# Patient Record
Sex: FEMALE | Race: WHITE | NOT HISPANIC OR LATINO | Employment: OTHER | URBAN - METROPOLITAN AREA
[De-identification: names, ages, dates, MRNs, and addresses within clinical notes are randomized per-mention and may not be internally consistent; named-entity substitution may affect disease eponyms.]

---

## 2017-01-10 ENCOUNTER — OFFICE VISIT (OUTPATIENT)
Dept: INTERNAL MEDICINE | Facility: CLINIC | Age: 82
End: 2017-01-10

## 2017-01-10 ENCOUNTER — HOSPITAL ENCOUNTER (OUTPATIENT)
Dept: CARDIOLOGY | Facility: HOSPITAL | Age: 82
Setting detail: RECURRING SERIES
Discharge: HOME | End: 2017-01-10

## 2017-01-10 VITALS
TEMPERATURE: 97.9 F | WEIGHT: 142 LBS | HEART RATE: 76 BPM | BODY MASS INDEX: 25.97 KG/M2 | OXYGEN SATURATION: 98 % | SYSTOLIC BLOOD PRESSURE: 118 MMHG | DIASTOLIC BLOOD PRESSURE: 78 MMHG

## 2017-01-10 DIAGNOSIS — R82.90 ABNORMAL FINDING IN URINE: ICD-10-CM

## 2017-01-10 DIAGNOSIS — R10.84 GENERALIZED ABDOMINAL PAIN: ICD-10-CM

## 2017-01-10 DIAGNOSIS — R19.7 DIARRHEA, UNSPECIFIED TYPE: Primary | ICD-10-CM

## 2017-01-10 LAB
ALBUMIN SERPL-MCNC: 3.68 G/DL (ref 3.4–4.6)
ALBUMIN/GLOB SERPL: 1.1 G/DL
ALP SERPL-CCNC: 75 U/L (ref 46–116)
ALT SERPL W P-5'-P-CCNC: 21 U/L (ref 14–59)
ANION GAP SERPL CALCULATED.3IONS-SCNC: 9 MMOL/L
AST SERPL-CCNC: 22 U/L (ref 7–37)
BACTERIA UR QL AUTO: ABNORMAL /HPF
BASOPHILS # BLD AUTO: 0.01 10*3/MM3 (ref 0–0.2)
BASOPHILS NFR BLD AUTO: 0.1 % (ref 0–2)
BILIRUB SERPL-MCNC: 0.4 MG/DL (ref 0.2–1)
BILIRUB UR QL STRIP: NEGATIVE
BUN BLD-MCNC: 29 MG/DL (ref 6–22)
BUN/CREAT SERPL: 24.6 (ref 7–25)
CALCIUM SPEC-SCNC: 9.1 MG/DL (ref 8.6–10.5)
CHLORIDE SERPL-SCNC: 99 MMOL/L (ref 95–107)
CLARITY UR: ABNORMAL
CO2 SERPL-SCNC: 29 MMOL/L (ref 23–32)
COLOR UR: YELLOW
CREAT BLD-MCNC: 1.18 MG/DL (ref 0.55–1.02)
DEPRECATED RDW RBC AUTO: 55.7 FL (ref 37–54)
EOSINOPHIL # BLD AUTO: 0.07 10*3/MM3 (ref 0–0.7)
EOSINOPHIL NFR BLD AUTO: 0.8 % (ref 0–5)
ERYTHROCYTE [DISTWIDTH] IN BLOOD BY AUTOMATED COUNT: 16.8 % (ref 11.5–15)
GFR SERPL CREATININE-BSD FRML MDRD: 43 ML/MIN/1.73
GLOBULIN UR ELPH-MCNC: 3.2 GM/DL
GLUCOSE BLD-MCNC: 97 MG/DL (ref 70–100)
GLUCOSE UR STRIP-MCNC: NEGATIVE MG/DL
HCT VFR BLD AUTO: 38.7 % (ref 34.1–44.9)
HGB BLD-MCNC: 11.9 G/DL (ref 11.2–15.7)
HGB UR QL STRIP.AUTO: ABNORMAL
HYALINE CASTS UR QL AUTO: ABNORMAL /LPF
KETONES UR QL STRIP: NEGATIVE
LEUKOCYTE ESTERASE UR QL STRIP.AUTO: ABNORMAL
LYMPHOCYTES # BLD AUTO: 0.92 10*3/MM3 (ref 0.8–7)
LYMPHOCYTES NFR BLD AUTO: 11.1 % (ref 10–60)
MCH RBC QN AUTO: 28.5 PG (ref 26–34)
MCHC RBC AUTO-ENTMCNC: 30.7 G/DL (ref 31–37)
MCV RBC AUTO: 92.6 FL (ref 80–100)
MONOCYTES # BLD AUTO: 0.87 10*3/MM3 (ref 0–1)
MONOCYTES NFR BLD AUTO: 10.5 % (ref 0–13)
NEUTROPHILS # BLD AUTO: 6.41 10*3/MM3 (ref 1–11)
NEUTROPHILS NFR BLD AUTO: 77.5 % (ref 30–85)
NITRITE UR QL STRIP: NEGATIVE
PH UR STRIP.AUTO: <=5 [PH] (ref 5–8)
PLATELET # BLD AUTO: 192 10*3/MM3 (ref 150–450)
PMV BLD AUTO: 11.2 FL (ref 6–12)
POTASSIUM BLD-SCNC: 4.5 MMOL/L (ref 3.3–5.3)
PROT SERPL-MCNC: 6.9 G/DL (ref 6.3–8.4)
PROT UR QL STRIP: NEGATIVE
RBC # BLD AUTO: 4.18 10*6/MM3 (ref 3.93–5.22)
RBC # UR: ABNORMAL /HPF
REF LAB TEST METHOD: ABNORMAL
SODIUM BLD-SCNC: 137 MMOL/L (ref 136–145)
SP GR UR STRIP: 1.01 (ref 1–1.03)
SQUAMOUS #/AREA URNS HPF: ABNORMAL /HPF
UROBILINOGEN UR QL STRIP: ABNORMAL
WBC NRBC COR # BLD: 8.28 10*3/MM3 (ref 5–10)
WBC UR QL AUTO: ABNORMAL /HPF

## 2017-01-10 PROCEDURE — 36416 COLLJ CAPILLARY BLOOD SPEC: CPT | Performed by: INTERNAL MEDICINE

## 2017-01-10 PROCEDURE — 81001 URINALYSIS AUTO W/SCOPE: CPT | Performed by: NURSE PRACTITIONER

## 2017-01-10 PROCEDURE — 80053 COMPREHEN METABOLIC PANEL: CPT | Performed by: NURSE PRACTITIONER

## 2017-01-10 PROCEDURE — 85610 PROTHROMBIN TIME: CPT | Performed by: INTERNAL MEDICINE

## 2017-01-10 PROCEDURE — 85025 COMPLETE CBC W/AUTO DIFF WBC: CPT | Performed by: NURSE PRACTITIONER

## 2017-01-10 PROCEDURE — 99213 OFFICE O/P EST LOW 20 MIN: CPT | Performed by: NURSE PRACTITIONER

## 2017-01-10 RX ORDER — DICYCLOMINE HYDROCHLORIDE 10 MG/1
10 CAPSULE ORAL EVERY 6 HOURS PRN
Qty: 30 CAPSULE | Refills: 0 | Status: SHIPPED | OUTPATIENT
Start: 2017-01-10 | End: 2017-02-07

## 2017-01-10 NOTE — PROGRESS NOTES
Subjective   Yudith Robertson is a 89 y.o. female.     HPI Comments: She had similar symptoms around cherry for 3 days and it resolved itself. She started having diarrhea on Sunday, January 8, 2017. She stopped taking miralax on Sunday when diarrhea started.     Diarrhea    The problem occurs more than 10 times per day. The problem has been gradually worsening. The patient states that diarrhea awakens her from sleep. Associated symptoms include abdominal pain, bloating and increased flatus. Pertinent negatives include no chills, coughing, fever or vomiting. Associated symptoms comments: reflux. Risk factors include travel to endemic area, suspect food intake, recent hospitalization, recent antibiotic use and ill contacts. Her past medical history is significant for irritable bowel syndrome.        The following portions of the patient's history were reviewed and updated as appropriate: allergies, current medications, past social history, past surgical history and problem list.    Review of Systems   Constitutional: Positive for appetite change. Negative for activity change, chills, fatigue and fever.   Respiratory: Positive for shortness of breath (chronic at baseline ). Negative for cough and wheezing.    Cardiovascular: Negative for chest pain, palpitations and leg swelling.   Gastrointestinal: Positive for abdominal pain, bloating, diarrhea, flatus and nausea. Negative for blood in stool and vomiting.   Genitourinary: Positive for dysuria (intermittent, chronic).       Objective   Physical Exam   Constitutional: She is oriented to person, place, and time. She appears well-developed and well-nourished.   HENT:   Head: Normocephalic.   Nose: Nose normal.   Cardiovascular: Regular rhythm and normal heart sounds.  Exam reveals no S3 and no S4.    No murmur heard.  Pulmonary/Chest: Effort normal and breath sounds normal. She has no decreased breath sounds. She has no wheezes. She has no rhonchi. She has no rales.    Abdominal: Normal appearance and bowel sounds are normal. There is no hepatosplenomegaly. There is generalized tenderness. There is no CVA tenderness.   Musculoskeletal: She exhibits no edema.   Neurological: She is alert and oriented to person, place, and time. Gait normal.   Skin: Skin is warm and dry.   Psychiatric: She has a normal mood and affect.       Assessment/Plan   Yudith was seen today for diarrhea.    Diagnoses and all orders for this visit:    Diarrhea, unspecified type  Comments:  may take immodium prn   Orders:  -     Clostridium Difficile Toxin, PCR; Future  -     Stool Culture; Future  -     Fecal Leukocytes; Future  -     Ova & Parasite Examination; Future  -     Clostridium Difficile Toxin, PCR  -     Stool Culture  -     Fecal Leukocytes  -     Ova & Parasite Examination    Generalized abdominal pain  Comments:  intermittent cramping   Orders:  -     CBC Auto Differential; Future  -     Comprehensive Metabolic Panel; Future  -     Urinalysis With / Microscopic If Indicated; Future  -     dicyclomine (BENTYL) 10 MG capsule; Take 1 capsule by mouth Every 6 (Six) Hours As Needed (cramping).  -     CT Abdomen Pelvis With & Without Contrast; Future  -     CBC Auto Differential  -     Comprehensive Metabolic Panel  -     Urinalysis With / Microscopic If Indicated  -     Urinalysis, Microscopic Only; Future  -     Urinalysis, Microscopic Only    Abnormal finding in urine  -     Urine Culture; Future  -     Urine Culture    I encouraged patient to keep hydrated. She may need to see GI. Will check labs , stool and obtain imaging (r/o diverticulitis). If symptoms worsen go to ER.

## 2017-01-10 NOTE — MR AVS SNAPSHOT
Yudith Robertson   1/10/2017 9:15 AM   Office Visit    Dept Phone:  [phone]   Encounter #:  [id number]    Provider:  STEFANO Gibbs   Department:  [facility] INTERNAL MEDICINE                Your Full Care Plan              Today's Medication Changes          These changes are accurate as of: 1/10/17 10:16 AM.  If you have any questions, ask your nurse or doctor.               New Medication(s)Ordered:     dicyclomine 10 MG capsule   Commonly known as:  BENTYL   Take 1 capsule by mouth Every 6 (Six) Hours As Needed (cramping).            Where to Get Your Medications      These medications were sent to [facility] [address] [place] [address] - [address] [place] [address] - [phone]  - [phone] [place] [address]     Phone:  [phone]     dicyclomine 10 MG capsule                  Your Updated Medication List          This list is accurate as of: 1/10/17 10:16 AM.  Always use your most recent med list.                acetaminophen 500 MG tablet   Commonly known as:  TYLENOL       aspirin 81 MG EC tablet       carvedilol 12.5 MG tablet   Commonly known as:  COREG   Take 1 tablet by mouth 2 (two) times a day with meals.       clotrimazole-betamethasone 1-0.05 % cream   Commonly known as:  LOTRISONE       dicyclomine 10 MG capsule   Commonly known as:  BENTYL   Take 1 capsule by mouth Every 6 (Six) Hours As Needed (cramping).       fexofenadine 180 MG tablet   Commonly known as:  ALLEGRA   Take 1 tablet by mouth Daily.       furosemide 40 MG tablet   Commonly known as:  LASIX   1 Daily       isosorbide mononitrate 60 MG 24 hr tablet   Commonly known as:  IMDUR   Take 1 tablet by mouth  twice a day       lansoprazole 15 MG capsule   Commonly known as:  PREVACID   Take 1 capsule by mouth Daily.       LORazepam 0.5 MG tablet   Commonly known as:  ATIVAN   Take 1 tablet by mouth Every 8 (Eight) Hours As Needed for anxiety.       NIFEdipine XL 30  MG 24 hr tablet   Commonly known as:  PROCARDIA XL       polyethylene glycol powder   Commonly known as:  MIRALAX       potassium chloride 20 MEQ CR tablet   Commonly known as:  K-DUR,KLOR-CON   Take 1 tablet by mouth Daily.       simvastatin 40 MG tablet   Commonly known as:  ZOCOR   Take 1 tablet by mouth  daily       valsartan 320 MG tablet   Commonly known as:  DIOVAN       warfarin 3 MG tablet   Commonly known as:  COUMADIN   Take 1 tablet by mouth  daily or as directed               You Were Diagnosed With        Codes Comments    Diarrhea, unspecified type    -  Primary ICD-10-CM: R19.7  ICD-9-CM: 787.91 may take immodium prn     Generalized abdominal pain     ICD-10-CM: R10.84  ICD-9-CM: 789.07 intermittent cramping       Instructions     None    Patient Instructions History      Upcoming Appointments     Visit Type Date Time Department    ACUTE           1/10/2017  9:15 AM [facility]    FOLLOW UP 2/7/2017 10:00 AM [facility]    FOLLOW UP 3/13/2017 11:00 AM [facility]     Our records indicate that you have an active [employer] account.    You can view your After Visit Summary by going to [employer] and logging in with your [employer] username and password.  If you don't have a [employer] username and password but a parent or guardian has access to your record, the parent or guardian should login with their own [employer] username and password and access your record to view the After Visit Summary.    If you have questions, you can email [email or web address] or call [phone] to talk to our [employer] staff.  Remember, [employer] is NOT to be used for urgent needs.  For medical emergencies, dial 911.               Other Info from Your Visit           Your Appointments     Feb 07, 2017 10:00 AM EST   Follow Up with Felicitas Brantley MD   [facility] [place] [facility] (--)    [address]  [facility] [address]    [phone]           Arrive 15 minutes prior to appointment.            Mar 13, 2017 11:00 AM EDT   Follow Up with Deon Garzon MD   [facility] INTERNAL MEDICINE (--)    [address] [place] [id number] [phone]           Arrive 15 minutes prior to appointment.              Allergies     Morphine        Reason for Visit     Diarrhea           Vital Signs     Blood Pressure Pulse Temperature Weight Oxygen Saturation Body Mass Index    118/78 (BP Location: Left arm, Patient Position: Sitting, Cuff Size: Adult) 76 97.9 °F (36.6 °C) 142 lb (64.4 kg) 98% 25.97 kg/m2    Smoking Status                   Former Smoker           Problems and Diagnoses Noted     Diarrhea    -  Primary    Generalized abdominal pain

## 2017-01-11 ENCOUNTER — TELEPHONE (OUTPATIENT)
Dept: INTERNAL MEDICINE | Facility: CLINIC | Age: 82
End: 2017-01-11

## 2017-01-11 LAB — C DIFF TOX GENS STL QL NAA+PROBE: NEGATIVE

## 2017-01-11 NOTE — TELEPHONE ENCOUNTER
I called patient to follow up from office visit yesterday. She reports the stool frequency has diminished and is more formed (5 since yesterday) . No vomiting, fever, chills or abdominal pain. She is tolerated little food and drinking gatorade. She is scheduled for CT scan 1/17/16, but may not need to have performed if she continues to get better which we discussed. I informed her of lab results and will provide further update once specimen results are available. She is having indigestion and asked if ok to take some mylanta, which I said was ok.

## 2017-01-12 ENCOUNTER — TELEPHONE (OUTPATIENT)
Dept: INTERNAL MEDICINE | Facility: CLINIC | Age: 82
End: 2017-01-12

## 2017-01-12 DIAGNOSIS — N39.0 ACUTE UTI: Primary | ICD-10-CM

## 2017-01-12 LAB
Lab: NORMAL
O+P SPEC MICRO: NORMAL
OVA + PARASITE RESULT 1: NORMAL
WBC STL QL MICRO: NORMAL

## 2017-01-12 RX ORDER — CIPROFLOXACIN 250 MG/1
250 TABLET, FILM COATED ORAL 2 TIMES DAILY
Qty: 10 TABLET | Refills: 0 | Status: SHIPPED | OUTPATIENT
Start: 2017-01-12 | End: 2017-01-17

## 2017-01-12 NOTE — TELEPHONE ENCOUNTER
Pt informed.  Wanted to make sure we can call her with rest of results before the 17th as that is when she is scheduled for CT and only wants to get that done if she absolutely must.  Thanks

## 2017-01-12 NOTE — TELEPHONE ENCOUNTER
----- Message from Maglaie Moon MA sent at 1/12/2017  2:34 PM EST -----  pls call pt with results to UA/C&S as soon as able   Pt also asks if she should have the other test you spoke to her about.    Pt#[phone]    ##pt was very vague

## 2017-01-13 ENCOUNTER — TELEPHONE (OUTPATIENT)
Dept: INTERNAL MEDICINE | Facility: CLINIC | Age: 82
End: 2017-01-13

## 2017-01-13 LAB
BACTERIA UR CULT: ABNORMAL
Lab: ABNORMAL
Lab: ABNORMAL
SUSCEPTIBILITY TESTING: ABNORMAL

## 2017-01-13 NOTE — TELEPHONE ENCOUNTER
----- Message from Jacki Ross sent at 1/13/2017 12:01 PM EST -----  Contact: pt  Pt calling, she would like to know if she needs to come back in a follow up after she finishes her antibiotic.     #[phone]    Kati

## 2017-01-13 NOTE — TELEPHONE ENCOUNTER
Pt informed.  She is canceling CT scan as  is having cataract surgery and her symptoms have improved.

## 2017-01-14 LAB
CAMPYLOBACTER STL CULT: NORMAL
E COLI SXT STL QL IA: NEGATIVE
Lab: NORMAL
Lab: NORMAL
SALM + SHIG STL CULT: NORMAL

## 2017-01-17 ENCOUNTER — APPOINTMENT (OUTPATIENT)
Dept: CT IMAGING | Facility: HOSPITAL | Age: 82
End: 2017-01-17

## 2017-01-17 RX ORDER — CARVEDILOL 12.5 MG/1
TABLET ORAL
Qty: 180 TABLET | Refills: 1 | Status: SHIPPED | OUTPATIENT
Start: 2017-01-17 | End: 2017-03-31 | Stop reason: SDUPTHER

## 2017-01-17 RX ORDER — VALSARTAN 320 MG/1
320 TABLET ORAL DAILY
Qty: 90 TABLET | Refills: 1 | Status: SHIPPED | OUTPATIENT
Start: 2017-01-17 | End: 2017-06-23 | Stop reason: SDUPTHER

## 2017-01-24 ENCOUNTER — HOSPITAL ENCOUNTER (OUTPATIENT)
Dept: CARDIOLOGY | Facility: HOSPITAL | Age: 82
Setting detail: RECURRING SERIES
Discharge: HOME | End: 2017-01-24

## 2017-01-24 PROCEDURE — 36416 COLLJ CAPILLARY BLOOD SPEC: CPT | Performed by: INTERNAL MEDICINE

## 2017-01-24 PROCEDURE — 85610 PROTHROMBIN TIME: CPT | Performed by: INTERNAL MEDICINE

## 2017-02-07 ENCOUNTER — OFFICE VISIT (OUTPATIENT)
Dept: CARDIOLOGY | Facility: CLINIC | Age: 82
End: 2017-02-07

## 2017-02-07 ENCOUNTER — HOSPITAL ENCOUNTER (OUTPATIENT)
Dept: CARDIOLOGY | Facility: HOSPITAL | Age: 82
Setting detail: RECURRING SERIES
Discharge: HOME | End: 2017-02-07

## 2017-02-07 VITALS
HEART RATE: 72 BPM | DIASTOLIC BLOOD PRESSURE: 66 MMHG | BODY MASS INDEX: 28.66 KG/M2 | HEIGHT: 60 IN | WEIGHT: 146 LBS | SYSTOLIC BLOOD PRESSURE: 130 MMHG | RESPIRATION RATE: 16 BRPM

## 2017-02-07 DIAGNOSIS — R92.1 BREAST CALCIFICATIONS: ICD-10-CM

## 2017-02-07 DIAGNOSIS — S09.90XA CLOSED HEAD INJURY, INITIAL ENCOUNTER: ICD-10-CM

## 2017-02-07 DIAGNOSIS — I48.20 CHRONIC ATRIAL FIBRILLATION (HCC): Primary | ICD-10-CM

## 2017-02-07 PROCEDURE — 93000 ELECTROCARDIOGRAM COMPLETE: CPT | Performed by: INTERNAL MEDICINE

## 2017-02-07 PROCEDURE — 36416 COLLJ CAPILLARY BLOOD SPEC: CPT

## 2017-02-07 PROCEDURE — 99213 OFFICE O/P EST LOW 20 MIN: CPT | Performed by: INTERNAL MEDICINE

## 2017-02-07 PROCEDURE — 85610 PROTHROMBIN TIME: CPT

## 2017-02-07 NOTE — PROGRESS NOTES
PATIENTINFORMATION    Date of Office Visit: 2017  Encounter Provider: Felicitas Brantley MD  Place of Service: [facility] CARDIOLOGY  Patient Name: Yudith Robertson  : 1927    Subjective:     Encounter Date:2017      Patient ID: Yudith Robertson is a 89 y.o. female.      History of Present Illness    This is an 88-year-old woman who is well known to me. She had a heart catheterization in , which showed nonobstructive disease with 30%-40% stenoses in various vessels and an ejection fraction of 50%. She came to [facility] in 2015 with chest pain. She had a PET stress test 2/15 in our office, which was normal and showed normal left ventricular function. Echocardiogram was in 2014 and showed normal left ventricular systolic function with an ejection fraction of 54%. There was abnormal diastolic filling pressures. There was mild to moderate mitral regurgitation, moderate to severe tricuspid regurgitation, and right ventricular systolic pressure of 52 mmHg. She has persistent atrial fibrillation.       She came back to the hospital on 2015. Her blood pressure, which is generally well controlled, was elevated that day. She took three nitroglycerins at home, but it still remained about 200/100 mmHg and she came to the emergency room. There, she had some nitro paste and her symptoms resolved. She ruled out for a myocardial infarction and discharged home on the same home medications.       She was admitted to [facility] on 2015, with acute diastolic congestive heart failure. She diuresed with intravenous Lasix and then with oral Lasix. She complained of chest pain, which sounded like an esophageal stricture. Dr. Morales saw her and recommended an outpatient EGD with Dr. Abrams. She did have this and had an esophageal stricture dilated.      I saw her in 2016. At that time she was complaining of shortness of breath with paroxysmal nocturnal dyspnea and  orthopnea. Because of this, she had a repeat echocardiogram performed on August 3, 2016:  · Left ventricular function is normal. Calculated EF = 63.6%. Estimated EF was in agreement with the calculated EF. Estimated EF = 64%. Normal left ventricular cavity size noted. Left ventricular wall thickness is consistent with mild concentric hypertrophy. Left ventricular diastolic function was unable to be assessed. Elevated left atrial pressure.  · The following segments are hypokinetic: basal inferior and mid inferior. All other segments are normal.  · Left atrial volume is severely increased.  · Right atrial cavity size is severely dilated  · There is severe thickening of the aortic valve. Mild aortic valve regurgitation is present.  · Severe mitral annular calcification is present. Mild mitral valve regurgitation is present.  · The tricuspid valve is grossly normal. Moderate tricuspid valve stenosis is present. Mild tricuspid valve regurgitation is present. Estimated right ventricular systolic pressure from tricuspid regurgitation is moderately elevated (45-55 mmHg). The calculated RV systolic pressures 51 mmHg.     She comes in today with really no new complaints.  She continues to have some palpitations which she notices mostly at night and sometimes during the day.  She is short of breath with exertion such as walking area and she does sleep on 2 pillows but does not have any paroxysmal nocturnal dyspnea or lower extremity edema.  She does have some lightheadedness mostly with position change.  She has a sensation she might fall backwards sometimes.  She is fatigued and does not have her usual energy levels.    Review of Systems   Constitution: Negative for fever, malaise/fatigue, weight gain and weight loss.   HENT: Positive for hearing loss. Negative for ear pain, nosebleeds and sore throat.    Eyes: Positive for vision loss in left eye and vision loss in right eye. Negative for double vision and pain.  "  Cardiovascular:        See history of present illness.   Respiratory: Negative for cough, shortness of breath, sleep disturbances due to breathing, snoring and wheezing.    Endocrine: Positive for cold intolerance and heat intolerance. Negative for polyuria.   Skin: Negative for itching, poor wound healing and rash.   Musculoskeletal: Positive for joint pain and myalgias. Negative for joint swelling.   Gastrointestinal: Negative for abdominal pain, diarrhea, hematochezia, nausea and vomiting.   Genitourinary: Negative for hematuria and hesitancy.   Neurological: Negative for numbness, paresthesias and seizures.   Psychiatric/Behavioral: Negative for depression. The patient is nervous/anxious.            ECG 12 Lead  Date/Time: 2/7/2017 11:05 AM  Performed by: LEE ANN REARDON  Authorized by: LEE ANN REARDON   Comparison: compared with previous ECG from 11/8/2016  Similar to previous ECG  Rhythm: atrial fibrillation  BPM: 72  Conduction: non-specific intraventricular conduction delay  Clinical impression: abnormal ECG               Objective:       Visit Vitals   • /66 (BP Location: Left arm, Patient Position: Sitting, Cuff Size: Adult)   • Pulse 72   • Resp 16   • Ht 60\" (152.4 cm)   • Wt 146 lb (66.2 kg)   • BMI 28.51 kg/m2    Body mass index is 28.51 kg/(m^2).     Physical Exam   Constitutional: She appears well-developed.   HENT:   Head: Normocephalic and atraumatic.   Eyes: Conjunctivae and lids are normal. Pupils are equal, round, and reactive to light. Lids are everted and swept, no foreign bodies found.   Neck: Normal range of motion. No JVD present. Carotid bruit is not present. No tracheal deviation present. No thyroid mass present.   Cardiovascular: Normal rate and normal heart sounds.  An irregularly irregular rhythm present.   Pulses:       Dorsalis pedis pulses are 2+ on the right side, and 2+ on the left side.   Pulmonary/Chest: Effort normal and breath sounds normal.   Abdominal: Normal " appearance and bowel sounds are normal.   Musculoskeletal: Normal range of motion.   Neurological: She is alert. She has normal strength.   Skin: Skin is warm, dry and intact.   Psychiatric: She has a normal mood and affect. Her behavior is normal.   Vitals reviewed.          Assessment/Plan:       1. Dyspnea.  This has improved. She seems euvolemic. I am not going to adjust her medications today.  2. Chest pain. She has had years of chest pain. As mentioned, she has had a heart catheterization in the past, which showed nonobstructive disease. Her most recent stress was in 02/2015 and was normal.  She has problems with esophageal strictures and follows with Dr. Abrams.  3. Hypertension.  Her blood pressure looks good today.   4. Atrial fibrillation. She is rate controlled and on Coumadin.  Atrial Fibrillation and Atrial Flutter  Assessment  • The patient has permanent atrial fibrillation  • The patient's CHADS2-VASc score is 7  • A [id number]-BSIb score of 2 or more is considered a high risk for a thromboembolic event  • Warfarin prescribed    Plan  • Continue in atrial fibrillation with rate control  • Continue warfarin for antithrombotic therapy, bleeding issues discussed  • Continue beta blocker for rhythm control    5. Diastolic heart failure. Controlled on current dose of diuretic.   6. Valvular heart disease with moderate tricuspid regurgitation and mild mitral regurgitation.   7. Moderate pulmonary hypertension.   8. History of carotid stenosis, 30%-40% bilateral disease.   9. History of gastroesophageal reflux disease and esophageal stenosis.   10. Hyperlipidemia.   11. History of small bowel obstruction.   12. History of left bundle branch block.   13. History of transient ischemic attack.       I will see her back in 3 months.     Orders Placed This Encounter   Procedures   • ECG 12 Lead     This order was created via procedure documentation      Yudith Robertson Medication Instructions OLEGARIO:    Printed  on:02/07/17 1109   Medication Information                      acetaminophen (TYLENOL) 500 MG tablet  Take 500 mg by mouth every 6 (six) hours as needed for mild pain (1-3).             aspirin 81 MG EC tablet  Take 81 mg by mouth daily.             carvedilol (COREG) 12.5 MG tablet  Take 1 tablet by mouth two  times daily with meals             fexofenadine (ALLEGRA) 180 MG tablet  Take 1 tablet by mouth Daily.             furosemide (LASIX) 40 MG tablet  1 Daily             isosorbide mononitrate (IMDUR) 60 MG 24 hr tablet  Take 1 tablet by mouth  twice a day             lansoprazole (PREVACID) 15 MG capsule  Take 1 capsule by mouth Daily.             LORazepam (ATIVAN) 0.5 MG tablet  Take 1 tablet by mouth Every 8 (Eight) Hours As Needed for anxiety.             NIFEdipine XL (PROCARDIA XL) 30 MG 24 hr tablet  Take 30 mg by mouth daily.             polyethylene glycol (MIRALAX) powder  Take 17 g by mouth daily.             potassium chloride (K-DUR,KLOR-CON) 20 MEQ CR tablet  Take 1 tablet by mouth Daily.             simvastatin (ZOCOR) 40 MG tablet  Take 1 tablet by mouth  daily             valsartan (DIOVAN) 320 MG tablet  Take 1 tablet by mouth Daily.             warfarin (COUMADIN) 3 MG tablet  Take 1 tablet by mouth  daily or as directed                        Felicitas Brantley MD  02/07/17  11:09 AM

## 2017-02-16 DIAGNOSIS — F39 MOOD DISORDER (HCC): ICD-10-CM

## 2017-02-16 RX ORDER — LORAZEPAM 0.5 MG/1
TABLET ORAL
Qty: 90 TABLET | Refills: 1 | OUTPATIENT
Start: 2017-02-16 | End: 2017-04-19 | Stop reason: SDUPTHER

## 2017-02-21 ENCOUNTER — HOSPITAL ENCOUNTER (OUTPATIENT)
Dept: CARDIOLOGY | Facility: HOSPITAL | Age: 82
Setting detail: RECURRING SERIES
Discharge: HOME | End: 2017-02-21

## 2017-02-21 PROCEDURE — 36416 COLLJ CAPILLARY BLOOD SPEC: CPT

## 2017-02-21 PROCEDURE — 85610 PROTHROMBIN TIME: CPT

## 2017-02-28 ENCOUNTER — HOSPITAL ENCOUNTER (OUTPATIENT)
Dept: CARDIOLOGY | Facility: HOSPITAL | Age: 82
Setting detail: RECURRING SERIES
Discharge: HOME | End: 2017-02-28

## 2017-02-28 PROCEDURE — 36416 COLLJ CAPILLARY BLOOD SPEC: CPT

## 2017-02-28 PROCEDURE — 85610 PROTHROMBIN TIME: CPT

## 2017-03-13 ENCOUNTER — OFFICE VISIT (OUTPATIENT)
Dept: INTERNAL MEDICINE | Facility: CLINIC | Age: 82
End: 2017-03-13

## 2017-03-13 ENCOUNTER — HOSPITAL ENCOUNTER (OUTPATIENT)
Dept: CARDIOLOGY | Facility: HOSPITAL | Age: 82
Setting detail: RECURRING SERIES
Discharge: HOME | End: 2017-03-13

## 2017-03-13 VITALS
OXYGEN SATURATION: 95 % | HEART RATE: 86 BPM | SYSTOLIC BLOOD PRESSURE: 152 MMHG | DIASTOLIC BLOOD PRESSURE: 90 MMHG | WEIGHT: 146 LBS | HEIGHT: 60 IN | BODY MASS INDEX: 28.66 KG/M2

## 2017-03-13 DIAGNOSIS — I48.20 CHRONIC ATRIAL FIBRILLATION (HCC): Primary | ICD-10-CM

## 2017-03-13 DIAGNOSIS — E78.2 MIXED HYPERLIPIDEMIA: ICD-10-CM

## 2017-03-13 DIAGNOSIS — K21.00 GASTROESOPHAGEAL REFLUX DISEASE WITH ESOPHAGITIS: ICD-10-CM

## 2017-03-13 DIAGNOSIS — I10 ESSENTIAL HYPERTENSION: ICD-10-CM

## 2017-03-13 LAB
ALBUMIN SERPL-MCNC: 3.68 G/DL (ref 3.4–4.6)
ALBUMIN/GLOB SERPL: 1.1 G/DL
ALP SERPL-CCNC: 75 U/L (ref 46–116)
ALT SERPL W P-5'-P-CCNC: 18 U/L (ref 14–59)
ANION GAP SERPL CALCULATED.3IONS-SCNC: 8 MMOL/L
AST SERPL-CCNC: 22 U/L (ref 7–37)
BASOPHILS # BLD AUTO: 0.02 10*3/MM3 (ref 0–0.2)
BASOPHILS NFR BLD AUTO: 0.3 % (ref 0–2)
BILIRUB SERPL-MCNC: 0.4 MG/DL (ref 0.2–1)
BUN BLD-MCNC: 16 MG/DL (ref 6–22)
BUN/CREAT SERPL: 19.3 (ref 7–25)
CALCIUM SPEC-SCNC: 9.2 MG/DL (ref 8.6–10.5)
CHLORIDE SERPL-SCNC: 100 MMOL/L (ref 95–107)
CHOLEST SERPL-MCNC: 145 MG/DL (ref 0–200)
CO2 SERPL-SCNC: 32 MMOL/L (ref 23–32)
CREAT BLD-MCNC: 0.83 MG/DL (ref 0.55–1.02)
DEPRECATED RDW RBC AUTO: 55.7 FL (ref 37–54)
EOSINOPHIL # BLD AUTO: 0.18 10*3/MM3 (ref 0–0.7)
EOSINOPHIL NFR BLD AUTO: 2.3 % (ref 0–5)
ERYTHROCYTE [DISTWIDTH] IN BLOOD BY AUTOMATED COUNT: 16.7 % (ref 11.5–15)
GFR SERPL CREATININE-BSD FRML MDRD: 65 ML/MIN/1.73
GLOBULIN UR ELPH-MCNC: 3.3 GM/DL
GLUCOSE BLD-MCNC: 104 MG/DL (ref 70–100)
HCT VFR BLD AUTO: 35.9 % (ref 34.1–44.9)
HDLC SERPL-MCNC: 54 MG/DL (ref 40–81)
HGB BLD-MCNC: 11.1 G/DL (ref 11.2–15.7)
LDLC SERPL CALC-MCNC: 72 MG/DL (ref 0–100)
LDLC/HDLC SERPL: 1.34 {RATIO}
LYMPHOCYTES # BLD AUTO: 1.54 10*3/MM3 (ref 0.8–7)
LYMPHOCYTES NFR BLD AUTO: 19.8 % (ref 10–60)
MCH RBC QN AUTO: 29.1 PG (ref 26–34)
MCHC RBC AUTO-ENTMCNC: 30.9 G/DL (ref 31–37)
MCV RBC AUTO: 94 FL (ref 80–100)
MONOCYTES # BLD AUTO: 0.84 10*3/MM3 (ref 0–1)
MONOCYTES NFR BLD AUTO: 10.8 % (ref 0–13)
NEUTROPHILS # BLD AUTO: 5.21 10*3/MM3 (ref 1–11)
NEUTROPHILS NFR BLD AUTO: 66.8 % (ref 30–85)
PLATELET # BLD AUTO: 258 10*3/MM3 (ref 150–450)
PMV BLD AUTO: 11.5 FL (ref 6–12)
POTASSIUM BLD-SCNC: 4 MMOL/L (ref 3.3–5.3)
PROT SERPL-MCNC: 7 G/DL (ref 6.3–8.4)
RBC # BLD AUTO: 3.82 10*6/MM3 (ref 3.93–5.22)
SODIUM BLD-SCNC: 140 MMOL/L (ref 136–145)
T4 FREE SERPL-MCNC: 1.17 NG/DL (ref 0.93–1.7)
TRIGL SERPL-MCNC: 93 MG/DL (ref 0–150)
TSH SERPL DL<=0.05 MIU/L-ACNC: 3.73 MIU/ML (ref 0.4–4.2)
VLDLC SERPL-MCNC: 18.6 MG/DL
WBC NRBC COR # BLD: 7.79 10*3/MM3 (ref 5–10)

## 2017-03-13 PROCEDURE — 85610 PROTHROMBIN TIME: CPT

## 2017-03-13 PROCEDURE — 84443 ASSAY THYROID STIM HORMONE: CPT | Performed by: INTERNAL MEDICINE

## 2017-03-13 PROCEDURE — 36416 COLLJ CAPILLARY BLOOD SPEC: CPT

## 2017-03-13 PROCEDURE — 80053 COMPREHEN METABOLIC PANEL: CPT | Performed by: INTERNAL MEDICINE

## 2017-03-13 PROCEDURE — 80061 LIPID PANEL: CPT | Performed by: INTERNAL MEDICINE

## 2017-03-13 PROCEDURE — 99214 OFFICE O/P EST MOD 30 MIN: CPT | Performed by: INTERNAL MEDICINE

## 2017-03-13 PROCEDURE — 85025 COMPLETE CBC W/AUTO DIFF WBC: CPT | Performed by: INTERNAL MEDICINE

## 2017-03-13 NOTE — PROGRESS NOTES
Subjective   Yudith Robertson is a 89 y.o. female.     Atrial Fibrillation   Presents for follow-up visit. Symptoms include shortness of breath. Past medical history includes atrial fibrillation and hyperlipidemia.   Hyperlipidemia   This is a chronic problem. The current episode started more than 1 year ago. Associated symptoms include shortness of breath.   Hypertension   This is a chronic problem. The current episode started more than 1 year ago. Associated symptoms include shortness of breath.        The following portions of the patient's history were reviewed and updated as appropriate: allergies, current medications, past family history, past medical history, past social history, past surgical history and problem list.    Review of Systems   Constitutional: Positive for fatigue (Stays tired all the time).        Generally doing about the sa me    HENT: Negative.    Eyes: Negative.    Respiratory: Positive for shortness of breath.    Cardiovascular:        Saw Dr Brantley last month& her A Fib, CHF  were stable    Gastrointestinal: Positive for constipation (Takes Miralax & stool softener).   Genitourinary: Negative.    Musculoskeletal: Positive for arthralgias (Usual aches & pains).   Neurological: Negative.    Psychiatric/Behavioral: Negative.        Objective   Physical Exam   Constitutional: She is oriented to person, place, and time. She appears well-developed.   HENT:   Head: Normocephalic.   Eyes: EOM are normal.   Neck: Neck supple.   Cardiovascular: Normal rate and normal heart sounds.    Repeat 150/70 Irreg irreg   Pulmonary/Chest: Effort normal and breath sounds normal.   Musculoskeletal: Normal range of motion.   Neurological: She is alert and oriented to person, place, and time.   Vitals reviewed.      Assessment/Plan   Yudith was seen today for atrial fibrillation, hyperlipidemia and hypertension.    Diagnoses and all orders for this visit:    Chronic atrial fibrillation  -     TSH; Future  -     T4, Free;  Future    Essential hypertension  -     CBC Auto Differential; Future  -     Comprehensive Metabolic Panel; Future    Mixed hyperlipidemia  -     Lipid Panel; Future    Gastroesophageal reflux disease with esophagitis

## 2017-03-17 ENCOUNTER — TELEPHONE (OUTPATIENT)
Dept: INTERNAL MEDICINE | Facility: CLINIC | Age: 82
End: 2017-03-17

## 2017-03-17 NOTE — TELEPHONE ENCOUNTER
Patient called requesting a call back regarding her most recent labs.  States she had a sister to pass yesterday, and can leave message on her cell phone if need be.   Please review labs and make your note so I can call pt with results.    Please advise.     Patient:  [phone]

## 2017-03-31 RX ORDER — CARVEDILOL 12.5 MG/1
TABLET ORAL
Qty: 180 TABLET | Refills: 3 | Status: SHIPPED | OUTPATIENT
Start: 2017-03-31 | End: 2017-05-23 | Stop reason: HOSPADM

## 2017-04-10 ENCOUNTER — HOSPITAL ENCOUNTER (OUTPATIENT)
Dept: CARDIOLOGY | Facility: HOSPITAL | Age: 82
Setting detail: RECURRING SERIES
Discharge: HOME | End: 2017-04-10

## 2017-04-10 PROCEDURE — 36416 COLLJ CAPILLARY BLOOD SPEC: CPT

## 2017-04-10 PROCEDURE — 85610 PROTHROMBIN TIME: CPT

## 2017-04-19 DIAGNOSIS — F39 MOOD DISORDER (HCC): ICD-10-CM

## 2017-04-19 RX ORDER — LORAZEPAM 0.5 MG/1
0.5 TABLET ORAL EVERY 8 HOURS PRN
Qty: 90 TABLET | Refills: 0 | OUTPATIENT
Start: 2017-04-19 | End: 2017-06-23 | Stop reason: SDUPTHER

## 2017-04-19 NOTE — TELEPHONE ENCOUNTER
----- Message from Jacki Ross sent at 4/19/2017  8:41 AM EDT -----  Contact: pt  Pt calling and would like a refill on RX sent into Pharmacy. Please advise.     LORazepam (ATIVAN) 0.5 MG tablet    Pt#[phone]     Last R 02/16/17    christiano done 02/20   please review med refill and advice

## 2017-05-01 RX ORDER — WARFARIN SODIUM 3 MG/1
3 TABLET ORAL
Qty: 90 TABLET | Refills: 0 | Status: SHIPPED | OUTPATIENT
Start: 2017-05-01 | End: 2017-08-18 | Stop reason: HOSPADM

## 2017-05-08 ENCOUNTER — HOSPITAL ENCOUNTER (OUTPATIENT)
Dept: CARDIOLOGY | Facility: HOSPITAL | Age: 82
Setting detail: RECURRING SERIES
Discharge: HOME | End: 2017-05-08

## 2017-05-08 PROCEDURE — 36416 COLLJ CAPILLARY BLOOD SPEC: CPT

## 2017-05-08 PROCEDURE — 85610 PROTHROMBIN TIME: CPT

## 2017-05-17 ENCOUNTER — APPOINTMENT (OUTPATIENT)
Dept: GENERAL RADIOLOGY | Facility: HOSPITAL | Age: 82
End: 2017-05-17

## 2017-05-17 ENCOUNTER — HOSPITAL ENCOUNTER (INPATIENT)
Facility: HOSPITAL | Age: 82
LOS: 5 days | Discharge: HOME | End: 2017-05-23
Attending: EMERGENCY MEDICINE | Admitting: INTERNAL MEDICINE

## 2017-05-17 DIAGNOSIS — K21.9 GASTROESOPHAGEAL REFLUX DISEASE, ESOPHAGITIS PRESENCE NOT SPECIFIED: ICD-10-CM

## 2017-05-17 DIAGNOSIS — J69.0 ASPIRATION PNEUMONIA OF RIGHT LOWER LOBE, UNSPECIFIED ASPIRATION PNEUMONIA TYPE (HCC): Primary | ICD-10-CM

## 2017-05-17 DIAGNOSIS — R13.10 DYSPHAGIA, UNSPECIFIED TYPE: ICD-10-CM

## 2017-05-17 PROCEDURE — 85610 PROTHROMBIN TIME: CPT | Performed by: EMERGENCY MEDICINE

## 2017-05-17 PROCEDURE — 93010 ELECTROCARDIOGRAM REPORT: CPT | Performed by: INTERNAL MEDICINE

## 2017-05-17 PROCEDURE — 80053 COMPREHEN METABOLIC PANEL: CPT | Performed by: EMERGENCY MEDICINE

## 2017-05-17 PROCEDURE — 71020 HC CHEST PA AND LATERAL: CPT

## 2017-05-17 PROCEDURE — 36415 COLL VENOUS BLD VENIPUNCTURE: CPT

## 2017-05-17 PROCEDURE — 84484 ASSAY OF TROPONIN QUANT: CPT | Performed by: EMERGENCY MEDICINE

## 2017-05-17 PROCEDURE — 99284 EMERGENCY DEPT VISIT MOD MDM: CPT

## 2017-05-17 PROCEDURE — 93005 ELECTROCARDIOGRAM TRACING: CPT | Performed by: EMERGENCY MEDICINE

## 2017-05-17 PROCEDURE — 85025 COMPLETE CBC W/AUTO DIFF WBC: CPT | Performed by: EMERGENCY MEDICINE

## 2017-05-18 PROBLEM — J69.0 ASPIRATION PNEUMONIA OF RIGHT LOWER LOBE (HCC): Status: ACTIVE | Noted: 2017-05-18

## 2017-05-18 LAB
ALBUMIN SERPL-MCNC: 4.2 G/DL (ref 3.5–5.2)
ALBUMIN/GLOB SERPL: 1.2 G/DL
ALP SERPL-CCNC: 80 U/L (ref 39–117)
ALT SERPL W P-5'-P-CCNC: 14 U/L (ref 1–33)
ANION GAP SERPL CALCULATED.3IONS-SCNC: 14.4 MMOL/L
ANION GAP SERPL CALCULATED.3IONS-SCNC: 14.8 MMOL/L
AST SERPL-CCNC: 21 U/L (ref 1–32)
B PERT DNA SPEC QL NAA+PROBE: NOT DETECTED
BASOPHILS # BLD AUTO: 0.03 10*3/MM3 (ref 0–0.2)
BASOPHILS # BLD AUTO: 0.03 10*3/MM3 (ref 0–0.2)
BASOPHILS NFR BLD AUTO: 0.3 % (ref 0–1.5)
BASOPHILS NFR BLD AUTO: 0.3 % (ref 0–1.5)
BILIRUB SERPL-MCNC: 0.3 MG/DL (ref 0.1–1.2)
BUN BLD-MCNC: 14 MG/DL (ref 8–23)
BUN BLD-MCNC: 16 MG/DL (ref 8–23)
BUN/CREAT SERPL: 17.3 (ref 7–25)
BUN/CREAT SERPL: 17.8 (ref 7–25)
C PNEUM DNA NPH QL NAA+NON-PROBE: NOT DETECTED
CALCIUM SPEC-SCNC: 9.5 MG/DL (ref 8.6–10.5)
CALCIUM SPEC-SCNC: 9.9 MG/DL (ref 8.6–10.5)
CHLORIDE SERPL-SCNC: 88 MMOL/L (ref 98–107)
CHLORIDE SERPL-SCNC: 91 MMOL/L (ref 98–107)
CO2 SERPL-SCNC: 23.6 MMOL/L (ref 22–29)
CO2 SERPL-SCNC: 26.2 MMOL/L (ref 22–29)
CREAT BLD-MCNC: 0.81 MG/DL (ref 0.57–1)
CREAT BLD-MCNC: 0.9 MG/DL (ref 0.57–1)
CREAT UR-MCNC: 27.5 MG/DL
D-LACTATE SERPL-SCNC: 1.4 MMOL/L (ref 0.5–2)
DEPRECATED RDW RBC AUTO: 51.9 FL (ref 37–54)
DEPRECATED RDW RBC AUTO: 52.1 FL (ref 37–54)
EOSINOPHIL # BLD AUTO: 0.11 10*3/MM3 (ref 0–0.7)
EOSINOPHIL # BLD AUTO: 0.17 10*3/MM3 (ref 0–0.7)
EOSINOPHIL NFR BLD AUTO: 1 % (ref 0.3–6.2)
EOSINOPHIL NFR BLD AUTO: 1.9 % (ref 0.3–6.2)
ERYTHROCYTE [DISTWIDTH] IN BLOOD BY AUTOMATED COUNT: 15.4 % (ref 11.7–13)
ERYTHROCYTE [DISTWIDTH] IN BLOOD BY AUTOMATED COUNT: 15.5 % (ref 11.7–13)
FLUAV H1 2009 PAND RNA NPH QL NAA+PROBE: NOT DETECTED
FLUAV H1 HA GENE NPH QL NAA+PROBE: NOT DETECTED
FLUAV H3 RNA NPH QL NAA+PROBE: NOT DETECTED
FLUAV SUBTYP SPEC NAA+PROBE: NOT DETECTED
FLUBV RNA ISLT QL NAA+PROBE: NOT DETECTED
GFR SERPL CREATININE-BSD FRML MDRD: 59 ML/MIN/1.73
GFR SERPL CREATININE-BSD FRML MDRD: 67 ML/MIN/1.73
GLOBULIN UR ELPH-MCNC: 3.6 GM/DL
GLUCOSE BLD-MCNC: 130 MG/DL (ref 65–99)
GLUCOSE BLD-MCNC: 131 MG/DL (ref 65–99)
HADV DNA SPEC NAA+PROBE: NOT DETECTED
HCOV 229E RNA SPEC QL NAA+PROBE: NOT DETECTED
HCOV HKU1 RNA SPEC QL NAA+PROBE: NOT DETECTED
HCOV NL63 RNA SPEC QL NAA+PROBE: NOT DETECTED
HCOV OC43 RNA SPEC QL NAA+PROBE: NOT DETECTED
HCT VFR BLD AUTO: 35.1 % (ref 35.6–45.5)
HCT VFR BLD AUTO: 35.1 % (ref 35.6–45.5)
HGB BLD-MCNC: 11.1 G/DL (ref 11.9–15.5)
HGB BLD-MCNC: 11.4 G/DL (ref 11.9–15.5)
HMPV RNA NPH QL NAA+NON-PROBE: NOT DETECTED
HPIV1 RNA SPEC QL NAA+PROBE: NOT DETECTED
HPIV2 RNA SPEC QL NAA+PROBE: NOT DETECTED
HPIV3 RNA NPH QL NAA+PROBE: NOT DETECTED
HPIV4 P GENE NPH QL NAA+PROBE: NOT DETECTED
IMM GRANULOCYTES # BLD: 0.02 10*3/MM3 (ref 0–0.03)
IMM GRANULOCYTES # BLD: 0.03 10*3/MM3 (ref 0–0.03)
IMM GRANULOCYTES NFR BLD: 0.2 % (ref 0–0.5)
IMM GRANULOCYTES NFR BLD: 0.3 % (ref 0–0.5)
INR PPP: 2.39 (ref 0.9–1.1)
INR PPP: 2.44 (ref 0.9–1.1)
LYMPHOCYTES # BLD AUTO: 1.42 10*3/MM3 (ref 0.9–4.8)
LYMPHOCYTES # BLD AUTO: 1.68 10*3/MM3 (ref 0.9–4.8)
LYMPHOCYTES NFR BLD AUTO: 13.2 % (ref 19.6–45.3)
LYMPHOCYTES NFR BLD AUTO: 18.6 % (ref 19.6–45.3)
M PNEUMO IGG SER IA-ACNC: NOT DETECTED
MAGNESIUM SERPL-MCNC: 2.3 MG/DL (ref 1.6–2.4)
MCH RBC QN AUTO: 29.3 PG (ref 26.9–32)
MCH RBC QN AUTO: 29.8 PG (ref 26.9–32)
MCHC RBC AUTO-ENTMCNC: 31.6 G/DL (ref 32.4–36.3)
MCHC RBC AUTO-ENTMCNC: 32.5 G/DL (ref 32.4–36.3)
MCV RBC AUTO: 91.6 FL (ref 80.5–98.2)
MCV RBC AUTO: 92.6 FL (ref 80.5–98.2)
MONOCYTES # BLD AUTO: 0.89 10*3/MM3 (ref 0.2–1.2)
MONOCYTES # BLD AUTO: 0.9 10*3/MM3 (ref 0.2–1.2)
MONOCYTES NFR BLD AUTO: 8.3 % (ref 5–12)
MONOCYTES NFR BLD AUTO: 9.9 % (ref 5–12)
NEUTROPHILS # BLD AUTO: 6.25 10*3/MM3 (ref 1.9–8.1)
NEUTROPHILS # BLD AUTO: 8.28 10*3/MM3 (ref 1.9–8.1)
NEUTROPHILS NFR BLD AUTO: 69.1 % (ref 42.7–76)
NEUTROPHILS NFR BLD AUTO: 76.9 % (ref 42.7–76)
OSMOLALITY SERPL: 274 MOSM/KG (ref 280–301)
OSMOLALITY UR: 355 MOSM/KG
PLATELET # BLD AUTO: 281 10*3/MM3 (ref 140–500)
PLATELET # BLD AUTO: 286 10*3/MM3 (ref 140–500)
PMV BLD AUTO: 10.1 FL (ref 6–12)
PMV BLD AUTO: 10.1 FL (ref 6–12)
POTASSIUM BLD-SCNC: 3.7 MMOL/L (ref 3.5–5.2)
POTASSIUM BLD-SCNC: 4.1 MMOL/L (ref 3.5–5.2)
PROCALCITONIN SERPL-MCNC: 0.03 NG/ML (ref 0.1–0.25)
PROT SERPL-MCNC: 7.8 G/DL (ref 6–8.5)
PROTHROMBIN TIME: 25.3 SECONDS (ref 11.7–14.2)
PROTHROMBIN TIME: 25.7 SECONDS (ref 11.7–14.2)
RBC # BLD AUTO: 3.79 10*6/MM3 (ref 3.9–5.2)
RBC # BLD AUTO: 3.83 10*6/MM3 (ref 3.9–5.2)
RHINOVIRUS RNA SPEC NAA+PROBE: NOT DETECTED
RSV RNA NPH QL NAA+NON-PROBE: NOT DETECTED
SODIUM BLD-SCNC: 126 MMOL/L (ref 136–145)
SODIUM BLD-SCNC: 132 MMOL/L (ref 136–145)
SODIUM UR-SCNC: 112 MMOL/L
TROPONIN T SERPL-MCNC: <0.01 NG/ML (ref 0–0.03)
WBC NRBC COR # BLD: 10.76 10*3/MM3 (ref 4.5–10.7)
WBC NRBC COR # BLD: 9.05 10*3/MM3 (ref 4.5–10.7)

## 2017-05-18 PROCEDURE — 25010000002 PIPERACILLIN SOD-TAZOBACTAM PER 1 G: Performed by: INTERNAL MEDICINE

## 2017-05-18 PROCEDURE — 87798 DETECT AGENT NOS DNA AMP: CPT | Performed by: INTERNAL MEDICINE

## 2017-05-18 PROCEDURE — 87633 RESP VIRUS 12-25 TARGETS: CPT | Performed by: INTERNAL MEDICINE

## 2017-05-18 PROCEDURE — 85610 PROTHROMBIN TIME: CPT | Performed by: INTERNAL MEDICINE

## 2017-05-18 PROCEDURE — 83605 ASSAY OF LACTIC ACID: CPT | Performed by: EMERGENCY MEDICINE

## 2017-05-18 PROCEDURE — 36415 COLL VENOUS BLD VENIPUNCTURE: CPT | Performed by: EMERGENCY MEDICINE

## 2017-05-18 PROCEDURE — 85025 COMPLETE CBC W/AUTO DIFF WBC: CPT | Performed by: INTERNAL MEDICINE

## 2017-05-18 PROCEDURE — 82570 ASSAY OF URINE CREATININE: CPT | Performed by: INTERNAL MEDICINE

## 2017-05-18 PROCEDURE — 87486 CHLMYD PNEUM DNA AMP PROBE: CPT | Performed by: INTERNAL MEDICINE

## 2017-05-18 PROCEDURE — 83930 ASSAY OF BLOOD OSMOLALITY: CPT | Performed by: INTERNAL MEDICINE

## 2017-05-18 PROCEDURE — 83935 ASSAY OF URINE OSMOLALITY: CPT | Performed by: INTERNAL MEDICINE

## 2017-05-18 PROCEDURE — 87040 BLOOD CULTURE FOR BACTERIA: CPT | Performed by: EMERGENCY MEDICINE

## 2017-05-18 PROCEDURE — 87581 M.PNEUMON DNA AMP PROBE: CPT | Performed by: INTERNAL MEDICINE

## 2017-05-18 PROCEDURE — 83735 ASSAY OF MAGNESIUM: CPT | Performed by: INTERNAL MEDICINE

## 2017-05-18 PROCEDURE — 92610 EVALUATE SWALLOWING FUNCTION: CPT | Performed by: SPEECH-LANGUAGE PATHOLOGIST

## 2017-05-18 PROCEDURE — 80048 BASIC METABOLIC PNL TOTAL CA: CPT | Performed by: INTERNAL MEDICINE

## 2017-05-18 PROCEDURE — 87205 SMEAR GRAM STAIN: CPT | Performed by: INTERNAL MEDICINE

## 2017-05-18 PROCEDURE — 99221 1ST HOSP IP/OBS SF/LOW 40: CPT | Performed by: INTERNAL MEDICINE

## 2017-05-18 PROCEDURE — 25010000002 PIPERACILLIN SOD-TAZOBACTAM PER 1 G: Performed by: EMERGENCY MEDICINE

## 2017-05-18 PROCEDURE — 84145 PROCALCITONIN (PCT): CPT | Performed by: INTERNAL MEDICINE

## 2017-05-18 PROCEDURE — 87070 CULTURE OTHR SPECIMN AEROBIC: CPT | Performed by: INTERNAL MEDICINE

## 2017-05-18 PROCEDURE — 94799 UNLISTED PULMONARY SVC/PX: CPT

## 2017-05-18 PROCEDURE — 84300 ASSAY OF URINE SODIUM: CPT | Performed by: INTERNAL MEDICINE

## 2017-05-18 RX ORDER — SIMVASTATIN 40 MG
40 TABLET ORAL DAILY
Status: DISCONTINUED | OUTPATIENT
Start: 2017-05-19 | End: 2017-05-23

## 2017-05-18 RX ORDER — WARFARIN SODIUM 3 MG/1
3 TABLET ORAL
Status: DISCONTINUED | OUTPATIENT
Start: 2017-05-18 | End: 2017-05-18

## 2017-05-18 RX ORDER — NIFEDIPINE 30 MG/1
30 TABLET, EXTENDED RELEASE ORAL DAILY
Status: DISCONTINUED | OUTPATIENT
Start: 2017-05-18 | End: 2017-05-18

## 2017-05-18 RX ORDER — CETIRIZINE HYDROCHLORIDE 10 MG/1
10 TABLET ORAL DAILY
Status: DISCONTINUED | OUTPATIENT
Start: 2017-05-18 | End: 2017-05-23 | Stop reason: HOSPADM

## 2017-05-18 RX ORDER — ALBUTEROL SULFATE 2.5 MG/3ML
2.5 SOLUTION RESPIRATORY (INHALATION) EVERY 6 HOURS PRN
Status: DISCONTINUED | OUTPATIENT
Start: 2017-05-18 | End: 2017-05-23 | Stop reason: HOSPADM

## 2017-05-18 RX ORDER — NIFEDIPINE 30 MG/1
30 TABLET, EXTENDED RELEASE ORAL NIGHTLY
Status: DISCONTINUED | OUTPATIENT
Start: 2017-05-18 | End: 2017-05-20

## 2017-05-18 RX ORDER — LORAZEPAM 0.5 MG/1
0.5 TABLET ORAL EVERY 8 HOURS PRN
Status: DISCONTINUED | OUTPATIENT
Start: 2017-05-18 | End: 2017-05-23 | Stop reason: HOSPADM

## 2017-05-18 RX ORDER — HYDROMORPHONE HYDROCHLORIDE 1 MG/ML
0.5 INJECTION, SOLUTION INTRAMUSCULAR; INTRAVENOUS; SUBCUTANEOUS
Status: DISCONTINUED | OUTPATIENT
Start: 2017-05-18 | End: 2017-05-23 | Stop reason: HOSPADM

## 2017-05-18 RX ORDER — HYDROCODONE BITARTRATE AND ACETAMINOPHEN 5; 325 MG/1; MG/1
1 TABLET ORAL EVERY 4 HOURS PRN
Status: DISCONTINUED | OUTPATIENT
Start: 2017-05-18 | End: 2017-05-23 | Stop reason: HOSPADM

## 2017-05-18 RX ORDER — ASPIRIN 81 MG/1
81 TABLET ORAL DAILY
Status: DISCONTINUED | OUTPATIENT
Start: 2017-05-18 | End: 2017-05-18

## 2017-05-18 RX ORDER — LANSOPRAZOLE 15 MG/1
15 CAPSULE, DELAYED RELEASE ORAL 2 TIMES DAILY
Status: DISCONTINUED | OUTPATIENT
Start: 2017-05-19 | End: 2017-05-18 | Stop reason: CLARIF

## 2017-05-18 RX ORDER — FUROSEMIDE 40 MG/1
40 TABLET ORAL DAILY
Status: DISCONTINUED | OUTPATIENT
Start: 2017-05-19 | End: 2017-05-21

## 2017-05-18 RX ORDER — LORAZEPAM 2 MG/ML
0.5 INJECTION INTRAMUSCULAR 3 TIMES DAILY PRN
Status: DISCONTINUED | OUTPATIENT
Start: 2017-05-18 | End: 2017-05-23 | Stop reason: HOSPADM

## 2017-05-18 RX ORDER — ISOSORBIDE MONONITRATE 60 MG/1
60 TABLET, EXTENDED RELEASE ORAL
Status: DISCONTINUED | OUTPATIENT
Start: 2017-05-18 | End: 2017-05-18

## 2017-05-18 RX ORDER — POTASSIUM CHLORIDE 750 MG/1
20 CAPSULE, EXTENDED RELEASE ORAL DAILY
Status: DISCONTINUED | OUTPATIENT
Start: 2017-05-18 | End: 2017-05-18

## 2017-05-18 RX ORDER — VALSARTAN 320 MG/1
320 TABLET ORAL DAILY
Status: DISCONTINUED | OUTPATIENT
Start: 2017-05-19 | End: 2017-05-23 | Stop reason: HOSPADM

## 2017-05-18 RX ORDER — FEXOFENADINE HCL 180 MG/1
180 TABLET ORAL DAILY
Status: DISCONTINUED | OUTPATIENT
Start: 2017-05-19 | End: 2017-05-18 | Stop reason: CLARIF

## 2017-05-18 RX ORDER — ISOSORBIDE MONONITRATE 60 MG/1
60 TABLET, EXTENDED RELEASE ORAL
Status: DISCONTINUED | OUTPATIENT
Start: 2017-05-19 | End: 2017-05-21

## 2017-05-18 RX ORDER — NIFEDIPINE 30 MG/1
30 TABLET, EXTENDED RELEASE ORAL NIGHTLY
Status: DISCONTINUED | OUTPATIENT
Start: 2017-05-18 | End: 2017-05-18

## 2017-05-18 RX ORDER — SODIUM CHLORIDE 0.9 % (FLUSH) 0.9 %
1-10 SYRINGE (ML) INJECTION AS NEEDED
Status: DISCONTINUED | OUTPATIENT
Start: 2017-05-18 | End: 2017-05-23 | Stop reason: HOSPADM

## 2017-05-18 RX ORDER — POTASSIUM CHLORIDE 750 MG/1
20 CAPSULE, EXTENDED RELEASE ORAL DAILY
Status: DISCONTINUED | OUTPATIENT
Start: 2017-05-19 | End: 2017-05-23 | Stop reason: HOSPADM

## 2017-05-18 RX ORDER — ROSUVASTATIN CALCIUM 10 MG/1
10 TABLET, COATED ORAL DAILY
Status: DISCONTINUED | OUTPATIENT
Start: 2017-05-18 | End: 2017-05-18

## 2017-05-18 RX ORDER — FUROSEMIDE 40 MG/1
40 TABLET ORAL DAILY
Status: DISCONTINUED | OUTPATIENT
Start: 2017-05-18 | End: 2017-05-18

## 2017-05-18 RX ORDER — ASPIRIN 81 MG/1
81 TABLET ORAL DAILY
Status: DISCONTINUED | OUTPATIENT
Start: 2017-05-19 | End: 2017-05-23 | Stop reason: HOSPADM

## 2017-05-18 RX ORDER — CARVEDILOL 3.12 MG/1
3.12 TABLET ORAL 2 TIMES DAILY WITH MEALS
Status: DISCONTINUED | OUTPATIENT
Start: 2017-05-18 | End: 2017-05-22

## 2017-05-18 RX ORDER — CETIRIZINE HYDROCHLORIDE 10 MG/1
5 TABLET ORAL DAILY
Status: DISCONTINUED | OUTPATIENT
Start: 2017-05-18 | End: 2017-05-18

## 2017-05-18 RX ORDER — VALSARTAN 320 MG/1
320 TABLET ORAL DAILY
Status: DISCONTINUED | OUTPATIENT
Start: 2017-05-18 | End: 2017-05-18

## 2017-05-18 RX ORDER — ACETAMINOPHEN 325 MG/1
650 TABLET ORAL EVERY 4 HOURS PRN
Status: DISCONTINUED | OUTPATIENT
Start: 2017-05-18 | End: 2017-05-23 | Stop reason: HOSPADM

## 2017-05-18 RX ORDER — NITROGLYCERIN 0.4 MG/1
0.4 TABLET SUBLINGUAL
Status: DISCONTINUED | OUTPATIENT
Start: 2017-05-18 | End: 2017-05-23 | Stop reason: HOSPADM

## 2017-05-18 RX ORDER — PANTOPRAZOLE SODIUM 40 MG/1
40 TABLET, DELAYED RELEASE ORAL
Status: DISCONTINUED | OUTPATIENT
Start: 2017-05-18 | End: 2017-05-23 | Stop reason: HOSPADM

## 2017-05-18 RX ORDER — ONDANSETRON 2 MG/ML
4 INJECTION INTRAMUSCULAR; INTRAVENOUS EVERY 6 HOURS PRN
Status: DISCONTINUED | OUTPATIENT
Start: 2017-05-18 | End: 2017-05-23 | Stop reason: HOSPADM

## 2017-05-18 RX ORDER — NALOXONE HCL 0.4 MG/ML
0.4 VIAL (ML) INJECTION
Status: DISCONTINUED | OUTPATIENT
Start: 2017-05-18 | End: 2017-05-23 | Stop reason: HOSPADM

## 2017-05-18 RX ORDER — ONDANSETRON 4 MG/1
4 TABLET, ORALLY DISINTEGRATING ORAL EVERY 6 HOURS PRN
Status: DISCONTINUED | OUTPATIENT
Start: 2017-05-18 | End: 2017-05-23 | Stop reason: HOSPADM

## 2017-05-18 RX ORDER — SODIUM CHLORIDE 9 MG/ML
75 INJECTION, SOLUTION INTRAVENOUS CONTINUOUS
Status: DISCONTINUED | OUTPATIENT
Start: 2017-05-18 | End: 2017-05-18

## 2017-05-18 RX ORDER — SENNA AND DOCUSATE SODIUM 50; 8.6 MG/1; MG/1
2 TABLET, FILM COATED ORAL NIGHTLY PRN
Status: DISCONTINUED | OUTPATIENT
Start: 2017-05-18 | End: 2017-05-23 | Stop reason: HOSPADM

## 2017-05-18 RX ORDER — CARVEDILOL 3.12 MG/1
3.12 TABLET ORAL 2 TIMES DAILY WITH MEALS
Status: DISCONTINUED | OUTPATIENT
Start: 2017-05-18 | End: 2017-05-18

## 2017-05-18 RX ORDER — PANTOPRAZOLE SODIUM 40 MG/1
40 TABLET, DELAYED RELEASE ORAL
Status: DISCONTINUED | OUTPATIENT
Start: 2017-05-18 | End: 2017-05-18

## 2017-05-18 RX ORDER — ONDANSETRON 4 MG/1
4 TABLET, FILM COATED ORAL EVERY 6 HOURS PRN
Status: DISCONTINUED | OUTPATIENT
Start: 2017-05-18 | End: 2017-05-23 | Stop reason: HOSPADM

## 2017-05-18 RX ADMIN — LORAZEPAM 0.5 MG: 0.5 TABLET ORAL at 22:32

## 2017-05-18 RX ADMIN — VALSARTAN 320 MG: 320 TABLET, FILM COATED ORAL at 08:46

## 2017-05-18 RX ADMIN — FUROSEMIDE 40 MG: 40 TABLET ORAL at 08:45

## 2017-05-18 RX ADMIN — TAZOBACTAM SODIUM AND PIPERACILLIN SODIUM 3.38 G: 375; 3 INJECTION, SOLUTION INTRAVENOUS at 08:41

## 2017-05-18 RX ADMIN — ASPIRIN 81 MG: 81 TABLET ORAL at 08:45

## 2017-05-18 RX ADMIN — ROSUVASTATIN CALCIUM 10 MG: 10 TABLET, FILM COATED ORAL at 08:46

## 2017-05-18 RX ADMIN — POTASSIUM CHLORIDE 20 MEQ: 750 CAPSULE, EXTENDED RELEASE ORAL at 08:46

## 2017-05-18 RX ADMIN — ISOSORBIDE MONONITRATE 60 MG: 60 TABLET, EXTENDED RELEASE ORAL at 08:46

## 2017-05-18 RX ADMIN — TAZOBACTAM SODIUM AND PIPERACILLIN SODIUM 4.5 G: 500; 4 INJECTION, SOLUTION INTRAVENOUS at 00:56

## 2017-05-18 RX ADMIN — ACETAMINOPHEN 650 MG: 325 TABLET ORAL at 04:16

## 2017-05-18 RX ADMIN — CARVEDILOL 3.12 MG: 3.12 TABLET, FILM COATED ORAL at 08:44

## 2017-05-18 RX ADMIN — TAZOBACTAM SODIUM AND PIPERACILLIN SODIUM 3.38 G: 375; 3 INJECTION, SOLUTION INTRAVENOUS at 17:21

## 2017-05-18 RX ADMIN — ACETAMINOPHEN 650 MG: 325 TABLET ORAL at 22:32

## 2017-05-18 RX ADMIN — SODIUM CHLORIDE 75 ML/HR: 9 INJECTION, SOLUTION INTRAVENOUS at 06:24

## 2017-05-18 RX ADMIN — PANTOPRAZOLE SODIUM 40 MG: 40 TABLET, DELAYED RELEASE ORAL at 06:29

## 2017-05-18 RX ADMIN — LORAZEPAM 0.5 MG: 0.5 TABLET ORAL at 04:16

## 2017-05-18 RX ADMIN — NIFEDIPINE 30 MG: 30 TABLET, FILM COATED, EXTENDED RELEASE ORAL at 20:31

## 2017-05-18 RX ADMIN — CARVEDILOL 3.12 MG: 3.12 TABLET, FILM COATED ORAL at 17:22

## 2017-05-18 RX ADMIN — CETIRIZINE HYDROCHLORIDE 5 MG: 10 TABLET, FILM COATED ORAL at 08:45

## 2017-05-19 ENCOUNTER — APPOINTMENT (OUTPATIENT)
Dept: GENERAL RADIOLOGY | Facility: HOSPITAL | Age: 82
End: 2017-05-19

## 2017-05-19 LAB
ANION GAP SERPL CALCULATED.3IONS-SCNC: 13 MMOL/L
BUN BLD-MCNC: 10 MG/DL (ref 8–23)
BUN/CREAT SERPL: 11.5 (ref 7–25)
CALCIUM SPEC-SCNC: 8.9 MG/DL (ref 8.6–10.5)
CHLORIDE SERPL-SCNC: 91 MMOL/L (ref 98–107)
CO2 SERPL-SCNC: 27 MMOL/L (ref 22–29)
CREAT BLD-MCNC: 0.87 MG/DL (ref 0.57–1)
DEPRECATED RDW RBC AUTO: 50.1 FL (ref 37–54)
ERYTHROCYTE [DISTWIDTH] IN BLOOD BY AUTOMATED COUNT: 15.4 % (ref 11.7–13)
GFR SERPL CREATININE-BSD FRML MDRD: 61 ML/MIN/1.73
GLUCOSE BLD-MCNC: 97 MG/DL (ref 65–99)
HCT VFR BLD AUTO: 32.4 % (ref 35.6–45.5)
HGB BLD-MCNC: 10.6 G/DL (ref 11.9–15.5)
INR PPP: 2.66 (ref 0.9–1.1)
MCH RBC QN AUTO: 29 PG (ref 26.9–32)
MCHC RBC AUTO-ENTMCNC: 32.7 G/DL (ref 32.4–36.3)
MCV RBC AUTO: 88.8 FL (ref 80.5–98.2)
PLATELET # BLD AUTO: 262 10*3/MM3 (ref 140–500)
PMV BLD AUTO: 9.8 FL (ref 6–12)
POTASSIUM BLD-SCNC: 3.3 MMOL/L (ref 3.5–5.2)
PROTHROMBIN TIME: 27.5 SECONDS (ref 11.7–14.2)
RBC # BLD AUTO: 3.65 10*6/MM3 (ref 3.9–5.2)
SODIUM BLD-SCNC: 131 MMOL/L (ref 136–145)
WBC NRBC COR # BLD: 7.54 10*3/MM3 (ref 4.5–10.7)

## 2017-05-19 PROCEDURE — 80048 BASIC METABOLIC PNL TOTAL CA: CPT | Performed by: INTERNAL MEDICINE

## 2017-05-19 PROCEDURE — 94799 UNLISTED PULMONARY SVC/PX: CPT

## 2017-05-19 PROCEDURE — 99232 SBSQ HOSP IP/OBS MODERATE 35: CPT | Performed by: INTERNAL MEDICINE

## 2017-05-19 PROCEDURE — 85027 COMPLETE CBC AUTOMATED: CPT | Performed by: INTERNAL MEDICINE

## 2017-05-19 PROCEDURE — 85610 PROTHROMBIN TIME: CPT | Performed by: INTERNAL MEDICINE

## 2017-05-19 PROCEDURE — 74220 X-RAY XM ESOPHAGUS 1CNTRST: CPT

## 2017-05-19 PROCEDURE — 25010000002 PIPERACILLIN SOD-TAZOBACTAM PER 1 G: Performed by: INTERNAL MEDICINE

## 2017-05-19 RX ORDER — BUMETANIDE 0.25 MG/ML
2 INJECTION INTRAMUSCULAR; INTRAVENOUS ONCE
Status: COMPLETED | OUTPATIENT
Start: 2017-05-19 | End: 2017-05-19

## 2017-05-19 RX ADMIN — LORAZEPAM 0.5 MG: 0.5 TABLET ORAL at 22:48

## 2017-05-19 RX ADMIN — BUMETANIDE 2 MG: 0.25 INJECTION, SOLUTION INTRAMUSCULAR; INTRAVENOUS at 20:07

## 2017-05-19 RX ADMIN — FUROSEMIDE 40 MG: 40 TABLET ORAL at 09:43

## 2017-05-19 RX ADMIN — CETIRIZINE HYDROCHLORIDE 10 MG: 10 TABLET, FILM COATED ORAL at 09:39

## 2017-05-19 RX ADMIN — NIFEDIPINE 30 MG: 30 TABLET, FILM COATED, EXTENDED RELEASE ORAL at 21:36

## 2017-05-19 RX ADMIN — ASPIRIN 81 MG: 81 TABLET ORAL at 09:39

## 2017-05-19 RX ADMIN — POTASSIUM CHLORIDE 20 MEQ: 750 CAPSULE, EXTENDED RELEASE ORAL at 09:39

## 2017-05-19 RX ADMIN — CARVEDILOL 3.12 MG: 3.12 TABLET, FILM COATED ORAL at 17:12

## 2017-05-19 RX ADMIN — TAZOBACTAM SODIUM AND PIPERACILLIN SODIUM 3.38 G: 375; 3 INJECTION, SOLUTION INTRAVENOUS at 09:39

## 2017-05-19 RX ADMIN — VALSARTAN 320 MG: 320 TABLET ORAL at 09:42

## 2017-05-19 RX ADMIN — ISOSORBIDE MONONITRATE 60 MG: 60 TABLET, EXTENDED RELEASE ORAL at 09:39

## 2017-05-19 RX ADMIN — Medication 40 MG: at 09:43

## 2017-05-19 RX ADMIN — TAZOBACTAM SODIUM AND PIPERACILLIN SODIUM 3.38 G: 375; 3 INJECTION, SOLUTION INTRAVENOUS at 17:12

## 2017-05-19 RX ADMIN — PANTOPRAZOLE SODIUM 40 MG: 40 TABLET, DELAYED RELEASE ORAL at 07:43

## 2017-05-19 RX ADMIN — TAZOBACTAM SODIUM AND PIPERACILLIN SODIUM 3.38 G: 375; 3 INJECTION, SOLUTION INTRAVENOUS at 00:40

## 2017-05-19 RX ADMIN — CARVEDILOL 3.12 MG: 3.12 TABLET, FILM COATED ORAL at 09:39

## 2017-05-20 ENCOUNTER — APPOINTMENT (OUTPATIENT)
Dept: GENERAL RADIOLOGY | Facility: HOSPITAL | Age: 82
End: 2017-05-20

## 2017-05-20 LAB
ANION GAP SERPL CALCULATED.3IONS-SCNC: 14.6 MMOL/L
ANION GAP SERPL CALCULATED.3IONS-SCNC: 17.5 MMOL/L
BACTERIA SPEC RESP CULT: NORMAL
BASOPHILS # BLD AUTO: 0.03 10*3/MM3 (ref 0–0.2)
BASOPHILS NFR BLD AUTO: 0.3 % (ref 0–1.5)
BUN BLD-MCNC: 16 MG/DL (ref 8–23)
BUN BLD-MCNC: 18 MG/DL (ref 8–23)
BUN/CREAT SERPL: 15.9 (ref 7–25)
BUN/CREAT SERPL: 18 (ref 7–25)
C DIFF TOX GENS STL QL NAA+PROBE: NEGATIVE
CALCIUM SPEC-SCNC: 9.1 MG/DL (ref 8.6–10.5)
CALCIUM SPEC-SCNC: 9.6 MG/DL (ref 8.6–10.5)
CHLORIDE SERPL-SCNC: 87 MMOL/L (ref 98–107)
CHLORIDE SERPL-SCNC: 88 MMOL/L (ref 98–107)
CO2 SERPL-SCNC: 25.4 MMOL/L (ref 22–29)
CO2 SERPL-SCNC: 25.5 MMOL/L (ref 22–29)
CREAT BLD-MCNC: 0.89 MG/DL (ref 0.57–1)
CREAT BLD-MCNC: 1.13 MG/DL (ref 0.57–1)
DEPRECATED RDW RBC AUTO: 49.8 FL (ref 37–54)
EOSINOPHIL # BLD AUTO: 0.09 10*3/MM3 (ref 0–0.7)
EOSINOPHIL NFR BLD AUTO: 1 % (ref 0.3–6.2)
ERYTHROCYTE [DISTWIDTH] IN BLOOD BY AUTOMATED COUNT: 15.5 % (ref 11.7–13)
GFR SERPL CREATININE-BSD FRML MDRD: 45 ML/MIN/1.73
GFR SERPL CREATININE-BSD FRML MDRD: 60 ML/MIN/1.73
GLUCOSE BLD-MCNC: 113 MG/DL (ref 65–99)
GLUCOSE BLD-MCNC: 97 MG/DL (ref 65–99)
GRAM STN SPEC: NORMAL
HCT VFR BLD AUTO: 33.6 % (ref 35.6–45.5)
HGB BLD-MCNC: 11 G/DL (ref 11.9–15.5)
IMM GRANULOCYTES # BLD: 0.02 10*3/MM3 (ref 0–0.03)
IMM GRANULOCYTES NFR BLD: 0.2 % (ref 0–0.5)
INR PPP: 2.3 (ref 0.9–1.1)
LYMPHOCYTES # BLD AUTO: 1.77 10*3/MM3 (ref 0.9–4.8)
LYMPHOCYTES NFR BLD AUTO: 19.4 % (ref 19.6–45.3)
MCH RBC QN AUTO: 28.9 PG (ref 26.9–32)
MCHC RBC AUTO-ENTMCNC: 32.7 G/DL (ref 32.4–36.3)
MCV RBC AUTO: 88.4 FL (ref 80.5–98.2)
MONOCYTES # BLD AUTO: 1.28 10*3/MM3 (ref 0.2–1.2)
MONOCYTES NFR BLD AUTO: 14 % (ref 5–12)
NEUTROPHILS # BLD AUTO: 5.95 10*3/MM3 (ref 1.9–8.1)
NEUTROPHILS NFR BLD AUTO: 65.1 % (ref 42.7–76)
NT-PROBNP SERPL-MCNC: 4439 PG/ML (ref 0–1800)
PLATELET # BLD AUTO: 303 10*3/MM3 (ref 140–500)
PMV BLD AUTO: 10 FL (ref 6–12)
POTASSIUM BLD-SCNC: 3.2 MMOL/L (ref 3.5–5.2)
POTASSIUM BLD-SCNC: 3.5 MMOL/L (ref 3.5–5.2)
PROCALCITONIN SERPL-MCNC: 0.06 NG/ML (ref 0.1–0.25)
PROTHROMBIN TIME: 24.5 SECONDS (ref 11.7–14.2)
RBC # BLD AUTO: 3.8 10*6/MM3 (ref 3.9–5.2)
SODIUM BLD-SCNC: 127 MMOL/L (ref 136–145)
SODIUM BLD-SCNC: 131 MMOL/L (ref 136–145)
WBC NRBC COR # BLD: 9.14 10*3/MM3 (ref 4.5–10.7)

## 2017-05-20 PROCEDURE — 84145 PROCALCITONIN (PCT): CPT | Performed by: INTERNAL MEDICINE

## 2017-05-20 PROCEDURE — 71020 HC CHEST PA AND LATERAL: CPT

## 2017-05-20 PROCEDURE — 80048 BASIC METABOLIC PNL TOTAL CA: CPT | Performed by: HOSPITALIST

## 2017-05-20 PROCEDURE — 83880 ASSAY OF NATRIURETIC PEPTIDE: CPT | Performed by: HOSPITALIST

## 2017-05-20 PROCEDURE — 99222 1ST HOSP IP/OBS MODERATE 55: CPT | Performed by: INTERNAL MEDICINE

## 2017-05-20 PROCEDURE — 94799 UNLISTED PULMONARY SVC/PX: CPT

## 2017-05-20 PROCEDURE — 25010000002 PIPERACILLIN SOD-TAZOBACTAM PER 1 G: Performed by: INTERNAL MEDICINE

## 2017-05-20 PROCEDURE — 87493 C DIFF AMPLIFIED PROBE: CPT | Performed by: HOSPITALIST

## 2017-05-20 PROCEDURE — 85025 COMPLETE CBC W/AUTO DIFF WBC: CPT | Performed by: HOSPITALIST

## 2017-05-20 PROCEDURE — 99231 SBSQ HOSP IP/OBS SF/LOW 25: CPT | Performed by: INTERNAL MEDICINE

## 2017-05-20 PROCEDURE — 85610 PROTHROMBIN TIME: CPT | Performed by: INTERNAL MEDICINE

## 2017-05-20 PROCEDURE — 92610 EVALUATE SWALLOWING FUNCTION: CPT

## 2017-05-20 PROCEDURE — 92611 MOTION FLUOROSCOPY/SWALLOW: CPT

## 2017-05-20 RX ORDER — POTASSIUM CHLORIDE 1.5 G/1.77G
40 POWDER, FOR SOLUTION ORAL ONCE
Status: COMPLETED | OUTPATIENT
Start: 2017-05-20 | End: 2017-05-20

## 2017-05-20 RX ADMIN — POTASSIUM CHLORIDE 20 MEQ: 750 CAPSULE, EXTENDED RELEASE ORAL at 09:10

## 2017-05-20 RX ADMIN — VALSARTAN 320 MG: 320 TABLET ORAL at 09:06

## 2017-05-20 RX ADMIN — CARVEDILOL 3.12 MG: 3.12 TABLET, FILM COATED ORAL at 10:48

## 2017-05-20 RX ADMIN — ACETAMINOPHEN 650 MG: 325 TABLET ORAL at 04:17

## 2017-05-20 RX ADMIN — FUROSEMIDE 40 MG: 40 TABLET ORAL at 09:06

## 2017-05-20 RX ADMIN — ASPIRIN 81 MG: 81 TABLET ORAL at 09:10

## 2017-05-20 RX ADMIN — PANTOPRAZOLE SODIUM 40 MG: 40 TABLET, DELAYED RELEASE ORAL at 06:36

## 2017-05-20 RX ADMIN — POTASSIUM CHLORIDE 40 MEQ: 1.5 POWDER, FOR SOLUTION ORAL at 12:06

## 2017-05-20 RX ADMIN — CARVEDILOL 3.12 MG: 3.12 TABLET, FILM COATED ORAL at 18:29

## 2017-05-20 RX ADMIN — Medication 40 MG: at 09:06

## 2017-05-20 RX ADMIN — CETIRIZINE HYDROCHLORIDE 10 MG: 10 TABLET, FILM COATED ORAL at 09:10

## 2017-05-20 RX ADMIN — ISOSORBIDE MONONITRATE 60 MG: 60 TABLET, EXTENDED RELEASE ORAL at 09:10

## 2017-05-20 RX ADMIN — ACETAMINOPHEN 650 MG: 325 TABLET ORAL at 22:04

## 2017-05-20 RX ADMIN — TAZOBACTAM SODIUM AND PIPERACILLIN SODIUM 3.38 G: 375; 3 INJECTION, SOLUTION INTRAVENOUS at 03:46

## 2017-05-20 RX ADMIN — LORAZEPAM 0.5 MG: 0.5 TABLET ORAL at 22:55

## 2017-05-21 LAB
ANION GAP SERPL CALCULATED.3IONS-SCNC: 14.6 MMOL/L
ANION GAP SERPL CALCULATED.3IONS-SCNC: 17.5 MMOL/L
BASOPHILS # BLD AUTO: 0.02 10*3/MM3 (ref 0–0.2)
BASOPHILS NFR BLD AUTO: 0.2 % (ref 0–1.5)
BUN BLD-MCNC: 21 MG/DL (ref 8–23)
BUN BLD-MCNC: 23 MG/DL (ref 8–23)
BUN/CREAT SERPL: 17.3 (ref 7–25)
BUN/CREAT SERPL: 21.9 (ref 7–25)
CALCIUM SPEC-SCNC: 9.2 MG/DL (ref 8.6–10.5)
CALCIUM SPEC-SCNC: 9.5 MG/DL (ref 8.6–10.5)
CHLORIDE SERPL-SCNC: 87 MMOL/L (ref 98–107)
CHLORIDE SERPL-SCNC: 89 MMOL/L (ref 98–107)
CHLORIDE UR-SCNC: <20 MMOL/L
CO2 SERPL-SCNC: 20.5 MMOL/L (ref 22–29)
CO2 SERPL-SCNC: 23.4 MMOL/L (ref 22–29)
CREAT BLD-MCNC: 0.96 MG/DL (ref 0.57–1)
CREAT BLD-MCNC: 1.33 MG/DL (ref 0.57–1)
CREAT UR-MCNC: 43.8 MG/DL
DEPRECATED RDW RBC AUTO: 52.2 FL (ref 37–54)
EOSINOPHIL # BLD AUTO: 0.04 10*3/MM3 (ref 0–0.7)
EOSINOPHIL NFR BLD AUTO: 0.4 % (ref 0.3–6.2)
ERYTHROCYTE [DISTWIDTH] IN BLOOD BY AUTOMATED COUNT: 15.6 % (ref 11.7–13)
FERRITIN SERPL-MCNC: 367.3 NG/ML (ref 13–150)
GFR SERPL CREATININE-BSD FRML MDRD: 38 ML/MIN/1.73
GFR SERPL CREATININE-BSD FRML MDRD: 55 ML/MIN/1.73
GLUCOSE BLD-MCNC: 114 MG/DL (ref 65–99)
GLUCOSE BLD-MCNC: 121 MG/DL (ref 65–99)
HCT VFR BLD AUTO: 34.3 % (ref 35.6–45.5)
HGB BLD-MCNC: 10.9 G/DL (ref 11.9–15.5)
IMM GRANULOCYTES # BLD: 0.03 10*3/MM3 (ref 0–0.03)
IMM GRANULOCYTES NFR BLD: 0.3 % (ref 0–0.5)
INR PPP: 1.97 (ref 0.9–1.1)
IRON 24H UR-MRATE: 21 MCG/DL (ref 37–145)
IRON SATN MFR SERPL: 8 % (ref 20–50)
LYMPHOCYTES # BLD AUTO: 1.92 10*3/MM3 (ref 0.9–4.8)
LYMPHOCYTES NFR BLD AUTO: 19.5 % (ref 19.6–45.3)
MCH RBC QN AUTO: 29.1 PG (ref 26.9–32)
MCHC RBC AUTO-ENTMCNC: 31.8 G/DL (ref 32.4–36.3)
MCV RBC AUTO: 91.7 FL (ref 80.5–98.2)
MONOCYTES # BLD AUTO: 1.49 10*3/MM3 (ref 0.2–1.2)
MONOCYTES NFR BLD AUTO: 15.1 % (ref 5–12)
NEUTROPHILS # BLD AUTO: 6.35 10*3/MM3 (ref 1.9–8.1)
NEUTROPHILS NFR BLD AUTO: 64.5 % (ref 42.7–76)
OSMOLALITY SERPL: 273 MOSM/KG (ref 280–301)
OSMOLALITY UR: 184 MOSM/KG
PLATELET # BLD AUTO: 270 10*3/MM3 (ref 140–500)
PMV BLD AUTO: 10.1 FL (ref 6–12)
POTASSIUM BLD-SCNC: 3.7 MMOL/L (ref 3.5–5.2)
POTASSIUM BLD-SCNC: 4.2 MMOL/L (ref 3.5–5.2)
PROT UR-MCNC: 15 MG/DL
PROT/CREAT UR: 342.5 MG/G CREA
PROTHROMBIN TIME: 21.8 SECONDS (ref 11.7–14.2)
RBC # BLD AUTO: 3.74 10*6/MM3 (ref 3.9–5.2)
SODIUM BLD-SCNC: 125 MMOL/L (ref 136–145)
SODIUM BLD-SCNC: 127 MMOL/L (ref 136–145)
SODIUM UR-SCNC: <20 MMOL/L
TIBC SERPL-MCNC: 247 MCG/DL (ref 298–536)
TRANSFERRIN SERPL-MCNC: 166 MG/DL (ref 200–360)
WBC NRBC COR # BLD: 9.85 10*3/MM3 (ref 4.5–10.7)

## 2017-05-21 PROCEDURE — 83540 ASSAY OF IRON: CPT | Performed by: INTERNAL MEDICINE

## 2017-05-21 PROCEDURE — 83930 ASSAY OF BLOOD OSMOLALITY: CPT | Performed by: INTERNAL MEDICINE

## 2017-05-21 PROCEDURE — 85610 PROTHROMBIN TIME: CPT | Performed by: INTERNAL MEDICINE

## 2017-05-21 PROCEDURE — 84300 ASSAY OF URINE SODIUM: CPT | Performed by: INTERNAL MEDICINE

## 2017-05-21 PROCEDURE — 85025 COMPLETE CBC W/AUTO DIFF WBC: CPT | Performed by: HOSPITALIST

## 2017-05-21 PROCEDURE — 82436 ASSAY OF URINE CHLORIDE: CPT | Performed by: INTERNAL MEDICINE

## 2017-05-21 PROCEDURE — 82728 ASSAY OF FERRITIN: CPT | Performed by: INTERNAL MEDICINE

## 2017-05-21 PROCEDURE — 83935 ASSAY OF URINE OSMOLALITY: CPT | Performed by: HOSPITALIST

## 2017-05-21 PROCEDURE — 99232 SBSQ HOSP IP/OBS MODERATE 35: CPT | Performed by: INTERNAL MEDICINE

## 2017-05-21 PROCEDURE — 84156 ASSAY OF PROTEIN URINE: CPT | Performed by: INTERNAL MEDICINE

## 2017-05-21 PROCEDURE — 82570 ASSAY OF URINE CREATININE: CPT | Performed by: INTERNAL MEDICINE

## 2017-05-21 PROCEDURE — 80048 BASIC METABOLIC PNL TOTAL CA: CPT | Performed by: HOSPITALIST

## 2017-05-21 PROCEDURE — 84466 ASSAY OF TRANSFERRIN: CPT | Performed by: INTERNAL MEDICINE

## 2017-05-21 RX ORDER — BUMETANIDE 0.25 MG/ML
2 INJECTION INTRAMUSCULAR; INTRAVENOUS EVERY 8 HOURS
Status: DISCONTINUED | OUTPATIENT
Start: 2017-05-21 | End: 2017-05-22 | Stop reason: SDUPTHER

## 2017-05-21 RX ORDER — ISOSORBIDE MONONITRATE 60 MG/1
60 TABLET, EXTENDED RELEASE ORAL 2 TIMES DAILY
Status: DISCONTINUED | OUTPATIENT
Start: 2017-05-21 | End: 2017-05-23 | Stop reason: HOSPADM

## 2017-05-21 RX ADMIN — ASPIRIN 81 MG: 81 TABLET ORAL at 09:15

## 2017-05-21 RX ADMIN — ISOSORBIDE MONONITRATE 60 MG: 60 TABLET, EXTENDED RELEASE ORAL at 09:17

## 2017-05-21 RX ADMIN — CETIRIZINE HYDROCHLORIDE 10 MG: 10 TABLET, FILM COATED ORAL at 09:17

## 2017-05-21 RX ADMIN — PANTOPRAZOLE SODIUM 40 MG: 40 TABLET, DELAYED RELEASE ORAL at 06:38

## 2017-05-21 RX ADMIN — FUROSEMIDE 40 MG: 40 TABLET ORAL at 09:14

## 2017-05-21 RX ADMIN — ACETAMINOPHEN 650 MG: 325 TABLET ORAL at 22:50

## 2017-05-21 RX ADMIN — ISOSORBIDE MONONITRATE 60 MG: 60 TABLET, EXTENDED RELEASE ORAL at 18:03

## 2017-05-21 RX ADMIN — ACETAMINOPHEN 650 MG: 325 TABLET ORAL at 12:59

## 2017-05-21 RX ADMIN — POTASSIUM CHLORIDE 20 MEQ: 750 CAPSULE, EXTENDED RELEASE ORAL at 09:17

## 2017-05-21 RX ADMIN — ACETAMINOPHEN 650 MG: 325 TABLET ORAL at 06:38

## 2017-05-21 RX ADMIN — CARVEDILOL 3.12 MG: 3.12 TABLET, FILM COATED ORAL at 18:03

## 2017-05-21 RX ADMIN — CARVEDILOL 3.12 MG: 3.12 TABLET, FILM COATED ORAL at 09:16

## 2017-05-21 RX ADMIN — LORAZEPAM 0.5 MG: 0.5 TABLET ORAL at 22:55

## 2017-05-21 RX ADMIN — VALSARTAN 320 MG: 320 TABLET ORAL at 09:13

## 2017-05-21 RX ADMIN — Medication 40 MG: at 09:12

## 2017-05-22 ENCOUNTER — ANESTHESIA EVENT (OUTPATIENT)
Dept: GASTROENTEROLOGY | Facility: HOSPITAL | Age: 82
End: 2017-05-22

## 2017-05-22 ENCOUNTER — TELEPHONE (OUTPATIENT)
Dept: CARDIOLOGY | Facility: CLINIC | Age: 82
End: 2017-05-22

## 2017-05-22 ENCOUNTER — ANESTHESIA (OUTPATIENT)
Dept: GASTROENTEROLOGY | Facility: HOSPITAL | Age: 82
End: 2017-05-22

## 2017-05-22 LAB
ALBUMIN SERPL-MCNC: 3.4 G/DL (ref 3.5–5.2)
ANION GAP SERPL CALCULATED.3IONS-SCNC: 15.9 MMOL/L
BASOPHILS # BLD AUTO: 0.02 10*3/MM3 (ref 0–0.2)
BASOPHILS NFR BLD AUTO: 0.2 % (ref 0–1.5)
BUN BLD-MCNC: 24 MG/DL (ref 8–23)
BUN/CREAT SERPL: 22.6 (ref 7–25)
CALCIUM SPEC-SCNC: 8.9 MG/DL (ref 8.6–10.5)
CHLORIDE SERPL-SCNC: 90 MMOL/L (ref 98–107)
CO2 SERPL-SCNC: 24.1 MMOL/L (ref 22–29)
CREAT BLD-MCNC: 1.06 MG/DL (ref 0.57–1)
DEPRECATED RDW RBC AUTO: 50.7 FL (ref 37–54)
EOSINOPHIL # BLD AUTO: 0.09 10*3/MM3 (ref 0–0.7)
EOSINOPHIL NFR BLD AUTO: 0.9 % (ref 0.3–6.2)
ERYTHROCYTE [DISTWIDTH] IN BLOOD BY AUTOMATED COUNT: 15.4 % (ref 11.7–13)
GFR SERPL CREATININE-BSD FRML MDRD: 49 ML/MIN/1.73
GLUCOSE BLD-MCNC: 117 MG/DL (ref 65–99)
HCT VFR BLD AUTO: 33.3 % (ref 35.6–45.5)
HGB BLD-MCNC: 10.9 G/DL (ref 11.9–15.5)
IMM GRANULOCYTES # BLD: 0.02 10*3/MM3 (ref 0–0.03)
IMM GRANULOCYTES NFR BLD: 0.2 % (ref 0–0.5)
INR PPP: 1.78 (ref 0.9–1.1)
KOH PREP NAIL: ABNORMAL
LYMPHOCYTES # BLD AUTO: 1.67 10*3/MM3 (ref 0.9–4.8)
LYMPHOCYTES NFR BLD AUTO: 17.4 % (ref 19.6–45.3)
MAGNESIUM SERPL-MCNC: 2 MG/DL (ref 1.6–2.4)
MCH RBC QN AUTO: 29.5 PG (ref 26.9–32)
MCHC RBC AUTO-ENTMCNC: 32.7 G/DL (ref 32.4–36.3)
MCV RBC AUTO: 90.2 FL (ref 80.5–98.2)
MONOCYTES # BLD AUTO: 1.31 10*3/MM3 (ref 0.2–1.2)
MONOCYTES NFR BLD AUTO: 13.7 % (ref 5–12)
NEUTROPHILS # BLD AUTO: 6.48 10*3/MM3 (ref 1.9–8.1)
NEUTROPHILS NFR BLD AUTO: 67.6 % (ref 42.7–76)
OSMOLALITY UR: 310 MOSM/KG
PHOSPHATE SERPL-MCNC: 3.2 MG/DL (ref 2.5–4.5)
PLATELET # BLD AUTO: 276 10*3/MM3 (ref 140–500)
PMV BLD AUTO: 10.2 FL (ref 6–12)
POTASSIUM BLD-SCNC: 3.7 MMOL/L (ref 3.5–5.2)
PROTHROMBIN TIME: 20.1 SECONDS (ref 11.7–14.2)
RBC # BLD AUTO: 3.69 10*6/MM3 (ref 3.9–5.2)
SODIUM BLD-SCNC: 130 MMOL/L (ref 136–145)
URATE SERPL-MCNC: 6.6 MG/DL (ref 2.4–5.7)
WBC NRBC COR # BLD: 9.59 10*3/MM3 (ref 4.5–10.7)

## 2017-05-22 PROCEDURE — 80069 RENAL FUNCTION PANEL: CPT | Performed by: INTERNAL MEDICINE

## 2017-05-22 PROCEDURE — 83935 ASSAY OF URINE OSMOLALITY: CPT | Performed by: INTERNAL MEDICINE

## 2017-05-22 PROCEDURE — 87220 TISSUE EXAM FOR FUNGI: CPT | Performed by: INTERNAL MEDICINE

## 2017-05-22 PROCEDURE — 0D758ZZ DILATION OF ESOPHAGUS, VIA NATURAL OR ARTIFICIAL OPENING ENDOSCOPIC: ICD-10-PCS | Performed by: INTERNAL MEDICINE

## 2017-05-22 PROCEDURE — 83735 ASSAY OF MAGNESIUM: CPT | Performed by: INTERNAL MEDICINE

## 2017-05-22 PROCEDURE — 85610 PROTHROMBIN TIME: CPT | Performed by: INTERNAL MEDICINE

## 2017-05-22 PROCEDURE — 86334 IMMUNOFIX E-PHORESIS SERUM: CPT | Performed by: INTERNAL MEDICINE

## 2017-05-22 PROCEDURE — 86335 IMMUNFIX E-PHORSIS/URINE/CSF: CPT | Performed by: INTERNAL MEDICINE

## 2017-05-22 PROCEDURE — 84550 ASSAY OF BLOOD/URIC ACID: CPT | Performed by: INTERNAL MEDICINE

## 2017-05-22 PROCEDURE — 25010000002 PROPOFOL 10 MG/ML EMULSION: Performed by: NURSE ANESTHETIST, CERTIFIED REGISTERED

## 2017-05-22 PROCEDURE — 43235 EGD DIAGNOSTIC BRUSH WASH: CPT | Performed by: INTERNAL MEDICINE

## 2017-05-22 PROCEDURE — 43450 DILATE ESOPHAGUS 1/MULT PASS: CPT | Performed by: INTERNAL MEDICINE

## 2017-05-22 PROCEDURE — 85025 COMPLETE CBC W/AUTO DIFF WBC: CPT | Performed by: INTERNAL MEDICINE

## 2017-05-22 PROCEDURE — 99232 SBSQ HOSP IP/OBS MODERATE 35: CPT | Performed by: INTERNAL MEDICINE

## 2017-05-22 RX ORDER — FUROSEMIDE 40 MG/1
40 TABLET ORAL 2 TIMES DAILY
Status: DISCONTINUED | OUTPATIENT
Start: 2017-05-22 | End: 2017-05-23

## 2017-05-22 RX ORDER — SODIUM CHLORIDE 0.9 % (FLUSH) 0.9 %
1-10 SYRINGE (ML) INJECTION AS NEEDED
Status: DISCONTINUED | OUTPATIENT
Start: 2017-05-22 | End: 2017-05-22

## 2017-05-22 RX ORDER — SODIUM CHLORIDE, SODIUM LACTATE, POTASSIUM CHLORIDE, CALCIUM CHLORIDE 600; 310; 30; 20 MG/100ML; MG/100ML; MG/100ML; MG/100ML
30 INJECTION, SOLUTION INTRAVENOUS CONTINUOUS
Status: DISCONTINUED | OUTPATIENT
Start: 2017-05-22 | End: 2017-05-22

## 2017-05-22 RX ORDER — ONDANSETRON 2 MG/ML
4 INJECTION INTRAMUSCULAR; INTRAVENOUS ONCE AS NEEDED
Status: DISCONTINUED | OUTPATIENT
Start: 2017-05-22 | End: 2017-05-22

## 2017-05-22 RX ORDER — WARFARIN SODIUM 3 MG/1
3 TABLET ORAL
Status: DISCONTINUED | OUTPATIENT
Start: 2017-05-22 | End: 2017-05-23 | Stop reason: HOSPADM

## 2017-05-22 RX ORDER — BUMETANIDE 0.25 MG/ML
2 INJECTION INTRAMUSCULAR; INTRAVENOUS EVERY 8 HOURS
Status: COMPLETED | OUTPATIENT
Start: 2017-05-22 | End: 2017-05-22

## 2017-05-22 RX ORDER — CARVEDILOL 6.25 MG/1
6.25 TABLET ORAL 2 TIMES DAILY WITH MEALS
Status: DISCONTINUED | OUTPATIENT
Start: 2017-05-22 | End: 2017-05-23 | Stop reason: HOSPADM

## 2017-05-22 RX ORDER — PROPOFOL 10 MG/ML
VIAL (ML) INTRAVENOUS CONTINUOUS PRN
Status: DISCONTINUED | OUTPATIENT
Start: 2017-05-22 | End: 2017-05-22 | Stop reason: SURG

## 2017-05-22 RX ORDER — LIDOCAINE HYDROCHLORIDE 20 MG/ML
INJECTION, SOLUTION INFILTRATION; PERINEURAL AS NEEDED
Status: DISCONTINUED | OUTPATIENT
Start: 2017-05-22 | End: 2017-05-22 | Stop reason: SURG

## 2017-05-22 RX ORDER — PROPOFOL 10 MG/ML
VIAL (ML) INTRAVENOUS AS NEEDED
Status: DISCONTINUED | OUTPATIENT
Start: 2017-05-22 | End: 2017-05-22 | Stop reason: SURG

## 2017-05-22 RX ORDER — CARVEDILOL 3.12 MG/1
3.12 TABLET ORAL ONCE
Status: COMPLETED | OUTPATIENT
Start: 2017-05-22 | End: 2017-05-22

## 2017-05-22 RX ADMIN — Medication 40 MG: at 09:19

## 2017-05-22 RX ADMIN — CARVEDILOL 6.25 MG: 6.25 TABLET, FILM COATED ORAL at 18:09

## 2017-05-22 RX ADMIN — WARFARIN SODIUM 3 MG: 3 TABLET ORAL at 18:09

## 2017-05-22 RX ADMIN — ISOSORBIDE MONONITRATE 60 MG: 60 TABLET, EXTENDED RELEASE ORAL at 18:09

## 2017-05-22 RX ADMIN — BUMETANIDE 2 MG: 0.25 INJECTION, SOLUTION INTRAMUSCULAR; INTRAVENOUS at 22:48

## 2017-05-22 RX ADMIN — BUMETANIDE 2 MG: 0.25 INJECTION, SOLUTION INTRAMUSCULAR; INTRAVENOUS at 15:48

## 2017-05-22 RX ADMIN — CARVEDILOL 3.12 MG: 3.12 TABLET, FILM COATED ORAL at 09:11

## 2017-05-22 RX ADMIN — LIDOCAINE HYDROCHLORIDE 60 MG: 20 INJECTION, SOLUTION INFILTRATION; PERINEURAL at 14:10

## 2017-05-22 RX ADMIN — PROPOFOL 50 MG: 10 INJECTION, EMULSION INTRAVENOUS at 14:10

## 2017-05-22 RX ADMIN — PANTOPRAZOLE SODIUM 40 MG: 40 TABLET, DELAYED RELEASE ORAL at 09:11

## 2017-05-22 RX ADMIN — CARVEDILOL 3.12 MG: 3.12 TABLET, FILM COATED ORAL at 09:19

## 2017-05-22 RX ADMIN — ASPIRIN 81 MG: 81 TABLET ORAL at 09:11

## 2017-05-22 RX ADMIN — BUMETANIDE 2 MG: 0.25 INJECTION, SOLUTION INTRAMUSCULAR; INTRAVENOUS at 03:57

## 2017-05-22 RX ADMIN — VALSARTAN 320 MG: 320 TABLET ORAL at 09:19

## 2017-05-22 RX ADMIN — ISOSORBIDE MONONITRATE 60 MG: 60 TABLET, EXTENDED RELEASE ORAL at 09:11

## 2017-05-22 RX ADMIN — LORAZEPAM 0.5 MG: 0.5 TABLET ORAL at 22:46

## 2017-05-22 RX ADMIN — SODIUM CHLORIDE, POTASSIUM CHLORIDE, SODIUM LACTATE AND CALCIUM CHLORIDE 30 ML/HR: 600; 310; 30; 20 INJECTION, SOLUTION INTRAVENOUS at 12:54

## 2017-05-22 RX ADMIN — CETIRIZINE HYDROCHLORIDE 10 MG: 10 TABLET, FILM COATED ORAL at 09:11

## 2017-05-22 RX ADMIN — ACETAMINOPHEN 650 MG: 325 TABLET ORAL at 20:32

## 2017-05-22 RX ADMIN — PROPOFOL 140 MCG/KG/MIN: 10 INJECTION, EMULSION INTRAVENOUS at 14:10

## 2017-05-22 RX ADMIN — POTASSIUM CHLORIDE 20 MEQ: 750 CAPSULE, EXTENDED RELEASE ORAL at 09:11

## 2017-05-23 VITALS
WEIGHT: 140 LBS | RESPIRATION RATE: 18 BRPM | HEIGHT: 60 IN | BODY MASS INDEX: 27.48 KG/M2 | DIASTOLIC BLOOD PRESSURE: 65 MMHG | OXYGEN SATURATION: 95 % | SYSTOLIC BLOOD PRESSURE: 119 MMHG | TEMPERATURE: 98.3 F | HEART RATE: 82 BPM

## 2017-05-23 LAB
ANION GAP SERPL CALCULATED.3IONS-SCNC: 14.6 MMOL/L
BACTERIA SPEC AEROBE CULT: NORMAL
BACTERIA SPEC AEROBE CULT: NORMAL
BASOPHILS # BLD AUTO: 0.02 10*3/MM3 (ref 0–0.2)
BASOPHILS NFR BLD AUTO: 0.3 % (ref 0–1.5)
BUN BLD-MCNC: 26 MG/DL (ref 8–23)
BUN/CREAT SERPL: 24.3 (ref 7–25)
CALCIUM SPEC-SCNC: 9.7 MG/DL (ref 8.6–10.5)
CHLORIDE SERPL-SCNC: 91 MMOL/L (ref 98–107)
CO2 SERPL-SCNC: 25.4 MMOL/L (ref 22–29)
CREAT BLD-MCNC: 1.07 MG/DL (ref 0.57–1)
DEPRECATED RDW RBC AUTO: 50.4 FL (ref 37–54)
EOSINOPHIL # BLD AUTO: 0.08 10*3/MM3 (ref 0–0.7)
EOSINOPHIL NFR BLD AUTO: 1.1 % (ref 0.3–6.2)
ERYTHROCYTE [DISTWIDTH] IN BLOOD BY AUTOMATED COUNT: 15.3 % (ref 11.7–13)
GFR SERPL CREATININE-BSD FRML MDRD: 48 ML/MIN/1.73
GLUCOSE BLD-MCNC: 99 MG/DL (ref 65–99)
HCT VFR BLD AUTO: 33.9 % (ref 35.6–45.5)
HGB BLD-MCNC: 10.9 G/DL (ref 11.9–15.5)
IGA SERPL-MCNC: 210 MG/DL (ref 64–422)
IGG SERPL-MCNC: 571 MG/DL (ref 700–1600)
IGM SERPL-MCNC: 246 MG/DL (ref 26–217)
IMM GRANULOCYTES # BLD: 0 10*3/MM3 (ref 0–0.03)
IMM GRANULOCYTES NFR BLD: 0 % (ref 0–0.5)
INR PPP: 1.73 (ref 0.9–1.1)
INTERPRETATION UR IFE-IMP: NORMAL
LYMPHOCYTES # BLD AUTO: 2.07 10*3/MM3 (ref 0.9–4.8)
LYMPHOCYTES NFR BLD AUTO: 27.5 % (ref 19.6–45.3)
MCH RBC QN AUTO: 29.1 PG (ref 26.9–32)
MCHC RBC AUTO-ENTMCNC: 32.2 G/DL (ref 32.4–36.3)
MCV RBC AUTO: 90.6 FL (ref 80.5–98.2)
MONOCYTES # BLD AUTO: 0.97 10*3/MM3 (ref 0.2–1.2)
MONOCYTES NFR BLD AUTO: 12.9 % (ref 5–12)
NEUTROPHILS # BLD AUTO: 4.39 10*3/MM3 (ref 1.9–8.1)
NEUTROPHILS NFR BLD AUTO: 58.2 % (ref 42.7–76)
PLATELET # BLD AUTO: 287 10*3/MM3 (ref 140–500)
PMV BLD AUTO: 10.1 FL (ref 6–12)
POTASSIUM BLD-SCNC: 3.7 MMOL/L (ref 3.5–5.2)
PROT PATTERN SERPL IFE-IMP: ABNORMAL
PROTHROMBIN TIME: 19.6 SECONDS (ref 11.7–14.2)
RBC # BLD AUTO: 3.74 10*6/MM3 (ref 3.9–5.2)
SODIUM BLD-SCNC: 131 MMOL/L (ref 136–145)
WBC NRBC COR # BLD: 7.53 10*3/MM3 (ref 4.5–10.7)

## 2017-05-23 PROCEDURE — 99232 SBSQ HOSP IP/OBS MODERATE 35: CPT | Performed by: INTERNAL MEDICINE

## 2017-05-23 PROCEDURE — 80048 BASIC METABOLIC PNL TOTAL CA: CPT | Performed by: HOSPITALIST

## 2017-05-23 PROCEDURE — 94799 UNLISTED PULMONARY SVC/PX: CPT

## 2017-05-23 PROCEDURE — 85610 PROTHROMBIN TIME: CPT | Performed by: INTERNAL MEDICINE

## 2017-05-23 PROCEDURE — 85025 COMPLETE CBC W/AUTO DIFF WBC: CPT | Performed by: HOSPITALIST

## 2017-05-23 RX ORDER — FERROUS SULFATE 325(65) MG
325 TABLET ORAL 2 TIMES DAILY WITH MEALS
Status: DISCONTINUED | OUTPATIENT
Start: 2017-05-23 | End: 2017-05-23 | Stop reason: HOSPADM

## 2017-05-23 RX ORDER — CARVEDILOL 6.25 MG/1
6.25 TABLET ORAL 2 TIMES DAILY WITH MEALS
Qty: 60 TABLET | Refills: 0 | Status: SHIPPED | OUTPATIENT
Start: 2017-05-23 | End: 2017-05-30 | Stop reason: SDUPTHER

## 2017-05-23 RX ORDER — CARVEDILOL 6.25 MG/1
6.25 TABLET ORAL 2 TIMES DAILY WITH MEALS
Qty: 60 TABLET | Refills: 0 | Status: SHIPPED | OUTPATIENT
Start: 2017-05-23 | End: 2017-05-23

## 2017-05-23 RX ORDER — FUROSEMIDE 20 MG/1
20 TABLET ORAL DAILY
Status: DISCONTINUED | OUTPATIENT
Start: 2017-05-24 | End: 2017-05-23 | Stop reason: HOSPADM

## 2017-05-23 RX ORDER — POTASSIUM CHLORIDE 750 MG/1
20 CAPSULE, EXTENDED RELEASE ORAL DAILY
Qty: 60 CAPSULE | Refills: 0 | Status: SHIPPED | OUTPATIENT
Start: 2017-05-23 | End: 2017-06-20

## 2017-05-23 RX ORDER — FLUCONAZOLE 100 MG/1
100 TABLET ORAL EVERY 24 HOURS
Status: DISCONTINUED | OUTPATIENT
Start: 2017-05-24 | End: 2017-05-23 | Stop reason: HOSPADM

## 2017-05-23 RX ORDER — FLUCONAZOLE 100 MG/1
100 TABLET ORAL EVERY 24 HOURS
Qty: 13 TABLET | Refills: 0 | Status: SHIPPED | OUTPATIENT
Start: 2017-05-24 | End: 2017-05-23

## 2017-05-23 RX ORDER — FLUCONAZOLE 100 MG/1
100 TABLET ORAL EVERY 24 HOURS
Qty: 13 TABLET | Refills: 0 | Status: SHIPPED | OUTPATIENT
Start: 2017-05-24 | End: 2017-06-06

## 2017-05-23 RX ORDER — POTASSIUM CHLORIDE 750 MG/1
20 CAPSULE, EXTENDED RELEASE ORAL DAILY
Qty: 60 CAPSULE | Refills: 0 | Status: SHIPPED | OUTPATIENT
Start: 2017-05-23 | End: 2017-05-23

## 2017-05-23 RX ORDER — FLUCONAZOLE 200 MG/1
200 TABLET ORAL ONCE
Status: COMPLETED | OUTPATIENT
Start: 2017-05-23 | End: 2017-05-23

## 2017-05-23 RX ADMIN — FLUCONAZOLE 200 MG: 200 TABLET ORAL at 11:06

## 2017-05-23 RX ADMIN — VALSARTAN 320 MG: 320 TABLET ORAL at 11:12

## 2017-05-23 RX ADMIN — POTASSIUM CHLORIDE 20 MEQ: 750 CAPSULE, EXTENDED RELEASE ORAL at 09:29

## 2017-05-23 RX ADMIN — CETIRIZINE HYDROCHLORIDE 10 MG: 10 TABLET, FILM COATED ORAL at 09:29

## 2017-05-23 RX ADMIN — ISOSORBIDE MONONITRATE 60 MG: 60 TABLET, EXTENDED RELEASE ORAL at 09:29

## 2017-05-23 RX ADMIN — PANTOPRAZOLE SODIUM 40 MG: 40 TABLET, DELAYED RELEASE ORAL at 06:55

## 2017-05-23 RX ADMIN — CARVEDILOL 6.25 MG: 6.25 TABLET, FILM COATED ORAL at 09:29

## 2017-05-23 RX ADMIN — FUROSEMIDE 40 MG: 40 TABLET ORAL at 09:29

## 2017-05-23 RX ADMIN — ASPIRIN 81 MG: 81 TABLET ORAL at 09:29

## 2017-05-24 ENCOUNTER — TELEPHONE (OUTPATIENT)
Dept: CARDIOLOGY | Facility: CLINIC | Age: 82
End: 2017-05-24

## 2017-05-25 ENCOUNTER — HOSPITAL ENCOUNTER (INPATIENT)
Dept: HOSPITAL 23 - CED | Age: 82
LOS: 1 days | Discharge: HOME HEALTH | DRG: 69 | End: 2017-05-26
Attending: INTERNAL MEDICINE | Admitting: INTERNAL MEDICINE
Payer: MEDICARE

## 2017-05-25 ENCOUNTER — TELEPHONE (OUTPATIENT)
Dept: CARDIOLOGY | Facility: CLINIC | Age: 82
End: 2017-05-25

## 2017-05-25 ENCOUNTER — HOSPITAL ENCOUNTER (OUTPATIENT)
Dept: CARDIOLOGY | Facility: HOSPITAL | Age: 82
Setting detail: RECURRING SERIES
Discharge: HOME | End: 2017-05-25

## 2017-05-25 DIAGNOSIS — Z79.01: ICD-10-CM

## 2017-05-25 DIAGNOSIS — I10: ICD-10-CM

## 2017-05-25 DIAGNOSIS — B37.81: ICD-10-CM

## 2017-05-25 DIAGNOSIS — J30.9: ICD-10-CM

## 2017-05-25 DIAGNOSIS — I48.91: ICD-10-CM

## 2017-05-25 DIAGNOSIS — Z98.49: ICD-10-CM

## 2017-05-25 DIAGNOSIS — G45.9: Primary | ICD-10-CM

## 2017-05-25 DIAGNOSIS — Z90.710: ICD-10-CM

## 2017-05-25 DIAGNOSIS — N17.9: ICD-10-CM

## 2017-05-25 DIAGNOSIS — Z79.82: ICD-10-CM

## 2017-05-25 DIAGNOSIS — Z86.73: ICD-10-CM

## 2017-05-25 LAB
ANISOCYTOSIS: (no result)
BARBITURATES UR QL SCN: 0.5 MG/DL (ref 0.2–2)
BARBITURATES UR QL SCN: 3.5 G/DL (ref 3.5–5)
BASOPHIL#: 0 X10E3 (ref 0–0.3)
BASOPHIL%: 0.1 % (ref 0–2.5)
BENZODIAZ UR QL SCN: 17 U/L (ref 10–40)
BENZODIAZ UR QL SCN: 25 U/L (ref 10–42)
BLOOD UREA NITROGEN: 39 MG/DL (ref 9–23)
BUN/CREATININE RATIO: 26
BZE UR QL SCN: 71 U/L (ref 32–92)
CALCIUM SERUM: 9.1 MG/DL (ref 8.4–10.2)
CK MB SERPL-RTO: 15.6 % (ref 11–15.5)
CK MB SERPL-RTO: 32.2 G/DL (ref 30–36)
CREATININE SERUM: 1.5 MG/DL (ref 0.6–1.4)
DIFF IND: YES
EOSINOPHIL#: 0.1 X10E3 (ref 0–0.7)
EOSINOPHIL%: 1.1 % (ref 0–7)
EOSINOPHIL: 2 % (ref 0–7)
GLOM FILT RATE ESTIMATED: 30.6 ML/MIN (ref 60–?)
GLUCOSE FASTING: 125 MG/DL (ref 70–110)
HEMATOCRIT: 31.5 % (ref 35–45)
HEMOGLOBIN: 10.1 GM/DL (ref 12–16)
HIV1+2 AB SPEC QL IA.RAPID: 38.9 SECONDS (ref 23.5–31.3)
INR: 1.8
KETONES UR QL: 24 MMOL/L (ref 22–31)
KETONES UR QL: 93 MMOL/L (ref 100–111)
LYMPHOCYTE#: 1.9 X10E3 (ref 1–3.5)
LYMPHOCYTE%: 20.5 % (ref 17–45)
LYMPHOCYTE: 19 % (ref 17–50)
MEAN CELL VOLUME: 88.5 FL (ref 83–96)
MEAN CORPUSCULAR HEMOGLOBIN: 28.5 PG (ref 28–34)
MEAN PLATELET VOLUME: 8.4 FL (ref 6.5–11.5)
MONOCYTE#: 1.1 X10E3 (ref 0–1)
MONOCYTE%: 12 % (ref 3–12)
MONOCYTE: 8 % (ref 3–12)
NEUTROPHIL#: 6.1 X10E3 (ref 1.5–7.1)
NEUTROPHIL%: 66.3 % (ref 40–75)
NEUTROPHIL: 71 % (ref 40–75)
PLATELET COUNT: 321 X10E3 (ref 140–420)
PLATELET ESTIMATE: NORMAL
POTASSIUM: 3.9 MMOL/L (ref 3.5–5.1)
PROTEIN TOTAL SERUM: 7.4 G/DL (ref 6–8.3)
PROTHROMBIN TIME (PATIENT): 19.3 SECONDS (ref 9.6–11.5)
RED BLOOD COUNT: 3.56 X10E (ref 3.9–5.3)
SODIUM: 127 MMOL/L (ref 135–145)
VACUOLIZATION: (no result)
WHITE BLOOD COUNT: 9.3 X10E3 (ref 4–10.5)

## 2017-05-25 PROCEDURE — 85610 PROTHROMBIN TIME: CPT

## 2017-05-25 PROCEDURE — 36416 COLLJ CAPILLARY BLOOD SPEC: CPT

## 2017-05-26 ENCOUNTER — TELEPHONE (OUTPATIENT)
Dept: INTERNAL MEDICINE | Facility: CLINIC | Age: 82
End: 2017-05-26

## 2017-05-26 ENCOUNTER — TELEPHONE (OUTPATIENT)
Dept: CARDIOLOGY | Facility: CLINIC | Age: 82
End: 2017-05-26

## 2017-05-26 LAB
%MB: 3.2 % (ref 0–4)
BLOOD UREA NITROGEN: 27 MG/DL (ref 9–23)
BUN/CREATININE RATIO: 27
CALCIUM SERUM: 9.2 MG/DL (ref 8.4–10.2)
CHOLESTEROL: 126 MG/DL (ref 0–200)
CK MB SERPL-RTO: 15.3 % (ref 11–15.5)
CK MB SERPL-RTO: 32.5 G/DL (ref 30–36)
CK TOTAL: 71 IU/L (ref 26–140)
CREATININE SERUM: 1 MG/DL (ref 0.6–1.4)
GENTAMICIN PEAK SERPL-MCNC: NO MG/L
GLOM FILT RATE ESTIMATED: 49.9 ML/MIN (ref 60–?)
GLUCOSE FASTING: 106 MG/DL (ref 70–110)
HDL CHOLESTEROL: 32 MG/DL (ref 35–95)
HEMATOCRIT: 32 % (ref 35–45)
HEMOGLOBIN: 10.4 GM/DL (ref 12–16)
INR: 2
KETONES UR QL: 25 MMOL/L (ref 22–31)
KETONES UR QL: 97 MMOL/L (ref 100–111)
LDL CHOLESTEROL: 76 MG/DL (ref ?–130)
LDL/HDL RATIO: 2 RATIO (ref 0–4)
MB: 2.3 NG/ML
MEAN CELL VOLUME: 88.1 FL (ref 83–96)
MEAN CORPUSCULAR HEMOGLOBIN: 28.6 PG (ref 28–34)
MEAN PLATELET VOLUME: 8.4 FL (ref 6.5–11.5)
METHADONE UR QL SCN: 2.5 NG/ML (ref 0–7.9)
METHADONE UR QL SCN: 2.9 NG/ML (ref 0–7.9)
PLATELET COUNT: 310 X10E3 (ref 140–420)
POC - CREATININE: 1.17 MG/DL (ref 0.44–1.03)
POC - GFR: 46 ML/MIN (ref 60–?)
POC - TROPONIN: <0.05 NG/ML (ref ?–0.05)
POC - TROPONIN: <0.05 NG/ML (ref ?–0.05)
POTASSIUM: 3.4 MMOL/L (ref 3.5–5.1)
PROTHROMBIN TIME (PATIENT): 21.4 SECONDS (ref 9.6–11.5)
RED BLOOD COUNT: 3.64 X10E (ref 3.9–5.3)
SODIUM: 133 MMOL/L (ref 135–145)
TRIGLYCERIDES: 91 MG/DL (ref 10–160)
URBCS1 AUWI: (no result) /[HPF] (ref 0–2)
URINE APPEARANCE: CLEAR
URINE BACTERIA AUWI: (no result)
URINE BILIRUBIN: (no result)
URINE BLOOD: (no result)
URINE COLOR: YELLOW
URINE GLUCOSE: (no result) MG/DL
URINE KETONE: (no result)
URINE LEUKOCYTE ESTERASE: (no result)
URINE NITRATE: (no result)
URINE PH: 6.5 (ref 5–8)
URINE PROTEIN: (no result)
URINE SOURCE: (no result)
URINE SPECIFIC GRAVITY: 1.02 (ref 1–1.03)
URINE SQUAMOUS EPITHELIAL CELL: (no result) /[HPF]
URINE UROBILINOGEN: 0.2 MG/DL
UWBCS1 AUWI: (no result) (ref 0–5)
WHITE BLOOD COUNT: 8 X10E3 (ref 4–10.5)

## 2017-05-26 PROCEDURE — B24BYZZ ULTRASONOGRAPHY OF HEART WITH AORTA USING OTHER CONTRAST: ICD-10-PCS | Performed by: INTERNAL MEDICINE

## 2017-05-26 PROCEDURE — B32RYZZ COMPUTERIZED TOMOGRAPHY (CT SCAN) OF INTRACRANIAL ARTERIES USING OTHER CONTRAST: ICD-10-PCS | Performed by: RADIOLOGY

## 2017-05-26 PROCEDURE — B328YZZ COMPUTERIZED TOMOGRAPHY (CT SCAN) OF BILATERAL INTERNAL CAROTID ARTERIES USING OTHER CONTRAST: ICD-10-PCS | Performed by: RADIOLOGY

## 2017-05-30 ENCOUNTER — TELEPHONE (OUTPATIENT)
Dept: CARDIOLOGY | Facility: CLINIC | Age: 82
End: 2017-05-30

## 2017-05-30 ENCOUNTER — OFFICE VISIT (OUTPATIENT)
Dept: INTERNAL MEDICINE | Facility: CLINIC | Age: 82
End: 2017-05-30

## 2017-05-30 VITALS
DIASTOLIC BLOOD PRESSURE: 96 MMHG | WEIGHT: 137 LBS | HEART RATE: 105 BPM | HEIGHT: 60 IN | OXYGEN SATURATION: 98 % | BODY MASS INDEX: 26.9 KG/M2 | SYSTOLIC BLOOD PRESSURE: 180 MMHG

## 2017-05-30 DIAGNOSIS — K21.00 GASTROESOPHAGEAL REFLUX DISEASE WITH ESOPHAGITIS: ICD-10-CM

## 2017-05-30 DIAGNOSIS — I10 ESSENTIAL HYPERTENSION: ICD-10-CM

## 2017-05-30 DIAGNOSIS — I48.21 PERMANENT ATRIAL FIBRILLATION (HCC): ICD-10-CM

## 2017-05-30 DIAGNOSIS — J69.0 ASPIRATION PNEUMONIA OF RIGHT LOWER LOBE, UNSPECIFIED ASPIRATION PNEUMONIA TYPE (HCC): Primary | ICD-10-CM

## 2017-05-30 LAB
BASOPHILS # BLD AUTO: 0.03 10*3/MM3 (ref 0–0.2)
BASOPHILS NFR BLD AUTO: 0.3 % (ref 0–1.5)
EOSINOPHIL # BLD AUTO: 0.12 10*3/MM3 (ref 0–0.7)
EOSINOPHIL # BLD AUTO: 1.3 % (ref 0.3–6.2)
ERYTHROCYTE [DISTWIDTH] IN BLOOD BY AUTOMATED COUNT: 15.7 % (ref 11.7–13)
HCT VFR BLD AUTO: 32.8 % (ref 35.6–45.5)
HGB BLD-MCNC: 10.4 G/DL (ref 11.9–15.5)
IMM GRANULOCYTES # BLD: 0.03 10*3/MM3 (ref 0–0.03)
IMM GRANULOCYTES NFR BLD: 0.3 % (ref 0–0.5)
LYMPHOCYTES # BLD AUTO: 1.92 10*3/MM3 (ref 0.9–4.8)
LYMPHOCYTES NFR BLD AUTO: 20.8 % (ref 19.6–45.3)
MCH RBC QN AUTO: 29 PG (ref 26.9–32)
MCHC RBC AUTO-ENTMCNC: 31.7 G/DL (ref 32.4–36.3)
MCV RBC AUTO: 91.4 FL (ref 80.5–98.2)
MONOCYTES # BLD AUTO: 0.7 10*3/MM3 (ref 0.2–1.2)
MONOCYTES NFR BLD AUTO: 7.6 % (ref 5–12)
NEUTROPHILS # BLD AUTO: 6.43 10*3/MM3 (ref 1.9–8.1)
NEUTROPHILS NFR BLD AUTO: 69.7 % (ref 42.7–76)
PLATELET # BLD AUTO: 419 10*3/MM3 (ref 140–500)
RBC # BLD AUTO: 3.59 10*6/MM3 (ref 3.9–5.2)
WBC # BLD AUTO: 9.23 10*3/MM3 (ref 4.5–10.7)

## 2017-05-30 PROCEDURE — 99214 OFFICE O/P EST MOD 30 MIN: CPT | Performed by: INTERNAL MEDICINE

## 2017-05-30 PROCEDURE — 80053 COMPREHEN METABOLIC PANEL: CPT | Performed by: INTERNAL MEDICINE

## 2017-05-30 RX ORDER — CARVEDILOL 6.25 MG/1
12.5 TABLET ORAL 2 TIMES DAILY WITH MEALS
Qty: 120 TABLET | Refills: 0
Start: 2017-05-30 | End: 2017-06-27 | Stop reason: SDUPTHER

## 2017-05-31 LAB
ALBUMIN SERPL-MCNC: 3.29 G/DL (ref 3.4–4.6)
ALBUMIN/GLOB SERPL: 0.9 G/DL
ALP SERPL-CCNC: 92 U/L (ref 46–116)
ALT SERPL W P-5'-P-CCNC: 21 U/L (ref 14–59)
ANION GAP SERPL CALCULATED.3IONS-SCNC: 11 MMOL/L
AST SERPL-CCNC: 26 U/L (ref 7–37)
BILIRUB SERPL-MCNC: 0.2 MG/DL (ref 0.2–1)
BUN BLD-MCNC: 19 MG/DL (ref 6–22)
BUN/CREAT SERPL: 17.8 (ref 7–25)
CALCIUM SPEC-SCNC: 9.5 MG/DL (ref 8.6–10.5)
CHLORIDE SERPL-SCNC: 96 MMOL/L (ref 95–107)
CO2 SERPL-SCNC: 28 MMOL/L (ref 23–32)
CREAT BLD-MCNC: 1.07 MG/DL (ref 0.55–1.02)
GFR SERPL CREATININE-BSD FRML MDRD: 48 ML/MIN/1.73
GLOBULIN UR ELPH-MCNC: 3.6 GM/DL
GLUCOSE BLD-MCNC: 114 MG/DL (ref 70–100)
POTASSIUM BLD-SCNC: 4.8 MMOL/L (ref 3.3–5.3)
PROT SERPL-MCNC: 6.9 G/DL (ref 6.3–8.4)
SODIUM BLD-SCNC: 135 MMOL/L (ref 136–145)

## 2017-06-02 ENCOUNTER — TELEPHONE (OUTPATIENT)
Dept: INTERNAL MEDICINE | Facility: CLINIC | Age: 82
End: 2017-06-02

## 2017-06-02 NOTE — TELEPHONE ENCOUNTER
----- Message from Magalie Moon MA sent at 6/2/2017 12:25 PM EDT -----  Pt anxious for lab results.     Pt#[phone]

## 2017-06-05 ENCOUNTER — TELEPHONE (OUTPATIENT)
Dept: INTERNAL MEDICINE | Facility: CLINIC | Age: 82
End: 2017-06-05

## 2017-06-05 DIAGNOSIS — I10 ESSENTIAL HYPERTENSION: Primary | ICD-10-CM

## 2017-06-05 RX ORDER — FUROSEMIDE 20 MG/1
20 TABLET ORAL DAILY
Qty: 90 TABLET | Refills: 0 | Status: SHIPPED | OUTPATIENT
Start: 2017-06-05 | End: 2017-06-20 | Stop reason: SDUPTHER

## 2017-06-05 NOTE — TELEPHONE ENCOUNTER
----- Message from Kamryn Alvarado sent at 6/5/2017  1:36 PM EDT -----  Contact: Patient  Patient called needing refill on     furosemide (LASIX) 40 MG tablet, BUT wants 20 mg instead of 40 mg please.  Please advise.     Patient:  [phone] (H)                [phone] (C)    Pharmacy:  [facility] - [address] [phone] [facility] [phone]

## 2017-06-05 NOTE — TELEPHONE ENCOUNTER
----- Message from Kamryn Alvarado sent at 6/5/2017  3:07 PM EDT -----  Contact: Patient  Patient was in [facility] on 05/26/2017 due to mini stroke.  She was told to follow with a neurologist at [facility], Dr. Paulino Nick, but only if her PCP said she needed to.  Please advise.      Patient: [phone] (H)               [phone] (C)

## 2017-06-05 NOTE — TELEPHONE ENCOUNTER
Spoke with patient about a recent hospitalization.  She has an appointment to follow-up on the 13th.  Advised to defer any appointments until I see her in the office there.

## 2017-06-06 RX ORDER — WARFARIN SODIUM 1 MG/1
1 TABLET ORAL
COMMUNITY
End: 2017-06-06 | Stop reason: SDUPTHER

## 2017-06-06 RX ORDER — WARFARIN SODIUM 1 MG/1
1 TABLET ORAL TAKE AS DIRECTED
Qty: 30 TABLET | Refills: 0 | Status: SHIPPED | OUTPATIENT
Start: 2017-06-06 | End: 2017-06-29 | Stop reason: SDUPTHER

## 2017-06-08 ENCOUNTER — TELEPHONE (OUTPATIENT)
Dept: CARDIOLOGY | Facility: CLINIC | Age: 82
End: 2017-06-08

## 2017-06-08 NOTE — TELEPHONE ENCOUNTER
Pt is having [facility] come out for physical therapy after her recent stroke, she wanted to make sure that this was ok and if she could possibly get [facility] to come instead. Please advise

## 2017-06-13 ENCOUNTER — OFFICE VISIT (OUTPATIENT)
Dept: INTERNAL MEDICINE | Facility: CLINIC | Age: 82
End: 2017-06-13

## 2017-06-13 VITALS
DIASTOLIC BLOOD PRESSURE: 60 MMHG | HEART RATE: 78 BPM | WEIGHT: 138 LBS | OXYGEN SATURATION: 97 % | BODY MASS INDEX: 27.09 KG/M2 | HEIGHT: 60 IN | SYSTOLIC BLOOD PRESSURE: 130 MMHG

## 2017-06-13 DIAGNOSIS — I10 ESSENTIAL HYPERTENSION: Primary | ICD-10-CM

## 2017-06-13 DIAGNOSIS — R51.9 NONINTRACTABLE EPISODIC HEADACHE, UNSPECIFIED HEADACHE TYPE: ICD-10-CM

## 2017-06-13 DIAGNOSIS — I48.21 PERMANENT ATRIAL FIBRILLATION (HCC): ICD-10-CM

## 2017-06-13 LAB
ALBUMIN SERPL-MCNC: 2.95 G/DL (ref 3.4–4.6)
ALBUMIN/GLOB SERPL: 0.9 G/DL
ALP SERPL-CCNC: 79 U/L (ref 46–116)
ALT SERPL W P-5'-P-CCNC: 13 U/L (ref 14–59)
ANION GAP SERPL CALCULATED.3IONS-SCNC: 9 MMOL/L
AST SERPL-CCNC: 15 U/L (ref 7–37)
BILIRUB SERPL-MCNC: 0.4 MG/DL (ref 0.2–1)
BUN BLD-MCNC: 18 MG/DL (ref 6–22)
BUN/CREAT SERPL: 14.9 (ref 7–25)
CALCIUM SPEC-SCNC: 8.8 MG/DL (ref 8.6–10.5)
CHLORIDE SERPL-SCNC: 101 MMOL/L (ref 95–107)
CO2 SERPL-SCNC: 28 MMOL/L (ref 23–32)
CREAT BLD-MCNC: 1.21 MG/DL (ref 0.55–1.02)
GFR SERPL CREATININE-BSD FRML MDRD: 42 ML/MIN/1.73
GLOBULIN UR ELPH-MCNC: 3.4 GM/DL
GLUCOSE BLD-MCNC: 111 MG/DL (ref 70–100)
POTASSIUM BLD-SCNC: 5 MMOL/L (ref 3.3–5.3)
PROT SERPL-MCNC: 6.3 G/DL (ref 6.3–8.4)
SODIUM BLD-SCNC: 138 MMOL/L (ref 136–145)

## 2017-06-13 PROCEDURE — 99213 OFFICE O/P EST LOW 20 MIN: CPT | Performed by: INTERNAL MEDICINE

## 2017-06-13 PROCEDURE — 80053 COMPREHEN METABOLIC PANEL: CPT | Performed by: INTERNAL MEDICINE

## 2017-06-13 NOTE — PROGRESS NOTES
Subjective   Yudith Robertson is a 89 y.o. female.     Atrial Fibrillation   Presents for follow-up visit. Symptoms include palpitations (Is aware of her fibrillation ). Symptoms are negative for chest pain. Past medical history includes atrial fibrillation.   Hypertension   This is a chronic problem. The current episode started more than 1 year ago. Associated symptoms include palpitations (Is aware of her fibrillation ). Pertinent negatives include no chest pain.   Pneumonia   Pertinent negatives include no chest pain.        The following portions of the patient's history were reviewed and updated as appropriate: allergies, current medications, past family history, past medical history, past social history, past surgical history and problem list.    Review of Systems   Constitutional:        Has been doing fairly well   HENT: Negative.    Respiratory: Negative.    Cardiovascular: Positive for palpitations (Is aware of her fibrillation ). Negative for chest pain.        Trying to get her coumadin regulated Has visiting nurse coming out thru [facility]  BP usually about 150's over 80's   Gastrointestinal: Negative.    Genitourinary:        Has appt to see Nephrologist tomorrow    Musculoskeletal: Negative.        Objective   Physical Exam   Constitutional: She is oriented to person, place, and time. She appears well-developed.   HENT:   Head: Normocephalic.   Eyes: EOM are normal.   Neck: Neck supple.   Cardiovascular: Normal rate and normal heart sounds.    Repeat 130/60 Irreg irreg   Pulmonary/Chest: Effort normal and breath sounds normal.   Musculoskeletal: Normal range of motion.   Neurological: She is alert and oriented to person, place, and time.   Vitals reviewed.      Assessment/Plan   Yudith was seen today for atrial fibrillation, hypertension and pneumonia.    Diagnoses and all orders for this visit:    Essential hypertension  -     CBC Auto Differential; Future  -     Comprehensive Metabolic Panel;  Future    Permanent atrial fibrillation

## 2017-06-14 LAB
BASOPHILS # BLD AUTO: 0 X10E3/UL (ref 0–0.2)
BASOPHILS NFR BLD AUTO: 1 %
EOSINOPHIL # BLD AUTO: 0.1 X10E3/UL (ref 0–0.4)
EOSINOPHIL # BLD AUTO: 1 %
ERYTHROCYTE [DISTWIDTH] IN BLOOD BY AUTOMATED COUNT: 15.5 % (ref 12.3–15.4)
HCT VFR BLD AUTO: 28.6 % (ref 34–46.6)
HGB BLD-MCNC: 9.3 G/DL (ref 11.1–15.9)
IMM GRANULOCYTES # BLD: 0 X10E3/UL (ref 0–0.1)
IMM GRANULOCYTES NFR BLD: 0 %
LYMPHOCYTES # BLD AUTO: 1.3 X10E3/UL (ref 0.7–3.1)
LYMPHOCYTES NFR BLD AUTO: 17 %
MCH RBC QN AUTO: 27.9 PG (ref 26.6–33)
MCHC RBC AUTO-ENTMCNC: 32.5 G/DL (ref 31.5–35.7)
MCV RBC AUTO: 86 FL (ref 79–97)
MONOCYTES # BLD AUTO: 0.8 X10E3/UL (ref 0.1–0.9)
MONOCYTES NFR BLD AUTO: 10 %
NEUTROPHILS # BLD AUTO: 5.4 X10E3/UL (ref 1.4–7)
NEUTROPHILS NFR BLD AUTO: 71 %
PLATELET # BLD AUTO: 342 X10E3/UL (ref 150–379)
RBC # BLD AUTO: 3.33 X10E6/UL (ref 3.77–5.28)
WBC # BLD AUTO: 7.6 X10E3/UL (ref 3.4–10.8)

## 2017-06-20 ENCOUNTER — OFFICE VISIT (OUTPATIENT)
Dept: CARDIOLOGY | Facility: CLINIC | Age: 82
End: 2017-06-20

## 2017-06-20 VITALS
DIASTOLIC BLOOD PRESSURE: 70 MMHG | HEART RATE: 70 BPM | BODY MASS INDEX: 27.72 KG/M2 | HEIGHT: 60 IN | RESPIRATION RATE: 16 BRPM | WEIGHT: 141.2 LBS | SYSTOLIC BLOOD PRESSURE: 142 MMHG

## 2017-06-20 DIAGNOSIS — I10 ESSENTIAL HYPERTENSION: ICD-10-CM

## 2017-06-20 DIAGNOSIS — I48.21 PERMANENT ATRIAL FIBRILLATION (HCC): Primary | ICD-10-CM

## 2017-06-20 DIAGNOSIS — I44.7 LEFT BUNDLE BRANCH BLOCK: ICD-10-CM

## 2017-06-20 DIAGNOSIS — I50.32 DIASTOLIC CHF, CHRONIC (HCC): ICD-10-CM

## 2017-06-20 DIAGNOSIS — I36.1 NON-RHEUMATIC TRICUSPID VALVE INSUFFICIENCY: ICD-10-CM

## 2017-06-20 DIAGNOSIS — E78.2 MIXED HYPERLIPIDEMIA: ICD-10-CM

## 2017-06-20 PROCEDURE — 93000 ELECTROCARDIOGRAM COMPLETE: CPT | Performed by: INTERNAL MEDICINE

## 2017-06-20 PROCEDURE — 99214 OFFICE O/P EST MOD 30 MIN: CPT | Performed by: INTERNAL MEDICINE

## 2017-06-20 RX ORDER — FUROSEMIDE 20 MG/1
10 TABLET ORAL DAILY
Qty: 90 TABLET | Refills: 0
Start: 2017-06-20 | End: 2017-07-03 | Stop reason: SDUPTHER

## 2017-06-20 NOTE — PROGRESS NOTES
PATIENTINFORMATION    Date of Office Visit: 2017  Encounter Provider: Felicitas Brantley MD  Place of Service: [facility] CARDIOLOGY  Patient Name: Yudith Robertson  : 1927    Subjective:     Encounter Date:2017      Patient ID: Yudith Robertson is a 89 y.o. female.      History of Present Illness    This is an 88-year-old woman who is well known to me. She had a heart catheterization in , which showed nonobstructive disease with 30%-40% stenoses in various vessels and an ejection fraction of 50%. She came to [facility] in 2015 with chest pain. She had a PET stress test 2/15 in our office, which was normal and showed normal left ventricular function. Echocardiogram was in 2014 and showed normal left ventricular systolic function with an ejection fraction of 54%. There was abnormal diastolic filling pressures. There was mild to moderate mitral regurgitation, moderate to severe tricuspid regurgitation, and right ventricular systolic pressure of 52 mmHg. She has persistent atrial fibrillation.       She came back to the hospital on 2015. Her blood pressure, which is generally well controlled, was elevated that day. She took three nitroglycerins at home, but it still remained about 200/100 mmHg and she came to the emergency room. There, she had some nitro paste and her symptoms resolved. She ruled out for a myocardial infarction and discharged home on the same home medications.       She was admitted to [facility] on 2015, with acute diastolic congestive heart failure. She diuresed with intravenous Lasix and then with oral Lasix. She complained of chest pain, which sounded like an esophageal stricture. Dr. Morales saw her and recommended an outpatient EGD with Dr. Abrams. She did have this and had an esophageal stricture dilated.       I saw her in 2016. At that time she was complaining of shortness of breath with paroxysmal nocturnal dyspnea and  orthopnea. Because of this, she had a repeat echocardiogram performed on August 3, 2016:  · Left ventricular function is normal. Calculated EF = 63.6%. Estimated EF was in agreement with the calculated EF. Estimated EF = 64%. Normal left ventricular cavity size noted. Left ventricular wall thickness is consistent with mild concentric hypertrophy. Left ventricular diastolic function was unable to be assessed. Elevated left atrial pressure.  · The following segments are hypokinetic: basal inferior and mid inferior. All other segments are normal.  · Left atrial volume is severely increased.  · Right atrial cavity size is severely dilated  · There is severe thickening of the aortic valve. Mild aortic valve regurgitation is present.  · Severe mitral annular calcification is present. Mild mitral valve regurgitation is present.  · The tricuspid valve is grossly normal. Moderate tricuspid valve stenosis is present. Mild tricuspid valve regurgitation is present. Estimated right ventricular systolic pressure from tricuspid regurgitation is moderately elevated (45-55 mmHg). The calculated RV systolic pressures 51 mmHg.       She was hospitalized at [facility] on 05/17/2017.  She came in because of difficulty swallowing and she was in a little bit of diastolic heart failure.  Her warfarin was held and she underwent an esophageal dilation.  She was noted to have some candida esophagitis and was started on fluconazole and there were plans for close monitoring of her INR.  On the day of discharge from the hospital, her INR was 1.7.  Around 05/26/2017, she was acting confused and Ashly was concerned.  She contacted the patients daughter.  The patient ended up being taken to [facility] by ambulance for stroke.  She was treated with tPA while in the hospital there.  I do not know what her INR was when she arrived at [place], but she had been running supratherapeutic around that time because of treatment with  "fluconazole.      She has since been discharged from [facility] and is doing home physical therapy.  She feels some palpitations when she is trying to sleep.  She does not feel like she has her usual stamina back since that hospitalization but is making improvements with physical therapy.  She denies lower extremity edema or chest pain.  She gets a little lightheaded when she changes position.      Review of Systems   Constitution: Negative for fever, malaise/fatigue, weight gain and weight loss.   HENT: Negative for ear pain, hearing loss, nosebleeds and sore throat.    Eyes: Negative for double vision, pain, vision loss in left eye and vision loss in right eye.   Cardiovascular:        See history of present illness.   Respiratory: Positive for cough and shortness of breath. Negative for sleep disturbances due to breathing, snoring and wheezing.    Endocrine: Negative for cold intolerance, heat intolerance and polyuria.   Skin: Negative for itching, poor wound healing and rash.   Musculoskeletal: Positive for joint pain and joint swelling. Negative for myalgias.   Gastrointestinal: Negative for abdominal pain, diarrhea, hematochezia, nausea and vomiting.   Genitourinary: Negative for hematuria and hesitancy.   Neurological: Negative for numbness, paresthesias and seizures.   Psychiatric/Behavioral: Negative for depression. The patient is nervous/anxious.            ECG 12 Lead  Date/Time: 6/20/2017 12:26 PM  Performed by: LEE ANN REARDON  Authorized by: LEE ANN REARDON   Comparison: compared with previous ECG from 5/17/2017  Similar to previous ECG  Rhythm: atrial fibrillation  BPM: 70  Conduction: left bundle branch block  Clinical impression: abnormal ECG               Objective:     /70 (BP Location: Right arm, Patient Position: Sitting, Cuff Size: Adult)  Pulse 70  Resp 16  Ht 60\" (152.4 cm)  Wt 141 lb 3.2 oz (64 kg)  BMI 27.58 kg/m2 Body mass index is 27.58 kg/(m^2).     Physical Exam "   Constitutional: She appears well-developed.   HENT:   Head: Normocephalic and atraumatic.   Eyes: Conjunctivae and lids are normal. Pupils are equal, round, and reactive to light. Lids are everted and swept, no foreign bodies found.   Neck: Normal range of motion. No JVD present. Carotid bruit is not present. No tracheal deviation present. No thyroid mass present.   Cardiovascular: Normal rate and normal heart sounds.  An irregularly irregular rhythm present.   Pulses:       Dorsalis pedis pulses are 2+ on the right side, and 2+ on the left side.   Pulmonary/Chest: Effort normal and breath sounds normal.   Abdominal: Normal appearance and bowel sounds are normal.   Musculoskeletal: Normal range of motion.   Neurological: She is alert. She has normal strength.   Skin: Skin is warm, dry and intact.   Psychiatric: She has a normal mood and affect. Her behavior is normal.   Vitals reviewed.          Assessment/Plan:       1. Dyspnea. Stable.  Her lungs are clear.  I encouraged her to continue with her current dose of diuretics.  2. Chest pain. She has had a heart catheterization in the past, which showed nonobstructive disease. Her most recent stress was in 02/2015 and was normal.  She is not having any chest pain at this time.  3. Hypertension. Her blood pressure looks good today.   4. Atrial fibrillation. She is rate controlled and on Coumadin.  Atrial Fibrillation and Atrial Flutter  Assessment  • The patient has permanent atrial fibrillation  • This is non-valvular in etiology  • The patient's CHADS2-VASc score is 7  • A [id number] score of 2 or more is considered a high risk for a thromboembolic event    Plan  • Continue in atrial fibrillation with rate control  • Continue warfarin for antithrombotic therapy, bleeding issues discussed  • Continue beta blocker for rate control  5. Diastolic heart failure. Controlled on current dose of diuretic.   6. Valvular heart disease with moderate tricuspid regurgitation  and mild mitral regurgitation.   7. Moderate pulmonary hypertension.   8. History of carotid stenosis, 30%-40% bilateral disease.   9. History of gastroesophageal reflux disease and esophageal stenosis.   10. Hyperlipidemia.   11. History of small bowel obstruction.   12. History of left bundle branch block.   13. History of stroke.     Orders Placed This Encounter   Procedures   • ECG 12 Lead     This order was created via procedure documentation      Yudith Robertson   [facility] Medication Instructions [facility]:    Printed on:06/20/17 4520   Medication Information                      acetaminophen (TYLENOL) 500 MG tablet  Take 500 mg by mouth every 6 (six) hours as needed for mild pain (1-3).             aspirin 81 MG EC tablet  Take 81 mg by mouth daily.             carvedilol (COREG) 6.25 MG tablet  Take 2 tablets by mouth 2 (Two) Times a Day With Meals for 30 days.             fexofenadine (ALLEGRA) 180 MG tablet  Take 1 tablet by mouth Daily.             furosemide (LASIX) 20 MG tablet  Take 0.5 tablets by mouth Daily.             isosorbide mononitrate (IMDUR) 60 MG 24 hr tablet  Take 1 tablet by mouth  twice a day             lansoprazole (PREVACID) 15 MG capsule  Take 1 capsule by mouth Daily.             LORazepam (ATIVAN) 0.5 MG tablet  Take 1 tablet by mouth Every 8 (Eight) Hours As Needed for Anxiety.             polyethylene glycol (MIRALAX) powder  Take 17 g by mouth daily.             valsartan (DIOVAN) 320 MG tablet  Take 1 tablet by mouth Daily.             warfarin (COUMADIN) 1 MG tablet  Take 1 tablet by mouth Take As Directed.             warfarin (COUMADIN) 3 MG tablet  Take 1 tablet by mouth Daily.                        Felicitas Brantley MD  06/20/17  12:37 PM

## 2017-06-23 DIAGNOSIS — F39 MOOD DISORDER (HCC): ICD-10-CM

## 2017-06-23 RX ORDER — VALSARTAN 320 MG/1
TABLET ORAL
Qty: 90 TABLET | Refills: 3 | Status: SHIPPED | OUTPATIENT
Start: 2017-06-23 | End: 2017-08-18 | Stop reason: HOSPADM

## 2017-06-23 RX ORDER — LORAZEPAM 0.5 MG/1
0.5 TABLET ORAL EVERY 8 HOURS PRN
Qty: 90 TABLET | Refills: 0 | OUTPATIENT
Start: 2017-06-23 | End: 2017-08-18 | Stop reason: SDUPTHER

## 2017-06-23 NOTE — TELEPHONE ENCOUNTER
----- Message from Corie Hernandez sent at 6/23/2017  2:53 PM EDT -----  Contact: pt - Dr woodruff's pt - RE: Rx refill  Pt calling and would like a refill on Rx      LORazepam (ATIVAN) 0.5 MG tablet 90 tablet       Sig - Route: Take 1 tablet by mouth Every 8 (Eight) Hours As Needed for Anxiety. - Oral    [facility] [address] [place] [address] - [phone]  - [phone] (Phone)  [phone] (Fax)      Pt # [phone] or [phone]

## 2017-06-26 DIAGNOSIS — F39 MOOD DISORDER (HCC): ICD-10-CM

## 2017-06-27 ENCOUNTER — TELEPHONE (OUTPATIENT)
Dept: CARDIOLOGY | Facility: CLINIC | Age: 82
End: 2017-06-27

## 2017-06-27 DIAGNOSIS — I10 ESSENTIAL HYPERTENSION: ICD-10-CM

## 2017-06-27 RX ORDER — CARVEDILOL 6.25 MG/1
12.5 TABLET ORAL 2 TIMES DAILY WITH MEALS
Qty: 120 TABLET | Refills: 0
Start: 2017-06-27 | End: 2017-06-30 | Stop reason: SDUPTHER

## 2017-06-27 NOTE — TELEPHONE ENCOUNTER
----- Message from Magalie Moon MA sent at 6/27/2017 10:32 AM EDT -----  Pt calling for refill    Carvedilol 6.25mg #pt says she takes 2 qd but does not match MAR    [facility]

## 2017-06-28 RX ORDER — WARFARIN SODIUM 1 MG/1
TABLET ORAL
Qty: 30 TABLET | Refills: 0 | Status: CANCELLED | OUTPATIENT
Start: 2017-06-28

## 2017-06-29 RX ORDER — WARFARIN SODIUM 1 MG/1
1 TABLET ORAL TAKE AS DIRECTED
Qty: 60 TABLET | Refills: 2 | Status: SHIPPED | OUTPATIENT
Start: 2017-06-29 | End: 2017-08-07 | Stop reason: DRUGHIGH

## 2017-06-30 DIAGNOSIS — I10 ESSENTIAL HYPERTENSION: ICD-10-CM

## 2017-06-30 RX ORDER — CARVEDILOL 6.25 MG/1
12.5 TABLET ORAL 2 TIMES DAILY WITH MEALS
Qty: 120 TABLET | Refills: 0 | Status: SHIPPED | OUTPATIENT
Start: 2017-06-30 | End: 2017-07-05 | Stop reason: SDUPTHER

## 2017-07-03 ENCOUNTER — HOSPITAL ENCOUNTER (OUTPATIENT)
Dept: CARDIOLOGY | Facility: HOSPITAL | Age: 82
Discharge: HOME | End: 2017-07-03
Admitting: INTERNAL MEDICINE

## 2017-07-03 VITALS
BODY MASS INDEX: 27.29 KG/M2 | SYSTOLIC BLOOD PRESSURE: 164 MMHG | OXYGEN SATURATION: 93 % | HEART RATE: 95 BPM | RESPIRATION RATE: 22 BRPM | HEIGHT: 60 IN | DIASTOLIC BLOOD PRESSURE: 91 MMHG | WEIGHT: 139 LBS

## 2017-07-03 DIAGNOSIS — R06.02 SHORTNESS OF BREATH: Primary | ICD-10-CM

## 2017-07-03 DIAGNOSIS — I10 ESSENTIAL HYPERTENSION: ICD-10-CM

## 2017-07-03 LAB
ALBUMIN SERPL-MCNC: 3.5 G/DL (ref 3.5–5.2)
ALBUMIN/GLOB SERPL: 1.2 G/DL
ALP SERPL-CCNC: 69 U/L (ref 39–117)
ALT SERPL W P-5'-P-CCNC: 20 U/L (ref 1–33)
ANION GAP SERPL CALCULATED.3IONS-SCNC: 15.4 MMOL/L
AST SERPL-CCNC: 20 U/L (ref 1–32)
BASOPHILS # BLD AUTO: 0.02 10*3/MM3 (ref 0–0.2)
BASOPHILS NFR BLD AUTO: 0.3 % (ref 0–1.5)
BILIRUB SERPL-MCNC: 0.4 MG/DL (ref 0.1–1.2)
BUN BLD-MCNC: 17 MG/DL (ref 8–23)
BUN/CREAT SERPL: 18.9 (ref 7–25)
CALCIUM SPEC-SCNC: 9.5 MG/DL (ref 8.6–10.5)
CHLORIDE SERPL-SCNC: 97 MMOL/L (ref 98–107)
CK MB SERPL-CCNC: 3.04 NG/ML
CK SERPL-CCNC: 65 U/L (ref 20–180)
CO2 SERPL-SCNC: 25.6 MMOL/L (ref 22–29)
CREAT BLD-MCNC: 0.9 MG/DL (ref 0.57–1)
D DIMER PPP FEU-MCNC: 2.29 MCGFEU/ML (ref 0–0.49)
DEPRECATED RDW RBC AUTO: 54.8 FL (ref 37–54)
EOSINOPHIL # BLD AUTO: 0.06 10*3/MM3 (ref 0–0.7)
EOSINOPHIL NFR BLD AUTO: 0.8 % (ref 0.3–6.2)
ERYTHROCYTE [DISTWIDTH] IN BLOOD BY AUTOMATED COUNT: 17.6 % (ref 11.7–13)
GFR SERPL CREATININE-BSD FRML MDRD: 59 ML/MIN/1.73
GLOBULIN UR ELPH-MCNC: 2.9 GM/DL
GLUCOSE BLD-MCNC: 139 MG/DL (ref 65–99)
HCT VFR BLD AUTO: 30 % (ref 35.6–45.5)
HGB BLD-MCNC: 9.7 G/DL (ref 11.9–15.5)
IMM GRANULOCYTES # BLD: 0 10*3/MM3 (ref 0–0.03)
IMM GRANULOCYTES NFR BLD: 0 % (ref 0–0.5)
LYMPHOCYTES # BLD AUTO: 1 10*3/MM3 (ref 0.9–4.8)
LYMPHOCYTES NFR BLD AUTO: 13.8 % (ref 19.6–45.3)
MCH RBC QN AUTO: 28 PG (ref 26.9–32)
MCHC RBC AUTO-ENTMCNC: 32.3 G/DL (ref 32.4–36.3)
MCV RBC AUTO: 86.7 FL (ref 80.5–98.2)
MONOCYTES # BLD AUTO: 0.62 10*3/MM3 (ref 0.2–1.2)
MONOCYTES NFR BLD AUTO: 8.5 % (ref 5–12)
MYOGLOBIN SERPL-MCNC: 49.5 NG/ML (ref 25–58)
NEUTROPHILS # BLD AUTO: 5.56 10*3/MM3 (ref 1.9–8.1)
NEUTROPHILS NFR BLD AUTO: 76.6 % (ref 42.7–76)
NT-PROBNP SERPL-MCNC: ABNORMAL PG/ML (ref 0–1800)
PLATELET # BLD AUTO: 307 10*3/MM3 (ref 140–500)
PMV BLD AUTO: 9.9 FL (ref 6–12)
POTASSIUM BLD-SCNC: 3.9 MMOL/L (ref 3.5–5.2)
PROT SERPL-MCNC: 6.4 G/DL (ref 6–8.5)
RBC # BLD AUTO: 3.46 10*6/MM3 (ref 3.9–5.2)
SODIUM BLD-SCNC: 138 MMOL/L (ref 136–145)
TROPONIN T SERPL-MCNC: <0.01 NG/ML (ref 0–0.03)
WBC NRBC COR # BLD: 7.26 10*3/MM3 (ref 4.5–10.7)

## 2017-07-03 PROCEDURE — 94760 N-INVAS EAR/PLS OXIMETRY 1: CPT

## 2017-07-03 PROCEDURE — 99214 OFFICE O/P EST MOD 30 MIN: CPT | Performed by: NURSE PRACTITIONER

## 2017-07-03 PROCEDURE — 36415 COLL VENOUS BLD VENIPUNCTURE: CPT

## 2017-07-03 PROCEDURE — 83874 ASSAY OF MYOGLOBIN: CPT | Performed by: NURSE PRACTITIONER

## 2017-07-03 PROCEDURE — 83880 ASSAY OF NATRIURETIC PEPTIDE: CPT | Performed by: NURSE PRACTITIONER

## 2017-07-03 PROCEDURE — 25010000002 FUROSEMIDE PER 20 MG: Performed by: NURSE PRACTITIONER

## 2017-07-03 PROCEDURE — 84484 ASSAY OF TROPONIN QUANT: CPT | Performed by: NURSE PRACTITIONER

## 2017-07-03 PROCEDURE — 85379 FIBRIN DEGRADATION QUANT: CPT | Performed by: NURSE PRACTITIONER

## 2017-07-03 PROCEDURE — 96374 THER/PROPH/DIAG INJ IV PUSH: CPT

## 2017-07-03 PROCEDURE — 93010 ELECTROCARDIOGRAM REPORT: CPT | Performed by: NURSE PRACTITIONER

## 2017-07-03 PROCEDURE — 85025 COMPLETE CBC W/AUTO DIFF WBC: CPT | Performed by: NURSE PRACTITIONER

## 2017-07-03 PROCEDURE — 82550 ASSAY OF CK (CPK): CPT | Performed by: NURSE PRACTITIONER

## 2017-07-03 PROCEDURE — 82553 CREATINE MB FRACTION: CPT | Performed by: NURSE PRACTITIONER

## 2017-07-03 PROCEDURE — 93005 ELECTROCARDIOGRAM TRACING: CPT | Performed by: NURSE PRACTITIONER

## 2017-07-03 PROCEDURE — 36416 COLLJ CAPILLARY BLOOD SPEC: CPT

## 2017-07-03 PROCEDURE — 85610 PROTHROMBIN TIME: CPT

## 2017-07-03 PROCEDURE — 80053 COMPREHEN METABOLIC PANEL: CPT | Performed by: NURSE PRACTITIONER

## 2017-07-03 RX ORDER — SODIUM CHLORIDE 0.9 % (FLUSH) 0.9 %
10 SYRINGE (ML) INJECTION AS NEEDED
Status: DISCONTINUED | OUTPATIENT
Start: 2017-07-03 | End: 2017-07-03

## 2017-07-03 RX ORDER — NITROGLYCERIN 0.4 MG/1
0.4 TABLET SUBLINGUAL
Status: DISCONTINUED | OUTPATIENT
Start: 2017-07-03 | End: 2017-07-03

## 2017-07-03 RX ORDER — POTASSIUM CHLORIDE 750 MG/1
10 TABLET, FILM COATED, EXTENDED RELEASE ORAL DAILY
Qty: 30 TABLET | Refills: 2 | Status: SHIPPED | OUTPATIENT
Start: 2017-07-03 | End: 2017-09-28 | Stop reason: SDUPTHER

## 2017-07-03 RX ORDER — FUROSEMIDE 20 MG/1
20 TABLET ORAL DAILY
Qty: 90 TABLET | Refills: 0
Start: 2017-07-03 | End: 2017-07-18 | Stop reason: SDUPTHER

## 2017-07-03 RX ORDER — LORAZEPAM 0.5 MG/1
0.5 TABLET ORAL EVERY 8 HOURS PRN
Qty: 90 TABLET | Refills: 0 | OUTPATIENT
Start: 2017-07-03

## 2017-07-03 RX ORDER — FUROSEMIDE 10 MG/ML
40 INJECTION INTRAMUSCULAR; INTRAVENOUS ONCE
Status: COMPLETED | OUTPATIENT
Start: 2017-07-03 | End: 2017-07-03

## 2017-07-03 RX ADMIN — FUROSEMIDE 40 MG: 10 INJECTION, SOLUTION INTRAMUSCULAR; INTRAVENOUS at 12:39

## 2017-07-03 NOTE — TELEPHONE ENCOUNTER
It looks like the medication was already approved and called into the pharmacy.  It will not let me refuse the medication due to it being controlled.

## 2017-07-03 NOTE — PROGRESS NOTES
Date of Office Visit: 2017  Encounter Provider: STEFANO Marie  Place of Service: [facility]  Patient Name: uYdith Robertson  :1927    Chief Complaint   Patient presents with   • Chest Pain     Pt c/o chest heaviness and SOB since friday. No radiation with chest heaviness, c/o nausea with heaviness. O2 sat 93% on RA..She called office this am and Dr. Brantley told her to come to cpu .    :     HPI: Yudith Robertson is a 89 y.o. female who presents today forEvaluation of shortness of breath and chest pain.  She is a patient of Dr. Brantley.  I'm seeing her for the first time today and have reviewed her records.  She has a history of permanent atrial fibrillation, nonobstructive coronary disease, cardiomyopathy, hypertension, and a stroke.She has had an EF as low as 35-40%.  Her last echocardiogram was in 2016 showing an EF of 63%, mild aortic regurgitation, mild mitral regurgitation and moderate tricuspid valve stenosis.  Mild tricuspid regurgitation with elevated RVSP of 51.     In May 2017, she was admitted to the hospital with pneumonia.  She was worked up for aspiration pneumonia.  A week after discharge, she was admitted to [facility] for stroke.  She did have a thrombectomy.  She then followed up with Dr. Brantley and was doing relatively well.  She had a follow-up with her nephrology and her furosemide was decreased to 10 mg daily.    Today, she comes in to the [place] [facility], accompanied by her daughters.  She reports that over the past week she's had gurgling in her lungs.  It's on and off.  It was worse over the weekend.  Sometimes when she lies down she has the gurgling.  She's been sleeping on 2 pillows.  She's also felt more nauseated.  She reports her weight is been stable.  Set a tightness in her chest that she's always had.  This is not changed.  Her heart races every now and then.  She short of breath on exertion  and mild sitting down.  Her blood pressure is typically high in the afternoon.  She has some dizziness when getting up from a chair.  She denies any leg pain, numbness or tingling.  She denies any signs of bleeding.    Past Medical History:   Diagnosis Date   • Allergic rhinitis    • Anemia    • Atrial fibrillation    • Atypical chest pain    • CAD (coronary artery disease)    • Carotid artery stenosis     20-30% bilateral   • Cerebral artery occlusion with cerebral infarction 1997   • CHF (congestive heart failure)    • Diastolic congestive heart failure     Echo 3/2012 with normal LVEF, mod LVH   • Difficulty swallowing    • Dizzy     mild   • Edema     In Calf   • Esophageal reflux     GERD, history of esophageal stenosis s/p dilation   • GERD (gastroesophageal reflux disease)    • H/O bone density study 06/16/2015    Osteopenia   • H/O mammogram 02/04/2014   • H/O thyroid nodule    • History of esophageal stricture    • Hyperlipidemia    • Hypertension    • IBS (irritable bowel syndrome)    • Intestinal obstruction     small bowel obstruction   • LBBB (left bundle branch block)    • Mild aortic insufficiency    • Mild mitral insufficiency    • Mixed hyperlipidemia    • Moderate tricuspid insufficiency    • Mood disorder in conditions classified elsewhere    • Osteoarthrosis    • Osteopenia    • Polyarthropathy, multiple sites    • SOB (shortness of breath)    • Transient cerebral ischemia    • Valvular heart disease     Echo 1/17/14: EF 54%, elevated LAP, mild-mod MR, mod-severe TR, moderate PHTN (52mm Hg)       Past Surgical History:   Procedure Laterality Date   • APPENDECTOMY     • ENDOSCOPY N/A 5/22/2017    Procedure: ESOPHAGOGASTRODUODENOSCOPY WITH GASTON DILATATION 46FR, 48FR,52FR  AND ESOPHAGEAL BRUSHINGS;  Surgeon: Rebecca Becerra MD;  Location: [facility] ENDOSCOPY;  Service:    • EYE SURGERY Right 12/10/2008    Vitrectomy-Dr. Yogi Finnegan   • HYSTERECTOMY     • LAPAROTOMY SALPINGO OOPHORECTOMY N/A  10/29/2008    Dr. Lucas Mills   • UPPER GASTROINTESTINAL ENDOSCOPY N/A 07/24/2015    Dr. Jayce Abrams       Social History     Social History   • Marital status:      Spouse name: N/A   • Number of children: N/A   • Years of education: N/A     Occupational History   • Not on file.     Social History Main Topics   • Smoking status: Former Smoker     Packs/day: 1.00     Years: 30.00     Quit date: 1/1/1986   • Smokeless tobacco: Never Used   • Alcohol use No      Comment: stopped drinking   • Drug use: No   • Sexual activity: No     Other Topics Concern   • Not on file     Social History Narrative       Family History   Problem Relation Age of Onset   • Stroke Other    • Stroke Mother    • No Known Problems Daughter    • No Known Problems Daughter    • No Known Problems Daughter        Review of Systems   Constitution: Positive for malaise/fatigue. Negative for fever.   HENT: Negative for ear pain, hearing loss, nosebleeds and sore throat.    Eyes: Negative for double vision, pain, vision loss in left eye, vision loss in right eye and visual disturbance.   Cardiovascular: Positive for dyspnea on exertion. Negative for claudication and leg swelling.   Respiratory: Negative for cough, snoring and wheezing.    Endocrine: Negative for cold intolerance, heat intolerance and polyuria.   Skin: Negative for color change, itching and rash.   Musculoskeletal: Negative for joint pain, joint swelling and muscle cramps.   Gastrointestinal: Positive for abdominal pain and nausea. Negative for diarrhea, melena and vomiting.   Genitourinary: Negative for bladder incontinence and hematuria.   Neurological: Negative for excessive daytime sleepiness, dizziness, light-headedness, paresthesias and seizures.   Psychiatric/Behavioral: Negative for depression. The patient is not nervous/anxious.    All other systems reviewed and are negative.      Allergies   Allergen Reactions   • Morphine          Current Outpatient Prescriptions:  "  •  aspirin 81 MG EC tablet, Take 81 mg by mouth daily., Disp: , Rfl:   •  carvedilol (COREG) 6.25 MG tablet, Take 2 tablets by mouth 2 (Two) Times a Day With Meals for 30 days., Disp: 120 tablet, Rfl: 0  •  fexofenadine (ALLEGRA) 180 MG tablet, Take 1 tablet by mouth Daily., Disp: 30 tablet, Rfl: 3  •  furosemide (LASIX) 20 MG tablet, Take 1 tablet by mouth Daily., Disp: 90 tablet, Rfl: 0  •  isosorbide mononitrate (IMDUR) 60 MG 24 hr tablet, Take 1 tablet by mouth  twice a day, Disp: 180 tablet, Rfl: 3  •  lansoprazole (PREVACID) 15 MG capsule, Take 1 capsule by mouth Daily., Disp: 90 capsule, Rfl: 0  •  LORazepam (ATIVAN) 0.5 MG tablet, Take 1 tablet by mouth Every 8 (Eight) Hours As Needed for Anxiety., Disp: 90 tablet, Rfl: 0  •  polyethylene glycol (MIRALAX) powder, Take 17 g by mouth daily., Disp: , Rfl:   •  valsartan (DIOVAN) 320 MG tablet, Take 1 tablet by mouth  daily, Disp: 90 tablet, Rfl: 3  •  warfarin (COUMADIN) 1 MG tablet, Take 1 tablet by mouth Take As Directed., Disp: 60 tablet, Rfl: 2  •  warfarin (COUMADIN) 3 MG tablet, Take 1 tablet by mouth Daily., Disp: 90 tablet, Rfl: 0  •  acetaminophen (TYLENOL) 500 MG tablet, Take 500 mg by mouth every 6 (six) hours as needed for mild pain (1-3)., Disp: , Rfl:   •  potassium chloride (K-DUR) 10 MEQ CR tablet, Take 1 tablet by mouth Daily., Disp: 30 tablet, Rfl: 2  No current facility-administered medications for this encounter.      Objective:     Vitals:    07/03/17 1115 07/03/17 1116   BP: 175/88 164/91   BP Location: Left arm Right arm   Patient Position: Sitting Sitting   Pulse: 95    Resp: 22    SpO2: 90% 93%   Weight: 139 lb (63 kg)    Height: 60\" (152.4 cm)      Body mass index is 27.15 kg/(m^2).    PHYSICAL EXAM:    Vitals Reviewed.   General Appearance: No acute distress, well developed and well nourished.   Eyes: Conjunctiva and lids: No erythema, swelling, or discharge. Sclera non-icteric.   HENT: Atraumatic, normocephalic. External eyes, " ears, and nose normal. No hearing loss noted. Mucous membranes normal. Lips not cyanotic. Neck supple with no tenderness.  Respiratory: No signs of respiratory distress. Respiration rhythm and depth normal.   Rales in lower bases bilaterally  Cardiovascular:  Jugular Venous Pressure: Normal  Heart Rate and Rhythm: Normal, Heart Sounds: Normal S1 and S2. No S3 or S4 noted.  Murmurs: No murmurs noted. No rubs, thrills, or gallops.   Arterial Pulses: Carotid pulses normal. No carotid bruit noted. Posterior tibialis and dorsalis pedis pulses normal.   Lower Extremities: No edema noted.  Gastrointestinal:  Abdomen soft, non-distended, non-tender. Normal bowel sounds. No hepatomegaly.   Musculoskeletal: Normal movement of extremities  Skin and Nails: General appearance normal. No pallor, cyanosis, diaphoresis. Skin temperature normal. No clubbing of fingernails.   Psychiatric: Patient alert and oriented to person, place, and time. Speech and behavior appropriate. Normal mood and affect.       ECG 12 Lead  Date/Time: 7/3/2017 5:05 PM  Performed by: ESHA ROWLEY  Authorized by: ESHA ROWLEY   Comparison: compared with previous ECG from 6/20/2017  Similar to previous ECG  Rhythm: atrial fibrillation  Rate: normal  BPM: 82  Conduction: conduction normal  ST Segments: ST segments normal  T Waves: T waves normal  QRS axis: normal  Other: no other findings  Clinical impression: abnormal ECG  Comments: Indication: atrial fib              LABS:     Rapid Blood Work:          Hospital Blood Work:       Results from last 7 days  Lab Units 07/03/17  1124   SODIUM mmol/L 138   POTASSIUM mmol/L 3.9   CHLORIDE mmol/L 97*   CO2 mmol/L 25.6   BUN mg/dL 17   CREATININE mg/dL 0.90   CALCIUM mg/dL 9.5   BILIRUBIN mg/dL 0.4   ALK PHOS U/L 69   ALT (SGPT) U/L 20   AST (SGOT) U/L 20   GLUCOSE mg/dL 139*       Results from last 7 days  Lab Units 07/03/17  1124   WBC 10*3/mm3 7.26   HEMOGLOBIN g/dL 9.7*   HEMATOCRIT % 30.0*   PLATELETS  10*3/mm3 307             Assessment:       Diagnosis Plan   1. Shortness of breath  Cardiac Monitoring    Vital Signs - Once    Vital Signs - As Needed    Pulse Oximetry    Oxygen Therapy- Nasal Cannula; Titrate for SPO2: 92%, equal to or greater than    Insert Peripheral IV    NPO Diet    Bathroom Privileges With Assistance    CBC & Differential    Comprehensive Metabolic Panel    ECG 12 Lead    Cardiac Monitoring    Vital Signs - Once    Insert Peripheral IV    NPO Diet    Bathroom Privileges With Assistance    CBC Auto Differential    proBNP    furosemide (LASIX) injection 40 mg    XR chest 2 vw   2. Essential hypertension  furosemide (LASIX) 20 MG tablet    XR chest 2 vw          Plan:       1. Diastolic heart failure-patient comes in today with increased shortness of breath.  This looks to be related to diastolic heart failure.  Her d-dimer is elevated but her proBNP is also elevated.  I'm not concerned with her d-dimer at this time.  She would like conservative treatment.  The patient does not want to be admitted to the hospital.  I'm going to give her 40 mg of IV Lasix now and then increase her furosemide to 20 mg daily.  She will take potassium 10 mEq.  On Wednesday she is going to come in and get a chest x-ray to rule out pneumonia.  Her white blood cell count is not elevated but was recently in the hospital with aspiration pneumonia.  Pneumonia could certainly bring on the diastolic heart failure.  This could also be brought on by hypertension.  This will need to be monitored close as an outpatient.    2.  Hypertension-patient is hypertensive today for volume overloaded.  I'm not getting make any medication changes for her blood pressure until she is more euvolemic.  Because start her on low-dose amlodipine to help with blood pressure.    F/u in office in one week    Patient was seen and dictated independently by STEFANO Cade. Reviewed plan of care with Dr. Brantley.   As always, it has been a  pleasure to participate in your patient's care.      Sincerely,         STEFANO Marie

## 2017-07-05 ENCOUNTER — TELEPHONE (OUTPATIENT)
Dept: CARDIOLOGY | Facility: CLINIC | Age: 82
End: 2017-07-05

## 2017-07-05 ENCOUNTER — HOSPITAL ENCOUNTER (OUTPATIENT)
Dept: GENERAL RADIOLOGY | Facility: HOSPITAL | Age: 82
Discharge: HOME | End: 2017-07-05
Admitting: NURSE PRACTITIONER

## 2017-07-05 DIAGNOSIS — R06.02 SHORTNESS OF BREATH: ICD-10-CM

## 2017-07-05 DIAGNOSIS — I10 ESSENTIAL HYPERTENSION: ICD-10-CM

## 2017-07-05 PROCEDURE — 71020 HC CHEST PA AND LATERAL: CPT

## 2017-07-05 RX ORDER — CARVEDILOL 12.5 MG/1
12.5 TABLET ORAL 2 TIMES DAILY WITH MEALS
Qty: 180 TABLET | Refills: 0 | Status: SHIPPED | OUTPATIENT
Start: 2017-07-05 | End: 2017-08-23 | Stop reason: SDUPTHER

## 2017-07-05 NOTE — TELEPHONE ENCOUNTER
I called the patient and informed her of the results of her chest x-ray.  She is feeling better.  She is sleeping better at night.  There is no evidence of pneumonia, she has small bilateral pleural effusions and an enlarged cardiac silhouette.  She will follow-up with Aminata Christensen on 7/11/2017.

## 2017-07-05 NOTE — TELEPHONE ENCOUNTER
I've looked at the previous progress notes but I am not sure what dose the Pt is currently taking for carvedilol. I think it was initially 6.25 mg twice a day but then switched to taking 6.25 mg two tablets twice daily. If this is correct can I send in an rx for 12.5 mg twice a day, Pt says she is running out of tablets. Please advise    Contacted pharmacy, Pt takes 12.5 mg twice daily. Sent in new Rx for this dose. Advised Pt to put away all other bottles of carvedilol once this medication has been received to limit confusion.

## 2017-07-11 ENCOUNTER — TELEPHONE (OUTPATIENT)
Dept: CARDIOLOGY | Facility: CLINIC | Age: 82
End: 2017-07-11

## 2017-07-11 ENCOUNTER — HOSPITAL ENCOUNTER (OUTPATIENT)
Dept: CARDIOLOGY | Facility: HOSPITAL | Age: 82
Setting detail: RECURRING SERIES
Discharge: HOME | End: 2017-07-11

## 2017-07-11 ENCOUNTER — OFFICE VISIT (OUTPATIENT)
Dept: CARDIOLOGY | Facility: CLINIC | Age: 82
End: 2017-07-11

## 2017-07-11 ENCOUNTER — HOSPITAL ENCOUNTER (OUTPATIENT)
Dept: GENERAL RADIOLOGY | Facility: HOSPITAL | Age: 82
Discharge: HOME | End: 2017-07-11
Admitting: NURSE PRACTITIONER

## 2017-07-11 VITALS
WEIGHT: 141 LBS | BODY MASS INDEX: 27.68 KG/M2 | HEART RATE: 84 BPM | HEIGHT: 60 IN | DIASTOLIC BLOOD PRESSURE: 70 MMHG | SYSTOLIC BLOOD PRESSURE: 154 MMHG

## 2017-07-11 DIAGNOSIS — I50.32 DIASTOLIC CHF, CHRONIC (HCC): Primary | ICD-10-CM

## 2017-07-11 DIAGNOSIS — I50.32 DIASTOLIC CHF, CHRONIC (HCC): ICD-10-CM

## 2017-07-11 PROCEDURE — 99213 OFFICE O/P EST LOW 20 MIN: CPT | Performed by: NURSE PRACTITIONER

## 2017-07-11 PROCEDURE — 93000 ELECTROCARDIOGRAM COMPLETE: CPT | Performed by: NURSE PRACTITIONER

## 2017-07-11 PROCEDURE — 71020 HC CHEST PA AND LATERAL: CPT

## 2017-07-11 PROCEDURE — 85610 PROTHROMBIN TIME: CPT

## 2017-07-11 PROCEDURE — 36416 COLLJ CAPILLARY BLOOD SPEC: CPT

## 2017-07-11 NOTE — TELEPHONE ENCOUNTER
7/11/17  Pt left vmsg at 4:21 - asking what cxr showed and if she should increase her fluid intake since her diuretic was increased - she is afraid of becoming dehydrated.  _ I explained that Aminata had already gone but that I would have her call in the morning.  Her ph [phone] or [phone] /colleen

## 2017-07-11 NOTE — PROGRESS NOTES
Date of Office Visit: 2017  Encounter Provider: STEFANO Marie  Place of Service: [facility] CARDIOLOGY  Patient Name: Yudith Robertson  :1927    Chief Complaint   Patient presents with   • Shortness of Breath   :     HPI: Yudith Robertson is a 89 y.o. female comes in today for reassessment.    She has a history of permanent atrial fibrillation, nonobstructive coronary disease, cardiomyopathy, hypertension, and a stroke.She has had an EF as low as 35-40%. Her last echocardiogram was in 2016 showing an EF of 63%, mild aortic regurgitation, mild mitral regurgitation and moderate tricuspid valve stenosis. Mild tricuspid regurgitation with elevated RVSP of 51.      In May 2017, she was admitted to the hospital with pneumonia. She was worked up for aspiration pneumonia. A week after discharge, she was admitted to [facility] for stroke. She did have a thrombectomy. She then followed up with Dr. Brantley and was doing relatively well. She had a follow-up with her nephrology and her furosemide was decreased to 10 mg daily.    Last week, she presented to the [place] [facility] complaining of shortness of breath, nausea, and chest tightness.  Her lab work showed a proBNP of 12,000.  I gave her a dose of IV Lasix and increased her furosemide to 20 mg daily.  I obtained a chest x-ray that showed small bilateral pleural effusions.     Today, she comes in a wheelchair.  She is accompanied by her daughter.  She is complaining that her cough is worse.  Every time she takes in a deep breath she has a cough.  Her chest pressure is improved.  Her nausea is improved.  She says her weight is stable.  She gets very fatigued with any activity.  She denies being lightheaded.  She she does have orthopnea.  She is very adamant that she does not want to go into the hospital.  She has [facility] home health.  She has a lot of anxiety.  It is difficult to get a history from  her.    Past Medical History:   Diagnosis Date   • Allergic rhinitis    • Anemia    • Atrial fibrillation    • Atypical chest pain    • CAD (coronary artery disease)    • Carotid artery stenosis     20-30% bilateral   • Cerebral artery occlusion with cerebral infarction 1997   • CHF (congestive heart failure)    • Diastolic congestive heart failure     Echo 3/2012 with normal LVEF, mod LVH   • Difficulty swallowing    • Dizzy     mild   • Edema     In Calf   • Esophageal reflux     GERD, history of esophageal stenosis s/p dilation   • GERD (gastroesophageal reflux disease)    • H/O bone density study 06/16/2015    Osteopenia   • H/O mammogram 02/04/2014   • H/O thyroid nodule    • History of esophageal stricture    • Hyperlipidemia    • Hypertension    • IBS (irritable bowel syndrome)    • Intestinal obstruction     small bowel obstruction   • LBBB (left bundle branch block)    • Mild aortic insufficiency    • Mild mitral insufficiency    • Mixed hyperlipidemia    • Moderate tricuspid insufficiency    • Mood disorder in conditions classified elsewhere    • Osteoarthrosis    • Osteopenia    • Polyarthropathy, multiple sites    • SOB (shortness of breath)    • Transient cerebral ischemia    • Valvular heart disease     Echo 1/17/14: EF 54%, elevated LAP, mild-mod MR, mod-severe TR, moderate PHTN (52mm Hg)       Past Surgical History:   Procedure Laterality Date   • APPENDECTOMY     • ENDOSCOPY N/A 5/22/2017    Procedure: ESOPHAGOGASTRODUODENOSCOPY WITH GASTON DILATATION 46FR, 48FR,52FR  AND ESOPHAGEAL BRUSHINGS;  Surgeon: Rebecca Becerra MD;  Location: [facility];  Service:    • EYE SURGERY Right 12/10/2008    Vitrectomy-Dr. Yogi Finnegan   • HYSTERECTOMY     • LAPAROTOMY SALPINGO OOPHORECTOMY N/A 10/29/2008    Dr. Lucas Mills   • UPPER GASTROINTESTINAL ENDOSCOPY N/A 07/24/2015    Dr. Jayce Abrams           Review of Systems   Constitution: Positive for malaise/fatigue. Negative for fever.   HENT: Negative  "for ear pain, hearing loss, nosebleeds and sore throat.    Eyes: Negative for double vision, pain, vision loss in left eye, vision loss in right eye and visual disturbance.   Cardiovascular: Positive for dyspnea on exertion. Negative for claudication and leg swelling.   Respiratory: Positive for cough. Negative for snoring and wheezing.    Endocrine: Negative for cold intolerance, heat intolerance and polyuria.   Skin: Negative for color change, itching and rash.   Musculoskeletal: Negative for joint pain, joint swelling and muscle cramps.   Gastrointestinal: Negative for abdominal pain, diarrhea, melena, nausea and vomiting.   Genitourinary: Negative for bladder incontinence and hematuria.   Neurological: Negative for excessive daytime sleepiness, dizziness, light-headedness, paresthesias and seizures.   Psychiatric/Behavioral: Negative for depression. The patient is not nervous/anxious.    All other systems reviewed and are negative.    All other systems reviewed and are negative    Allergies   Allergen Reactions   • Morphine        All aspects of family and social history reviewed.          Objective:     Vitals:    07/11/17 0952   BP: 154/70   BP Location: Left arm   Pulse: 84   Weight: 141 lb (64 kg)   Height: 60\" (152.4 cm)     Body mass index is 27.54 kg/(m^2).    PHYSICAL EXAM:  Physical Exam   Constitutional: She appears well-developed.   HENT:   Head: Normocephalic and atraumatic.   Eyes: Conjunctivae and lids are normal. Pupils are equal, round, and reactive to light. Lids are everted and swept, no foreign bodies found.   Neck: Normal range of motion. No JVD present. Carotid bruit is not present. No tracheal deviation present. No thyroid mass present.   Cardiovascular: Normal rate and normal heart sounds.  An irregularly irregular rhythm present.   Pulses:       Dorsalis pedis pulses are 2+ on the right side, and 2+ on the left side.   Pulmonary/Chest: Effort normal. She has rales.   Abdominal: Normal " appearance and bowel sounds are normal. She exhibits distension.   Musculoskeletal: Normal range of motion.   Neurological: She is alert. She has normal strength.   Skin: Skin is warm, dry and intact.   Psychiatric: She has a normal mood and affect. Her behavior is normal.   Vitals reviewed.        ECG 12 Lead  Date/Time: 7/11/2017 10:46 AM  Performed by: ESHA ROWLEY  Authorized by: ESHA ROWLEY   Comparison: compared with previous ECG from 6/20/2017  Similar to previous ECG  Rhythm: atrial fibrillation  BPM: 84  Conduction: non-specific intraventricular conduction delay  Clinical impression: abnormal ECG  Comments: Indication: afib, heart failure                Assessment:       Diagnosis Plan   1. Diastolic CHF, chronic  XR Chest 2 View        Orders Placed This Encounter   Procedures   • XR Chest 2 View     Standing Status:   Future     Standing Expiration Date:   7/11/2018     Order Specific Question:   Reason for Exam:     Answer:   pleural effusions       Current Outpatient Prescriptions   Medication Sig Dispense Refill   • acetaminophen (TYLENOL) 500 MG tablet Take 500 mg by mouth every 6 (six) hours as needed for mild pain (1-3).     • aspirin 81 MG EC tablet Take 81 mg by mouth daily.     • carvedilol (COREG) 12.5 MG tablet Take 1 tablet by mouth 2 (Two) Times a Day With Meals for 90 days. 180 tablet 0   • fexofenadine (ALLEGRA) 180 MG tablet Take 1 tablet by mouth Daily. 30 tablet 3   • furosemide (LASIX) 20 MG tablet Take 1 tablet by mouth Daily. 90 tablet 0   • isosorbide mononitrate (IMDUR) 60 MG 24 hr tablet Take 1 tablet by mouth  twice a day 180 tablet 3   • lansoprazole (PREVACID) 15 MG capsule Take 1 capsule by mouth Daily. 90 capsule 0   • LORazepam (ATIVAN) 0.5 MG tablet Take 1 tablet by mouth Every 8 (Eight) Hours As Needed for Anxiety. 90 tablet 0   • polyethylene glycol (MIRALAX) powder Take 17 g by mouth daily.     • potassium chloride (K-DUR) 10 MEQ CR tablet Take 1 tablet by  mouth Daily. 30 tablet 2   • valsartan (DIOVAN) 320 MG tablet Take 1 tablet by mouth  daily 90 tablet 3   • warfarin (COUMADIN) 1 MG tablet Take 1 tablet by mouth Take As Directed. 60 tablet 2   • warfarin (COUMADIN) 3 MG tablet Take 1 tablet by mouth Daily. (Patient taking differently: Take 3 mg by mouth Daily. Currently taking 3 mg daily except on Monday take 1.5mg) 90 tablet 0     No current facility-administered medications for this visit.             Plan:       1. Diastolic heart failure-patient came in last week with shortness of breath and diastolic heart failure.  Her proBNP was elevated.  She had pleural effusions in her bases of her lungs.  She continues to have a cough which I suspect be related to her pleural effusions.  Patient is very adamant that she does not want to be admitted to the hospital.  I will try to treat her as an outpatient.  I'm and increase her furosemide to 40 mg twice a day for 5 days then 40 mg daily.  She will have a repeat chest x-ray today.  I will fax in order for BMP with home health.  I'm not going to increase any blood pressure medicine as needed room for diuresis. I have advised family that if she worsens, she needs to report to ER.     Follow up in office in one week.     As always, it has been a pleasure to participate in this patient's care.      Sincerely,      STEFANO Marie

## 2017-07-12 NOTE — TELEPHONE ENCOUNTER
7/12/17 -- pt left [facility] [place] - asked for cxr results  Ph [phone] or [phone]

## 2017-07-14 ENCOUNTER — TELEPHONE (OUTPATIENT)
Dept: CARDIOLOGY | Facility: CLINIC | Age: 82
End: 2017-07-14

## 2017-07-14 NOTE — TELEPHONE ENCOUNTER
Please fax an order to [facility] for a bmp on Monday or next week. Dx: heart failure. Please let her know that there is nothing she needs to do.

## 2017-07-14 NOTE — TELEPHONE ENCOUNTER
7/14/17  Pt left [facility] [place] - states she has questions about orders to [facility] and what she is to do - asked to be called asap  [phone]/colleen

## 2017-07-14 NOTE — TELEPHONE ENCOUNTER
I faxed the order to [facility] and notified pt she will have labs drawn on Monday - she has appt on Tues. With Aminata and asked if she should keep it.  I told her after we see the labs, I will let her know if she does not have to be seen./colleen

## 2017-07-17 ENCOUNTER — TELEPHONE (OUTPATIENT)
Dept: CARDIOLOGY | Facility: CLINIC | Age: 82
End: 2017-07-17

## 2017-07-17 NOTE — TELEPHONE ENCOUNTER
Pt is being discharged from [facility], all goals met Pt knows when to contact office if any new symptoms or questions occur.

## 2017-07-18 ENCOUNTER — OFFICE VISIT (OUTPATIENT)
Dept: CARDIOLOGY | Facility: CLINIC | Age: 82
End: 2017-07-18

## 2017-07-18 VITALS
BODY MASS INDEX: 26.66 KG/M2 | HEIGHT: 60 IN | WEIGHT: 135.8 LBS | HEART RATE: 68 BPM | SYSTOLIC BLOOD PRESSURE: 120 MMHG | DIASTOLIC BLOOD PRESSURE: 50 MMHG

## 2017-07-18 DIAGNOSIS — I10 ESSENTIAL HYPERTENSION: ICD-10-CM

## 2017-07-18 DIAGNOSIS — I48.21 PERMANENT ATRIAL FIBRILLATION (HCC): ICD-10-CM

## 2017-07-18 DIAGNOSIS — I50.32 DIASTOLIC CHF, CHRONIC (HCC): Primary | ICD-10-CM

## 2017-07-18 PROCEDURE — 99213 OFFICE O/P EST LOW 20 MIN: CPT | Performed by: NURSE PRACTITIONER

## 2017-07-18 RX ORDER — FUROSEMIDE 40 MG/1
40 TABLET ORAL DAILY
Qty: 30 TABLET | Refills: 2 | Status: SHIPPED | OUTPATIENT
Start: 2017-07-18 | End: 2017-08-02

## 2017-07-18 RX ORDER — FUROSEMIDE 20 MG/1
40 TABLET ORAL DAILY
Qty: 30 TABLET | Refills: 0 | Status: CANCELLED
Start: 2017-07-18

## 2017-07-18 NOTE — PROGRESS NOTES
Date of Office Visit: 2017  Encounter Provider: STEFANO Marie  Place of Service: [facility] CARDIOLOGY  Patient Name: Yudith Robertson  :1927    Chief Complaint   Patient presents with   • Shortness of Breath   :     HPI: Yudith Robertson is a 89 y.o. female comes in today for follow up.     She has a history of permanent atrial fibrillation, nonobstructive coronary disease, cardiomyopathy, hypertension, and a stroke.She has had an EF as low as 35-40%. Her last echocardiogram was in 2016 showing an EF of 63%, mild aortic regurgitation, mild mitral regurgitation and moderate tricuspid valve stenosis. Mild tricuspid regurgitation with elevated RVSP of 51.       In May 2017, she was admitted to the hospital with pneumonia. She was worked up for aspiration pneumonia. A week after discharge, she was admitted to [facility] for stroke. She did have a thrombectomy. She then followed up with Dr. Brantley and was doing relatively well. She had a follow-up with her nephrology and her furosemide was decreased to 10 mg daily.    2 weeks ago, she presented to the [facility] complaining of shortness of breath, nausea, and chest tightness. Her lab work showed a proBNP of 12,000. I gave her a dose of IV Lasix and increased her furosemide to 20 mg daily. I obtained a chest x-ray that showed small bilateral pleural effusions.  She followed up last week and complained of a worsening cough.  She was not losing much weight.  I reordered a chest x-ray showing vascular congestion.  I increased her furosemide to 40 mg twice a day for 5 days then 40 mg daily.    Home health check labs that I had ordered.  Labs were drawn yesterday showing a sodium of 131, potassium 3.5, chloride 93, CO2 29, glucose 97, BUN 20, creatinine 0.9.    Today, she comes in accompanied by her family members.  She is again in a wheelchair.  She has lost 6 pounds since her last visit.   Her cough is improved.  She still has shortness of breath on exertion.  Her chest pain is improved.  Her fatigue is improved.  She denies being lightheaded.  Her daughters report that she is doing much better.    Past Medical History:   Diagnosis Date   • Allergic rhinitis    • Anemia    • Atrial fibrillation    • Atypical chest pain    • CAD (coronary artery disease)    • Carotid artery stenosis     20-30% bilateral   • Cerebral artery occlusion with cerebral infarction 1997   • CHF (congestive heart failure)    • Diastolic congestive heart failure     Echo 3/2012 with normal LVEF, mod LVH   • Difficulty swallowing    • Dizzy     mild   • Edema     In [place]   • Esophageal reflux     GERD, history of esophageal stenosis s/p dilation   • GERD (gastroesophageal reflux disease)    • H/O bone density study 06/16/2015    Osteopenia   • H/O mammogram 02/04/2014   • H/O thyroid nodule    • History of esophageal stricture    • Hyperlipidemia    • Hypertension    • IBS (irritable bowel syndrome)    • Intestinal obstruction     small bowel obstruction   • LBBB (left bundle branch block)    • Mild aortic insufficiency    • Mild mitral insufficiency    • Mixed hyperlipidemia    • Moderate tricuspid insufficiency    • Mood disorder in conditions classified elsewhere    • Osteoarthrosis    • Osteopenia    • Polyarthropathy, multiple sites    • SOB (shortness of breath)    • Transient cerebral ischemia    • Valvular heart disease     Echo 1/17/14: EF 54%, elevated LAP, mild-mod MR, mod-severe TR, moderate PHTN (52mm Hg)       Past Surgical History:   Procedure Laterality Date   • APPENDECTOMY     • ENDOSCOPY N/A 5/22/2017    Procedure: ESOPHAGOGASTRODUODENOSCOPY WITH GASTON DILATATION 46FR, 48FR,52FR  AND ESOPHAGEAL BRUSHINGS;  Surgeon: Rebecca Becerra MD;  Location: [facility] ENDOSCOPY;  Service:    • EYE SURGERY Right 12/10/2008    Vitrectomy-Dr. Yogi Finnegan   • HYSTERECTOMY     • LAPAROTOMY SALPINGO OOPHORECTOMY N/A  "10/29/2008    Dr. Lucas Mills   • UPPER GASTROINTESTINAL ENDOSCOPY N/A 07/24/2015    Dr. Jayce Abrams           Review of Systems   Constitution: Negative for fever and malaise/fatigue.   HENT: Negative for ear pain, hearing loss, nosebleeds and sore throat.    Eyes: Negative for double vision, pain, vision loss in left eye, vision loss in right eye and visual disturbance.   Cardiovascular: Negative for claudication and leg swelling.   Respiratory: Negative for cough, snoring and wheezing.    Endocrine: Negative for cold intolerance, heat intolerance and polyuria.   Skin: Negative for color change, itching and rash.   Musculoskeletal: Negative for joint pain, joint swelling and muscle cramps.   Gastrointestinal: Negative for abdominal pain, diarrhea, melena, nausea and vomiting.   Genitourinary: Negative for bladder incontinence and hematuria.   Neurological: Negative for excessive daytime sleepiness, dizziness, light-headedness, paresthesias and seizures.   Psychiatric/Behavioral: Negative for depression. The patient is not nervous/anxious.    All other systems reviewed and are negative.    All other systems reviewed and are negative    Allergies   Allergen Reactions   • Morphine        All aspects of family and social history reviewed.          Objective:     Vitals:    07/18/17 1013   BP: 120/50   BP Location: Left arm   Pulse: 68   Weight: 135 lb 12.8 oz (61.6 kg)   Height: 60\" (152.4 cm)     Body mass index is 26.52 kg/(m^2).    PHYSICAL EXAM:  Physical Exam   Constitutional: She appears well-developed.   HENT:   Head: Normocephalic and atraumatic.   Eyes: Conjunctivae and lids are normal. Pupils are equal, round, and reactive to light. Lids are everted and swept, no foreign bodies found.   Neck: Normal range of motion. No JVD present. Carotid bruit is not present. No tracheal deviation present. No thyroid mass present.   Cardiovascular: Normal rate and normal heart sounds.  An irregularly irregular rhythm " present.   Pulses:       Dorsalis pedis pulses are 2+ on the right side, and 2+ on the left side.   Pulmonary/Chest: Effort normal. She has rales.   Abdominal: Normal appearance and bowel sounds are normal. She exhibits no distension.   Musculoskeletal: Normal range of motion.   Neurological: She is alert. She has normal strength.   Skin: Skin is warm, dry and intact.   Psychiatric: She has a normal mood and affect. Her behavior is normal.   Vitals reviewed.      Procedures        Assessment:       Diagnosis Plan   1. Diastolic CHF, chronic     2. Essential hypertension  furosemide (LASIX) 40 MG tablet   3. Permanent atrial fibrillation          No orders of the defined types were placed in this encounter.      Current Outpatient Prescriptions   Medication Sig Dispense Refill   • acetaminophen (TYLENOL) 500 MG tablet Take 500 mg by mouth every 6 (six) hours as needed for mild pain (1-3).     • aspirin 81 MG EC tablet Take 81 mg by mouth daily.     • carvedilol (COREG) 12.5 MG tablet Take 1 tablet by mouth 2 (Two) Times a Day With Meals for 90 days. 180 tablet 0   • fexofenadine (ALLEGRA) 180 MG tablet Take 1 tablet by mouth Daily. 30 tablet 3   • isosorbide mononitrate (IMDUR) 60 MG 24 hr tablet Take 1 tablet by mouth  twice a day 180 tablet 3   • lansoprazole (PREVACID) 15 MG capsule Take 1 capsule by mouth Daily. 90 capsule 0   • LORazepam (ATIVAN) 0.5 MG tablet Take 1 tablet by mouth Every 8 (Eight) Hours As Needed for Anxiety. 90 tablet 0   • polyethylene glycol (MIRALAX) powder Take 17 g by mouth daily.     • potassium chloride (K-DUR) 10 MEQ CR tablet Take 1 tablet by mouth Daily. 30 tablet 2   • valsartan (DIOVAN) 320 MG tablet Take 1 tablet by mouth  daily 90 tablet 3   • warfarin (COUMADIN) 1 MG tablet Take 1 tablet by mouth Take As Directed. 60 tablet 2   • warfarin (COUMADIN) 3 MG tablet Take 1 tablet by mouth Daily. (Patient taking differently: Take 3 mg by mouth Daily. Currently taking 3 mg daily except  on Monday take 1.5mg) 90 tablet 0   • furosemide (LASIX) 40 MG tablet Take 1 tablet by mouth Daily. 30 tablet 2     No current facility-administered medications for this visit.             Plan:       1. Diastolic heart failure-diuretic needed to be increased due to shortness of breath.  Feeling better.  Will continue furosemide 40 mg daily.  Labs are stable.  Will continue potassium 10 mEq daily.    2. HTN-stable. Improved with diuresis.     3. Atrial Fibrillation and Atrial Flutter  Assessment  • The patient has permanent atrial fibrillation  • This is non-valvular in etiology  • The patient's CHADS2-VASc score is 7  • A [id number] score of 2 or more is considered a high risk for a thromboembolic event    Plan  • Continue in atrial fibrillation with rate control  • Continue warfarin for antithrombotic therapy, bleeding issues discussed  • Continue beta blocker for rate control      Follow up in office in 2 weeks with Dr. Brantley    As always, it has been a pleasure to participate in this patient's care.      Sincerely,      STEFANO Marie

## 2017-07-19 ENCOUNTER — TELEPHONE (OUTPATIENT)
Dept: CARDIOLOGY | Facility: CLINIC | Age: 82
End: 2017-07-19

## 2017-07-25 ENCOUNTER — HOSPITAL ENCOUNTER (OUTPATIENT)
Dept: CARDIOLOGY | Facility: HOSPITAL | Age: 82
Setting detail: RECURRING SERIES
Discharge: HOME | End: 2017-07-25

## 2017-07-25 PROCEDURE — 36416 COLLJ CAPILLARY BLOOD SPEC: CPT

## 2017-07-25 PROCEDURE — 85610 PROTHROMBIN TIME: CPT

## 2017-08-02 ENCOUNTER — OFFICE VISIT (OUTPATIENT)
Dept: CARDIOLOGY | Facility: CLINIC | Age: 82
End: 2017-08-02

## 2017-08-02 VITALS
BODY MASS INDEX: 27.09 KG/M2 | HEIGHT: 60 IN | DIASTOLIC BLOOD PRESSURE: 72 MMHG | WEIGHT: 138 LBS | HEART RATE: 71 BPM | SYSTOLIC BLOOD PRESSURE: 142 MMHG

## 2017-08-02 DIAGNOSIS — I10 ESSENTIAL HYPERTENSION: ICD-10-CM

## 2017-08-02 DIAGNOSIS — I36.1 NON-RHEUMATIC TRICUSPID VALVE INSUFFICIENCY: ICD-10-CM

## 2017-08-02 DIAGNOSIS — I27.20 PULMONARY HYPERTENSION (HCC): ICD-10-CM

## 2017-08-02 DIAGNOSIS — E78.2 MIXED HYPERLIPIDEMIA: ICD-10-CM

## 2017-08-02 DIAGNOSIS — I50.32 DIASTOLIC CHF, CHRONIC (HCC): ICD-10-CM

## 2017-08-02 DIAGNOSIS — I48.21 PERMANENT ATRIAL FIBRILLATION (HCC): Primary | ICD-10-CM

## 2017-08-02 DIAGNOSIS — I44.7 LEFT BUNDLE BRANCH BLOCK: ICD-10-CM

## 2017-08-02 PROCEDURE — 93000 ELECTROCARDIOGRAM COMPLETE: CPT | Performed by: INTERNAL MEDICINE

## 2017-08-02 PROCEDURE — 99214 OFFICE O/P EST MOD 30 MIN: CPT | Performed by: INTERNAL MEDICINE

## 2017-08-02 RX ORDER — FUROSEMIDE 40 MG/1
40 TABLET ORAL DAILY
Qty: 90 TABLET | Refills: 3 | Status: ON HOLD | OUTPATIENT
Start: 2017-08-02 | End: 2017-09-16

## 2017-08-02 NOTE — PROGRESS NOTES
PATIENTINFORMATION    Date of Office Visit: 2017  Encounter Provider: Felicitas Brantley MD  Place of Service: [facility] CARDIOLOGY  Patient Name: Yudith Robertson  : 1927    Subjective:     Encounter Date:2017      Patient ID: Yudith Robertson is a 89 y.o. female.      History of Present Illness    This is an 88-year-old woman who is well known to me. She had a heart catheterization in , which showed nonobstructive disease with 30%-40% stenoses in various vessels and an ejection fraction of 50%. She came to [facility] in 2015 with chest pain. She had a PET stress test 2/15 in our office, which was normal and showed normal left ventricular function. Echocardiogram was in 2014 and showed normal left ventricular systolic function with an ejection fraction of 54%. There was abnormal diastolic filling pressures. There was mild to moderate mitral regurgitation, moderate to severe tricuspid regurgitation, and right ventricular systolic pressure of 52 mmHg. She has persistent atrial fibrillation.       She came back to the hospital on 2015. Her blood pressure, which is generally well controlled, was elevated that day. She took three nitroglycerins at home, but it still remained about 200/100 mmHg and she came to the emergency room. There, she had some nitro paste and her symptoms resolved. She ruled out for a myocardial infarction and discharged home on the same home medications.       She was admitted to [facility] on 2015, with acute diastolic congestive heart failure. She diuresed with intravenous Lasix and then with oral Lasix. She complained of chest pain, which sounded like an esophageal stricture. Dr. Morales saw her and recommended an outpatient EGD with Dr. Abrams. She did have this and had an esophageal stricture dilated.       I saw her in 2016. At that time she was complaining of shortness of breath with paroxysmal nocturnal dyspnea and  orthopnea. Because of this, she had a repeat echocardiogram performed on August 3, 2016:  Left ventricular function is normal. Calculated EF = 63.6%. Estimated EF was in agreement with the calculated EF. Estimated EF = 64%. Normal left ventricular cavity size noted. Left ventricular wall thickness is consistent with mild concentric hypertrophy. Left ventricular diastolic function was unable to be assessed. Elevated left atrial pressure.  The following segments are hypokinetic: basal inferior and mid inferior. All other segments are normal.  Left atrial volume is severely increased.  Right atrial cavity size is severely dilated  There is severe thickening of the aortic valve. Mild aortic valve regurgitation is present.  Severe mitral annular calcification is present. Mild mitral valve regurgitation is present.  The tricuspid valve is grossly normal. Moderate tricuspid valve stenosis is present. Mild tricuspid valve regurgitation is present. Estimated right ventricular systolic pressure from tricuspid regurgitation is moderately elevated (45-55 mmHg). The calculated RV systolic pressures 51 mmHg.       She was hospitalized at [facility] on 05/17/2017.  She came in because of difficulty swallowing and she was in a little bit of diastolic heart failure.  Her warfarin was held and she underwent an esophageal dilation.  She was noted to have some candida esophagitis and was started on fluconazole and there were plans for close monitoring of her INR.  On the day of discharge from the hospital, her INR was 1.7.  Around 05/26/2017, she was acting confused and Ashly was concerned.  She contacted the patient's daughter.  The patient ended up being taken to [facility] by ambulance for stroke.  She was treated with tPA while in the hospital there.  I do not know what her INR was when she arrived at [facility], but she had been running supratherapeutic around that time because of treatment with fluconazole.   "    She has been doing well.  She continues to be anxious.  She feels more short of breath in the evening.  She sleeps on 2 pillows.  She denies edema.  She does have some tightness in her chest usually in the evenings.  She has had some worsening arthritis.  She has had a lot of problems with her allergies.    Review of Systems   Constitution: Positive for malaise/fatigue. Negative for fever, weight gain and weight loss.   HENT: Positive for hearing loss. Negative for ear pain, nosebleeds and sore throat.    Eyes: Negative for double vision, pain, vision loss in left eye and vision loss in right eye.   Cardiovascular:        See history of present illness.   Respiratory: Positive for cough and shortness of breath. Negative for sleep disturbances due to breathing, snoring and wheezing.    Endocrine: Positive for cold intolerance and heat intolerance. Negative for polyuria.   Skin: Negative for itching, poor wound healing and rash.   Musculoskeletal: Positive for joint pain. Negative for joint swelling and myalgias.   Gastrointestinal: Negative for abdominal pain, diarrhea, hematochezia, nausea and vomiting.   Genitourinary: Negative for hematuria and hesitancy.   Neurological: Negative for numbness, paresthesias and seizures.   Psychiatric/Behavioral: Negative for depression. The patient is nervous/anxious.            ECG 12 Lead  Date/Time: 8/2/2017 11:10 AM  Performed by: LEE ANN REARDON  Authorized by: LEE ANN REARDON   Comparison: compared with previous ECG from 7/11/2017  Similar to previous ECG  Rhythm: sinus rhythm  BPM: 71  Conduction: left bundle branch block  Clinical impression: abnormal ECG               Objective:     /72  Pulse 71  Ht 60\" (152.4 cm)  Wt 138 lb (62.6 kg)  BMI 26.95 kg/m2 Body mass index is 26.95 kg/(m^2).     Physical Exam   Constitutional: She appears well-developed.   HENT:   Head: Normocephalic and atraumatic.   Eyes: Conjunctivae and lids are normal. Pupils are equal, " round, and reactive to light. Lids are everted and swept, no foreign bodies found.   Neck: Normal range of motion. No JVD present. Carotid bruit is not present. No tracheal deviation present. No thyroid mass present.   Cardiovascular: Normal rate, regular rhythm and normal heart sounds.    Pulses:       Dorsalis pedis pulses are 2+ on the right side, and 2+ on the left side.   Pulmonary/Chest: Effort normal and breath sounds normal.   Abdominal: Normal appearance and bowel sounds are normal.   Musculoskeletal: Normal range of motion.   Neurological: She is alert. She has normal strength.   Skin: Skin is warm, dry and intact.   Psychiatric: She has a normal mood and affect. Her behavior is normal.   Vitals reviewed.          Assessment/Plan:       1. Dyspnea. Stable.  Her lungs are clear.  I encouraged her to continue with her current dose of diuretics.  2. Chest pain. She has had a heart catheterization in the past, which showed nonobstructive disease. Her most recent stress was in 02/2015 and was normal.  She is not having any chest pain at this time.  3. Hypertension. Her blood pressure looks good today.   4. Atrial fibrillation. She is rate controlled and on Coumadin.  Atrial Fibrillation and Atrial Flutter  Assessment  • The patient has permanent atrial fibrillation  • This is non-valvular in etiology  • The patient's CHADS2-VASc score is 7  • A [id number] score of 2 or more is considered a high risk for a thromboembolic event     Plan  • Continue in atrial fibrillation with rate control  • Continue warfarin for antithrombotic therapy, bleeding issues discussed  • Continue beta blocker for rate control  5. Diastolic heart failure. Controlled on current dose of diuretic.   6. Valvular heart disease with moderate tricuspid regurgitation and mild mitral regurgitation.   7. Moderate pulmonary hypertension.   8. History of carotid stenosis, 30%-40% bilateral disease.   9. History of gastroesophageal reflux disease  and esophageal stenosis.   10. Hyperlipidemia.   11. History of small bowel obstruction.   12. History of left bundle branch block.   13. History of stroke.     She looks euvolemic on exam.  I am not going to change her medications today.  She will see Aminata in 3 months and then me 3 months after that.    Orders Placed This Encounter   Procedures   • ECG 12 Lead     This order was created via procedure documentation      Yudith Robertson   [facility] Medication Instructions OLEGARIO:    Printed on:08/02/17 6478   Medication Information                      acetaminophen (TYLENOL) 500 MG tablet  Take 500 mg by mouth every 6 (six) hours as needed for mild pain (1-3).             aspirin 81 MG EC tablet  Take 81 mg by mouth daily.             carvedilol (COREG) 12.5 MG tablet  Take 1 tablet by mouth 2 (Two) Times a Day With Meals for 90 days.             fexofenadine (ALLEGRA) 180 MG tablet  Take 1 tablet by mouth Daily.             furosemide (LASIX) 40 MG tablet  Take 1 tablet by mouth Daily.             isosorbide mononitrate (IMDUR) 60 MG 24 hr tablet  Take 1 tablet by mouth  twice a day             lansoprazole (PREVACID) 15 MG capsule  Take 1 capsule by mouth Daily.             LORazepam (ATIVAN) 0.5 MG tablet  Take 1 tablet by mouth Every 8 (Eight) Hours As Needed for Anxiety.             polyethylene glycol (MIRALAX) powder  Take 17 g by mouth daily.             potassium chloride (K-DUR) 10 MEQ CR tablet  Take 1 tablet by mouth Daily.             valsartan (DIOVAN) 320 MG tablet  Take 1 tablet by mouth  daily             warfarin (COUMADIN) 1 MG tablet  Take 1 tablet by mouth Take As Directed.             warfarin (COUMADIN) 3 MG tablet  Take 1 tablet by mouth Daily.                        Felicitas Brantley MD  08/02/17  11:16 AM

## 2017-08-07 ENCOUNTER — HOSPITAL ENCOUNTER (OUTPATIENT)
Dept: CARDIOLOGY | Facility: HOSPITAL | Age: 82
Setting detail: RECURRING SERIES
Discharge: HOME | End: 2017-08-07

## 2017-08-07 ENCOUNTER — OFFICE VISIT (OUTPATIENT)
Dept: INTERNAL MEDICINE | Facility: CLINIC | Age: 82
End: 2017-08-07

## 2017-08-07 VITALS
HEIGHT: 60 IN | SYSTOLIC BLOOD PRESSURE: 166 MMHG | DIASTOLIC BLOOD PRESSURE: 70 MMHG | WEIGHT: 139 LBS | HEART RATE: 93 BPM | OXYGEN SATURATION: 95 % | BODY MASS INDEX: 27.29 KG/M2

## 2017-08-07 DIAGNOSIS — I10 ESSENTIAL HYPERTENSION: ICD-10-CM

## 2017-08-07 DIAGNOSIS — I48.21 PERMANENT ATRIAL FIBRILLATION (HCC): ICD-10-CM

## 2017-08-07 DIAGNOSIS — K64.4 EXTERNAL HEMORRHOID: Primary | ICD-10-CM

## 2017-08-07 PROCEDURE — 99214 OFFICE O/P EST MOD 30 MIN: CPT | Performed by: INTERNAL MEDICINE

## 2017-08-07 PROCEDURE — 36416 COLLJ CAPILLARY BLOOD SPEC: CPT

## 2017-08-07 PROCEDURE — 85610 PROTHROMBIN TIME: CPT

## 2017-08-07 RX ORDER — CLOTRIMAZOLE AND BETAMETHASONE DIPROPIONATE 10; .64 MG/G; MG/G
CREAM TOPICAL AS NEEDED
COMMUNITY
Start: 2017-07-27 | End: 2017-12-27

## 2017-08-07 NOTE — PROGRESS NOTES
Subjective   Yudith Robertsno is a 89 y.o. female.     Rectal Pain   This is a new problem. The current episode started more than 1 month ago. Pertinent negatives include no chest pain.        The following portions of the patient's history were reviewed and updated as appropriate: allergies, current medications, past family history, past medical history, past social history, past surgical history and problem list.    Review of Systems   Constitutional:        Generally doing about the same     HENT: Negative.    Respiratory: Positive for shortness of breath (States she's SOB all the time).    Cardiovascular: Negative for chest pain and palpitations.        Saw Dr Brantley recently & heart has been doing well Breathing has been OK   Gastrointestinal: Positive for rectal pain ( Has something by her rectum whidh felt like a ball but has gotten flatter Itches Not painful Doesn't bleed).   Genitourinary: Negative.    Musculoskeletal: Negative.    Neurological: Negative.        Objective   Physical Exam   Constitutional: She is oriented to person, place, and time. She appears well-developed.   HENT:   Head: Normocephalic.   Eyes: EOM are normal.   Neck: Neck supple.   Cardiovascular: Normal rate and normal heart sounds.    Repeat 140/70 Irreg irreg   Pulmonary/Chest: Effort normal and breath sounds normal.   Genitourinary:   Genitourinary Comments: Rectal exam shows external hemorrhoid No masses   Musculoskeletal: Normal range of motion.   Neurological: She is alert and oriented to person, place, and time.   Vitals reviewed.      Assessment/Plan   Yudith was seen today for rectal problems.    Diagnoses and all orders for this visit:    External hemorrhoid    Essential hypertension    Permanent atrial fibrillation    Reassured pt Nothing to worry about

## 2017-08-14 ENCOUNTER — APPOINTMENT (OUTPATIENT)
Dept: CT IMAGING | Facility: HOSPITAL | Age: 82
End: 2017-08-14
Attending: INTERNAL MEDICINE

## 2017-08-14 ENCOUNTER — APPOINTMENT (OUTPATIENT)
Dept: CARDIOLOGY | Facility: HOSPITAL | Age: 82
End: 2017-08-14
Attending: INTERNAL MEDICINE

## 2017-08-14 ENCOUNTER — HOSPITAL ENCOUNTER (INPATIENT)
Facility: HOSPITAL | Age: 82
LOS: 4 days | Discharge: HOME HEALTH | End: 2017-08-18
Attending: EMERGENCY MEDICINE | Admitting: INTERNAL MEDICINE

## 2017-08-14 ENCOUNTER — APPOINTMENT (OUTPATIENT)
Dept: GENERAL RADIOLOGY | Facility: HOSPITAL | Age: 82
End: 2017-08-14

## 2017-08-14 DIAGNOSIS — I50.23 ACUTE ON CHRONIC SYSTOLIC CONGESTIVE HEART FAILURE (HCC): Primary | ICD-10-CM

## 2017-08-14 PROBLEM — R13.12 OROPHARYNGEAL DYSPHAGIA: Status: ACTIVE | Noted: 2017-08-14

## 2017-08-14 PROBLEM — J96.21 ACUTE ON CHRONIC RESPIRATORY FAILURE WITH HYPOXIA (HCC): Status: ACTIVE | Noted: 2017-08-14

## 2017-08-14 PROBLEM — I50.33 ACUTE ON CHRONIC DIASTOLIC CONGESTIVE HEART FAILURE (HCC): Status: ACTIVE | Noted: 2017-08-14

## 2017-08-14 PROBLEM — R19.02 LEFT UPPER QUADRANT ABDOMINAL MASS: Status: ACTIVE | Noted: 2017-08-14

## 2017-08-14 PROBLEM — I38 VALVULAR HEART DISEASE: Status: ACTIVE | Noted: 2017-08-14

## 2017-08-14 LAB
ALBUMIN SERPL-MCNC: 3.9 G/DL (ref 3.5–5.2)
ALBUMIN/GLOB SERPL: 1.3 G/DL
ALP SERPL-CCNC: 76 U/L (ref 39–117)
ALT SERPL W P-5'-P-CCNC: 15 U/L (ref 1–33)
ANION GAP SERPL CALCULATED.3IONS-SCNC: 14.5 MMOL/L
AST SERPL-CCNC: 19 U/L (ref 1–32)
B PERT DNA SPEC QL NAA+PROBE: NOT DETECTED
BASOPHILS # BLD AUTO: 0.02 10*3/MM3 (ref 0–0.2)
BASOPHILS NFR BLD AUTO: 0.2 % (ref 0–1.5)
BH CV ECHO MEAS - AI DEC SLOPE: 339 CM/SEC^2
BH CV ECHO MEAS - AI MAX PG: 67.8 MMHG
BH CV ECHO MEAS - AI MAX VEL: 411.5 CM/SEC
BH CV ECHO MEAS - AI P1/2T: 355.5 MSEC
BH CV ECHO MEAS - AO MAX PG: 6 MMHG
BH CV ECHO MEAS - AO MEAN PG (FULL): 2 MMHG
BH CV ECHO MEAS - AO MEAN PG: 3 MMHG
BH CV ECHO MEAS - AO ROOT AREA (BSA CORRECTED): 2.4
BH CV ECHO MEAS - AO ROOT AREA: 11.3 CM^2
BH CV ECHO MEAS - AO ROOT DIAM: 3.8 CM
BH CV ECHO MEAS - AO V2 MAX: 123 CM/SEC
BH CV ECHO MEAS - AO V2 MEAN: 78.7 CM/SEC
BH CV ECHO MEAS - AO V2 VTI: 23.8 CM
BH CV ECHO MEAS - AVA(I,A): 1.9 CM^2
BH CV ECHO MEAS - AVA(I,D): 1.9 CM^2
BH CV ECHO MEAS - BSA(HAYCOCK): 1.6 M^2
BH CV ECHO MEAS - BSA: 1.6 M^2
BH CV ECHO MEAS - BZI_BMI: 27 KILOGRAMS/M^2
BH CV ECHO MEAS - BZI_METRIC_HEIGHT: 152.4 CM
BH CV ECHO MEAS - BZI_METRIC_WEIGHT: 62.6 KG
BH CV ECHO MEAS - CONTRAST EF (2CH): 38.2 ML/M^2
BH CV ECHO MEAS - CONTRAST EF 4CH: 27.7 ML/M^2
BH CV ECHO MEAS - EDV(CUBED): 117.6 ML
BH CV ECHO MEAS - EDV(MOD-SP2): 136 ML
BH CV ECHO MEAS - EDV(MOD-SP4): 130 ML
BH CV ECHO MEAS - EDV(TEICH): 112.8 ML
BH CV ECHO MEAS - EF(CUBED): 37 %
BH CV ECHO MEAS - EF(MOD-SP2): 38.2 %
BH CV ECHO MEAS - EF(MOD-SP4): 27.7 %
BH CV ECHO MEAS - EF(TEICH): 30.3 %
BH CV ECHO MEAS - ESV(CUBED): 74.1 ML
BH CV ECHO MEAS - ESV(MOD-SP2): 84 ML
BH CV ECHO MEAS - ESV(MOD-SP4): 94 ML
BH CV ECHO MEAS - ESV(TEICH): 78.6 ML
BH CV ECHO MEAS - FS: 14.3 %
BH CV ECHO MEAS - IVS/LVPW: 1.1
BH CV ECHO MEAS - IVSD: 1.1 CM
BH CV ECHO MEAS - LAT PEAK E' VEL: 8 CM/SEC
BH CV ECHO MEAS - LV DIASTOLIC VOL/BSA (35-75): 81.5 ML/M^2
BH CV ECHO MEAS - LV MASS(C)D: 188.1 GRAMS
BH CV ECHO MEAS - LV MASS(C)DI: 118 GRAMS/M^2
BH CV ECHO MEAS - LV MEAN PG: 1 MMHG
BH CV ECHO MEAS - LV SYSTOLIC VOL/BSA (12-30): 59 ML/M^2
BH CV ECHO MEAS - LV V1 MAX: 84 CM/SEC
BH CV ECHO MEAS - LV V1 MEAN: 52 CM/SEC
BH CV ECHO MEAS - LV V1 VTI: 13.3 CM
BH CV ECHO MEAS - LVIDD: 4.9 CM
BH CV ECHO MEAS - LVIDS: 4.2 CM
BH CV ECHO MEAS - LVLD AP2: 8.4 CM
BH CV ECHO MEAS - LVLD AP4: 8.4 CM
BH CV ECHO MEAS - LVLS AP2: 7.7 CM
BH CV ECHO MEAS - LVLS AP4: 7.8 CM
BH CV ECHO MEAS - LVOT AREA (M): 3.5 CM^2
BH CV ECHO MEAS - LVOT AREA: 3.5 CM^2
BH CV ECHO MEAS - LVOT DIAM: 2.1 CM
BH CV ECHO MEAS - LVPWD: 1 CM
BH CV ECHO MEAS - MED PEAK E' VEL: 5 CM/SEC
BH CV ECHO MEAS - MR MAX PG: 49.8 MMHG
BH CV ECHO MEAS - MR MAX VEL: 353 CM/SEC
BH CV ECHO MEAS - MV DEC SLOPE: 881 CM/SEC^2
BH CV ECHO MEAS - MV DEC TIME: 0.14 SEC
BH CV ECHO MEAS - MV E MAX VEL: 137 CM/SEC
BH CV ECHO MEAS - MV MEAN PG: 5 MMHG
BH CV ECHO MEAS - MV P1/2T MAX VEL: 153 CM/SEC
BH CV ECHO MEAS - MV P1/2T: 50.9 MSEC
BH CV ECHO MEAS - MV V2 MEAN: 97.7 CM/SEC
BH CV ECHO MEAS - MV V2 VTI: 23 CM
BH CV ECHO MEAS - MVA P1/2T LCG: 1.4 CM^2
BH CV ECHO MEAS - MVA(P1/2T): 4.3 CM^2
BH CV ECHO MEAS - MVA(VTI): 2 CM^2
BH CV ECHO MEAS - PA ACC SLOPE: 893 CM/SEC^2
BH CV ECHO MEAS - PA ACC TIME: 0.09 SEC
BH CV ECHO MEAS - PA MAX PG (FULL): 0.94 MMHG
BH CV ECHO MEAS - PA MAX PG: 2.2 MMHG
BH CV ECHO MEAS - PA PR(ACCEL): 40.8 MMHG
BH CV ECHO MEAS - PA V2 MAX: 74.4 CM/SEC
BH CV ECHO MEAS - PULM DIAS VEL: 66.7 CM/SEC
BH CV ECHO MEAS - PULM S/D: 0.58
BH CV ECHO MEAS - PULM SYS VEL: 39 CM/SEC
BH CV ECHO MEAS - PVA(V,A): 3.7 CM^2
BH CV ECHO MEAS - PVA(V,D): 3.7 CM^2
BH CV ECHO MEAS - QP/QS: 1.1
BH CV ECHO MEAS - RAP SYSTOLE: 8 MMHG
BH CV ECHO MEAS - RV MAX PG: 1.3 MMHG
BH CV ECHO MEAS - RV MEAN PG: 1 MMHG
BH CV ECHO MEAS - RV V1 MAX: 56.4 CM/SEC
BH CV ECHO MEAS - RV V1 MEAN: 34.1 CM/SEC
BH CV ECHO MEAS - RV V1 VTI: 10 CM
BH CV ECHO MEAS - RVOT AREA: 4.9 CM^2
BH CV ECHO MEAS - RVOT DIAM: 2.5 CM
BH CV ECHO MEAS - RVSP: 65.2 MMHG
BH CV ECHO MEAS - SI(AO): 169.3 ML/M^2
BH CV ECHO MEAS - SI(CUBED): 27.3 ML/M^2
BH CV ECHO MEAS - SI(LVOT): 28.9 ML/M^2
BH CV ECHO MEAS - SI(MOD-SP2): 32.6 ML/M^2
BH CV ECHO MEAS - SI(MOD-SP4): 22.6 ML/M^2
BH CV ECHO MEAS - SI(TEICH): 21.5 ML/M^2
BH CV ECHO MEAS - SV(AO): 269.9 ML
BH CV ECHO MEAS - SV(CUBED): 43.6 ML
BH CV ECHO MEAS - SV(LVOT): 46.1 ML
BH CV ECHO MEAS - SV(MOD-SP2): 52 ML
BH CV ECHO MEAS - SV(MOD-SP4): 36 ML
BH CV ECHO MEAS - SV(RVOT): 48.8 ML
BH CV ECHO MEAS - SV(TEICH): 34.2 ML
BH CV ECHO MEAS - TAPSE (>1.6): 1.2 CM2
BH CV ECHO MEAS - TR MAX VEL: 378 CM/SEC
BH CV VAS BP RIGHT ARM: NORMAL MMHG
BH CV XLRA - RV BASE: 3.8 CM
BH CV XLRA - RV LENGTH: 7.1 CM
BH CV XLRA - RV MID: 2.2 CM
BH CV XLRA - TDI S': 11 CM/SEC
BILIRUB SERPL-MCNC: 0.5 MG/DL (ref 0.1–1.2)
BUN BLD-MCNC: 15 MG/DL (ref 8–23)
BUN/CREAT SERPL: 17.9 (ref 7–25)
C PNEUM DNA NPH QL NAA+NON-PROBE: NOT DETECTED
CALCIUM SPEC-SCNC: 9.8 MG/DL (ref 8.2–9.6)
CHLORIDE SERPL-SCNC: 94 MMOL/L (ref 98–107)
CO2 SERPL-SCNC: 27.5 MMOL/L (ref 22–29)
CREAT BLD-MCNC: 0.84 MG/DL (ref 0.57–1)
DEPRECATED RDW RBC AUTO: 57.3 FL (ref 37–54)
E/E' RATIO: 22
EOSINOPHIL # BLD AUTO: 0.06 10*3/MM3 (ref 0–0.7)
EOSINOPHIL NFR BLD AUTO: 0.7 % (ref 0.3–6.2)
ERYTHROCYTE [DISTWIDTH] IN BLOOD BY AUTOMATED COUNT: 17.4 % (ref 11.7–13)
FLUAV H1 2009 PAND RNA NPH QL NAA+PROBE: NOT DETECTED
FLUAV H1 HA GENE NPH QL NAA+PROBE: NOT DETECTED
FLUAV H3 RNA NPH QL NAA+PROBE: NOT DETECTED
FLUAV SUBTYP SPEC NAA+PROBE: NOT DETECTED
FLUBV RNA ISLT QL NAA+PROBE: NOT DETECTED
GFR SERPL CREATININE-BSD FRML MDRD: 64 ML/MIN/1.73
GLOBULIN UR ELPH-MCNC: 3 GM/DL
GLUCOSE BLD-MCNC: 184 MG/DL (ref 65–99)
HADV DNA SPEC NAA+PROBE: NOT DETECTED
HCOV 229E RNA SPEC QL NAA+PROBE: NOT DETECTED
HCOV HKU1 RNA SPEC QL NAA+PROBE: NOT DETECTED
HCOV NL63 RNA SPEC QL NAA+PROBE: NOT DETECTED
HCOV OC43 RNA SPEC QL NAA+PROBE: NOT DETECTED
HCT VFR BLD AUTO: 34.2 % (ref 35.6–45.5)
HGB BLD-MCNC: 10.2 G/DL (ref 11.9–15.5)
HMPV RNA NPH QL NAA+NON-PROBE: NOT DETECTED
HOLD SPECIMEN: NORMAL
HOLD SPECIMEN: NORMAL
HPIV1 RNA SPEC QL NAA+PROBE: NOT DETECTED
HPIV2 RNA SPEC QL NAA+PROBE: NOT DETECTED
HPIV3 RNA NPH QL NAA+PROBE: NOT DETECTED
HPIV4 P GENE NPH QL NAA+PROBE: NOT DETECTED
IMM GRANULOCYTES # BLD: 0.03 10*3/MM3 (ref 0–0.03)
IMM GRANULOCYTES NFR BLD: 0.4 % (ref 0–0.5)
INR PPP: 4 (ref 0.9–1.1)
INR PPP: 4.05 (ref 0.9–1.1)
LEFT ATRIUM VOLUME INDEX: 55 ML/M2
LEFT ATRIUM VOLUME: 84 CM3
LV EF 2D ECHO EST: 27 %
LYMPHOCYTES # BLD AUTO: 1.14 10*3/MM3 (ref 0.9–4.8)
LYMPHOCYTES NFR BLD AUTO: 13.6 % (ref 19.6–45.3)
M PNEUMO IGG SER IA-ACNC: NOT DETECTED
MCH RBC QN AUTO: 27 PG (ref 26.9–32)
MCHC RBC AUTO-ENTMCNC: 29.8 G/DL (ref 32.4–36.3)
MCV RBC AUTO: 90.5 FL (ref 80.5–98.2)
MONOCYTES # BLD AUTO: 0.7 10*3/MM3 (ref 0.2–1.2)
MONOCYTES NFR BLD AUTO: 8.3 % (ref 5–12)
NEUTROPHILS # BLD AUTO: 6.46 10*3/MM3 (ref 1.9–8.1)
NEUTROPHILS NFR BLD AUTO: 76.8 % (ref 42.7–76)
NT-PROBNP SERPL-MCNC: ABNORMAL PG/ML (ref 0–1800)
PLATELET # BLD AUTO: 361 10*3/MM3 (ref 140–500)
PMV BLD AUTO: 9.7 FL (ref 6–12)
POTASSIUM BLD-SCNC: 3.9 MMOL/L (ref 3.5–5.2)
PROT SERPL-MCNC: 6.9 G/DL (ref 6–8.5)
PROTHROMBIN TIME: 37.8 SECONDS (ref 11.7–14.2)
PROTHROMBIN TIME: 38.2 SECONDS (ref 11.7–14.2)
RBC # BLD AUTO: 3.78 10*6/MM3 (ref 3.9–5.2)
RHINOVIRUS RNA SPEC NAA+PROBE: NOT DETECTED
RSV RNA NPH QL NAA+NON-PROBE: NOT DETECTED
SODIUM BLD-SCNC: 136 MMOL/L (ref 136–145)
TROPONIN T SERPL-MCNC: <0.01 NG/ML (ref 0–0.03)
TROPONIN T SERPL-MCNC: <0.01 NG/ML (ref 0–0.03)
WBC NRBC COR # BLD: 8.41 10*3/MM3 (ref 4.5–10.7)
WHOLE BLOOD HOLD SPECIMEN: NORMAL
WHOLE BLOOD HOLD SPECIMEN: NORMAL

## 2017-08-14 PROCEDURE — 87581 M.PNEUMON DNA AMP PROBE: CPT | Performed by: INTERNAL MEDICINE

## 2017-08-14 PROCEDURE — 87798 DETECT AGENT NOS DNA AMP: CPT | Performed by: INTERNAL MEDICINE

## 2017-08-14 PROCEDURE — 80053 COMPREHEN METABOLIC PANEL: CPT | Performed by: EMERGENCY MEDICINE

## 2017-08-14 PROCEDURE — 85025 COMPLETE CBC W/AUTO DIFF WBC: CPT | Performed by: EMERGENCY MEDICINE

## 2017-08-14 PROCEDURE — 99222 1ST HOSP IP/OBS MODERATE 55: CPT | Performed by: INTERNAL MEDICINE

## 2017-08-14 PROCEDURE — 87633 RESP VIRUS 12-25 TARGETS: CPT | Performed by: INTERNAL MEDICINE

## 2017-08-14 PROCEDURE — 93005 ELECTROCARDIOGRAM TRACING: CPT | Performed by: EMERGENCY MEDICINE

## 2017-08-14 PROCEDURE — 85610 PROTHROMBIN TIME: CPT | Performed by: INTERNAL MEDICINE

## 2017-08-14 PROCEDURE — 83880 ASSAY OF NATRIURETIC PEPTIDE: CPT | Performed by: EMERGENCY MEDICINE

## 2017-08-14 PROCEDURE — 84484 ASSAY OF TROPONIN QUANT: CPT | Performed by: INTERNAL MEDICINE

## 2017-08-14 PROCEDURE — 87486 CHLMYD PNEUM DNA AMP PROBE: CPT | Performed by: INTERNAL MEDICINE

## 2017-08-14 PROCEDURE — 84484 ASSAY OF TROPONIN QUANT: CPT | Performed by: EMERGENCY MEDICINE

## 2017-08-14 PROCEDURE — 93306 TTE W/DOPPLER COMPLETE: CPT | Performed by: INTERNAL MEDICINE

## 2017-08-14 PROCEDURE — 92610 EVALUATE SWALLOWING FUNCTION: CPT

## 2017-08-14 PROCEDURE — 71010 HC CHEST PA OR AP: CPT

## 2017-08-14 PROCEDURE — 25010000002 FUROSEMIDE PER 20 MG: Performed by: EMERGENCY MEDICINE

## 2017-08-14 PROCEDURE — 93010 ELECTROCARDIOGRAM REPORT: CPT | Performed by: INTERNAL MEDICINE

## 2017-08-14 PROCEDURE — 25010000002 FUROSEMIDE PER 20 MG: Performed by: INTERNAL MEDICINE

## 2017-08-14 PROCEDURE — 36415 COLL VENOUS BLD VENIPUNCTURE: CPT | Performed by: INTERNAL MEDICINE

## 2017-08-14 PROCEDURE — 93306 TTE W/DOPPLER COMPLETE: CPT

## 2017-08-14 PROCEDURE — 74176 CT ABD & PELVIS W/O CONTRAST: CPT

## 2017-08-14 PROCEDURE — 85610 PROTHROMBIN TIME: CPT | Performed by: EMERGENCY MEDICINE

## 2017-08-14 PROCEDURE — 99284 EMERGENCY DEPT VISIT MOD MDM: CPT

## 2017-08-14 PROCEDURE — C8929 TTE W OR WO FOL WCON,DOPPLER: HCPCS

## 2017-08-14 PROCEDURE — 25010000002 PERFLUTREN (DEFINITY) 8.476 MG IN SODIUM CHLORIDE 10 ML INJECTION: Performed by: INTERNAL MEDICINE

## 2017-08-14 RX ORDER — ACETAMINOPHEN 500 MG
500 TABLET ORAL EVERY 6 HOURS PRN
Status: DISCONTINUED | OUTPATIENT
Start: 2017-08-14 | End: 2017-08-18 | Stop reason: HOSPADM

## 2017-08-14 RX ORDER — VALSARTAN 320 MG/1
320 TABLET ORAL
Status: DISCONTINUED | OUTPATIENT
Start: 2017-08-14 | End: 2017-08-18

## 2017-08-14 RX ORDER — ONDANSETRON 2 MG/ML
4 INJECTION INTRAMUSCULAR; INTRAVENOUS EVERY 6 HOURS PRN
Status: DISCONTINUED | OUTPATIENT
Start: 2017-08-14 | End: 2017-08-18 | Stop reason: HOSPADM

## 2017-08-14 RX ORDER — PANTOPRAZOLE SODIUM 40 MG/1
40 TABLET, DELAYED RELEASE ORAL EVERY MORNING
Status: DISCONTINUED | OUTPATIENT
Start: 2017-08-14 | End: 2017-08-18 | Stop reason: HOSPADM

## 2017-08-14 RX ORDER — ASPIRIN 325 MG
325 TABLET ORAL ONCE
Status: DISCONTINUED | OUTPATIENT
Start: 2017-08-14 | End: 2017-08-14

## 2017-08-14 RX ORDER — SODIUM CHLORIDE 0.9 % (FLUSH) 0.9 %
1-10 SYRINGE (ML) INJECTION AS NEEDED
Status: DISCONTINUED | OUTPATIENT
Start: 2017-08-14 | End: 2017-08-18 | Stop reason: HOSPADM

## 2017-08-14 RX ORDER — SODIUM CHLORIDE 0.9 % (FLUSH) 0.9 %
10 SYRINGE (ML) INJECTION AS NEEDED
Status: DISCONTINUED | OUTPATIENT
Start: 2017-08-14 | End: 2017-08-18 | Stop reason: HOSPADM

## 2017-08-14 RX ORDER — POTASSIUM CHLORIDE 750 MG/1
10 CAPSULE, EXTENDED RELEASE ORAL DAILY
Status: DISCONTINUED | OUTPATIENT
Start: 2017-08-14 | End: 2017-08-18 | Stop reason: HOSPADM

## 2017-08-14 RX ORDER — FUROSEMIDE 10 MG/ML
40 INJECTION INTRAMUSCULAR; INTRAVENOUS EVERY 12 HOURS
Status: DISCONTINUED | OUTPATIENT
Start: 2017-08-14 | End: 2017-08-16

## 2017-08-14 RX ORDER — CARVEDILOL 12.5 MG/1
12.5 TABLET ORAL 2 TIMES DAILY WITH MEALS
Status: DISCONTINUED | OUTPATIENT
Start: 2017-08-14 | End: 2017-08-18 | Stop reason: HOSPADM

## 2017-08-14 RX ORDER — ISOSORBIDE MONONITRATE 60 MG/1
60 TABLET, EXTENDED RELEASE ORAL
Status: DISCONTINUED | OUTPATIENT
Start: 2017-08-14 | End: 2017-08-15

## 2017-08-14 RX ORDER — ASPIRIN 81 MG/1
81 TABLET ORAL DAILY
Status: DISCONTINUED | OUTPATIENT
Start: 2017-08-14 | End: 2017-08-18 | Stop reason: HOSPADM

## 2017-08-14 RX ORDER — LORAZEPAM 0.5 MG/1
0.5 TABLET ORAL ONCE
Status: COMPLETED | OUTPATIENT
Start: 2017-08-14 | End: 2017-08-14

## 2017-08-14 RX ORDER — NITROGLYCERIN 0.4 MG/1
0.4 TABLET SUBLINGUAL
Status: DISCONTINUED | OUTPATIENT
Start: 2017-08-14 | End: 2017-08-18 | Stop reason: HOSPADM

## 2017-08-14 RX ORDER — CETIRIZINE HYDROCHLORIDE 10 MG/1
10 TABLET ORAL DAILY
Status: DISCONTINUED | OUTPATIENT
Start: 2017-08-14 | End: 2017-08-18 | Stop reason: HOSPADM

## 2017-08-14 RX ORDER — POLYETHYLENE GLYCOL 3350 17 G/17G
17 POWDER, FOR SOLUTION ORAL DAILY
Status: DISCONTINUED | OUTPATIENT
Start: 2017-08-14 | End: 2017-08-18 | Stop reason: HOSPADM

## 2017-08-14 RX ORDER — LORAZEPAM 0.5 MG/1
0.5 TABLET ORAL 2 TIMES DAILY PRN
Status: DISCONTINUED | OUTPATIENT
Start: 2017-08-14 | End: 2017-08-18 | Stop reason: HOSPADM

## 2017-08-14 RX ORDER — FUROSEMIDE 10 MG/ML
40 INJECTION INTRAMUSCULAR; INTRAVENOUS ONCE
Status: COMPLETED | OUTPATIENT
Start: 2017-08-14 | End: 2017-08-14

## 2017-08-14 RX ADMIN — POTASSIUM CHLORIDE 10 MEQ: 750 CAPSULE, EXTENDED RELEASE ORAL at 08:47

## 2017-08-14 RX ADMIN — ISOSORBIDE MONONITRATE 60 MG: 60 TABLET, EXTENDED RELEASE ORAL at 10:03

## 2017-08-14 RX ADMIN — VALSARTAN 320 MG: 320 TABLET, FILM COATED ORAL at 10:03

## 2017-08-14 RX ADMIN — ASPIRIN 81 MG: 81 TABLET ORAL at 10:03

## 2017-08-14 RX ADMIN — NITROGLYCERIN 1 INCH: 20 OINTMENT TOPICAL at 02:00

## 2017-08-14 RX ADMIN — LORAZEPAM 0.5 MG: 0.5 TABLET ORAL at 04:58

## 2017-08-14 RX ADMIN — FUROSEMIDE 40 MG: 10 INJECTION, SOLUTION INTRAMUSCULAR; INTRAVENOUS at 14:43

## 2017-08-14 RX ADMIN — PERFLUTREN 3 ML: 6.52 INJECTION, SUSPENSION INTRAVENOUS at 13:57

## 2017-08-14 RX ADMIN — FUROSEMIDE 40 MG: 10 INJECTION, SOLUTION INTRAMUSCULAR; INTRAVENOUS at 02:45

## 2017-08-14 RX ADMIN — CARVEDILOL 12.5 MG: 12.5 TABLET, FILM COATED ORAL at 18:03

## 2017-08-14 RX ADMIN — PANTOPRAZOLE SODIUM 40 MG: 40 TABLET, DELAYED RELEASE ORAL at 08:47

## 2017-08-14 RX ADMIN — LORAZEPAM 0.5 MG: 0.5 TABLET ORAL at 22:18

## 2017-08-14 RX ADMIN — CARVEDILOL 12.5 MG: 12.5 TABLET, FILM COATED ORAL at 10:03

## 2017-08-14 RX ADMIN — ACETAMINOPHEN 500 MG: 500 TABLET ORAL at 22:22

## 2017-08-14 RX ADMIN — CETIRIZINE HYDROCHLORIDE 10 MG: 10 TABLET, FILM COATED ORAL at 10:03

## 2017-08-14 NOTE — CONSULTS
Patient Name: Yudith Robertson  :1927  89 y.o.    Date of Admission: 2017  Encounter Provider: Miguel Ángel Marques III, MD  Date of Encounter Visit: 17  Place of Service: [facility] CARDIOLOGY  Referring Provider: Carlos Fontaine*  Patient Care Team:  Deon Garzon MD as PCP - General (Internal Medicine)  Deon Garzon MD as PCP - Claims Attributed  Latrice Riley RN as Care Coordinator (Population Health)      Chief complaint: Consulted for acute on chronic congestive heart failure      History of Present Illness:   Ms. Robertson is a 89-year-old femalewith a documented history of nonobstructive coronary artery disease be a catheter in , atrial fibrillation (on Coumadin), valvular heart disease, diastolic heart failure, pulmonary hypertension, LBBB and recent stroke.  She typically sees Dr Brantley and was just seen by her in the office. Her last stress test was a PET stress in our office in 2015 which showed normal LV systolic function and no evidence of ischemia.  Her last echocardiogram was performed in 2016 which showed a normal ejection fraction is 63%, mild concentric hypertrophy, basal inferior and mid inferior walls are hypokinetic, severely dilated RA, severely increased left atrial volume, mild AR/MR, moderate TS and mild TR; RV systolic pressure 51 mmHg.  She was just seen in the office on  for follow-up and reported that she had been doing well since her stroke in May 2017.  She did report she has some chest tightness and shortness of breath in the evenings.  Her blood pressure at the time of this point was 142/72 with a heart rate is 71 and sinus rhythm.  No changes were made to her medical regimen at that time.    Patient presented to the emergency room early this morning with shortness of breath, orthopnea, and mid precordial chest discomfort when she coughed.  Upon presentation in the emergency room her blood pressure was  181/101, atrial fibrillation was present with a heart rate of 115, and her O2 saturation was 90% on room air.  She notes that her  has oxygen available at home at night and she wonders if she would benefit from having the same thing.  She did not have any sensation of palpitations or heart racing.  She denied any chills or fevers.  She received 40 mg of IV Lasix in the emergency room and now is receiving that every 12 hours.  She also has a tender mass just left of the upper midline of her abdomen and had a CT scan of the abdomen this morning-records pending.  I saw her in radiology right after she had had the images obtained.    She has not had any other recent medical illnesses.  She denies any nausea or vomiting.      CHADS2-VASc score is 7    Past Medical History:   Diagnosis Date   • Allergic rhinitis    • Anemia    • Atrial fibrillation    • Atypical chest pain    • CAD (coronary artery disease)    • Carotid artery stenosis     20-30% bilateral   • Cerebral artery occlusion with cerebral infarction 1997   • CHF (congestive heart failure)    • Diastolic congestive heart failure     Echo 3/2012 with normal LVEF, mod LVH   • Difficulty swallowing    • Dizzy     mild   • Edema     In Calf   • Esophageal reflux     GERD, history of esophageal stenosis s/p dilation   • GERD (gastroesophageal reflux disease)    • H/O bone density study 06/16/2015    Osteopenia   • H/O mammogram 02/04/2014   • H/O thyroid nodule    • History of esophageal stricture    • Hyperlipidemia    • Hypertension    • IBS (irritable bowel syndrome)    • Intestinal obstruction     small bowel obstruction   • LBBB (left bundle branch block)    • Mild aortic insufficiency    • Mild mitral insufficiency    • Mixed hyperlipidemia    • Moderate tricuspid insufficiency    • Mood disorder in conditions classified elsewhere    • Osteoarthrosis    • Osteopenia    • Polyarthropathy, multiple sites    • SOB (shortness of breath)    • Transient  cerebral ischemia    • Valvular heart disease     Echo 1/17/14: EF 54%, elevated LAP, mild-mod MR, mod-severe TR, moderate PHTN (52mm Hg)       Past Surgical History:   Procedure Laterality Date   • APPENDECTOMY     • BACK SURGERY     • ENDOSCOPY N/A 5/22/2017    Procedure: ESOPHAGOGASTRODUODENOSCOPY WITH GASTON DILATATION 46FR, 48FR,52FR  AND ESOPHAGEAL BRUSHINGS;  Surgeon: Rebecca Becerra MD;  Location: [facility] ENDOSCOPY;  Service:    • EYE SURGERY Right 12/10/2008    Vitrectomy-Dr. Yogi Finnegan   • HYSTERECTOMY     • LAPAROTOMY SALPINGO OOPHORECTOMY N/A 10/29/2008    Dr. Lucas Mills   • UPPER GASTROINTESTINAL ENDOSCOPY N/A 07/24/2015    Dr. Jayce Abrams         Prior to Admission medications    Medication Sig Start Date End Date Taking? Authorizing Provider   acetaminophen (TYLENOL) 500 MG tablet Take 500 mg by mouth every 6 (six) hours as needed for mild pain (1-3). 8/20/14   Historical Provider, MD   aspirin 81 MG EC tablet Take 81 mg by mouth daily. 8/7/12   Historical Provider, MD   carvedilol (COREG) 12.5 MG tablet Take 1 tablet by mouth 2 (Two) Times a Day With Meals for 90 days. 7/5/17 10/3/17  Felicitas Brantley MD   clotrimazole-betamethasone (LOTRISONE) 1-0.05 % cream  7/27/17   Historical Provider, MD   fexofenadine (ALLEGRA) 180 MG tablet Take 1 tablet by mouth Daily. 12/6/16   Deon Garzon MD   furosemide (LASIX) 40 MG tablet Take 1 tablet by mouth Daily. 8/2/17   Felicitas Brantley MD   isosorbide mononitrate (IMDUR) 60 MG 24 hr tablet Take 1 tablet by mouth  twice a day 12/9/16   Felicitas Brantley MD   lansoprazole (PREVACID) 15 MG capsule Take 1 capsule by mouth Daily. 10/14/16   Deon Garzon MD   LORazepam (ATIVAN) 0.5 MG tablet Take 1 tablet by mouth Every 8 (Eight) Hours As Needed for Anxiety.  Patient taking differently: Take 0.5 mg by mouth 2 (Two) Times a Day As Needed for Anxiety. 6/23/17   Deon Garzon MD   polyethylene glycol (MIRALAX) powder Take 17 g by mouth daily.     "Historical Provider, MD   potassium chloride (K-DUR) 10 MEQ CR tablet Take 1 tablet by mouth Daily. 7/3/17   Gillian STEFANO Christensen   valsartan (DIOVAN) 320 MG tablet Take 1 tablet by mouth  daily 6/23/17   Deon Garzon MD   warfarin (COUMADIN) 3 MG tablet Take 1 tablet by mouth Daily.  Patient taking differently: Take 3 mg by mouth Daily. Currently taking 3 mg daily except on Monday take 1.5mg 5/1/17   Felicitas Brantley MD       Allergies   Allergen Reactions   • Morphine      Makes pt feel freaked out/loopy       Social History     Social History   • Marital status:      Spouse name: N/A   • Number of children: N/A   • Years of education: N/A     Social History Main Topics   • Smoking status: Former Smoker     Packs/day: 1.00     Years: 30.00     Quit date: 1/1/1986   • Smokeless tobacco: Never Used   • Alcohol use No      Comment: stopped drinking   • Drug use: No   • Sexual activity: No     Other Topics Concern   • None     Social History Narrative       Family History   Problem Relation Age of Onset   • Stroke Other    • Stroke Mother    • No Known Problems Daughter    • No Known Problems Daughter    • No Known Problems Daughter        REVIEW OF SYSTEMS:   All other systems reviewed and negative.       Objective:     Vitals:    08/14/17 0247 08/14/17 0332 08/14/17 0430 08/14/17 0700   BP: 160/67 125/73 164/98 162/88   BP Location:  Left arm Left arm Left arm   Patient Position:  Sitting Lying Lying   Pulse: 95 109 96 94   Resp: 26 19 20 20   Temp:   97.9 °F (36.6 °C) 98.7 °F (37.1 °C)   TempSrc:   Oral Oral   SpO2: 96% 94%  94%   Weight:   138 lb (62.6 kg)    Height:   60\" (152.4 cm)      Body mass index is 26.95 kg/(m^2).    General Appearance:    Alert, cooperative, in no acute distress   Head:    Normocephalic, without obvious abnormality, atraumatic   Eyes:            Lids and lashes normal, conjunctivae and sclerae normal, no   icterus, no pallor, corneas clear, PERRLA   Ears:    Ears appear " intact with no abnormalities noted   Throat:   No oral lesions, no thrush, oral mucosa moist   Neck:   No adenopathy, supple, trachea midline, no thyromegaly, no   carotid bruit, no JVD   Back:     No kyphosis present, no scoliosis present, no skin lesions, erythema or scars, no tenderness to percussion or palpation, range of motion normal   Lungs:     Rhonchi, basilar rales    Heart:    Regular rhythm and normal rate, normal S1 and S2, no murmur, no gallop, no rub, no click   Chest Wall:    No abnormalities observed   Abdomen:     Normal bowel sounds, no masses, no organomegaly, soft        non-tender, non-distended, no guarding, no rebound  tenderness   Extremities:   Moves all extremities well, no edema, no cyanosis, no redness   Pulses:   Pulses palpable and equal bilaterally. Normal radial, carotid, femoral, dorsalis pedis and posterior tibial pulses bilaterally. Normal abdominal aorta   Skin:  Psychiatric:   No bleeding, bruising or rash    Alert and oriented, normal mood and affect         Lab Review:       Results from last 7 days  Lab Units 08/14/17  0159   SODIUM mmol/L 136   POTASSIUM mmol/L 3.9   CHLORIDE mmol/L 94*   CO2 mmol/L 27.5   BUN mg/dL 15   CREATININE mg/dL 0.84   CALCIUM mg/dL 9.8*   BILIRUBIN mg/dL 0.5   ALK PHOS U/L 76   ALT (SGPT) U/L 15   AST (SGOT) U/L 19   GLUCOSE mg/dL 184*       Results from last 7 days  Lab Units 08/14/17  0446 08/14/17  0159   TROPONIN T ng/mL <0.010 <0.010       Results from last 7 days  Lab Units 08/14/17  0159   WBC 10*3/mm3 8.41   HEMOGLOBIN g/dL 10.2*   HEMATOCRIT % 34.2*   PLATELETS 10*3/mm3 361       Results from last 7 days  Lab Units 08/14/17  0446 08/14/17  0159   INR  4.00* 4.05*            Previous:     I personally viewed and interpreted the patient's EKG/Telemetry data.        Assessment and Plan:       Active Hospital Problems (** Indicates Principal Problem)    Diagnosis Date Noted   • **Acute on chronic respiratory failure with hypoxia [J96.21]  08/14/2017   • Acute on chronic diastolic congestive heart failure [I50.33] 08/14/2017   • Left upper quadrant abdominal mass [R19.02] 08/14/2017   • Oropharyngeal dysphagia [R13.12] 08/14/2017   • Valvular heart disease [I38] 08/14/2017   • Hypertension [I10] 02/03/2016   • Left bundle branch block [I44.7] 02/03/2016   • Permanent atrial fibrillation [I48.2] 02/03/2016   • Pulmonary hypertension [I27.2] 02/03/2016      Resolved Hospital Problems    Diagnosis Date Noted Date Resolved   No resolved problems to display.     1. SOB/ADCHF- continue IV diuresis. Will recheck an echocardiogram with change in status.  2. Chest pain. She has had a heart catheterization in the past, which showed nonobstructive disease. Her most recent stress was in 02/2015 and was normal.  Her CP does not sound anginal; will follow  3. Hypertension. Continue her current medical therapy  4. Atrial fibrillation. She is rate controlled and on Coumadin.  Atrial Fibrillation and Atrial Flutter  Assessment  • The patient has permanent atrial fibrillation  • This is non-valvular in etiology  • The patient's CHADS2-VASc score is 7  • A [id number] score of 2 or more is considered a high risk for a thromboembolic event      Plan  • Continue in atrial fibrillation with rate control  • Continue warfarin for antithrombotic therapy, bleeding issues discussed  • Continue beta blocker for rate control  5. Diastolic heart failure. After IV diuresis will adjust her OP med regimen  6. Valvular heart disease with moderate tricuspid regurgitation and mild mitral regurgitation. Will reassess with echo  7. Moderate pulmonary hypertension.   8. History of carotid stenosis, 30%-40% bilateral disease.   9. History of gastroesophageal reflux disease and esophageal stenosis.   10. Hyperlipidemia.   11. History of small bowel obstruction.   12. History of left bundle branch block.   13. History of stroke.          Miguel Ángel Marques III, MD  08/14/17  10:19  AM

## 2017-08-14 NOTE — PLAN OF CARE
Problem: Patient Care Overview (Adult)  Goal: Plan of Care Review  Outcome: Ongoing (interventions implemented as appropriate)    08/14/17 1443 08/14/17 1651   Coping/Psychosocial Response Interventions   Plan Of Care Reviewed With patient;family --    Patient Care Overview   Progress --  no change   Outcome Evaluation   Outcome Summary/Follow up Plan --  vitals stable. continues to c/o chest pressure, denies pain. Echo done today. cardio consult placed today. ct of abdomen also done today. iv lasix bid continues, patient up with assistance to the bathroom.         Problem: Fall Risk (Adult)  Goal: Absence of Falls  Outcome: Ongoing (interventions implemented as appropriate)    Problem: Fluid Volume Excess (Adult,Obstetrics,Pediatric)  Goal: Stable Weight  Outcome: Ongoing (interventions implemented as appropriate)  Goal: Balanced Intake/Output  Outcome: Ongoing (interventions implemented as appropriate)

## 2017-08-14 NOTE — H&P
Name: Yudith Robertson ADMIT: 2017   : 1927  PCP: Deon Garzon MD    MRN: [id number] LOS: 0 days   AGE/SEX: 89 y.o. female  ROOM: 506/1     Chief Complaint   Patient presents with   • Shortness of Breath         Patient is a 89 y.o. woman whom we last saw in May 2017 for dysphagia.  She had esophageal candidiasis at that time.  She came to the emergency room yesterday because of shortness of breath.  She tells me that she is always short of breath but was more so yesterday.  She also felt some chest pressure and congestion.  She has orthopnea.  She has oxygen at home to use as needed and she has had to use it much more recently.  She's had chills but no fever.  She has a productive cough of clear, sticky mucus.  This is unchanged for her.  No wheezing.  No sore throat.  The left ear has been a little sore at times.  She has orthopnea and palpitations.  She can tell that her heart is skipping at times.  Chest discomfort is at the anterior chest, more on the left.  She has no radiation of pain.  At times she feels a choking sensation because she can't get her breath.  No ankle swelling.  She weighs daily.  Her weight has been stable.  No other associated symptoms or exacerbating or alleviating factors      Past Medical History:   Diagnosis Date   • Allergic rhinitis    • Anemia    • Atrial fibrillation    • Atypical chest pain    • CAD (coronary artery disease)    • Carotid artery stenosis     20-30% bilateral   • Cerebral artery occlusion with cerebral infarction    • CHF (congestive heart failure)    • Diastolic congestive heart failure     Echo 3/2012 with normal LVEF, mod LVH   • Difficulty swallowing    • Dizzy     mild   • Edema     In Calf   • Esophageal reflux     GERD, history of esophageal stenosis s/p dilation   • GERD (gastroesophageal reflux disease)    • H/O bone density study 2015    Osteopenia   • H/O mammogram 2014   • H/O thyroid nodule    • History of esophageal  stricture    • Hyperlipidemia    • Hypertension    • IBS (irritable bowel syndrome)    • Intestinal obstruction     small bowel obstruction   • LBBB (left bundle branch block)    • Mild aortic insufficiency    • Mild mitral insufficiency    • Mixed hyperlipidemia    • Moderate tricuspid insufficiency    • Mood disorder in conditions classified elsewhere    • Osteoarthrosis    • Osteopenia    • Polyarthropathy, multiple sites    • SOB (shortness of breath)    • Transient cerebral ischemia    • Valvular heart disease     Echo 1/17/14: EF 54%, elevated LAP, mild-mod MR, mod-severe TR, moderate PHTN (52mm Hg)       Past Surgical History:   Procedure Laterality Date   • APPENDECTOMY     • BACK SURGERY     • ENDOSCOPY N/A 5/22/2017    Procedure: ESOPHAGOGASTRODUODENOSCOPY WITH GASTON DILATATION 46FR, 48FR,52FR  AND ESOPHAGEAL BRUSHINGS;  Surgeon: Rebecca Becerra MD;  Location: [facility] ENDOSCOPY;  Service:    • EYE SURGERY Right 12/10/2008    Vitrectomy-Dr. Yogi Finnegan   • HYSTERECTOMY     • LAPAROTOMY SALPINGO OOPHORECTOMY N/A 10/29/2008    Dr. Lucas Mills   • UPPER GASTROINTESTINAL ENDOSCOPY N/A 07/24/2015    Dr. Jayce Abrams       Prescriptions Prior to Admission   Medication Sig Dispense Refill Last Dose   • acetaminophen (TYLENOL) 500 MG tablet Take 500 mg by mouth every 6 (six) hours as needed for mild pain (1-3).   Taking   • aspirin 81 MG EC tablet Take 81 mg by mouth daily.   Taking   • carvedilol (COREG) 12.5 MG tablet Take 1 tablet by mouth 2 (Two) Times a Day With Meals for 90 days. 180 tablet 0 Taking   • clotrimazole-betamethasone (LOTRISONE) 1-0.05 % cream    Taking   • fexofenadine (ALLEGRA) 180 MG tablet Take 1 tablet by mouth Daily. 30 tablet 3 Taking   • furosemide (LASIX) 40 MG tablet Take 1 tablet by mouth Daily. 90 tablet 3 Taking   • isosorbide mononitrate (IMDUR) 60 MG 24 hr tablet Take 1 tablet by mouth  twice a day 180 tablet 3 Taking   • lansoprazole (PREVACID) 15 MG capsule Take 1 capsule  by mouth Daily. 90 capsule 0 Taking   • LORazepam (ATIVAN) 0.5 MG tablet Take 1 tablet by mouth Every 8 (Eight) Hours As Needed for Anxiety. (Patient taking differently: Take 0.5 mg by mouth 2 (Two) Times a Day As Needed for Anxiety.) 90 tablet 0 Taking   • polyethylene glycol (MIRALAX) powder Take 17 g by mouth daily.   Taking   • potassium chloride (K-DUR) 10 MEQ CR tablet Take 1 tablet by mouth Daily. 30 tablet 2 Taking   • valsartan (DIOVAN) 320 MG tablet Take 1 tablet by mouth  daily 90 tablet 3 Taking   • warfarin (COUMADIN) 3 MG tablet Take 1 tablet by mouth Daily. (Patient taking differently: Take 3 mg by mouth Daily. Currently taking 3 mg daily except on Monday take 1.5mg) 90 tablet 0 Taking     Allergies:  Morphine    Social History   Substance Use Topics   • Smoking status: Former Smoker     Packs/day: 1.00     Years: 30.00     Quit date: 1/1/1986   • Smokeless tobacco: Never Used   • Alcohol use No      Comment: stopped drinking       Family History   Problem Relation Age of Onset   • Stroke Other    • Stroke Mother    • No Known Problems Daughter    • No Known Problems Daughter    • No Known Problems Daughter        Review of Systems   Constitutional: Positive for appetite change, chills and fatigue. Negative for activity change, diaphoresis, fever and unexpected weight change.        Appetite is not good.  She had lost weight when she was here a few months ago but weight is been stable since then.  She still doing cooking and light cleaning at home   HENT: Positive for congestion, ear pain, sinus pressure and trouble swallowing. Negative for mouth sores, sneezing and sore throat.         Occasionally pills get caught in her throat.  If she has some applesauce, that makes it go down   Eyes: Negative for visual disturbance.   Respiratory: Positive for cough, choking, chest tightness and shortness of breath. Negative for wheezing and stridor.    Cardiovascular: Positive for chest pain and palpitations.  Negative for leg swelling.   Gastrointestinal: Positive for abdominal pain, constipation and nausea. Negative for abdominal distention, blood in stool, diarrhea and vomiting.   Endocrine: Negative for polydipsia and polyphagia.   Genitourinary: Negative for dysuria.   Musculoskeletal: Positive for arthralgias and joint swelling. Negative for back pain and neck pain.        Right wrist has been swollen and very sore.  She is been soaking it which has helped a little bit.  Her knees are achy.  Back and neck are fine   Neurological: Positive for weakness and light-headedness. Negative for headaches.        Occasionally lightheaded   Hematological: Bruises/bleeds easily.   Psychiatric/Behavioral: Negative for agitation and behavioral problems.         Vital Signs  Temp:  [97.6 °F (36.4 °C)-98.7 °F (37.1 °C)] 98.7 °F (37.1 °C)  Heart Rate:  [] 94  Resp:  [19-28] 20  BP: (125-181)/() 162/88  SpO2:  [90 %-98 %] 94 %  on  Flow (L/min):  [2-4] 4;   O2 Device: nasal cannula  Body mass index is 26.95 kg/(m^2).    Physical Exam   Constitutional: She appears well-developed and well-nourished. No distress.   HENT:   Head: Normocephalic and atraumatic.   Right Ear: External ear normal.   Left Ear: External ear normal.   Nose: Nose normal.   Mouth/Throat: Oropharynx is clear and moist. No oropharyngeal exudate.   TMs are cloudy but without erythema.  External auditory meatus clear.  Dentition is fair.  Few teeth are missing.  Very prominent JVD even at 45°   Eyes: Conjunctivae and EOM are normal. Pupils are equal, round, and reactive to light. Right eye exhibits no discharge. Left eye exhibits no discharge. No scleral icterus.   Neck: Normal range of motion. Neck supple. JVD present. No tracheal deviation present. No thyromegaly present.   Cardiovascular: Exam reveals no gallop and no friction rub.    No murmur heard.  Irregular.  I don't hear murmur   Pulmonary/Chest: No respiratory distress. She has no wheezes. She  has rales.   Breath sounds are diminished at the lower thirds.  Few crackles left base.  No wheezing   Abdominal: Soft. Bowel sounds are normal. She exhibits mass. She exhibits no distension. There is tenderness.   Mass/fullness at the upper abdomen just left of the midline.  Tender.  I can't tell if it is the left lobe of the liver versus spleen versus other   Musculoskeletal: She exhibits deformity. She exhibits no edema or tenderness.   No edema at the ankles.  Right wrist is swollen and tender.  Knees are swollen with mild increase warmth.  Varicose veins at the lower extremities   Lymphadenopathy:     She has no cervical adenopathy.   Neurological: She is alert.   Sensation with plastic filament intact right distal lower extremity, slightly decreased left distal lower extremity   Skin: Skin is warm and dry. She is not diaphoretic.   Psychiatric: She has a normal mood and affect. Her behavior is normal.   Nursing note and vitals reviewed.      Results Review:   I reviewed the patient's new clinical results.    Lab Results   Component Value Date    GLUCOSE 184 (H) 08/14/2017    BUN 15 08/14/2017    CREATININE 0.84 08/14/2017    EGFRIFNONA 64 08/14/2017    EGFRIFAFRI  09/13/2016      Comment:      <15 Indicative of kidney failure.    BCR 17.9 08/14/2017    K 3.9 08/14/2017    CO2 27.5 08/14/2017    CALCIUM 9.8 (H) 08/14/2017    ALBUMIN 3.90 08/14/2017    LABIL2 1.3 08/14/2017    AST 19 08/14/2017    ALT 15 08/14/2017       Lab Results   Component Value Date    WBC 8.41 08/14/2017    HGB 10.2 (L) 08/14/2017    HCT 34.2 (L) 08/14/2017    MCV 90.5 08/14/2017     08/14/2017         Results from last 7 days  Lab Units 08/14/17  0446   INR  4.00*       Imaging Results (last 24 hours)     Procedure Component Value Units Date/Time    XR Chest 1 View [[id number]] Collected:  08/14/17 0230     Updated:  08/14/17 0234    Narrative:       PORTABLE CHEST X-RAY     CLINICAL HISTORY: Chest pain protocol     COMPARISON:  07/11/2017.     FINDINGS: Portable AP view of the chest was obtained with overlying  monitor leads in place. Lungs are under aerated. There are hazy  groundglass opacities likely related to edema. There are bilateral  pleural effusions. Both have increased slightly. Heart is enlarged.  There are vascular type calculi.       Impression:       Persistent mild CHF with slight increase in bilateral  effusions        This report was finalized on 8/14/2017 2:31 AM by Efren Allen MD.               Assessment/Plan   1.  Acute and chronic respiratory failure with hypoxia secondary to acute and chronic diastolic congestive heart failure and pulmonary hypertension.  She is requiring oxygen more frequently.  She has JVD and orthopnea.  IV Lasix as ordered.  Will ask her cardiologist to see.  Last echo in our system is August 2016.  If she hasn't had something more recent, will need a repeat.  She has known valvular heart disease and moderate pulmonary hypertension.  We'll check for any infectious component that may have exacerbated her failure.  No evidence of ischemia.  Possible rhythm issue.  She has chronic A. Fib.  Also has chronic allergies  2.  Tender mass just left of the upper midline.  Will check CT abdomen.  Not sure if this might be a prominent lobe of the liver or possibly the spleen or other (stool, hematoma, malignancy?)  3.  Chronic A. fib with elevated INR.  Coumadin on hold.  Follow INR  4.  Dysphagia with recent Candida of the esophagus and stricture dilatation May 2017.  Will ask speech therapy to evaluate  5.  Coronary artery disease with left bundle branch block  6.  Hypertension  7.  Allergic rhinitis  8.  TIA May 2017 at [facility].  9.  Hyperglycemia.  Will check A1c      I discussed the patients findings and my recommendations with patient and daughter at bedside.  Medical records reviewed including admission 5/17 at [facility] for TIA.  CODE STATUS confirmed: conditional.  No CPR, no intubation, no  cardioversion no pressors etc. Okay for IV fluids and antibiotics    Daniella Poole MD  [facility]  08/14/17  8:15 AM

## 2017-08-14 NOTE — PLAN OF CARE
Problem: Patient Care Overview (Adult)  Goal: Plan of Care Review  Outcome: Ongoing (interventions implemented as appropriate)    08/14/17 0517   Coping/Psychosocial Response Interventions   Plan Of Care Reviewed With patient;daughter   Patient Care Overview   Progress no change   Outcome Evaluation   Outcome Summary/Follow up Plan Pt admitted to floor for CHF. IV lasix ordered BID. Strict I and O. Pt in A-fib. Monitoing closely.        Goal: Adult Individualization and Mutuality  Outcome: Ongoing (interventions implemented as appropriate)  Goal: Discharge Needs Assessment  Outcome: Ongoing (interventions implemented as appropriate)    Problem: Fall Risk (Adult)  Goal: Identify Related Risk Factors and Signs and Symptoms  Outcome: Outcome(s) achieved Date Met:  08/14/17  Goal: Absence of Falls  Outcome: Ongoing (interventions implemented as appropriate)    Problem: Fluid Volume Excess (Adult,Obstetrics,Pediatric)  Goal: Identify Related Risk Factors and Signs and Symptoms  Outcome: Outcome(s) achieved Date Met:  08/14/17  Goal: Stable Weight  Outcome: Ongoing (interventions implemented as appropriate)  Goal: Balanced Intake/Output  Outcome: Ongoing (interventions implemented as appropriate)

## 2017-08-14 NOTE — THERAPY DISCHARGE NOTE
Acute Care - Speech Language Pathology   Swallow Eval/Discharge [facility]     Patient Name: Yudith Robertson  : 1927  MRN: [id number]  Today's Date: 2017               Admit Date: 2017    SPEECH-LANGUAGE PATHOLOGY EVALUATION - SWALLOW  Subjective: The patient was seen on this date for a Clinical Swallow evaluation.  Patient was alert and cooperative.    The patient's history is significant for CHF. She was evaluated in May of 2017 and found to have esophageal dysphagia. A barium swallow demonstrated no aspiration at that time. Family reports recent esophageal dilatation.   Objective: Textures given included thin liquid, puree consistency, mechanical soft consistency and regular consistency.  Assessment: Difficulties were noted with none of the above consistencies. Voicing was clear after the swallow, no coughing or throat clearing.  SLP Findings:  Patient presents with functional swallow, with esophageal component.   Recommendations: Diet Textures: thin liquid, regular consistency food.  Medications should be taken whole or crushed with puree.   Recommended Strategies: Reflux precautions, Upright for PO and small bites and sips. Oral care before breakfast, after all meals and PRN.  Other Recommended Evaluations:     Dysphagia therapy is not recommended. Rationale: Functional oropharyngeal swallow.  Visit Dx:    ICD-10-CM ICD-9-CM   1. Acute on chronic systolic congestive heart failure I50.23 428.23     428.0     Patient Active Problem List   Diagnosis   • Permanent atrial fibrillation   • Atypical chest pain   • Hypertension   • Diastolic CHF, chronic   • Tricuspid insufficiency   • Pulmonary hypertension   • Left bundle branch block   • Hyperlipidemia   • GERD (gastroesophageal reflux disease)   • Aspiration pneumonia of right lower lobe   • Acute on chronic diastolic congestive heart failure   • Acute on chronic respiratory failure with hypoxia   • Left upper quadrant abdominal mass   •  Oropharyngeal dysphagia   • Valvular heart disease     Past Medical History:   Diagnosis Date   • Allergic rhinitis    • Anemia    • Atrial fibrillation    • Atypical chest pain    • CAD (coronary artery disease)    • Carotid artery stenosis     20-30% bilateral   • Cerebral artery occlusion with cerebral infarction 1997   • CHF (congestive heart failure)    • Diastolic congestive heart failure     Echo 3/2012 with normal LVEF, mod LVH   • Difficulty swallowing    • Dizzy     mild   • Edema     In Calf   • Esophageal reflux     GERD, history of esophageal stenosis s/p dilation   • GERD (gastroesophageal reflux disease)    • H/O bone density study 06/16/2015    Osteopenia   • H/O mammogram 02/04/2014   • H/O thyroid nodule    • History of esophageal stricture    • Hyperlipidemia    • Hypertension    • IBS (irritable bowel syndrome)    • Intestinal obstruction     small bowel obstruction   • LBBB (left bundle branch block)    • Mild aortic insufficiency    • Mild mitral insufficiency    • Mixed hyperlipidemia    • Moderate tricuspid insufficiency    • Mood disorder in conditions classified elsewhere    • Osteoarthrosis    • Osteopenia    • Polyarthropathy, multiple sites    • SOB (shortness of breath)    • Transient cerebral ischemia    • Valvular heart disease     Echo 1/17/14: EF 54%, elevated LAP, mild-mod MR, mod-severe TR, moderate PHTN (52mm Hg)     Past Surgical History:   Procedure Laterality Date   • APPENDECTOMY     • BACK SURGERY     • ENDOSCOPY N/A 5/22/2017    Procedure: ESOPHAGOGASTRODUODENOSCOPY WITH GASTON DILATATION 46FR, 48FR,52FR  AND ESOPHAGEAL BRUSHINGS;  Surgeon: Rebecca Becerra MD;  Location: [facility] ENDOSCOPY;  Service:    • EYE SURGERY Right 12/10/2008    Vitrectomy-Dr. Yogi Finnegan   • HYSTERECTOMY     • LAPAROTOMY SALPINGO OOPHORECTOMY N/A 10/29/2008    Dr. Lucas Mills   • UPPER GASTROINTESTINAL ENDOSCOPY N/A 07/24/2015    Dr. Jayce Abrams          SWALLOW EVALUATION (last 72 hours)       Swallow Evaluation       08/14/17 1200                Rehab Evaluation    Document Type evaluation  -MT        Subjective Information no complaints  -MT        Patient Effort, Rehab Treatment good  -MT        General Information    Patient Profile Review yes  -MT        Current Diet Limitations regular solid;thin liquids  -MT        Precautions/Limitations, Vision WFL  -MT        Precautions/Limitations, Hearing WFL  -MT        Prior Level of Function- Communication functional in all spheres  -MT        Prior Level of Function- Swallowing no diet consistency restrictions;esophageal concerns  -MT        Plans/Goals Discussed With patient and family  -MT        Barriers to Rehab none identified  -MT        Clinical Impression    Patient's Goals For Discharge patient did not state  -MT        Family Goals For Discharge family did not state  -MT        SLP Swallowing Diagnosis esophageal dysfunction  -MT        Rehab Potential/Prognosis, Swallowing good, to achieve stated therapy goals  -MT        Criteria for Skilled Therapeutic Interventions Met no problems identified which require skilled intervention  -MT        FCM, Swallowing 7-->Level 7  -MT        Therapy Frequency evaluation only  -MT        SLP Diet Recommendation regular textures;thin liquids  -MT        Recommended Feeding/Eating Techniques --   reflux precautions  -MT        SLP Rec. for Method of Medication Administration meds whole in pudding/applesauce  -MT        Monitor For Signs Of Aspiration cough;gurgly voice  -MT        Anticipated Discharge Disposition home  -MT        Cognitive Assessment/Intervention    Current Cognitive/Communication Assessment functional  -MT        Oral Motor Structure and Function    Oral Motor Anatomy and Physiology patient demonstrates anatomy and physiology that is WNL  -MT        Dentition Assessment upper dentures/partial in place  -MT        Secretion Management WNL/WFL  -MT        Mucosal Quality moist, healthy    lip bleeding at corner, RN informed  -MT          User Key  (r) = Recorded By, (t) = Taken By, (c) = Cosigned By    Initials Name Effective Dates    MICHEL Rojas MS CCC-SLP 04/13/15 -         EDUCATION  The patient has been educated in the following areas:   Dysphagia (Swallowing Impairment).    SLP Recommendation and Plan  SLP Swallowing Diagnosis: esophageal dysfunction  SLP Diet Recommendation: regular textures, thin liquids  Recommended Feeding/Eating Techniques:  (reflux precautions)  SLP Rec. for Method of Medication Administration: meds whole in pudding/applesauce  Monitor For Signs Of Aspiration: cough, gurgly voice     Criteria for Skilled Therapeutic Interventions Met: no problems identified which require skilled intervention  Anticipated Discharge Disposition: home  Rehab Potential/Prognosis, Swallowing: good, to achieve stated therapy goals  Therapy Frequency: evaluation only             Plan of Care Review  Plan Of Care Reviewed With: patient, family  Progress: progress toward functional goals as expected  Outcome Summary/Follow up Plan: Clinical swallow eval reveals no signs/symptoms of oropharyngeal dysphagia. Patient has a history of esophageal dilatations per family. Esophageal dysphagia documented in previous encounters. Recommend regular diet with reflux precautions. Speech to sign off.           SLP Outcome Measures (last 72 hours)      SLP Outcome Measures       08/14/17 1300          SLP Outcome Measures    Outcome Measure Used? Adult NOMS  -MT      FCM Scores    FCM Chosen Swallowing  -MT      Swallowing FCM Score 7  -MT        User Key  (r) = Recorded By, (t) = Taken By, (c) = Cosigned By    Initials Name Effective Dates    MICHEL Rojas MS CCC-SLP 04/13/15 -            Time Calculation:         Time Calculation- SLP       08/14/17 1330          Time Calculation- SLP    SLP Start Time 1120  -MT      SLP Stop Time 1200  -MT      SLP Time Calculation (min) 40 min  -MT      SLP  Received On 08/14/17  -MT        User Key  (r) = Recorded By, (t) = Taken By, (c) = Cosigned By    Initials Name Provider Type    MICHEL Rojas MS CCC-SLP Speech and Language Pathologist          Therapy Charges for Today     Code Description Service Date Service Provider Modifiers Qty    [id number] HC ST EVAL ORAL PHARYNG SWALLOW 3 8/14/2017 MS BRIDGETTE Bradley GN 1               SLP Discharge Summary  Anticipated Discharge Disposition: home  Reason for Discharge: At baseline function  Outcomes Achieved: Able to achieve all goals within established timeline  Discharge Destination: Home    MS BRIDGETTE Bradley  8/14/2017

## 2017-08-14 NOTE — ED NOTES
Patient reports shortness of breath since this morning that has been worsening. She also complains of chest pressure.     Maria Chin RN  08/14/17 0209

## 2017-08-14 NOTE — ED PROVIDER NOTES
EMERGENCY DEPARTMENT ENCOUNTER    CHIEF COMPLAINT  Chief Complaint: Shortness of air   History given by: patient  History limited by: n/a  Room Number: 27/27  PMD: Deon Garzon MD      HPI:  Pt is a 89 y.o. female who presents complaining of SOA that has been ongoing throughout the day. Pt also complains of midsternal chest pressure and a cough. Pt denies fever, chills, N/V, or any other sx. Hx of CHF.     Duration:  1 day  Onset: gradual  Timing: constant   Quality: SOA  Intensity/Severity: moderate  Progression: worsening   Associated Symptoms: chest pressure, cough  Aggravating Factors: none  Alleviating Factors: none  Previous Episodes: hx of CHF  Treatment before arrival: none    PAST MEDICAL HISTORY  Active Ambulatory Problems     Diagnosis Date Noted   • Permanent atrial fibrillation 02/03/2016   • Atypical chest pain 02/03/2016   • Hypertension 02/03/2016   • Diastolic CHF, chronic 02/03/2016   • Tricuspid insufficiency 02/03/2016   • Pulmonary hypertension 02/03/2016   • Left bundle branch block 02/03/2016   • Hyperlipidemia    • GERD (gastroesophageal reflux disease)    • Aspiration pneumonia of right lower lobe 05/18/2017     Resolved Ambulatory Problems     Diagnosis Date Noted   • No Resolved Ambulatory Problems     Past Medical History:   Diagnosis Date   • Allergic rhinitis    • Anemia    • Atrial fibrillation    • Atypical chest pain    • CAD (coronary artery disease)    • Carotid artery stenosis    • Cerebral artery occlusion with cerebral infarction 1997   • CHF (congestive heart failure)    • Diastolic congestive heart failure    • Difficulty swallowing    • Dizzy    • Edema    • Esophageal reflux    • GERD (gastroesophageal reflux disease)    • H/O bone density study 06/16/2015   • H/O mammogram 02/04/2014   • H/O thyroid nodule    • History of esophageal stricture    • Hyperlipidemia    • Hypertension    • IBS (irritable bowel syndrome)    • Intestinal obstruction    • LBBB (left bundle  branch block)    • Mild aortic insufficiency    • Mild mitral insufficiency    • Mixed hyperlipidemia    • Moderate tricuspid insufficiency    • Mood disorder in conditions classified elsewhere    • Osteoarthrosis    • Osteopenia    • Polyarthropathy, multiple sites    • SOB (shortness of breath)    • Transient cerebral ischemia    • Valvular heart disease        PAST SURGICAL HISTORY  Past Surgical History:   Procedure Laterality Date   • APPENDECTOMY     • ENDOSCOPY N/A 5/22/2017    Procedure: ESOPHAGOGASTRODUODENOSCOPY WITH GASTON DILATATION 46FR, 48FR,52FR  AND ESOPHAGEAL BRUSHINGS;  Surgeon: Rebecca Becerra MD;  Location: [facility] ENDOSCOPY;  Service:    • EYE SURGERY Right 12/10/2008    Vitrectomy-Dr. Yogi Finnegan   • HYSTERECTOMY     • LAPAROTOMY SALPINGO OOPHORECTOMY N/A 10/29/2008    Dr. Lucas Mills   • UPPER GASTROINTESTINAL ENDOSCOPY N/A 07/24/2015    Dr. Jayce Abrams       FAMILY HISTORY  Family History   Problem Relation Age of Onset   • Stroke Other    • Stroke Mother    • No Known Problems Daughter    • No Known Problems Daughter    • No Known Problems Daughter        SOCIAL HISTORY  Social History     Social History   • Marital status:      Spouse name: N/A   • Number of children: N/A   • Years of education: N/A     Occupational History   • Not on file.     Social History Main Topics   • Smoking status: Former Smoker     Packs/day: 1.00     Years: 30.00     Quit date: 1/1/1986   • Smokeless tobacco: Never Used   • Alcohol use No      Comment: stopped drinking   • Drug use: No   • Sexual activity: No     Other Topics Concern   • Not on file     Social History Narrative       ALLERGIES  Morphine    REVIEW OF SYSTEMS  Review of Systems   Constitutional: Negative for chills and fever.   HENT: Negative for congestion and sore throat.    Eyes: Negative.    Respiratory: Positive for shortness of breath. Negative for cough.    Cardiovascular: Positive for chest pain (pressure). Negative for leg  swelling.   Gastrointestinal: Negative for abdominal pain, diarrhea and vomiting.   Genitourinary: Negative for difficulty urinating and dysuria.   Musculoskeletal: Negative for back pain and neck pain.   Skin: Negative for rash and wound.   Allergic/Immunologic: Negative.    Neurological: Negative for dizziness, weakness, numbness and headaches.   Psychiatric/Behavioral: Negative.    All other systems reviewed and are negative.      PHYSICAL EXAM  ED Triage Vitals   Temp Heart Rate Resp BP SpO2   08/14/17 0143 08/14/17 0143 08/14/17 0143 08/14/17 0143 08/14/17 0143   97.6 °F (36.4 °C) 115 28 181/101 90 %      Temp src Heart Rate Source Patient Position BP Location FiO2 (%)   -- 08/14/17 0143 08/14/17 0143 08/14/17 0143 --    Monitor Sitting Right arm        Physical Exam   Constitutional: She is oriented to person, place, and time and well-developed, well-nourished, and in no distress. No distress.   HENT:   Head: Normocephalic and atraumatic.   Eyes: EOM are normal. Pupils are equal, round, and reactive to light.   Neck: Normal range of motion. Neck supple.   Cardiovascular: Normal rate, regular rhythm and normal heart sounds.    Pulmonary/Chest: Effort normal. No respiratory distress. She has rhonchi (mild).   Abdominal: Soft. There is no tenderness. There is no rebound and no guarding.   Musculoskeletal: Normal range of motion. She exhibits no edema.   Neurological: She is alert and oriented to person, place, and time.   Skin: Skin is warm and dry. No rash noted.   Psychiatric: Mood and affect normal.   Nursing note and vitals reviewed.      LAB RESULTS  Lab Results (last 24 hours)     Procedure Component Value Units Date/Time    CBC & Differential [[id number]] Collected:  08/14/17 0159    Specimen:  Blood Updated:  08/14/17 0214    Narrative:       The following orders were created for panel order CBC & Differential.  Procedure                               Abnormality         Status                      ---------                               -----------         ------                     CBC Auto Differential[[id number]]        Abnormal            Final result                 Please view results for these tests on the individual orders.    Comprehensive Metabolic Panel [[id number]]  (Abnormal) Collected:  08/14/17 0159    Specimen:  Blood Updated:  08/14/17 0238     Glucose 184 (H) mg/dL      BUN 15 mg/dL      Creatinine 0.84 mg/dL      Sodium 136 mmol/L      Potassium 3.9 mmol/L      Chloride 94 (L) mmol/L      CO2 27.5 mmol/L      Calcium 9.8 (H) mg/dL      Total Protein 6.9 g/dL      Albumin 3.90 g/dL      ALT (SGPT) 15 U/L      AST (SGOT) 19 U/L      Alkaline Phosphatase 76 U/L      Total Bilirubin 0.5 mg/dL      eGFR Non African Amer 64 mL/min/1.73      Globulin 3.0 gm/dL      A/G Ratio 1.3 g/dL      BUN/Creatinine Ratio 17.9     Anion Gap 14.5 mmol/L     Narrative:       The MDRD GFR formula is only valid for adults with stable renal function between ages 18 and 70.    Troponin [[id number]]  (Normal) Collected:  08/14/17 0159    Specimen:  Blood Updated:  08/14/17 0238     Troponin T <0.010 ng/mL     Narrative:       Troponin T Reference Ranges:  Less than 0.03 ng/mL:    Negative for AMI  0.03 to 0.09 ng/mL:      Indeterminant for AMI  Greater than 0.09 ng/mL: Positive for AMI    Protime-INR [[id number]]  (Abnormal) Collected:  08/14/17 0159    Specimen:  Blood Updated:  08/14/17 0231     Protime 38.2 (H) Seconds      INR 4.05 (C)    CBC Auto Differential [[id number]]  (Abnormal) Collected:  08/14/17 0159    Specimen:  Blood Updated:  08/14/17 0214     WBC 8.41 10*3/mm3      RBC 3.78 (L) 10*6/mm3      Hemoglobin 10.2 (L) g/dL      Hematocrit 34.2 (L) %      MCV 90.5 fL      MCH 27.0 pg      MCHC 29.8 (L) g/dL      RDW 17.4 (H) %      RDW-SD 57.3 (H) fl      MPV 9.7 fL      Platelets 361 10*3/mm3      Neutrophil % 76.8 (H) %      Lymphocyte % 13.6 (L) %      Monocyte % 8.3 %      Eosinophil % 0.7 %      Basophil  % 0.2 %      Immature Grans % 0.4 %      Neutrophils, Absolute 6.46 10*3/mm3      Lymphocytes, Absolute 1.14 10*3/mm3      Monocytes, Absolute 0.70 10*3/mm3      Eosinophils, Absolute 0.06 10*3/mm3      Basophils, Absolute 0.02 10*3/mm3      Immature Grans, Absolute 0.03 10*3/mm3     BNP [[id number]]  (Abnormal) Collected:  08/14/17 0159    Specimen:  Blood Updated:  08/14/17 0237     proBNP 58368.0 (H) pg/mL     Narrative:       Among patients with dyspnea, NT-proBNP is highly sensitive for the detection of acute congestive heart failure. In addition NT-proBNP of <300 pg/ml effectively rules out acute congestive heart failure with 99% negative predictive value.          I ordered the above labs and reviewed the results    RADIOLOGY  XR Chest 1 View   Final Result   Persistent mild CHF with slight increase in bilateral   effusions           This report was finalized on 8/14/2017 2:31 AM by Efren Allen MD.               I ordered the above noted radiological studies. Interpreted by radiologist. Reviewed by me in PACS.       PROCEDURES  Procedures    EKG           EKG time: 01;58  Rhythm/Rate: afib 112  P waves and MO: absent   QRS, axis: IVCD   ST and T waves: nml     Interpreted Contemporaneously by me, independently viewed  Improved compared to prior 5/17/17    PROGRESS AND CONSULTS  ED Course     01:51  Pt/inr, troponin, CMP, CBC, BNP, CXR, and EKG ordered for further evaluation. ASA and NTG ordered.     02:41  Call placed to [facility] for admission. Lasix ordered to treat CHF.     02:42  BP- (!) 181/101 HR- 77 Temp- 97.6 °F (36.4 °C) O2 sat- 98%  Rechecked the patient who is in NAD and is resting comfortably. Advised pt and family that the CXR shows CHF, and BNP is elevated. Plan for admission. Pt understands and agrees with the plan, all questions answered.    02:54  Discussed pt's case with Dr Long ([facility]), who agrees to admit the pt.     MEDICAL DECISION MAKING  Results were reviewed/discussed with the patient  and they were also made aware of online access. Pt also made aware that some labs, such as cultures, will not be resulted during ER visit and follow up with PMD is necessary.     MDM  Number of Diagnoses or Management Options  Acute on chronic systolic congestive heart failure:      Amount and/or Complexity of Data Reviewed  Clinical lab tests: ordered and reviewed (BNP is 29060.0)  Tests in the radiology section of CPT®: ordered and reviewed (CXR shows Persistent mild CHF with slight increase in bilateral effusions.  Persistent mild CHF with slight increase in bilateral effusions   )  Tests in the medicine section of CPT®: ordered and reviewed (See EKG procedure note.   )  Decide to obtain previous medical records or to obtain history from someone other than the patient: yes  Discuss the patient with other providers: yes (Dr Long, [facility])  Independent visualization of images, tracings, or specimens: yes    Patient Progress  Patient progress: stable         DIAGNOSIS  Final diagnoses:   Acute on chronic systolic congestive heart failure       DISPOSITION  ADMISSION    Discussed treatment plan and reason for admission with pt/family and admitting physician.  Pt/family voiced understanding of the plan for admission for further testing/treatment as needed.       Latest Documented Vital Signs:  As of 2:54 AM  BP- 160/67 HR- 95 Temp- 97.6 °F (36.4 °C) O2 sat- 96%    --  Documentation assistance provided by jeromy Ghosh for Dr Padilla.  Information recorded by the jeromy was done at my direction and has been verified and validated by me.        Vandana Ghosh  08/14/17 0254       Ger Padilla MD  08/14/17 0622

## 2017-08-14 NOTE — CONSULTS
"Adult Nutrition  Assessment/PES    Patient Name:  Yudith Robertson  YOB: 1927  MRN: [id number]  Admit Date:  8/14/2017    Assessment Date:  8/14/2017        Reason for Assessment       08/14/17 1449    Reason for Assessment    Reason For Assessment/Visit admission assessment;identified at risk by screening criteria    Identified At Risk By Screening Criteria MST SCORE 2+;unintentional loss of 10 lbs or more in the past 2 mos    Diagnosis Diagnosis    Cardiac CAD;CHF;HTN;PAF    Gastrointestinal GERD/Reflux;IBS    Ortho Osteoarthritis    Factors Affecting Nutrition --   adm in May 2017 - dysphagia - esophageal candidiasis              Nutrition/Diet History       08/14/17 1451    Nutrition/Diet History    Typical Food/Fluid Intake patient at test, spoke with family in room - weight stable and eats well at home, 3 meals/day            Anthropometrics       08/14/17 1451    Anthropometrics    Height 152.4 cm (60\")    Weight 62.6 kg (138 lb 0.1 oz)    RD Documented Weight on Admission 62.6 kg (138 lb 0.1 oz)    Anthropometrics (Special Considerations)    RD Calculated BMI (kg/m2) 26.95    Ideal Body Weight (IBW)    Ideal Body Weight (IBW), Female 46.26    % Ideal Body Weight 135.61    Usual Body Weight (UBW)    Weight Loss Time Frame weight remains stable per family, 138# currently, weighs herself daily    Body Mass Index (BMI)    BMI (kg/m2) 27.01    BMI Grade 25 - 29.9 - overweight            Labs/Tests/Procedures/Meds       08/14/17 1452    Labs/Tests/Procedures/Meds    Diagnostic Test/Procedure Review reviewed, pertinent    Labs/Tests Review Reviewed;Glucose;Cl-;Hgb Hct    Procedure Review Other (comment)   out of room, at EKG per family    Medication Review Reviewed, pertinent;Diuretic;Antacid;Laxative   KCl    Significant Vitals reviewed, pertinent            Physical Findings       08/14/17 1453    Physical Findings/Assessment    Additional Documentation Physical Appearance (Group)   not in room, family " at bedside    Physical Appearance    Overall Physical Appearance overweight    Skin --   B=20, intact              Nutrition Prescription Ordered       08/14/17 1454    Nutrition Prescription PO    Current PO Diet Regular    Common Modifiers Low Sodium;Cardiac    Low Sodium Details 2,000 mg Sodium            Evaluation of Received Nutrient/Fluid Intake       08/14/17 1454    PO Evaluation    Number of Meals 1    % PO Intake 75              Problem/Interventions:        Problem 1       08/14/17 1454    Nutrition Diagnoses Problem 1    Problem 1 Nutrition Appropriate for Condition at this Time    Etiology (related to) Factors Affecting Nutrition    Appetite Fair;Good    Signs/Symptoms (evidenced by) Report/Observation    Reported/Observed By Family                    Intervention Goal       08/14/17 1455    Intervention Goal    General Maintain nutrition;Disease management/therapy;Reduce/improve symptoms;Meet nutritional needs for age/condition    PO Maintain intake    Weight No significant weight loss            Nutrition Intervention       08/14/17 1455    Nutrition Intervention    RD/Tech Action Follow Tx progress;Care plan reviewd              Education/Evaluation       08/14/17 1455    Education    Education Will Instruct as appropriate    Monitor/Evaluation    Monitor Per protocol;PO intake;Weight    Education Follow-up Reinforce PRN        Comments:  Patient not in room at time of visit, per family patient at EKG.  Family in room reports patient's weight has remained stable for quite some time now, she weighs herself daily.  Family reports patient typically eats well, eats 3 meals/day.  75% x 1 meal since admission.  Will continue to follow.    Electronically signed by:  Bobbi Horvath RD  08/14/17 2:56 PM

## 2017-08-14 NOTE — PLAN OF CARE
Problem: Patient Care Overview (Adult)  Goal: Plan of Care Review    08/14/17 7907   Coping/Psychosocial Response Interventions   Plan Of Care Reviewed With patient;family   Patient Care Overview   Progress progress toward functional goals as expected   Outcome Evaluation   Outcome Summary/Follow up Plan Clinical swallow eval reveals no signs/symptoms of oropharyngeal dysphagia. Patient has a history of esophageal dilatations per family. Esophageal dysphagia documented in previous encounters. Recommend regular diet with reflux precautions. Speech to sign off.

## 2017-08-14 NOTE — ED TRIAGE NOTES
"Pt presents to ED with c/o being \"short of breath\"  States it has been going on all day Sunday. \"feels like somethings just cutting my air off\".  Pt has hx of CHF.  Pt states she does wear O2 at night as needed.  "

## 2017-08-15 PROBLEM — I50.43 ACUTE ON CHRONIC COMBINED SYSTOLIC AND DIASTOLIC CONGESTIVE HEART FAILURE (HCC): Status: ACTIVE | Noted: 2017-08-14

## 2017-08-15 LAB
ANION GAP SERPL CALCULATED.3IONS-SCNC: 12.6 MMOL/L
BUN BLD-MCNC: 14 MG/DL (ref 8–23)
BUN/CREAT SERPL: 17.1 (ref 7–25)
CALCIUM SPEC-SCNC: 9 MG/DL (ref 8.2–9.6)
CHLORIDE SERPL-SCNC: 90 MMOL/L (ref 98–107)
CHOLEST SERPL-MCNC: 141 MG/DL (ref 0–200)
CO2 SERPL-SCNC: 31.4 MMOL/L (ref 22–29)
CREAT BLD-MCNC: 0.82 MG/DL (ref 0.57–1)
DEPRECATED RDW RBC AUTO: 55.5 FL (ref 37–54)
ERYTHROCYTE [DISTWIDTH] IN BLOOD BY AUTOMATED COUNT: 17.3 % (ref 11.7–13)
GFR SERPL CREATININE-BSD FRML MDRD: 65 ML/MIN/1.73
GLUCOSE BLD-MCNC: 94 MG/DL (ref 65–99)
HBA1C MFR BLD: 6.39 % (ref 4.8–5.6)
HCT VFR BLD AUTO: 32.2 % (ref 35.6–45.5)
HDLC SERPL-MCNC: 42 MG/DL (ref 40–60)
HGB BLD-MCNC: 9.9 G/DL (ref 11.9–15.5)
INR PPP: 3.59 (ref 0.9–1.1)
LDLC SERPL CALC-MCNC: 82 MG/DL (ref 0–100)
LDLC/HDLC SERPL: 1.96 {RATIO}
MCH RBC QN AUTO: 27 PG (ref 26.9–32)
MCHC RBC AUTO-ENTMCNC: 30.7 G/DL (ref 32.4–36.3)
MCV RBC AUTO: 87.7 FL (ref 80.5–98.2)
PLATELET # BLD AUTO: 321 10*3/MM3 (ref 140–500)
PMV BLD AUTO: 10.1 FL (ref 6–12)
POTASSIUM BLD-SCNC: 3.1 MMOL/L (ref 3.5–5.2)
PROTHROMBIN TIME: 34.7 SECONDS (ref 11.7–14.2)
RBC # BLD AUTO: 3.67 10*6/MM3 (ref 3.9–5.2)
SODIUM BLD-SCNC: 134 MMOL/L (ref 136–145)
TRIGL SERPL-MCNC: 84 MG/DL (ref 0–150)
TROPONIN T SERPL-MCNC: <0.01 NG/ML (ref 0–0.03)
VLDLC SERPL-MCNC: 16.8 MG/DL (ref 5–40)
WBC NRBC COR # BLD: 5.81 10*3/MM3 (ref 4.5–10.7)

## 2017-08-15 PROCEDURE — 99232 SBSQ HOSP IP/OBS MODERATE 35: CPT | Performed by: INTERNAL MEDICINE

## 2017-08-15 PROCEDURE — 85610 PROTHROMBIN TIME: CPT | Performed by: INTERNAL MEDICINE

## 2017-08-15 PROCEDURE — 94799 UNLISTED PULMONARY SVC/PX: CPT

## 2017-08-15 PROCEDURE — G8980 MOBILITY D/C STATUS: HCPCS

## 2017-08-15 PROCEDURE — 25010000002 FUROSEMIDE PER 20 MG: Performed by: INTERNAL MEDICINE

## 2017-08-15 PROCEDURE — 85027 COMPLETE CBC AUTOMATED: CPT | Performed by: INTERNAL MEDICINE

## 2017-08-15 PROCEDURE — 97161 PT EVAL LOW COMPLEX 20 MIN: CPT

## 2017-08-15 PROCEDURE — 80048 BASIC METABOLIC PNL TOTAL CA: CPT | Performed by: INTERNAL MEDICINE

## 2017-08-15 PROCEDURE — G8978 MOBILITY CURRENT STATUS: HCPCS

## 2017-08-15 PROCEDURE — 93010 ELECTROCARDIOGRAM REPORT: CPT | Performed by: INTERNAL MEDICINE

## 2017-08-15 PROCEDURE — G8979 MOBILITY GOAL STATUS: HCPCS

## 2017-08-15 PROCEDURE — 80061 LIPID PANEL: CPT | Performed by: INTERNAL MEDICINE

## 2017-08-15 PROCEDURE — 83036 HEMOGLOBIN GLYCOSYLATED A1C: CPT | Performed by: INTERNAL MEDICINE

## 2017-08-15 PROCEDURE — 93005 ELECTROCARDIOGRAM TRACING: CPT | Performed by: INTERNAL MEDICINE

## 2017-08-15 PROCEDURE — 84484 ASSAY OF TROPONIN QUANT: CPT | Performed by: INTERNAL MEDICINE

## 2017-08-15 PROCEDURE — 97110 THERAPEUTIC EXERCISES: CPT

## 2017-08-15 RX ORDER — POTASSIUM CHLORIDE 750 MG/1
40 CAPSULE, EXTENDED RELEASE ORAL ONCE
Status: COMPLETED | OUTPATIENT
Start: 2017-08-15 | End: 2017-08-15

## 2017-08-15 RX ORDER — BISACODYL 10 MG
10 SUPPOSITORY, RECTAL RECTAL DAILY PRN
Status: DISCONTINUED | OUTPATIENT
Start: 2017-08-15 | End: 2017-08-18 | Stop reason: HOSPADM

## 2017-08-15 RX ORDER — DOCUSATE SODIUM 100 MG/1
100 CAPSULE, LIQUID FILLED ORAL EVERY 12 HOURS SCHEDULED
Status: DISCONTINUED | OUTPATIENT
Start: 2017-08-15 | End: 2017-08-18 | Stop reason: HOSPADM

## 2017-08-15 RX ORDER — ISOSORBIDE MONONITRATE 60 MG/1
60 TABLET, EXTENDED RELEASE ORAL 2 TIMES DAILY
Status: DISCONTINUED | OUTPATIENT
Start: 2017-08-16 | End: 2017-08-18 | Stop reason: HOSPADM

## 2017-08-15 RX ADMIN — DOCUSATE SODIUM 100 MG: 100 CAPSULE, LIQUID FILLED ORAL at 19:54

## 2017-08-15 RX ADMIN — CARVEDILOL 12.5 MG: 12.5 TABLET, FILM COATED ORAL at 18:19

## 2017-08-15 RX ADMIN — FUROSEMIDE 40 MG: 10 INJECTION, SOLUTION INTRAMUSCULAR; INTRAVENOUS at 02:58

## 2017-08-15 RX ADMIN — CETIRIZINE HYDROCHLORIDE 10 MG: 10 TABLET, FILM COATED ORAL at 08:23

## 2017-08-15 RX ADMIN — POTASSIUM CHLORIDE 10 MEQ: 750 CAPSULE, EXTENDED RELEASE ORAL at 08:23

## 2017-08-15 RX ADMIN — VALSARTAN 320 MG: 320 TABLET, FILM COATED ORAL at 08:23

## 2017-08-15 RX ADMIN — ASPIRIN 81 MG: 81 TABLET ORAL at 08:23

## 2017-08-15 RX ADMIN — POLYETHYLENE GLYCOL 3350 17 G: 17 POWDER, FOR SOLUTION ORAL at 08:58

## 2017-08-15 RX ADMIN — ISOSORBIDE MONONITRATE 60 MG: 60 TABLET, EXTENDED RELEASE ORAL at 08:23

## 2017-08-15 RX ADMIN — PANTOPRAZOLE SODIUM 40 MG: 40 TABLET, DELAYED RELEASE ORAL at 06:19

## 2017-08-15 RX ADMIN — LORAZEPAM 0.5 MG: 0.5 TABLET ORAL at 22:18

## 2017-08-15 RX ADMIN — FUROSEMIDE 40 MG: 10 INJECTION, SOLUTION INTRAMUSCULAR; INTRAVENOUS at 14:28

## 2017-08-15 RX ADMIN — BISACODYL 10 MG: 10 SUPPOSITORY RECTAL at 18:28

## 2017-08-15 RX ADMIN — POTASSIUM CHLORIDE 40 MEQ: 750 CAPSULE, EXTENDED RELEASE ORAL at 12:39

## 2017-08-15 RX ADMIN — CARVEDILOL 12.5 MG: 12.5 TABLET, FILM COATED ORAL at 08:23

## 2017-08-15 NOTE — PROGRESS NOTES
"Patient Name: Yudith Robertson  :1927  89 y.o.      Patient Care Team:  Deon Garzon MD as PCP - General (Internal Medicine)  Deon Garzon MD as PCP - Claims Attributed  Latrice Riley RN as Care Coordinator (Population Health)    Interval History:   Following for hypertension and atrial fibrillation    Subjective:  Blood pressure is better controlled.  Diuresed.     Objective   Vital Signs  Temp:  [98 °F (36.7 °C)-99.2 °F (37.3 °C)] 98 °F (36.7 °C)  Heart Rate:  [73-93] 93  Resp:  [16-18] 18  BP: (135-156)/(62-99) 156/92    Intake/Output Summary (Last 24 hours) at 08/15/17 0922  Last data filed at 08/15/17 0741   Gross per 24 hour   Intake              220 ml   Output             2300 ml   Net            -2080 ml     Flowsheet Rows         First Filed Value    Admission Height  60\" (152.4 cm) Documented at 2017 0143    Admission Weight  138 lb (62.6 kg) Documented at 2017 0143          Physical Exam:   General Appearance:    Alert, cooperative, in no acute distress   Lungs:     Clear to auscultation.  Normal respiratory effort and rate.      Heart:   Irregularly irregular.     Chest Wall:    No abnormalities observed   Abdomen:     Soft, nontender, positive bowel sounds.     Extremities:   no cyanosis, clubbing or edema.  No marked joint deformities.  Adequate musculoskeletal strength.       Results Review:      Results from last 7 days  Lab Units 08/15/17  0646   SODIUM mmol/L 134*   POTASSIUM mmol/L 3.1*   CHLORIDE mmol/L 90*   CO2 mmol/L 31.4*   BUN mg/dL 14   CREATININE mg/dL 0.82   GLUCOSE mg/dL 94   CALCIUM mg/dL 9.0       Results from last 7 days  Lab Units 08/15/17  0646 17  0446 17  0159   TROPONIN T ng/mL <0.010 <0.010 <0.010       Results from last 7 days  Lab Units 08/15/17  0646   WBC 10*3/mm3 5.81   HEMOGLOBIN g/dL 9.9*   HEMATOCRIT % 32.2*   PLATELETS 10*3/mm3 321       Results from last 7 days  Lab Units 08/15/17  0646 17  0446 17  0159   INR  3.59* " 4.00* 4.05*       Results from last 7 days  Lab Units 08/15/17  0646   CHOLESTEROL mg/dL 141           Results from last 7 days  Lab Units 08/15/17  0646   CHOLESTEROL mg/dL 141   TRIGLYCERIDES mg/dL 84   HDL CHOL mg/dL 42         Medication Review:     aspirin 81 mg Oral Daily   carvedilol 12.5 mg Oral BID With Meals   cetirizine 10 mg Oral Daily   furosemide 40 mg Intravenous Q12H   isosorbide mononitrate 60 mg Oral Q24H   pantoprazole 40 mg Oral QAM   polyethylene glycol 17 g Oral Daily   potassium chloride 10 mEq Oral Daily   valsartan 320 mg Oral Q24H             Assessment/Plan     1.  Acute systolic congestive heart failure.  Echocardiogram on August 14 showed an ejection fraction of 27%.  This is a marked decline compared to her normal ejection fraction in August 2016.  She is on carvedilol and valsartan at home.  She is currently receiving IV Lasix.  2.  Permanent atrial fibrillation.  She is on anticoagulation with warfarin.  Her INR was supratherapeutic.  Warfarin has been on hold and her INR is declining.  3.  Mild mitral regurgitation.  4.  Mild to moderate tricuspid regurgitation with severe pulmonary hypertension  5.  History of carotid stenosis with 30-40% bilateral plaque  6.  GERD with esophageal stenosis  7.  Hyperlipidemia  8.  Left bundle branch block  9.  History of stroke  10.  Hypertension  11.  Nonobstructive coronary artery disease  12.  Hypokalemia    She has had a significant decline in her LV function during the last year.  She is on valsartan and carvedilol at home.  I am going to increase the carvedilol.  I think we should continue with IV diuretics once her potassium is replaced.  The question is going to be if we do an ischemic evaluation and if so, would that be repeat heart catheterization or stress test    Felicitas Brantley MD, [facility]  08/15/17  9:22 AM

## 2017-08-15 NOTE — PLAN OF CARE
Problem: Patient Care Overview (Adult)  Goal: Plan of Care Review  Outcome: Ongoing (interventions implemented as appropriate)    08/15/17 0531   Coping/Psychosocial Response Interventions   Plan Of Care Reviewed With patient   Patient Care Overview   Progress improving   Outcome Evaluation   Outcome Summary/Follow up Plan Vitals stable. Pt remains on 4L nasal cannula. No c/o pain. No falls. IV bumex administered as ordered. Pt ambulated 100 feet with minimal assist of 1 and gait belt. Pt reports and improvement of SOA. Monitoring closely.        Goal: Adult Individualization and Mutuality  Outcome: Ongoing (interventions implemented as appropriate)  Goal: Discharge Needs Assessment  Outcome: Ongoing (interventions implemented as appropriate)    Problem: Fall Risk (Adult)  Goal: Absence of Falls  Outcome: Ongoing (interventions implemented as appropriate)    Problem: Fluid Volume Excess (Adult,Obstetrics,Pediatric)  Goal: Stable Weight  Outcome: Ongoing (interventions implemented as appropriate)  Goal: Balanced Intake/Output  Outcome: Ongoing (interventions implemented as appropriate)

## 2017-08-15 NOTE — PLAN OF CARE
Problem: Patient Care Overview (Adult)  Goal: Plan of Care Review  Outcome: Ongoing (interventions implemented as appropriate)    08/15/17 0531 08/15/17 1523 08/15/17 1801   Coping/Psychosocial Response Interventions   Plan Of Care Reviewed With --  patient --    Patient Care Overview   Progress improving --  --    Outcome Evaluation   Outcome Summary/Follow up Plan --  --  No falls, no pain, VSS. IV lasix continued. Potassium 3.1 treated. Will continue to monitor.       Goal: Adult Individualization and Mutuality  Outcome: Ongoing (interventions implemented as appropriate)  Goal: Discharge Needs Assessment  Outcome: Ongoing (interventions implemented as appropriate)    08/15/17 0808 08/15/17 1519   Discharge Needs Assessment   Discharge Planning Comments S/W pt and her dtr, June at bedside. Facesheet info confirmed. Pt lives in a single story house with her . She ambulates with a walker or cane, and also has a w/c, grab bars in the bathrom, shower chair, and an O2 concentrator thru [employer]. Her family provides transportation as needed. She has had [facility] earlier this year and will likely want them again. Referral to [facility]. No hx of SNF.  --    Living Environment   Transportation Available family or friend will provide --    Self-Care   Equipment Currently Used at Home --  walker, rolling         Problem: Fall Risk (Adult)  Goal: Absence of Falls  Outcome: Ongoing (interventions implemented as appropriate)    08/15/17 1801   Fall Risk (Adult)   Absence of Falls making progress toward outcome         Problem: Fluid Volume Excess (Adult,Obstetrics,Pediatric)  Goal: Stable Weight  Outcome: Ongoing (interventions implemented as appropriate)    08/15/17 1801   Fluid Volume Excess (Adult,Obstetrics,Pediatric)   Stable Weight making progress toward outcome       Goal: Balanced Intake/Output  Outcome: Ongoing (interventions implemented as appropriate)    08/15/17 1801   Fluid Volume Excess (Adult,Obstetrics,Pediatric)    Balanced Intake/Output making progress toward outcome

## 2017-08-15 NOTE — PROGRESS NOTES
"     LOS: 1 day   Primary Care Physician: Deon Garzon MD     Subjective   Feels better than yesterday.  Says she takes Imdur twice daily at home not once daily  Constipated.    Vital Signs  Body mass index is 26.21 kg/(m^2).  Temp:  [98 °F (36.7 °C)-99.2 °F (37.3 °C)] 98.3 °F (36.8 °C)  Heart Rate:  [73-94] 94  Resp:  [16-18] 18  BP: (135-156)/(62-92) 145/78      Objective:  General Appearance:  In no acute distress.    Vital signs: (most recent): Blood pressure 145/78, pulse 94, temperature 98.3 °F (36.8 °C), temperature source Oral, resp. rate 18, height 60\" (152.4 cm), weight 134 lb 3.2 oz (60.9 kg), SpO2 94 %, not currently breastfeeding.    HEENT: (JVD at at least 45°.  Neck supple.  No lymphadenopathy.  Trachea midline)    Lungs:  There are rales and decreased breath sounds.  No wheezes.  (Crackles left base)  Heart: Normal rate.  Irregular rhythm.  No murmur.   Abdomen: Abdomen is soft and non-distended.  Bowel sounds are normal.   There is left upper quadrant tenderness.  There is a mass. There is no splenomegaly. There is no hepatomegaly. (Mildly tender mass around and just left of upper midline).   Extremities: There is no dependent edema.    Neurological: Patient is alert.          Results Review:    I reviewed the patient's new clinical results.      Results from last 7 days  Lab Units 08/15/17  0646 08/14/17  0159   WBC 10*3/mm3 5.81 8.41   HEMOGLOBIN g/dL 9.9* 10.2*   PLATELETS 10*3/mm3 321 361       Results from last 7 days  Lab Units 08/15/17  0646 08/14/17  0159   SODIUM mmol/L 134* 136   POTASSIUM mmol/L 3.1* 3.9   CHLORIDE mmol/L 90* 94*   CO2 mmol/L 31.4* 27.5   BUN mg/dL 14 15   CREATININE mg/dL 0.82 0.84   CALCIUM mg/dL 9.0 9.8*   GLUCOSE mg/dL 94 184*       Results from last 7 days  Lab Units 08/15/17  0646   INR  3.59*     Hemoglobin A1C:  Lab Results   Component Value Date    HGBA1C 6.39 (H) 08/15/2017       Glucose Range:No results found for: POCGLU    No results found for: " [id number]    Lab Results   Component Value Date    TSH 3.730 03/13/2017       Assessment & Plan      Medication Review: Yes    Active Hospital Problems (** Indicates Principal Problem)    Diagnosis Date Noted   • **Acute on chronic respiratory failure with hypoxia [J96.21] 08/14/2017   • Acute on chronic combined systolic and diastolic congestive heart failure [I50.43] 08/14/2017   • Left upper quadrant abdominal mass [R19.02] 08/14/2017   • Oropharyngeal dysphagia [R13.12] 08/14/2017   • Valvular heart disease [I38] 08/14/2017   • Hypertension [I10] 02/03/2016   • Left bundle branch block [I44.7] 02/03/2016   • Permanent atrial fibrillation [I48.2] 02/03/2016   • Pulmonary hypertension [I27.2] 02/03/2016      Resolved Hospital Problems    Diagnosis Date Noted Date Resolved   No resolved problems to display.       Assessment/Plan  1.  Acute and chronic respiratory failure with hypoxia secondary to acute and chronic diastolic and systolic congestive heart failure.  Ejection fraction significantly decreased from last echocardiogram.  Possible ischemic workup per cardiology- stress test versus catheter.  Patient says she prefers a stress test.  Continue IV Lasix.  Continue Imdur and Coreg.  Already on Diovan at high dose.  Will discuss further with cardiology.  2.  Abnormality at the vena cava near the kidney, uncertain etiology.  Will need a contrast study for this.  We will coordinate timing with Dr. Brantley if patient is going to have cath.  3.  Chronic A. fib.  INR is coming down.  Recheck in a.m.  Off Coumadin for now.  4.  Hypertension.  Medications adjusted.  Follow  5.  Hyperglycemia.  A1c is 6.4.  No medications needed.  6.  Fullness at the midline upper abdomen.  This is likely the left lobe of the liver and some passive congestion.    Discussed with daughter and other family member at the bedside.  Discussed with patient    Daniella Poole MD  08/15/17  6:46 PM

## 2017-08-15 NOTE — PLAN OF CARE
Problem: Patient Care Overview (Adult)  Goal: Plan of Care Review  Outcome: Ongoing (interventions implemented as appropriate)    08/15/17 1523   Coping/Psychosocial Response Interventions   Plan Of Care Reviewed With patient   Outcome Evaluation   Outcome Summary/Follow up Plan Pt demonstrates adequate strength and balance to perform functional mobility and gait independently. Pt reports she has been walking with nursing and states she does not feel as though she needs PT at this time. PT will sign off.

## 2017-08-15 NOTE — DISCHARGE PLACEMENT REQUEST
"Dajuan Darden (89 y.o. Female)     Date of Birth Social Security Number Address Home Phone MRN    07/09/1927  [id number] [id number] [phone] [id number]    Christian Marital Status          Rastafari        Admission Date Admission Type Admitting Provider Attending Provider Department, Room/Bed    8/14/17 Emergency Daniella Poole MD Hayden, Juliana, [place] [address] [address] [address], 506/1    Discharge Date Discharge Disposition Discharge Destination                      Attending Provider: Daniella Poole MD     Allergies:  Morphine    Isolation:  None   Infection:  None   Code Status:  Conditional    Ht:  60\" (152.4 cm)   Wt:  134 lb 3.2 oz (60.9 kg)    Admission Cmt:  None   Principal Problem:  Acute on chronic respiratory failure with hypoxia [J96.21]                 Active Insurance as of 8/14/2017     Primary Coverage     Payor Plan Insurance Group Employer/Plan Group    MEDICARE RAILROAD MEDICARE      Payor Plan Address Payor Plan Phone Number Effective From Effective To    [address] [phone] 7/1/[address]     [place] [address]       Subscriber Name Subscriber Birth Date Member ID       DAJUAN DARDEN 7/9/1927 [id number]           Secondary Coverage     Payor Plan Insurance Group Employer/Plan Group    AAR MED SUPP AAR HE[employer] CARE OPTIONS      Payor Plan Address Payor Plan Phone Number Effective From Effective To    [facility] [phone] 4/1/1993     [address]       [place] [address]       Subscriber Name Subscriber Birth Date Member ID       DAJUAN DARDEN 7/9/1927 [id number]                 Emergency Contacts      (Rel.) Home Phone Work Phone Mobile Phone    AilynAtul alcalao (Spouse) [phone] -- [phone]    RobertoPat (Daughter) [phone] -- [phone]    CristoferBriseyda (Daughter) [phone] -- [phone]              "

## 2017-08-16 ENCOUNTER — APPOINTMENT (OUTPATIENT)
Dept: NUCLEAR MEDICINE | Facility: HOSPITAL | Age: 82
End: 2017-08-16

## 2017-08-16 LAB
ANION GAP SERPL CALCULATED.3IONS-SCNC: 11 MMOL/L
BUN BLD-MCNC: 17 MG/DL (ref 8–23)
BUN/CREAT SERPL: 20.5 (ref 7–25)
CALCIUM SPEC-SCNC: 8.8 MG/DL (ref 8.2–9.6)
CHLORIDE SERPL-SCNC: 90 MMOL/L (ref 98–107)
CO2 SERPL-SCNC: 30 MMOL/L (ref 22–29)
CREAT BLD-MCNC: 0.83 MG/DL (ref 0.57–1)
DEPRECATED RDW RBC AUTO: 56.4 FL (ref 37–54)
ERYTHROCYTE [DISTWIDTH] IN BLOOD BY AUTOMATED COUNT: 17.3 % (ref 11.7–13)
GFR SERPL CREATININE-BSD FRML MDRD: 65 ML/MIN/1.73
GLUCOSE BLD-MCNC: 97 MG/DL (ref 65–99)
HCT VFR BLD AUTO: 32 % (ref 35.6–45.5)
HGB BLD-MCNC: 9.8 G/DL (ref 11.9–15.5)
INR PPP: 3.51 (ref 0.9–1.1)
MCH RBC QN AUTO: 27.1 PG (ref 26.9–32)
MCHC RBC AUTO-ENTMCNC: 30.6 G/DL (ref 32.4–36.3)
MCV RBC AUTO: 88.4 FL (ref 80.5–98.2)
PLATELET # BLD AUTO: 309 10*3/MM3 (ref 140–500)
PMV BLD AUTO: 10 FL (ref 6–12)
POTASSIUM BLD-SCNC: 3.6 MMOL/L (ref 3.5–5.2)
PROTHROMBIN TIME: 34.1 SECONDS (ref 11.7–14.2)
RBC # BLD AUTO: 3.62 10*6/MM3 (ref 3.9–5.2)
SODIUM BLD-SCNC: 131 MMOL/L (ref 136–145)
WBC NRBC COR # BLD: 6.34 10*3/MM3 (ref 4.5–10.7)

## 2017-08-16 PROCEDURE — 85610 PROTHROMBIN TIME: CPT | Performed by: INTERNAL MEDICINE

## 2017-08-16 PROCEDURE — 94799 UNLISTED PULMONARY SVC/PX: CPT

## 2017-08-16 PROCEDURE — 25010000002 FUROSEMIDE PER 20 MG: Performed by: INTERNAL MEDICINE

## 2017-08-16 PROCEDURE — 80048 BASIC METABOLIC PNL TOTAL CA: CPT | Performed by: INTERNAL MEDICINE

## 2017-08-16 PROCEDURE — 85027 COMPLETE CBC AUTOMATED: CPT | Performed by: INTERNAL MEDICINE

## 2017-08-16 RX ORDER — FUROSEMIDE 40 MG/1
40 TABLET ORAL 2 TIMES DAILY
Status: DISCONTINUED | OUTPATIENT
Start: 2017-08-16 | End: 2017-08-16

## 2017-08-16 RX ADMIN — POTASSIUM CHLORIDE 10 MEQ: 750 CAPSULE, EXTENDED RELEASE ORAL at 08:49

## 2017-08-16 RX ADMIN — DOCUSATE SODIUM 100 MG: 100 CAPSULE, LIQUID FILLED ORAL at 20:00

## 2017-08-16 RX ADMIN — CARVEDILOL 12.5 MG: 12.5 TABLET, FILM COATED ORAL at 08:49

## 2017-08-16 RX ADMIN — PANTOPRAZOLE SODIUM 40 MG: 40 TABLET, DELAYED RELEASE ORAL at 05:23

## 2017-08-16 RX ADMIN — POLYETHYLENE GLYCOL 3350 17 G: 17 POWDER, FOR SOLUTION ORAL at 08:49

## 2017-08-16 RX ADMIN — ISOSORBIDE MONONITRATE 60 MG: 60 TABLET, EXTENDED RELEASE ORAL at 18:28

## 2017-08-16 RX ADMIN — ASPIRIN 81 MG: 81 TABLET ORAL at 08:49

## 2017-08-16 RX ADMIN — CETIRIZINE HYDROCHLORIDE 10 MG: 10 TABLET, FILM COATED ORAL at 08:49

## 2017-08-16 RX ADMIN — LORAZEPAM 0.5 MG: 0.5 TABLET ORAL at 22:17

## 2017-08-16 RX ADMIN — FUROSEMIDE 40 MG: 10 INJECTION, SOLUTION INTRAMUSCULAR; INTRAVENOUS at 05:23

## 2017-08-16 RX ADMIN — ACETAMINOPHEN 500 MG: 500 TABLET ORAL at 22:17

## 2017-08-16 RX ADMIN — VALSARTAN 320 MG: 320 TABLET, FILM COATED ORAL at 08:49

## 2017-08-16 RX ADMIN — CARVEDILOL 12.5 MG: 12.5 TABLET, FILM COATED ORAL at 18:28

## 2017-08-16 RX ADMIN — ISOSORBIDE MONONITRATE 60 MG: 60 TABLET, EXTENDED RELEASE ORAL at 08:49

## 2017-08-16 RX ADMIN — DOCUSATE SODIUM 100 MG: 100 CAPSULE, LIQUID FILLED ORAL at 08:50

## 2017-08-16 NOTE — PLAN OF CARE
Problem: Patient Care Overview (Adult)  Goal: Plan of Care Review  Outcome: Ongoing (interventions implemented as appropriate)    08/16/17 0412   Coping/Psychosocial Response Interventions   Plan Of Care Reviewed With patient   Patient Care Overview   Progress improving   Outcome Evaluation   Outcome Summary/Follow up Plan no c/o, vss, prn ativan given per request to help sleep, continue to monitor       Goal: Adult Individualization and Mutuality  Outcome: Ongoing (interventions implemented as appropriate)  Goal: Discharge Needs Assessment  Outcome: Ongoing (interventions implemented as appropriate)    Problem: Fall Risk (Adult)  Goal: Absence of Falls  Outcome: Ongoing (interventions implemented as appropriate)    Problem: Fluid Volume Excess (Adult,Obstetrics,Pediatric)  Goal: Stable Weight  Outcome: Ongoing (interventions implemented as appropriate)  Goal: Balanced Intake/Output  Outcome: Ongoing (interventions implemented as appropriate)

## 2017-08-16 NOTE — PROGRESS NOTES
"     LOS: 2 days   Primary Care Physician: Deon Grazon MD     Subjective   Feels better.  Thinks her breathing now is back to normal.  Was on Lasix 40 mg daily at home  Regarding the abnormality noted on the CT abdomen, the patient is not interested in any further evaluation  Daughter and granddaughter at bedside    Vital Signs  Body mass index is 26.21 kg/(m^2).  Temp:  [98.2 °F (36.8 °C)-98.6 °F (37 °C)] 98.2 °F (36.8 °C)  Heart Rate:  [85-94] 92  Resp:  [16-18] 18  BP: (137-156)/(66-86) 156/86      Objective:  General Appearance:  In no acute distress.    Vital signs: (most recent): Blood pressure 156/86, pulse 92, temperature 98.2 °F (36.8 °C), temperature source Oral, resp. rate 18, height 60\" (152.4 cm), weight 134 lb 3.2 oz (60.9 kg), SpO2 96 %, not currently breastfeeding.    Lungs:  There are rales and decreased breath sounds.  No wheezes or rhonchi.    Heart: Normal rate.  Irregular rhythm.  No murmur.   Abdomen: Abdomen is soft and non-distended.  Bowel sounds are normal.   There is no abdominal tenderness.   There is no splenomegaly. There is no hepatomegaly.   Extremities: There is no dependent edema.    Neurological: Patient is alert.          Results Review:    I reviewed the patient's new clinical results.      Results from last 7 days  Lab Units 08/16/17  0435 08/15/17  0646   WBC 10*3/mm3 6.34 5.81   HEMOGLOBIN g/dL 9.8* 9.9*   PLATELETS 10*3/mm3 309 321       Results from last 7 days  Lab Units 08/16/17  0435 08/15/17  0646   SODIUM mmol/L 131* 134*   POTASSIUM mmol/L 3.6 3.1*   CHLORIDE mmol/L 90* 90*   CO2 mmol/L 30.0* 31.4*   BUN mg/dL 17 14   CREATININE mg/dL 0.83 0.82   CALCIUM mg/dL 8.8 9.0   GLUCOSE mg/dL 97 94       Results from last 7 days  Lab Units 08/16/17  0435   INR  3.51*     Hemoglobin A1C:  Lab Results   Component Value Date    HGBA1C 6.39 (H) 08/15/2017       Glucose Range:No results found for: POCGLU    No results found for: [id number]    Lab Results   Component Value Date " "   TSH 3.730 03/13/2017       Assessment & Plan      Medication Review: Yes    Active Hospital Problems (** Indicates Principal Problem)    Diagnosis Date Noted   • **Acute on chronic respiratory failure with hypoxia [J96.21] 08/14/2017   • Acute on chronic combined systolic and diastolic congestive heart failure [I50.43] 08/14/2017   • Left upper quadrant abdominal mass [R19.02] 08/14/2017   • Oropharyngeal dysphagia [R13.12] 08/14/2017   • Valvular heart disease [I38] 08/14/2017   • Hypertension [I10] 02/03/2016   • Left bundle branch block [I44.7] 02/03/2016   • Permanent atrial fibrillation [I48.2] 02/03/2016   • Pulmonary hypertension [I27.2] 02/03/2016      Resolved Hospital Problems    Diagnosis Date Noted Date Resolved   No resolved problems to display.       Assessment/Plan  1.  Acute and chronic respiratory failure with hypoxia secondary to acute and chronic diastolic and systolic congestive heart failure.  Patient agreeable to stress test.  Discussed earlier with Dr. Castellon.  Will change IV Lasix to po.   2.  Abnormality near the inferior vena cava at the level of the kidney.  Patient not interested in any further evaluation which I believe is reasonable.  She says \"I won't do anything about it anyway\".  3.  Chronic A. fib.  INR still elevated.  Coumadin has been on hold.  Recheck in a.m.  4.  Hypertension.  Blood pressures have been improved.  Continue medications same for now.    Daniella Poole MD  08/16/17  1:08 PM        "

## 2017-08-17 ENCOUNTER — APPOINTMENT (OUTPATIENT)
Dept: NUCLEAR MEDICINE | Facility: HOSPITAL | Age: 82
End: 2017-08-17

## 2017-08-17 LAB
ANION GAP SERPL CALCULATED.3IONS-SCNC: 11.5 MMOL/L
BH CV STRESS COMMENTS STAGE 1: NORMAL
BH CV STRESS DOSE REGADENOSON STAGE 1: 0.4
BH CV STRESS DURATION MIN STAGE 1: 0
BH CV STRESS DURATION SEC STAGE 1: 15
BH CV STRESS PROTOCOL 1: NORMAL
BH CV STRESS RECOVERY BP: NORMAL MMHG
BH CV STRESS RECOVERY HR: 95 BPM
BH CV STRESS STAGE 1: 1
BUN BLD-MCNC: 18 MG/DL (ref 8–23)
BUN/CREAT SERPL: 22 (ref 7–25)
CALCIUM SPEC-SCNC: 9 MG/DL (ref 8.2–9.6)
CHLORIDE SERPL-SCNC: 89 MMOL/L (ref 98–107)
CO2 SERPL-SCNC: 31.5 MMOL/L (ref 22–29)
CREAT BLD-MCNC: 0.82 MG/DL (ref 0.57–1)
DEPRECATED RDW RBC AUTO: 54.1 FL (ref 37–54)
ERYTHROCYTE [DISTWIDTH] IN BLOOD BY AUTOMATED COUNT: 17.1 % (ref 11.7–13)
GFR SERPL CREATININE-BSD FRML MDRD: 65 ML/MIN/1.73
GLUCOSE BLD-MCNC: 97 MG/DL (ref 65–99)
HCT VFR BLD AUTO: 31 % (ref 35.6–45.5)
HGB BLD-MCNC: 9.6 G/DL (ref 11.9–15.5)
INR PPP: 2.88 (ref 0.9–1.1)
LV EF NUC BP: 30 %
MAXIMAL PREDICTED HEART RATE: 130 BPM
MCH RBC QN AUTO: 27.2 PG (ref 26.9–32)
MCHC RBC AUTO-ENTMCNC: 31 G/DL (ref 32.4–36.3)
MCV RBC AUTO: 87.8 FL (ref 80.5–98.2)
NT-PROBNP SERPL-MCNC: ABNORMAL PG/ML (ref 0–1800)
PERCENT MAX PREDICTED HR: 80.77 %
PLATELET # BLD AUTO: 306 10*3/MM3 (ref 140–500)
PMV BLD AUTO: 9.8 FL (ref 6–12)
POTASSIUM BLD-SCNC: 3.5 MMOL/L (ref 3.5–5.2)
PROTHROMBIN TIME: 29.2 SECONDS (ref 11.7–14.2)
RBC # BLD AUTO: 3.53 10*6/MM3 (ref 3.9–5.2)
SODIUM BLD-SCNC: 132 MMOL/L (ref 136–145)
STRESS BASELINE BP: NORMAL MMHG
STRESS BASELINE HR: 92 BPM
STRESS PERCENT HR: 95 %
STRESS POST PEAK BP: NORMAL MMHG
STRESS POST PEAK HR: 105 BPM
STRESS TARGET HR: 111 BPM
WBC NRBC COR # BLD: 6.3 10*3/MM3 (ref 4.5–10.7)

## 2017-08-17 PROCEDURE — A9500 TC99M SESTAMIBI: HCPCS | Performed by: HOSPITALIST

## 2017-08-17 PROCEDURE — 78452 HT MUSCLE IMAGE SPECT MULT: CPT

## 2017-08-17 PROCEDURE — 93017 CV STRESS TEST TRACING ONLY: CPT

## 2017-08-17 PROCEDURE — 25010000002 REGADENOSON 0.4 MG/5ML SOLUTION: Performed by: HOSPITALIST

## 2017-08-17 PROCEDURE — 0 TECHNETIUM SESTAMIBI: Performed by: HOSPITALIST

## 2017-08-17 PROCEDURE — 99232 SBSQ HOSP IP/OBS MODERATE 35: CPT | Performed by: INTERNAL MEDICINE

## 2017-08-17 PROCEDURE — 85610 PROTHROMBIN TIME: CPT | Performed by: INTERNAL MEDICINE

## 2017-08-17 PROCEDURE — 93016 CV STRESS TEST SUPVJ ONLY: CPT | Performed by: INTERNAL MEDICINE

## 2017-08-17 PROCEDURE — 85027 COMPLETE CBC AUTOMATED: CPT | Performed by: INTERNAL MEDICINE

## 2017-08-17 PROCEDURE — 80048 BASIC METABOLIC PNL TOTAL CA: CPT | Performed by: INTERNAL MEDICINE

## 2017-08-17 PROCEDURE — 83880 ASSAY OF NATRIURETIC PEPTIDE: CPT | Performed by: INTERNAL MEDICINE

## 2017-08-17 PROCEDURE — 93018 CV STRESS TEST I&R ONLY: CPT | Performed by: INTERNAL MEDICINE

## 2017-08-17 PROCEDURE — 78452 HT MUSCLE IMAGE SPECT MULT: CPT | Performed by: INTERNAL MEDICINE

## 2017-08-17 RX ADMIN — ISOSORBIDE MONONITRATE 60 MG: 60 TABLET, EXTENDED RELEASE ORAL at 17:14

## 2017-08-17 RX ADMIN — REGADENOSON 0.4 MG: 0.08 INJECTION, SOLUTION INTRAVENOUS at 10:15

## 2017-08-17 RX ADMIN — VALSARTAN 320 MG: 320 TABLET, FILM COATED ORAL at 12:24

## 2017-08-17 RX ADMIN — TECHNETIUM TC-99M SESTAMIBI 1 DOSE: 1 INJECTION INTRAVENOUS at 10:15

## 2017-08-17 RX ADMIN — ACETAMINOPHEN 500 MG: 500 TABLET ORAL at 22:16

## 2017-08-17 RX ADMIN — CARVEDILOL 12.5 MG: 12.5 TABLET, FILM COATED ORAL at 17:14

## 2017-08-17 RX ADMIN — CARVEDILOL 12.5 MG: 12.5 TABLET, FILM COATED ORAL at 12:24

## 2017-08-17 RX ADMIN — LORAZEPAM 0.5 MG: 0.5 TABLET ORAL at 12:33

## 2017-08-17 RX ADMIN — PANTOPRAZOLE SODIUM 40 MG: 40 TABLET, DELAYED RELEASE ORAL at 20:47

## 2017-08-17 RX ADMIN — CETIRIZINE HYDROCHLORIDE 10 MG: 10 TABLET, FILM COATED ORAL at 12:24

## 2017-08-17 RX ADMIN — TECHNETIUM TC-99M SESTAMIBI 1 DOSE: 1 INJECTION INTRAVENOUS at 06:20

## 2017-08-17 RX ADMIN — LORAZEPAM 0.5 MG: 0.5 TABLET ORAL at 22:16

## 2017-08-17 RX ADMIN — ASPIRIN 81 MG: 81 TABLET ORAL at 12:24

## 2017-08-17 RX ADMIN — POTASSIUM CHLORIDE 10 MEQ: 750 CAPSULE, EXTENDED RELEASE ORAL at 12:24

## 2017-08-17 RX ADMIN — ISOSORBIDE MONONITRATE 60 MG: 60 TABLET, EXTENDED RELEASE ORAL at 12:25

## 2017-08-17 NOTE — PLAN OF CARE
Problem: Patient Care Overview (Adult)  Goal: Plan of Care Review  Outcome: Ongoing (interventions implemented as appropriate)    08/17/17 0616   Coping/Psychosocial Response Interventions   Plan Of Care Reviewed With patient   Patient Care Overview   Progress improving   Outcome Evaluation   Outcome Summary/Follow up Plan stress test planned for today       Goal: Adult Individualization and Mutuality  Outcome: Ongoing (interventions implemented as appropriate)  Goal: Discharge Needs Assessment  Outcome: Ongoing (interventions implemented as appropriate)    Problem: Fall Risk (Adult)  Goal: Absence of Falls  Outcome: Ongoing (interventions implemented as appropriate)    Problem: Fluid Volume Excess (Adult,Obstetrics,Pediatric)  Goal: Stable Weight  Outcome: Ongoing (interventions implemented as appropriate)  Goal: Balanced Intake/Output  Outcome: Ongoing (interventions implemented as appropriate)

## 2017-08-17 NOTE — PROGRESS NOTES
"Patient Name: Yudith Robertson  :1927  89 y.o.      Patient Care Team:  Deon Garzon MD as PCP - General (Internal Medicine)  Deon Garzon MD as PCP - Claims Attributed  Latrice Riley RN as Care Coordinator (Population Health)    Interval History:   Currently getting a nuclear stress test    Subjective:  Following for a sudden drop in ejection fraction     Objective   Vital Signs  Temp:  [97.9 °F (36.6 °C)-98.6 °F (37 °C)] 98.6 °F (37 °C)  Heart Rate:  [] 95  Resp:  [16-18] 16  BP: (131-140)/(58-73) 131/73    Intake/Output Summary (Last 24 hours) at 17 0918  Last data filed at 17 0631   Gross per 24 hour   Intake                0 ml   Output             1200 ml   Net            -1200 ml     Flowsheet Rows         First Filed Value    Admission Height  60\" (152.4 cm) Documented at 2017 0143    Admission Weight  138 lb (62.6 kg) Documented at 2017 0143          Physical Exam:   General Appearance:    Alert, cooperative, in no acute distress   Lungs:     Clear to auscultation.  Normal respiratory effort and rate.      Heart:    irregegular rhythm and normal rate, normal S1 and S2, no + murmurs.     Chest Wall:    No abnormalities observed   Abdomen:     Soft, nontender, positive bowel sounds.     Extremities:   no cyanosis, clubbing or edema.  No marked joint deformities.  Adequate musculoskeletal strength.       Results Review:      Results from last 7 days  Lab Units 17  0311   SODIUM mmol/L 132*   POTASSIUM mmol/L 3.5   CHLORIDE mmol/L 89*   CO2 mmol/L 31.5*   BUN mg/dL 18   CREATININE mg/dL 0.82   GLUCOSE mg/dL 97   CALCIUM mg/dL 9.0       Results from last 7 days  Lab Units 08/15/17  0646 17  0446 17  0159   TROPONIN T ng/mL <0.010 <0.010 <0.010       Results from last 7 days  Lab Units 17  0311   WBC 10*3/mm3 6.30   HEMOGLOBIN g/dL 9.6*   HEMATOCRIT % 31.0*   PLATELETS 10*3/mm3 306       Results from last 7 days  Lab Units 17  0311 " 08/16/17  0435 08/15/17  0646   INR  2.88* 3.51* 3.59*       Results from last 7 days  Lab Units 08/15/17  0646   CHOLESTEROL mg/dL 141           Results from last 7 days  Lab Units 08/15/17  0646   CHOLESTEROL mg/dL 141   TRIGLYCERIDES mg/dL 84   HDL CHOL mg/dL 42         Medication Review:     aspirin 81 mg Oral Daily   carvedilol 12.5 mg Oral BID With Meals   cetirizine 10 mg Oral Daily   docusate sodium 100 mg Oral Q12H   isosorbide mononitrate 60 mg Oral BID   pantoprazole 40 mg Oral QAM   polyethylene glycol 17 g Oral Daily   potassium chloride 10 mEq Oral Daily   valsartan 320 mg Oral Q24H             Assessment/Plan     1.  Acute systolic congestive heart failure.  Echocardiogram on August 14 showed an ejection fraction of 27%.  This is a marked decline compared to her normal ejection fraction in August 2016.  She is on carvedilol and valsartan at home.  She was diuresed with IV Lasix.  If the daily weights can be believed, she has diuresed 8 pounds since admission.  2.  Permanent atrial fibrillation.  She is on anticoagulation with warfarin.  Her INR was supratherapeutic.  Warfarin has been on hold and her INR is declining.  Probably restart warfarin tomorrow.  3.  Mild mitral regurgitation.  4.  Mild to moderate tricuspid regurgitation with severe pulmonary hypertension  5.  History of carotid stenosis with 30-40% bilateral plaque  6.  GERD with esophageal stenosis  7.  Hyperlipidemia  8.  Left bundle branch block  9.  History of stroke  10.  Hypertension  11.  Nonobstructive coronary artery disease  12.  Hypokalemia.  Replaced     She is getting a nuclear stress test today to try to evaluate for ischemia as a possible cause of her LV dysfunction.  If there does not seem to be any ischemia, I will consider a switch from valsartan to Enteresto.    Felicitas Brantley MD, [facility]  08/17/17  9:18 AM

## 2017-08-17 NOTE — PROGRESS NOTES
"DAILY PROGRESS NOTE  [facility]    Patient Identification:  Name: Yudith Robertson  Age: 89 y.o.  Sex: female  :  1927  MRN: [id number]         Primary Care Physician: Deon Garzon MD    Subjective:  Interval History:She is breathing better.    Objective:    Scheduled Meds:  aspirin 81 mg Oral Daily   carvedilol 12.5 mg Oral BID With Meals   cetirizine 10 mg Oral Daily   docusate sodium 100 mg Oral Q12H   isosorbide mononitrate 60 mg Oral BID   pantoprazole 40 mg Oral QAM   polyethylene glycol 17 g Oral Daily   potassium chloride 10 mEq Oral Daily   valsartan 320 mg Oral Q24H     Continuous Infusions:     Vital signs in last 24 hours:  Temp:  [97.9 °F (36.6 °C)-98.8 °F (37.1 °C)] 98.8 °F (37.1 °C)  Heart Rate:  [] 86  Resp:  [16-20] 20  BP: (131-155)/(58-83) 155/72    Intake/Output:    Intake/Output Summary (Last 24 hours) at 17 1426  Last data filed at 17 1109   Gross per 24 hour   Intake                0 ml   Output             1100 ml   Net            -1100 ml       Exam:  /72 (BP Location: Left arm, Patient Position: Sitting)  Pulse 86  Temp 98.8 °F (37.1 °C) (Oral)   Resp 20  Ht 60\" (152.4 cm)  Wt 130 lb (59 kg)  SpO2 97%  BMI 25.39 kg/m2    General Appearance:    Alert, cooperative, no distress   Head:    Normocephalic, without obvious abnormality, atraumatic   Eyes:       Throat:   Lips, tongue, gums normal   Neck:   Supple, symmetrical, trachea midline, no JVD   Lungs:     Clear to auscultation bilaterally, respirations unlabored   Chest Wall:    No tenderness or deformity    Heart:    Regular rate and rhythm, S1 and S2 normal, no murmur,no  Rub or gallop   Abdomen:     Soft, non-tender, bowel sounds active, no masses, no organomegaly    Extremities:   Extremities normal, atraumatic, no cyanosis or edema   Pulses:      Skin:   Skin is warm and dry,  no rashes or palpable lesions   Neurologic:   no focal deficits noted      [unfilled]  Data " Review:    Results from last 7 days  Lab Units 08/17/17  0311 08/16/17  0435 08/15/17  0646   SODIUM mmol/L 132* 131* 134*   POTASSIUM mmol/L 3.5 3.6 3.1*   CHLORIDE mmol/L 89* 90* 90*   CO2 mmol/L 31.5* 30.0* 31.4*   BUN mg/dL 18 17 14   CREATININE mg/dL 0.82 0.83 0.82   GLUCOSE mg/dL 97 97 94   CALCIUM mg/dL 9.0 8.8 9.0       Results from last 7 days  Lab Units 08/17/17  0311 08/16/17  0435 08/15/17  0646   WBC 10*3/mm3 6.30 6.34 5.81   HEMOGLOBIN g/dL 9.6* 9.8* 9.9*   HEMATOCRIT % 31.0* 32.0* 32.2*   PLATELETS 10*3/mm3 306 309 321           Results from last 7 days  Lab Units 08/15/17  0646   HEMOGLOBIN A1C % 6.39*       Lab Results  Lab Value Date/Time   TROPONINT <0.010 08/15/2017 0646   TROPONINT <0.010 08/14/2017 0446   TROPONINT <0.010 08/14/2017 0159   TROPONINT <0.010 07/03/2017 1124   TROPONINT <0.010 05/17/2017 2353   TROPONINT <0.010 08/10/2016 2001   TROPONINT <0.01 07/19/2015 0458   TROPONINT <0.01 07/18/2015 1523   TROPONINT <0.01 03/24/2015 1100   TROPONINT <0.01 03/24/2015 0110   TROPONINT <0.01 02/21/2015 0509   TROPONINT <0.01 02/20/2015 2053       Results from last 7 days  Lab Units 08/15/17  0646   CHOLESTEROL mg/dL 141   TRIGLYCERIDES mg/dL 84   HDL CHOL mg/dL 42       Results from last 7 days  Lab Units 08/14/17  0159   ALK PHOS U/L 76   BILIRUBIN mg/dL 0.5   ALT (SGPT) U/L 15   AST (SGOT) U/L 19           Results from last 7 days  Lab Units 08/15/17  0646   HEMOGLOBIN A1C % 6.39*     No results found for: POCGLU    Results from last 7 days  Lab Units 08/17/17  0311 08/16/17  0435 08/15/17  0646   INR  2.88* 3.51* 3.59*       Patient Active Problem List   Diagnosis Code   • Permanent atrial fibrillation I48.2   • Atypical chest pain R07.89   • Hypertension I10   • Diastolic CHF, chronic I50.32   • Tricuspid insufficiency I07.1   • Pulmonary hypertension I27.2   • Left bundle branch block I44.7   • Hyperlipidemia E78.5   • GERD (gastroesophageal reflux disease) K21.9   • Aspiration pneumonia  of right lower lobe J69.0   • Acute on chronic combined systolic and diastolic congestive heart failure I50.43   • Acute on chronic respiratory failure with hypoxia J96.21   • Left upper quadrant abdominal mass R19.02   • Oropharyngeal dysphagia R13.12   • Valvular heart disease I38       Assessment:  Principal Problem:    Acute on chronic respiratory failure with hypoxia  Active Problems:    Permanent atrial fibrillation    Hypertension    Pulmonary hypertension    Left bundle branch block    Acute on chronic combined systolic and diastolic congestive heart failure    Left upper quadrant abdominal mass    Oropharyngeal dysphagia    Valvular heart disease      Plan:  Continue with medical treatment. As per cardiology.    Serafin Chen MD  8/17/2017  2:26 PM

## 2017-08-17 NOTE — PLAN OF CARE
Problem: Patient Care Overview (Adult)  Goal: Plan of Care Review  Outcome: Ongoing (interventions implemented as appropriate)    08/17/17 1501   Coping/Psychosocial Response Interventions   Plan Of Care Reviewed With patient   Patient Care Overview   Progress improving   Outcome Evaluation   Outcome Summary/Follow up Plan Pt feels better today; had a large bm while down for stress test. Stress test showed EF of 30%, but otherwise no ischemia. INR=2.88 this am. No orders yet to restart Coumadin. Pt got anxious after returning from stress test, so Ativian PO given. Continue to monitor.         Problem: Fall Risk (Adult)  Goal: Absence of Falls  Outcome: Ongoing (interventions implemented as appropriate)    08/17/17 1501   Fall Risk (Adult)   Absence of Falls making progress toward outcome         Problem: Fluid Volume Excess (Adult,Obstetrics,Pediatric)  Goal: Stable Weight  Outcome: Ongoing (interventions implemented as appropriate)    08/17/17 1501   Fluid Volume Excess (Adult,Obstetrics,Pediatric)   Stable Weight making progress toward outcome       Goal: Balanced Intake/Output  Outcome: Ongoing (interventions implemented as appropriate)    08/17/17 1501   Fluid Volume Excess (Adult,Obstetrics,Pediatric)   Balanced Intake/Output making progress toward outcome

## 2017-08-18 VITALS
HEIGHT: 60 IN | BODY MASS INDEX: 25.64 KG/M2 | TEMPERATURE: 98.2 F | OXYGEN SATURATION: 97 % | WEIGHT: 130.6 LBS | HEART RATE: 77 BPM | RESPIRATION RATE: 18 BRPM | SYSTOLIC BLOOD PRESSURE: 145 MMHG | DIASTOLIC BLOOD PRESSURE: 80 MMHG

## 2017-08-18 DIAGNOSIS — F39 MOOD DISORDER (HCC): ICD-10-CM

## 2017-08-18 LAB
ANION GAP SERPL CALCULATED.3IONS-SCNC: 13.5 MMOL/L
BASOPHILS # BLD AUTO: 0.04 10*3/MM3 (ref 0–0.2)
BASOPHILS NFR BLD AUTO: 0.7 % (ref 0–1.5)
BUN BLD-MCNC: 17 MG/DL (ref 8–23)
BUN/CREAT SERPL: 21.5 (ref 7–25)
CALCIUM SPEC-SCNC: 9 MG/DL (ref 8.2–9.6)
CHLORIDE SERPL-SCNC: 91 MMOL/L (ref 98–107)
CO2 SERPL-SCNC: 29.5 MMOL/L (ref 22–29)
CREAT BLD-MCNC: 0.79 MG/DL (ref 0.57–1)
DEPRECATED RDW RBC AUTO: 55.4 FL (ref 37–54)
EOSINOPHIL # BLD AUTO: 0.09 10*3/MM3 (ref 0–0.7)
EOSINOPHIL NFR BLD AUTO: 1.5 % (ref 0.3–6.2)
ERYTHROCYTE [DISTWIDTH] IN BLOOD BY AUTOMATED COUNT: 17.3 % (ref 11.7–13)
GFR SERPL CREATININE-BSD FRML MDRD: 68 ML/MIN/1.73
GLUCOSE BLD-MCNC: 101 MG/DL (ref 65–99)
HCT VFR BLD AUTO: 33.3 % (ref 35.6–45.5)
HGB BLD-MCNC: 10.1 G/DL (ref 11.9–15.5)
IMM GRANULOCYTES # BLD: 0.02 10*3/MM3 (ref 0–0.03)
IMM GRANULOCYTES NFR BLD: 0.3 % (ref 0–0.5)
INR PPP: 2.22 (ref 0.9–1.1)
LYMPHOCYTES # BLD AUTO: 1.66 10*3/MM3 (ref 0.9–4.8)
LYMPHOCYTES NFR BLD AUTO: 28.6 % (ref 19.6–45.3)
MCH RBC QN AUTO: 26.7 PG (ref 26.9–32)
MCHC RBC AUTO-ENTMCNC: 30.3 G/DL (ref 32.4–36.3)
MCV RBC AUTO: 88.1 FL (ref 80.5–98.2)
MONOCYTES # BLD AUTO: 0.73 10*3/MM3 (ref 0.2–1.2)
MONOCYTES NFR BLD AUTO: 12.6 % (ref 5–12)
NEUTROPHILS # BLD AUTO: 3.27 10*3/MM3 (ref 1.9–8.1)
NEUTROPHILS NFR BLD AUTO: 56.3 % (ref 42.7–76)
PLATELET # BLD AUTO: 325 10*3/MM3 (ref 140–500)
PMV BLD AUTO: 9.8 FL (ref 6–12)
POTASSIUM BLD-SCNC: 3.6 MMOL/L (ref 3.5–5.2)
PROTHROMBIN TIME: 23.9 SECONDS (ref 11.7–14.2)
RBC # BLD AUTO: 3.78 10*6/MM3 (ref 3.9–5.2)
SODIUM BLD-SCNC: 134 MMOL/L (ref 136–145)
WBC NRBC COR # BLD: 5.81 10*3/MM3 (ref 4.5–10.7)

## 2017-08-18 PROCEDURE — 99232 SBSQ HOSP IP/OBS MODERATE 35: CPT | Performed by: INTERNAL MEDICINE

## 2017-08-18 PROCEDURE — 85610 PROTHROMBIN TIME: CPT | Performed by: INTERNAL MEDICINE

## 2017-08-18 PROCEDURE — 85025 COMPLETE CBC W/AUTO DIFF WBC: CPT | Performed by: HOSPITALIST

## 2017-08-18 PROCEDURE — 80048 BASIC METABOLIC PNL TOTAL CA: CPT | Performed by: HOSPITALIST

## 2017-08-18 RX ORDER — WARFARIN SODIUM 2 MG/1
2 TABLET ORAL
Status: DISCONTINUED | OUTPATIENT
Start: 2017-08-18 | End: 2017-08-18 | Stop reason: HOSPADM

## 2017-08-18 RX ORDER — WARFARIN SODIUM 2 MG/1
TABLET ORAL
Qty: 30 TABLET | Refills: 0 | Status: SHIPPED | OUTPATIENT
Start: 2017-08-18 | End: 2017-10-09 | Stop reason: SDUPTHER

## 2017-08-18 RX ORDER — FUROSEMIDE 40 MG/1
40 TABLET ORAL DAILY
Status: DISCONTINUED | OUTPATIENT
Start: 2017-08-18 | End: 2017-08-18 | Stop reason: HOSPADM

## 2017-08-18 RX ADMIN — ISOSORBIDE MONONITRATE 60 MG: 60 TABLET, EXTENDED RELEASE ORAL at 08:37

## 2017-08-18 RX ADMIN — DOCUSATE SODIUM 100 MG: 100 CAPSULE, LIQUID FILLED ORAL at 08:37

## 2017-08-18 RX ADMIN — CARVEDILOL 12.5 MG: 12.5 TABLET, FILM COATED ORAL at 08:38

## 2017-08-18 RX ADMIN — ASPIRIN 81 MG: 81 TABLET ORAL at 08:37

## 2017-08-18 RX ADMIN — SACUBITRIL AND VALSARTAN 1 TABLET: 49; 51 TABLET, FILM COATED ORAL at 11:03

## 2017-08-18 RX ADMIN — POTASSIUM CHLORIDE 10 MEQ: 750 CAPSULE, EXTENDED RELEASE ORAL at 08:35

## 2017-08-18 RX ADMIN — LORAZEPAM 0.5 MG: 0.5 TABLET ORAL at 08:53

## 2017-08-18 RX ADMIN — FUROSEMIDE 40 MG: 40 TABLET ORAL at 11:04

## 2017-08-18 RX ADMIN — CETIRIZINE HYDROCHLORIDE 10 MG: 10 TABLET, FILM COATED ORAL at 08:38

## 2017-08-18 NOTE — PLAN OF CARE
Problem: Patient Care Overview (Adult)  Goal: Plan of Care Review  Outcome: Ongoing (interventions implemented as appropriate)    08/18/17 0548   Coping/Psychosocial Response Interventions   Plan Of Care Reviewed With patient   Patient Care Overview   Progress progress toward functional goals as expected       Goal: Adult Individualization and Mutuality  Outcome: Ongoing (interventions implemented as appropriate)  Goal: Discharge Needs Assessment  Outcome: Ongoing (interventions implemented as appropriate)    Problem: Fall Risk (Adult)  Goal: Absence of Falls  Outcome: Ongoing (interventions implemented as appropriate)    Problem: Fluid Volume Excess (Adult,Obstetrics,Pediatric)  Goal: Stable Weight  Outcome: Ongoing (interventions implemented as appropriate)  Goal: Balanced Intake/Output  Outcome: Ongoing (interventions implemented as appropriate)    Problem: Skin Integrity Impairment, Risk/Actual (Adult)  Goal: Identify Related Risk Factors and Signs and Symptoms  Outcome: Ongoing (interventions implemented as appropriate)  Goal: Skin Integrity/Wound Healing  Outcome: Ongoing (interventions implemented as appropriate)    Problem: Infection, Risk/Actual (Adult)  Goal: Identify Related Risk Factors and Signs and Symptoms  Outcome: Ongoing (interventions implemented as appropriate)  Goal: Infection Prevention/Resolution  Outcome: Ongoing (interventions implemented as appropriate)    Problem: Pain, Acute (Adult)  Goal: Identify Related Risk Factors and Signs and Symptoms  Outcome: Ongoing (interventions implemented as appropriate)  Goal: Acceptable Pain Control/Comfort Level  Outcome: Ongoing (interventions implemented as appropriate)    Problem: Anxiety (Adult)  Goal: Identify Related Risk Factors and Signs and Symptoms  Outcome: Ongoing (interventions implemented as appropriate)  Goal: Reduction/Resolution  Outcome: Ongoing (interventions implemented as appropriate)

## 2017-08-18 NOTE — PLAN OF CARE
Problem: Anxiety (Adult)  Goal: Identify Related Risk Factors and Signs and Symptoms  Outcome: Ongoing (interventions implemented as appropriate)  Goal: Reduction/Resolution  Outcome: Ongoing (interventions implemented as appropriate)

## 2017-08-18 NOTE — PLAN OF CARE
Problem: Patient Care Overview (Adult)  Goal: Plan of Care Review  Outcome: Ongoing (interventions implemented as appropriate)    08/18/17 0548 08/18/17 0835 08/18/17 1205   Coping/Psychosocial Response Interventions   Plan Of Care Reviewed With --  patient --    Patient Care Overview   Progress progress toward functional goals as expected --  --    Outcome Evaluation   Outcome Summary/Follow up Plan --  --  Pt A&Ox4. VSS. No complaints of pain or discomfort. Seen by Cardiology this AM. INR 2.2.. Restarting coumadin today. Ok to d/c per Dr. Brantley. Daughter currently at bedside. Will continue to monitor.       Goal: Discharge Needs Assessment  Outcome: Ongoing (interventions implemented as appropriate)    Problem: Fall Risk (Adult)  Goal: Absence of Falls  Outcome: Ongoing (interventions implemented as appropriate)    Problem: Fluid Volume Excess (Adult,Obstetrics,Pediatric)  Goal: Stable Weight  Outcome: Ongoing (interventions implemented as appropriate)  Goal: Balanced Intake/Output  Outcome: Ongoing (interventions implemented as appropriate)    Problem: Skin Integrity Impairment, Risk/Actual (Adult)  Goal: Identify Related Risk Factors and Signs and Symptoms  Outcome: Ongoing (interventions implemented as appropriate)  Goal: Skin Integrity/Wound Healing  Outcome: Ongoing (interventions implemented as appropriate)    Problem: Infection, Risk/Actual (Adult)  Goal: Infection Prevention/Resolution  Outcome: Ongoing (interventions implemented as appropriate)    Problem: Pain, Acute (Adult)  Goal: Acceptable Pain Control/Comfort Level  Outcome: Ongoing (interventions implemented as appropriate)    Problem: Anxiety (Adult)  Goal: Identify Related Risk Factors and Signs and Symptoms  Outcome: Ongoing (interventions implemented as appropriate)  Goal: Reduction/Resolution  Outcome: Ongoing (interventions implemented as appropriate)

## 2017-08-18 NOTE — DISCHARGE SUMMARY
PHYSICIAN DISCHARGE SUMMARY                                                                        [facility]    Patient Identification:  Name: Yudith Robertson  Age: 89 y.o.  Sex: female  :  1927  MRN: [id number]  Primary Care Physician: Deon Garzon MD    Admit date: 2017  Discharge date and time:2017  Discharged Condition: fair    Discharge Diagnoses:Principal Problem:    Acute on chronic respiratory failure with hypoxia  Active Problems:    Permanent atrial fibrillation    Hypertension    Pulmonary hypertension    Left bundle branch block    Acute on chronic combined systolic and diastolic congestive heart failure    Left upper quadrant abdominal mass    Oropharyngeal dysphagia    Valvular heart disease     Patient Active Problem List   Diagnosis Code   • Permanent atrial fibrillation I48.2   • Atypical chest pain R07.89   • Hypertension I10   • Diastolic CHF, chronic I50.32   • Tricuspid insufficiency I07.1   • Pulmonary hypertension I27.2   • Left bundle branch block I44.7   • Hyperlipidemia E78.5   • GERD (gastroesophageal reflux disease) K21.9   • Aspiration pneumonia of right lower lobe J69.0   • Acute on chronic combined systolic and diastolic congestive heart failure I50.43   • Acute on chronic respiratory failure with hypoxia J96.21   • Left upper quadrant abdominal mass R19.02   • Oropharyngeal dysphagia R13.12   • Valvular heart disease I38       PMHX:   Past Medical History:   Diagnosis Date   • Allergic rhinitis    • Anemia    • Atrial fibrillation    • Atypical chest pain    • CAD (coronary artery disease)    • Carotid artery stenosis     20-30% bilateral   • Cerebral artery occlusion with cerebral infarction    • CHF (congestive heart failure)    • Diastolic congestive heart failure     Echo 3/2012 with normal LVEF, mod LVH   • Difficulty swallowing    • Dizzy     mild   • Edema     In [place]   •  Esophageal reflux     GERD, history of esophageal stenosis s/p dilation   • GERD (gastroesophageal reflux disease)    • H/O bone density study 06/16/2015    Osteopenia   • H/O mammogram 02/04/2014   • H/O thyroid nodule    • History of esophageal stricture    • Hyperlipidemia    • Hypertension    • IBS (irritable bowel syndrome)    • Intestinal obstruction     small bowel obstruction   • LBBB (left bundle branch block)    • Mild aortic insufficiency    • Mild mitral insufficiency    • Mixed hyperlipidemia    • Moderate tricuspid insufficiency    • Mood disorder in conditions classified elsewhere    • Osteoarthrosis    • Osteopenia    • Polyarthropathy, multiple sites    • SOB (shortness of breath)    • Transient cerebral ischemia    • Valvular heart disease     Echo 1/17/14: EF 54%, elevated LAP, mild-mod MR, mod-severe TR, moderate PHTN (52mm Hg)     PSHX:   Past Surgical History:   Procedure Laterality Date   • APPENDECTOMY     • BACK SURGERY     • ENDOSCOPY N/A 5/22/2017    Procedure: ESOPHAGOGASTRODUODENOSCOPY WITH GASTON DILATATION 46FR, 48FR,52FR  AND ESOPHAGEAL BRUSHINGS;  Surgeon: Rebecca Becerra MD;  Location: [facility] ENDOSCOPY;  Service:    • EYE SURGERY Right 12/10/2008    Vitrectomy-Dr. Yogi Finnegan   • HYSTERECTOMY     • LAPAROTOMY SALPINGO OOPHORECTOMY N/A 10/29/2008    Dr. Lucas Mills   • UPPER GASTROINTESTINAL ENDOSCOPY N/A 07/24/2015    Dr. Jayce Abrams       [facility] Course: Yudith Robertson  is a 89 y.o. woman whom we last saw in May 2017 for dysphagia.  She had esophageal candidiasis at that time.  She came to the emergency room yesterday because of shortness of breath.  She tells me that she is always short of breath but was more so yesterday.  She also felt some chest pressure and congestion.  She has orthopnea.  She has oxygen at home to use as needed and she has had to use it much more recently.  She's had chills but no fever.  She has a productive cough of clear, sticky mucus.  This is  unchanged for her.  No wheezing.  No sore throat.  The left ear has been a little sore at times.  She has orthopnea and palpitations.  She can tell that her heart is skipping at times.  Chest discomfort is at the anterior chest, more on the left.  She has no radiation of pain.  At times she feels a choking sensation because she can't get her breath.  No ankle swelling.  She weighs daily.  Her weight has been stable.  No other associated symptoms or exacerbating or alleviating factors          The patient was admitted the hospital and had consultation with cardiology.  Patient has low ejection fraction in add diuresis and was started on Entresto and taken off Diovan.  She is feeling better after being the hospital for a few days looked well enough to go home with home health services.  Her Coumadin was reduced to 2 mg and she'll get another pro time on Monday and follow-up with cardiology in 6 weeks and her nurse practitioner in 1 week.  She'll follow-up her primary care doctor in 1 week for continuing care.    Consults:     Consults     Date and Time Order Name Status Description    8/14/2017 0814 Inpatient Consult to Cardiology Completed     8/14/2017 0241 [facility] (on-call MD unless specified) Completed           Results from last 7 days  Lab Units 08/18/17  0608   WBC 10*3/mm3 5.81   HEMOGLOBIN g/dL 10.1*   HEMATOCRIT % 33.3*   PLATELETS 10*3/mm3 325       Results from last 7 days  Lab Units 08/18/17  0608   SODIUM mmol/L 134*   POTASSIUM mmol/L 3.6   CHLORIDE mmol/L 91*   CO2 mmol/L 29.5*   BUN mg/dL 17   CREATININE mg/dL 0.79   GLUCOSE mg/dL 101*   CALCIUM mg/dL 9.0     Significant Diagnostic Studies:   Lab Results   Component Value Date    WBC 5.81 08/18/2017    HGB 10.1 (L) 08/18/2017    HCT 33.3 (L) 08/18/2017     08/18/2017     Lab Results   Component Value Date     (L) 08/18/2017    K 3.6 08/18/2017    CL 91 (L) 08/18/2017    CO2 29.5 (H) 08/18/2017    BUN 17 08/18/2017    CREATININE 0.79  08/18/2017    GLUCOSE 101 (H) 08/18/2017     Lab Results   Component Value Date    CALCIUM 9.0 08/18/2017     No results found for: AST, ALT, ALKPHOS  Lab Results   Component Value Date    INR 2.22 (H) 08/18/2017     No results found for: COLORU, CLARITYU, SPECGRAV, PHUR, PROTEINUR, GLUCOSEU, KETONESU, BLOODU, NITRITE, LEUKOCYTESUR, BILIRUBINUR, UROBILINOGEN, RBCUA, WBCUA, BACTERIA  No results found for: TROPONINT, TROPONINI, BNP  No components found for: HGBA1C;2  No components found for: TSH;2  Imaging Results (all)     Procedure Component Value Units Date/Time    XR Chest 1 View [[id number]] Collected:  08/14/17 0230     Updated:  08/14/17 0234    Narrative:       PORTABLE CHEST X-RAY     CLINICAL HISTORY: Chest pain protocol     COMPARISON: 07/11/2017.     FINDINGS: Portable AP view of the chest was obtained with overlying  monitor leads in place. Lungs are under aerated. There are hazy  groundglass opacities likely related to edema. There are bilateral  pleural effusions. Both have increased slightly. Heart is enlarged.  There are vascular type calculi.       Impression:       Persistent mild CHF with slight increase in bilateral  effusions        This report was finalized on 8/14/2017 2:31 AM by Efren Allen MD.       CT Abdomen Pelvis Without Contrast [[id number]] Collected:  08/14/17 0947     Updated:  08/16/17 0838    Narrative:       CT ABDOMEN AND PELVIS WITHOUT CONTRAST     HISTORY: Tender abdominal mass, just left of midline.     TECHNIQUE: Axial CT images of the abdomen and pelvis were obtained  without administration of intravenous contrast. The patient was not  given oral contrast. Coronal and sagittal reformats were obtained.     COMPARISON: CT abdomen from 10/07/2008.     FINDINGS: Cardiomegaly is present. A small pericardial effusion is seen.  There are bilateral layering pleural effusions with prominent  atelectasis within the bilateral lower lobes, left greater than right.  Calcified coronary  artery disease is present.     There is dilatation of the IVC and hepatic veins that may suggest right  heart dysfunction. The liver demonstrates normal attenuation, however  there is a mildly lobulated outline most inferiorly. The left lobe of  the liver is prominent lying just underneath the anterior abdominal wall  in the left paramidline location and may be corresponding to the mass  felt by palpation. The gallbladder, spleen and the pancreas are normal.  Bilateral adrenal glands are normal. Limited evaluation of the kidneys  secondary to motion artifact however there is a partly exophytic cyst  arising within the midpole of the right kidney. The urinary bladder is  partially distended and normal. The uterus is not identified and is  likely surgically absent. There is a indeterminate solid lesion seen  within the right periurethral space measuring 1.9 x 2.7 cm. This is  unchanged from imaging in 2008. Small amount of layering ascites is  present. Colonic diverticulosis is seen.     Advanced calcified plaque is seen within the abdominal aorta and its  branches. There is dilatation of the bilateral common iliac arteries  measuring 1.7 cm on the right and 1.6 cm on the left. There is a rounded  lesion measuring 1.4 x 1.4 cm seen within the right paracaval location  below the level of the kidneys.  Marked degenerative disc disease is  identified within the spine with vacuum disc phenomena at numerous  levels.       Impression:       1. Enlarged left lobe of the liver below the anterior abdominal wall may  correspond to the mass felt on palpation.  2. Cardiomegaly with small pericardial effusion. Bilateral layering  pleural effusions with bibasilar atelectasis. Small ascites.  3. Rounded lesion measuring 1.4 x 1.4 cm in the right paracaval space of  indeterminate origin. Imaging with contrast be helpful to assess the  nature and characterize. This is new from the imaging in 2008.  Correlation with any interim imaging  may be helpful for  characterization. A metastatic lymph node cannot be excluded.  4. Colonic diverticulosis.     This report was finalized on 8/16/2017 8:35 AM by Dr. Melvin Browning MD.           Lab Results (last 7 days)     Procedure Component Value Units Date/Time    CBC & Differential [[id number]] Collected:  08/14/17 0159    Specimen:  Blood Updated:  08/14/17 0214    Narrative:       The following orders were created for panel order CBC & Differential.  Procedure                               Abnormality         Status                     ---------                               -----------         ------                     CBC Auto Differential[[id number]]        Abnormal            Final result                 Please view results for these tests on the individual orders.    CBC Auto Differential [[id number]]  (Abnormal) Collected:  08/14/17 0159    Specimen:  Blood Updated:  08/14/17 0214     WBC 8.41 10*3/mm3      RBC 3.78 (L) 10*6/mm3      Hemoglobin 10.2 (L) g/dL      Hematocrit 34.2 (L) %      MCV 90.5 fL      MCH 27.0 pg      MCHC 29.8 (L) g/dL      RDW 17.4 (H) %      RDW-SD 57.3 (H) fl      MPV 9.7 fL      Platelets 361 10*3/mm3      Neutrophil % 76.8 (H) %      Lymphocyte % 13.6 (L) %      Monocyte % 8.3 %      Eosinophil % 0.7 %      Basophil % 0.2 %      Immature Grans % 0.4 %      Neutrophils, Absolute 6.46 10*3/mm3      Lymphocytes, Absolute 1.14 10*3/mm3      Monocytes, Absolute 0.70 10*3/mm3      Eosinophils, Absolute 0.06 10*3/mm3      Basophils, Absolute 0.02 10*3/mm3      Immature Grans, Absolute 0.03 10*3/mm3     Protime-INR [[id number]]  (Abnormal) Collected:  08/14/17 0159    Specimen:  Blood Updated:  08/14/17 0231     Protime 38.2 (H) Seconds      INR 4.05 (C)    BNP [[id number]]  (Abnormal) Collected:  08/14/17 0159    Specimen:  Blood Updated:  08/14/17 0237     proBNP 52923.0 (H) pg/mL     Narrative:       Among patients with dyspnea, NT-proBNP is highly sensitive for the detection of  acute congestive heart failure. In addition NT-proBNP of <300 pg/ml effectively rules out acute congestive heart failure with 99% negative predictive value.    Comprehensive Metabolic Panel [[id number]]  (Abnormal) Collected:  08/14/17 0159    Specimen:  Blood Updated:  08/14/17 0238     Glucose 184 (H) mg/dL      BUN 15 mg/dL      Creatinine 0.84 mg/dL      Sodium 136 mmol/L      Potassium 3.9 mmol/L      Chloride 94 (L) mmol/L      CO2 27.5 mmol/L      Calcium 9.8 (H) mg/dL      Total Protein 6.9 g/dL      Albumin 3.90 g/dL      ALT (SGPT) 15 U/L      AST (SGOT) 19 U/L      Alkaline Phosphatase 76 U/L      Total Bilirubin 0.5 mg/dL      eGFR Non African Amer 64 mL/min/1.73      Globulin 3.0 gm/dL      A/G Ratio 1.3 g/dL      BUN/Creatinine Ratio 17.9     Anion Gap 14.5 mmol/L     Narrative:       The MDRD GFR formula is only valid for adults with stable renal function between ages 18 and 70.    Troponin [[id number]]  (Normal) Collected:  08/14/17 0159    Specimen:  Blood Updated:  08/14/17 0238     Troponin T <0.010 ng/mL     Narrative:       Troponin T Reference Ranges:  Less than 0.03 ng/mL:    Negative for AMI  0.03 to 0.09 ng/mL:      Indeterminant for AMI  Greater than 0.09 ng/mL: Positive for AMI    [place] Draw [[id number]] Collected:  08/14/17 0159    Specimen:  Blood Updated:  08/14/17 0302    Narrative:       The following orders were created for panel order [place] Draw.  Procedure                               Abnormality         Status                     ---------                               -----------         ------                     Light Blue Top[[id number]]                                   Final result               Green Top (Gel)[[id number]]                                  Final result               Lavender Top[[id number]]                                     Final result               Gold Top - SST[[id number]]                                   Final result                 Please view results  for these tests on the individual orders.    Light Blue Top [[id number]] Collected:  08/14/17 0159    Specimen:  Blood Updated:  08/14/17 0302     Extra Tube hold for add-on      Auto resulted       Green Top (Gel) [[id number]] Collected:  08/14/17 0159    Specimen:  Blood Updated:  08/14/17 0302     Extra Tube Hold for add-ons.      Auto resulted.       Lavender Top [[id number]] Collected:  08/14/17 0159    Specimen:  Blood Updated:  08/14/17 0302     Extra Tube hold for add-on      Auto resulted       Gold Top - SST [[id number]] Collected:  08/14/17 0159    Specimen:  Blood Updated:  08/14/17 0302     Extra Tube Hold for add-ons.      Auto resulted.       Protime-INR [[id number]]  (Abnormal) Collected:  08/14/17 0446    Specimen:  Blood Updated:  08/14/17 0558     Protime 37.8 (H) Seconds      INR 4.00 (C)    Troponin [[id number]]  (Normal) Collected:  08/14/17 0446    Specimen:  Blood Updated:  08/14/17 0606     Troponin T <0.010 ng/mL     Narrative:       Troponin T Reference Ranges:  Less than 0.03 ng/mL:    Negative for AMI  0.03 to 0.09 ng/mL:      Indeterminant for AMI  Greater than 0.09 ng/mL: Positive for AMI    Respiratory Panel, PCR [[id number]]  (Normal) Collected:  08/14/17 1016    Specimen:  Swab from Nasopharynx Updated:  08/14/17 1327     ADENOVIRUS, PCR Not Detected     Coronavirus 229E Not Detected     Coronavirus HKU1 Not Detected     Coronavirus NL63 Not Detected     Coronavirus OC43 Not Detected     Human Metapneumovirus Not Detected     Human Rhinovirus/Enterovirus Not Detected     Influenza B PCR Not Detected     Parainfluenza Virus 1 Not Detected     Parainfluenza Virus 2 Not Detected     Parainfluenza Virus 3 Not Detected     Parainfluenza Virus 4 Not Detected     Bordetella pertussis pcr Not Detected     Influenza 2009 H1N1 by PCR Not Detected     Chlamydophila pneumoniae PCR Not Detected     Mycoplasma pneumo by PCR Not Detected     Influenza A PCR Not Detected     Influenza A H3 Not Detected      Influenza A H1 Not Detected     RSV, PCR Not Detected    Protime-INR [[id number]]  (Abnormal) Collected:  08/15/17 0646    Specimen:  Blood Updated:  08/15/17 0738     Protime 34.7 (H) Seconds      INR 3.59 (H)    CBC (No Diff) [[id number]]  (Abnormal) Collected:  08/15/17 0646    Specimen:  Blood Updated:  08/15/17 0739     WBC 5.81 10*3/mm3      RBC 3.67 (L) 10*6/mm3      Hemoglobin 9.9 (L) g/dL      Hematocrit 32.2 (L) %      MCV 87.7 fL      MCH 27.0 pg      MCHC 30.7 (L) g/dL      RDW 17.3 (H) %      RDW-SD 55.5 (H) fl      MPV 10.1 fL      Platelets 321 10*3/mm3     Hemoglobin A1c [[id number]]  (Abnormal) Collected:  08/15/17 0646    Specimen:  Blood Updated:  08/15/17 0742     Hemoglobin A1C 6.39 (H) %     Narrative:       Hemoglobin A1C Ranges:    Increased Risk for Diabetes  5.7% to 6.4%  Diabetes                     >= 6.5%  Diabetic Goal                < 7.0%    Troponin [[id number]]  (Normal) Collected:  08/15/17 0646    Specimen:  Blood Updated:  08/15/17 0757     Troponin T <0.010 ng/mL     Narrative:       Troponin T Reference Ranges:  Less than 0.03 ng/mL:    Negative for AMI  0.03 to 0.09 ng/mL:      Indeterminant for AMI  Greater than 0.09 ng/mL: Positive for AMI    Lipid Panel [[id number]] Collected:  08/15/17 0646    Specimen:  Blood Updated:  08/15/17 0758     Total Cholesterol 141 mg/dL      Triglycerides 84 mg/dL      HDL Cholesterol 42 mg/dL      LDL Cholesterol  82 mg/dL      VLDL Cholesterol 16.8 mg/dL      LDL/HDL Ratio 1.96    Narrative:       Cholesterol Reference Ranges  (U.S. Department of Health and Human Services ATP III Classifications)    Desirable          <200 mg/dL  Borderline High    200-239 mg/dL  High Risk          >240 mg/dL      Triglyceride Reference Ranges  (U.S. Department of Health and Human Services ATP III Classifications)    Normal           <150 mg/dL  Borderline High  150-199 mg/dL  High             200-499 mg/dL  Very High        >500 mg/dL    HDL Reference  Ranges  (U.S. Department of Health and Human Services ATP III Classifcations)    Low     <40 mg/dl (major risk factor for CHD)  High    >60 mg/dl ('negative' risk factor for CHD)        LDL Reference Ranges  (U.S. Department of Health and Human Services ATP III Classifcations)    Optimal          <100 mg/dL  Near Optimal     100-129 mg/dL  Borderline High  130-159 mg/dL  High             160-189 mg/dL  Very High        >189 mg/dL    Basic Metabolic Panel [[id number]]  (Abnormal) Collected:  08/15/17 0646    Specimen:  Blood Updated:  08/15/17 0758     Glucose 94 mg/dL      BUN 14 mg/dL      Creatinine 0.82 mg/dL      Sodium 134 (L) mmol/L      Potassium 3.1 (L) mmol/L      Chloride 90 (L) mmol/L      CO2 31.4 (H) mmol/L      Calcium 9.0 mg/dL      eGFR Non African Amer 65 mL/min/1.73      BUN/Creatinine Ratio 17.1     Anion Gap 12.6 mmol/L     Narrative:       The MDRD GFR formula is only valid for adults with stable renal function between ages 18 and 70.    Protime-INR [[id number]]  (Abnormal) Collected:  08/16/17 0435    Specimen:  Blood Updated:  08/16/17 0529     Protime 34.1 (H) Seconds      INR 3.51 (H)    CBC (No Diff) [[id number]]  (Abnormal) Collected:  08/16/17 0435    Specimen:  Blood Updated:  08/16/17 0530     WBC 6.34 10*3/mm3      RBC 3.62 (L) 10*6/mm3      Hemoglobin 9.8 (L) g/dL      Hematocrit 32.0 (L) %      MCV 88.4 fL      MCH 27.1 pg      MCHC 30.6 (L) g/dL      RDW 17.3 (H) %      RDW-SD 56.4 (H) fl      MPV 10.0 fL      Platelets 309 10*3/mm3     Basic Metabolic Panel [[id number]]  (Abnormal) Collected:  08/16/17 0435    Specimen:  Blood Updated:  08/16/17 0541     Glucose 97 mg/dL      BUN 17 mg/dL      Creatinine 0.83 mg/dL      Sodium 131 (L) mmol/L      Potassium 3.6 mmol/L      Chloride 90 (L) mmol/L      CO2 30.0 (H) mmol/L      Calcium 8.8 mg/dL      eGFR Non African Amer 65 mL/min/1.73      BUN/Creatinine Ratio 20.5     Anion Gap 11.0 mmol/L     Narrative:       The MDRD GFR formula is  only valid for adults with stable renal function between ages 18 and 70.    CBC (No Diff) [[id number]]  (Abnormal) Collected:  08/17/17 0311    Specimen:  Blood Updated:  08/17/17 0336     WBC 6.30 10*3/mm3      RBC 3.53 (L) 10*6/mm3      Hemoglobin 9.6 (L) g/dL      Hematocrit 31.0 (L) %      MCV 87.8 fL      MCH 27.2 pg      MCHC 31.0 (L) g/dL      RDW 17.1 (H) %      RDW-SD 54.1 (H) fl      MPV 9.8 fL      Platelets 306 10*3/mm3     Protime-INR [[id number]]  (Abnormal) Collected:  08/17/17 0311    Specimen:  Blood Updated:  08/17/17 0344     Protime 29.2 (H) Seconds      INR 2.88 (H)    Basic Metabolic Panel [[id number]]  (Abnormal) Collected:  08/17/17 0311    Specimen:  Blood Updated:  08/17/17 0401     Glucose 97 mg/dL      BUN 18 mg/dL      Creatinine 0.82 mg/dL      Sodium 132 (L) mmol/L      Potassium 3.5 mmol/L      Chloride 89 (L) mmol/L      CO2 31.5 (H) mmol/L      Calcium 9.0 mg/dL      eGFR Non African Amer 65 mL/min/1.73      BUN/Creatinine Ratio 22.0     Anion Gap 11.5 mmol/L     Narrative:       The MDRD GFR formula is only valid for adults with stable renal function between ages 18 and 70.    BNP [[id number]]  (Abnormal) Collected:  08/17/17 0311    Specimen:  Blood Updated:  08/17/17 0401     proBNP 25438.0 (H) pg/mL     Narrative:       Among patients with dyspnea, NT-proBNP is highly sensitive for the detection of acute congestive heart failure. In addition NT-proBNP of <300 pg/ml effectively rules out acute congestive heart failure with 99% negative predictive value.    CBC & Differential [[id number]] Collected:  08/18/17 0608    Specimen:  Blood Updated:  08/18/17 0652    Narrative:       The following orders were created for panel order CBC & Differential.  Procedure                               Abnormality         Status                     ---------                               -----------         ------                     CBC Auto Differential[[id number]]        Abnormal            Final  "result                 Please view results for these tests on the individual orders.    CBC Auto Differential [[id number]]  (Abnormal) Collected:  08/18/17 0608    Specimen:  Blood Updated:  08/18/17 0652     WBC 5.81 10*3/mm3      RBC 3.78 (L) 10*6/mm3      Hemoglobin 10.1 (L) g/dL      Hematocrit 33.3 (L) %      MCV 88.1 fL      MCH 26.7 (L) pg      MCHC 30.3 (L) g/dL      RDW 17.3 (H) %      RDW-SD 55.4 (H) fl      MPV 9.8 fL      Platelets 325 10*3/mm3      Neutrophil % 56.3 %      Lymphocyte % 28.6 %      Monocyte % 12.6 (H) %      Eosinophil % 1.5 %      Basophil % 0.7 %      Immature Grans % 0.3 %      Neutrophils, Absolute 3.27 10*3/mm3      Lymphocytes, Absolute 1.66 10*3/mm3      Monocytes, Absolute 0.73 10*3/mm3      Eosinophils, Absolute 0.09 10*3/mm3      Basophils, Absolute 0.04 10*3/mm3      Immature Grans, Absolute 0.02 10*3/mm3     Protime-INR [[id number]]  (Abnormal) Collected:  08/18/17 0608    Specimen:  Blood Updated:  08/18/17 0700     Protime 23.9 (H) Seconds      INR 2.22 (H)    Basic Metabolic Panel [[id number]]  (Abnormal) Collected:  08/18/17 0608    Specimen:  Blood Updated:  08/18/17 0707     Glucose 101 (H) mg/dL      BUN 17 mg/dL      Creatinine 0.79 mg/dL      Sodium 134 (L) mmol/L      Potassium 3.6 mmol/L      Chloride 91 (L) mmol/L      CO2 29.5 (H) mmol/L      Calcium 9.0 mg/dL      eGFR Non African Amer 68 mL/min/1.73      BUN/Creatinine Ratio 21.5     Anion Gap 13.5 mmol/L     Narrative:       The MDRD GFR formula is only valid for adults with stable renal function between ages 18 and 70.        /89 (BP Location: Left arm, Patient Position: Lying)  Pulse 88  Temp 97.8 °F (36.6 °C) (Oral)   Resp 18  Ht 60\" (152.4 cm)  Wt 130 lb 9.6 oz (59.2 kg)  SpO2 95%  BMI 25.51 kg/m2    Discharge Exam:  General Appearance:    Alert, cooperative, no distress                          Head:    Normocephalic, without obvious abnormality, atraumatic                          Eyes:         "                    Throat:   Lips, tongue, gums normal                          Neck:   Supple, symmetrical, trachea midline, no JVD                        Lungs:     Clear to auscultation bilaterally, respirations unlabored                Chest Wall:    No tenderness or deformity                        Heart:    Regular rate and rhythm, S1 and S2 normal, no murmur,no  Rub   or gallop                  Abdomen:     Soft, non-tender, bowel sounds active, no masses, no organomegaly                  Extremities:   Extremities normal, atraumatic, no cyanosis or edema                             Skin:   Skin is warm and dry,  no rashes or palpable lesions                  Neurologic:   no focal deficits noted     Disposition:  Home with home health    Patient Instructions:    Yudith Robertson   Home Medication Instructions OLEGARIO:[id number]    Printed on:08/18/17 6867   Medication Information                      acetaminophen (TYLENOL) 500 MG tablet  Take 500 mg by mouth every 6 (six) hours as needed for mild pain (1-3).             aspirin 81 MG EC tablet  Take 81 mg by mouth daily.             carvedilol (COREG) 12.5 MG tablet  Take 1 tablet by mouth 2 (Two) Times a Day With Meals for 90 days.             clotrimazole-betamethasone (LOTRISONE) 1-0.05 % cream               fexofenadine (ALLEGRA) 180 MG tablet  Take 1 tablet by mouth Daily.             furosemide (LASIX) 40 MG tablet  Take 1 tablet by mouth Daily.             isosorbide mononitrate (IMDUR) 60 MG 24 hr tablet  Take 1 tablet by mouth  twice a day             lansoprazole (PREVACID) 15 MG capsule  Take 1 capsule by mouth Daily.             LORazepam (ATIVAN) 0.5 MG tablet  Take 1 tablet by mouth Every 8 (Eight) Hours As Needed for Anxiety.             polyethylene glycol (MIRALAX) powder  Take 17 g by mouth daily.             potassium chloride (K-DUR) 10 MEQ CR tablet  Take 1 tablet by mouth Daily.             sacubitril-valsartan (ENTRESTO) 49-51 MG  tablet  Take 1 tablet by mouth Every 12 (Twelve) Hours.             warfarin (COUMADIN) 2 MG tablet  2 mg daily in afternoon and get protime/INR on Monday               Future Appointments  Date Time Provider Department Center   8/25/2017 10:30 AM Gillian Christensen APRTESS [facility] None   9/29/2017 11:40 AM Felicitas Brantley MD [facility] None   10/31/2017 10:00 AM Gillian ChristensenSTEFANO CD [facility] None   11/14/2017 10:00 AM Deon Sinha MD [facility] None   2/6/2018 10:00 AM MD VINITA Sanchez CD [facility] None     Additional Instructions for the Follow-ups that You Need to Schedule     Ambulatory Referral to Home Health    As directed    Face to Face Visit Date:  8/18/2017   Follow-up Provider for Plan of Care?:  I treated the patient in an acute care facility and will not continue treatment after discharge.   Follow-up Provider:  DEON SINHA   Reason/Clinical Findings:  weak   Describe mobility limitations that make leaving home difficult:  weak   Nursing/Therapeutic Services Requested:   Skilled Nursing  Physical Therapy      Skilled nursing orders:  Other   PT orders:  Other   Frequency:  1 Week 1             Follow-up Information     Follow up with [facility] .    Specialty:  Home Health Services    Contact information:    [address]  [facility] [address]  [phone]        Follow up with Felicitas Brantley MD Follow up in 6 week(s).    Specialty:  Cardiology    Why:  See Aminata Christensen in 1 week    Contact information:    [address]  [address]  [place] [id number] [phone]          Follow up with Deon Sinha MD Follow up in 1 week(s).    Specialty:  Internal Medicine    Contact information:    [address]  [address]  [place] [address]  [phone]          Discharge Order     Start     Ordered    08/18/17 1350  Discharge patient  Once     Expected Discharge Date:  08/18/17    Discharge Disposition:  [place]-Health Care [facility] [place]        08/18/17 1350           Total time spent discharging patient including evaluation,post hospitalization follow up,  medication and post hospitalization instructions and education total time exceeds 30 minutes.    Signed:  Serafin Chen MD  8/18/2017  1:53 PM

## 2017-08-18 NOTE — PROGRESS NOTES
"Patient Name: Yudith Robertson  :1927  89 y.o.      Patient Care Team:  Deon Garzon MD as PCP - General (Internal Medicine)  Deon Garzon MD as PCP - Claims Attributed  Latrice Riley RN as Care Coordinator (Population Health)    Interval History:   Status post normal stress test    Subjective:  Following for acute systolic congestive heart failure     Objective   Vital Signs  Temp:  [97.8 °F (36.6 °C)-98.8 °F (37.1 °C)] 97.8 °F (36.6 °C)  Heart Rate:  [69-88] 88  Resp:  [18-20] 18  BP: (121-165)/(72-89) 165/89    Intake/Output Summary (Last 24 hours) at 17 0918  Last data filed at 17 0738   Gross per 24 hour   Intake               60 ml   Output             1500 ml   Net            -1440 ml     Flowsheet Rows         First Filed Value    Admission Height  60\" (152.4 cm) Documented at 2017 0143    Admission Weight  138 lb (62.6 kg) Documented at 2017 0143          Physical Exam:   General Appearance:    Alert, cooperative, in no acute distress   Lungs:     Clear to auscultation.  Normal respiratory effort and rate.      Heart:    Irregular with a normal rate, normal S1 and S2, no murmurs, gallops or rubs.     Chest Wall:    No abnormalities observed   Abdomen:     Soft, nontender, positive bowel sounds.     Extremities:   no cyanosis, clubbing or edema.  No marked joint deformities.  Adequate musculoskeletal strength.       Results Review:      Results from last 7 days  Lab Units 17  0608   SODIUM mmol/L 134*   POTASSIUM mmol/L 3.6   CHLORIDE mmol/L 91*   CO2 mmol/L 29.5*   BUN mg/dL 17   CREATININE mg/dL 0.79   GLUCOSE mg/dL 101*   CALCIUM mg/dL 9.0       Results from last 7 days  Lab Units 08/15/17  0646 17  0446 17  0159   TROPONIN T ng/mL <0.010 <0.010 <0.010       Results from last 7 days  Lab Units 17  0608   WBC 10*3/mm3 5.81   HEMOGLOBIN g/dL 10.1*   HEMATOCRIT % 33.3*   PLATELETS 10*3/mm3 325       Results from last 7 days  Lab Units 17  0608 " 08/17/17  0311 08/16/17  0435   INR  2.22* 2.88* 3.51*       Results from last 7 days  Lab Units 08/15/17  0646   CHOLESTEROL mg/dL 141           Results from last 7 days  Lab Units 08/15/17  0646   CHOLESTEROL mg/dL 141   TRIGLYCERIDES mg/dL 84   HDL CHOL mg/dL 42         Medication Review:     aspirin 81 mg Oral Daily   carvedilol 12.5 mg Oral BID With Meals   cetirizine 10 mg Oral Daily   docusate sodium 100 mg Oral Q12H   furosemide 40 mg Oral Daily   isosorbide mononitrate 60 mg Oral BID   pantoprazole 40 mg Oral QAM   polyethylene glycol 17 g Oral Daily   potassium chloride 10 mEq Oral Daily   sacubitril-valsartan 1 tablet Oral Q12H   warfarin 2 mg Oral Daily             Assessment/Plan     1.  Acute systolic congestive heart failure.  Echocardiogram on August 14 showed an ejection fraction of 27%.  This is a marked decline compared to her normal ejection fraction in August 2016.  She is on carvedilol and valsartan at home.  She was diuresed with IV Lasix.  Discontinue valsartan and start Enteresto.  2.  Permanent atrial fibrillation.  She is on anticoagulation with warfarin.  Her INR was supratherapeutic.  Warfarin has been on hold and her INR is declining.  I am going to restart warfarin today.  3.  Mild mitral regurgitation.  4.  Mild to moderate tricuspid regurgitation with severe pulmonary hypertension  5.  History of carotid stenosis with 30-40% bilateral plaque  6.  GERD with esophageal stenosis  7.  Hyperlipidemia  8.  Left bundle branch block  9.  History of stroke  10.  Hypertension  11.  Nonobstructive coronary artery disease  12.  Hypokalemia.  Replaced      - Start Enteresto.  Discontinue valsartan  - Resume warfarin at a slightly lower dose.  - Okay with discharge home today  - Follow up with Aminata Christensen in 1 week  - Follow up with me in 6 weeks  - INR on Monday    Felicitas Brantley MD, [facility]  08/18/17  9:18 AM

## 2017-08-21 RX ORDER — LORAZEPAM 0.5 MG/1
TABLET ORAL
Qty: 90 TABLET | Refills: 0 | OUTPATIENT
Start: 2017-08-21 | End: 2017-10-19 | Stop reason: SDUPTHER

## 2017-08-23 DIAGNOSIS — I10 ESSENTIAL HYPERTENSION: ICD-10-CM

## 2017-08-23 RX ORDER — CARVEDILOL 12.5 MG/1
TABLET ORAL
Qty: 180 TABLET | Refills: 1 | Status: SHIPPED | OUTPATIENT
Start: 2017-08-23 | End: 2018-02-06 | Stop reason: SDUPTHER

## 2017-08-24 ENCOUNTER — OFFICE VISIT (OUTPATIENT)
Dept: INTERNAL MEDICINE | Facility: CLINIC | Age: 82
End: 2017-08-24

## 2017-08-24 VITALS
SYSTOLIC BLOOD PRESSURE: 160 MMHG | DIASTOLIC BLOOD PRESSURE: 70 MMHG | OXYGEN SATURATION: 95 % | BODY MASS INDEX: 26.9 KG/M2 | HEART RATE: 85 BPM | WEIGHT: 137 LBS | HEIGHT: 60 IN

## 2017-08-24 DIAGNOSIS — I50.43 ACUTE ON CHRONIC COMBINED SYSTOLIC AND DIASTOLIC CONGESTIVE HEART FAILURE (HCC): Primary | ICD-10-CM

## 2017-08-24 DIAGNOSIS — I10 ESSENTIAL HYPERTENSION: ICD-10-CM

## 2017-08-24 DIAGNOSIS — I48.21 PERMANENT ATRIAL FIBRILLATION (HCC): ICD-10-CM

## 2017-08-24 PROBLEM — I50.40 COMBINED SYSTOLIC AND DIASTOLIC CONGESTIVE HEART FAILURE (HCC): Status: ACTIVE | Noted: 2017-08-24

## 2017-08-24 LAB
ALBUMIN SERPL-MCNC: 2.72 G/DL (ref 3.4–4.6)
ALBUMIN/GLOB SERPL: 0.7 G/DL
ALP SERPL-CCNC: 68 U/L (ref 46–116)
ALT SERPL W P-5'-P-CCNC: 15 U/L (ref 14–59)
ANION GAP SERPL CALCULATED.3IONS-SCNC: 8 MMOL/L
AST SERPL-CCNC: 17 U/L (ref 7–37)
BASOPHILS # BLD AUTO: 0.03 10*3/MM3 (ref 0–0.2)
BASOPHILS NFR BLD AUTO: 0.4 % (ref 0–2)
BILIRUB SERPL-MCNC: 0.4 MG/DL (ref 0.2–1)
BUN BLD-MCNC: 18 MG/DL (ref 6–22)
BUN/CREAT SERPL: 17.5 (ref 7–25)
CALCIUM SPEC-SCNC: 9.4 MG/DL (ref 8.6–10.5)
CHLORIDE SERPL-SCNC: 99 MMOL/L (ref 95–107)
CO2 SERPL-SCNC: 30 MMOL/L (ref 23–32)
CREAT BLD-MCNC: 1.03 MG/DL (ref 0.55–1.02)
DEPRECATED RDW RBC AUTO: 53 FL (ref 37–54)
EOSINOPHIL # BLD AUTO: 0.1 10*3/MM3 (ref 0–0.7)
EOSINOPHIL NFR BLD AUTO: 1.2 % (ref 0–5)
ERYTHROCYTE [DISTWIDTH] IN BLOOD BY AUTOMATED COUNT: 17.2 % (ref 11.5–15)
GFR SERPL CREATININE-BSD FRML MDRD: 50 ML/MIN/1.73
GLOBULIN UR ELPH-MCNC: 4.1 GM/DL
GLUCOSE BLD-MCNC: 155 MG/DL (ref 70–100)
HCT VFR BLD AUTO: 33.2 % (ref 34.1–44.9)
HGB BLD-MCNC: 10.2 G/DL (ref 11.2–15.7)
LYMPHOCYTES # BLD AUTO: 1.4 10*3/MM3 (ref 0.8–7)
LYMPHOCYTES NFR BLD AUTO: 17 % (ref 10–60)
MCH RBC QN AUTO: 26.6 PG (ref 26–34)
MCHC RBC AUTO-ENTMCNC: 30.7 G/DL (ref 31–37)
MCV RBC AUTO: 86.7 FL (ref 80–100)
MONOCYTES # BLD AUTO: 0.75 10*3/MM3 (ref 0–1)
MONOCYTES NFR BLD AUTO: 9.1 % (ref 0–13)
NEUTROPHILS # BLD AUTO: 5.96 10*3/MM3 (ref 1–11)
NEUTROPHILS NFR BLD AUTO: 72.3 % (ref 30–85)
PLATELET # BLD AUTO: 318 10*3/MM3 (ref 150–450)
PMV BLD AUTO: 11 FL (ref 6–12)
POTASSIUM BLD-SCNC: 3.7 MMOL/L (ref 3.3–5.3)
PROT SERPL-MCNC: 6.8 G/DL (ref 6.3–8.4)
RBC # BLD AUTO: 3.83 10*6/MM3 (ref 3.93–5.22)
SODIUM BLD-SCNC: 137 MMOL/L (ref 136–145)
WBC NRBC COR # BLD: 8.24 10*3/MM3 (ref 5–10)

## 2017-08-24 PROCEDURE — 85025 COMPLETE CBC W/AUTO DIFF WBC: CPT | Performed by: INTERNAL MEDICINE

## 2017-08-24 PROCEDURE — 80053 COMPREHEN METABOLIC PANEL: CPT | Performed by: INTERNAL MEDICINE

## 2017-08-24 PROCEDURE — 99214 OFFICE O/P EST MOD 30 MIN: CPT | Performed by: INTERNAL MEDICINE

## 2017-08-24 NOTE — PROGRESS NOTES
Subjective   Yudith Robertson is a 89 y.o. female.     Shortness of Breath   This is a chronic problem. The current episode started 1 to 4 weeks ago.        The following portions of the patient's history were reviewed and updated as appropriate: allergies, current medications, past family history, past medical history, past social history, past surgical history and problem list.    Review of Systems   Constitutional:        Here for hospital F/U Was in for CHF   HENT: Negative.    Eyes: Negative.    Respiratory: Positive for shortness of breath ( Is doing better since out of hospital).    Cardiovascular:        Was in hospital for CHF Was diuresed & started on Entresto 40MG which is helping   Gastrointestinal: Negative.    Genitourinary: Negative.    Musculoskeletal: Positive for arthralgias (Arthritis giving her fits).   Neurological: Negative.        Objective   Physical Exam   Constitutional: She is oriented to person, place, and time. She appears well-developed.   HENT:   Head: Normocephalic.   Eyes: EOM are normal.   Neck: Neck supple.   Cardiovascular: Normal rate and normal heart sounds.    Repeat 140/60 Irreg irreg   Pulmonary/Chest: Effort normal and breath sounds normal.   Musculoskeletal: Normal range of motion.   Neurological: She is alert and oriented to person, place, and time.   Vitals reviewed.      Assessment/Plan   Yudith was seen today for labs only.    Diagnoses and all orders for this visit:    Acute on chronic combined systolic and diastolic congestive heart failure  -     proBNP; Future  -     CBC Auto Differential; Future  -     Comprehensive Metabolic Panel; Future  -     proBNP  -     CBC Auto Differential  -     Cancel: Comprehensive Metabolic Panel  -     Comprehensive Metabolic Panel; Future  -     Comprehensive Metabolic Panel    Permanent atrial fibrillation    Essential hypertension

## 2017-08-25 ENCOUNTER — OFFICE VISIT (OUTPATIENT)
Dept: CARDIOLOGY | Facility: CLINIC | Age: 82
End: 2017-08-25

## 2017-08-25 VITALS
HEART RATE: 84 BPM | HEIGHT: 60 IN | BODY MASS INDEX: 25.91 KG/M2 | DIASTOLIC BLOOD PRESSURE: 68 MMHG | WEIGHT: 132 LBS | SYSTOLIC BLOOD PRESSURE: 142 MMHG

## 2017-08-25 DIAGNOSIS — I50.43 ACUTE ON CHRONIC COMBINED SYSTOLIC AND DIASTOLIC CONGESTIVE HEART FAILURE (HCC): Primary | ICD-10-CM

## 2017-08-25 DIAGNOSIS — I10 ESSENTIAL HYPERTENSION: ICD-10-CM

## 2017-08-25 DIAGNOSIS — I48.21 PERMANENT ATRIAL FIBRILLATION (HCC): ICD-10-CM

## 2017-08-25 LAB — NT-PROBNP SERPL-MCNC: ABNORMAL PG/ML (ref 0–738)

## 2017-08-25 PROCEDURE — 99213 OFFICE O/P EST LOW 20 MIN: CPT | Performed by: NURSE PRACTITIONER

## 2017-08-25 PROCEDURE — 93000 ELECTROCARDIOGRAM COMPLETE: CPT | Performed by: NURSE PRACTITIONER

## 2017-08-25 NOTE — PROGRESS NOTES
Date of Office Visit: 2017  Encounter Provider: STEAFNO Marie  Place of Service: [facility] CARDIOLOGY  Patient Name: Yudith Robertson  :1927    Chief Complaint   Patient presents with   • Shortness of Breath   :     HPI: Yudith Robertson is a 89 y.o. female comes in today for one week hospital follow up.     She has a history of permanent atrial fibrillation, nonobstructive coronary disease, cardiomyopathy, hypertension, and a stroke.She has had an EF as low as 35-40%. Her last echocardiogram was in 2016 showing an EF of 63%, mild aortic regurgitation, mild mitral regurgitation and moderate tricuspid valve stenosis. Mild tricuspid regurgitation with elevated RVSP of 51.       In May 2017, she was admitted to the hospital with pneumonia. She was worked up for aspiration pneumonia. A week after discharge, she was admitted to [facility] for stroke. She did have a thrombectomy. She then followed up with Dr. Brantley and was doing relatively well. She had a follow-up with her nephrology and her furosemide was decreased to 10 mg daily.    In 2017, she came in with complaints of shortness of breath.  She was volume overloaded.  We attempted to adjust her diuretics as an outpatient as she did not want to be admitted to the hospital.    2017, she went to the emergency room with complaints of shortness of breath.  She also had some chest pressure and congestion.  She had orthopnea.  She had a repeat echocardiogram showing an EF of 27%, mildly reduced RVSP, moderate aortic valve regurgitation, no aortic valve stenosis, mild MR, mild to moderate TR.  She was taken off valsartan and started on Entresto.    Today, she comes in with her daughter. She is in a wheelchair today, but has been doing well at home.  She was able to walk around [employer] earlier this week. She denies feeling her heart racing.  She is short of breath and it does wear her out, but this is  improved.  She denies edema. Her appetite is improved.  She denies dizziness, chest pain, orthopnea or PND. The pharmacist told her she should not take potassium while taking Entresto.   She had labs reviewed at Dr. Garzon's yesterday and her potassium is 3.7.            Past Medical History:   Diagnosis Date   • Allergic rhinitis    • Anemia    • Atrial fibrillation    • Atypical chest pain    • CAD (coronary artery disease)    • Carotid artery stenosis     20-30% bilateral   • Cerebral artery occlusion with cerebral infarction 1997   • CHF (congestive heart failure)    • Diastolic congestive heart failure     Echo 3/2012 with normal LVEF, mod LVH   • Difficulty swallowing    • Dizzy     mild   • Edema     In [place]   • Esophageal reflux     GERD, history of esophageal stenosis s/p dilation   • GERD (gastroesophageal reflux disease)    • H/O bone density study 06/16/2015    Osteopenia   • H/O mammogram 02/04/2014   • H/O thyroid nodule    • History of esophageal stricture    • Hyperlipidemia    • Hypertension    • IBS (irritable bowel syndrome)    • Intestinal obstruction     small bowel obstruction   • LBBB (left bundle branch block)    • Mild aortic insufficiency    • Mild mitral insufficiency    • Mixed hyperlipidemia    • Moderate tricuspid insufficiency    • Mood disorder in conditions classified elsewhere    • Osteoarthrosis    • Osteopenia    • Polyarthropathy, multiple sites    • SOB (shortness of breath)    • Transient cerebral ischemia    • Valvular heart disease     Echo 1/17/14: EF 54%, elevated LAP, mild-mod MR, mod-severe TR, moderate PHTN (52mm Hg)       Past Surgical History:   Procedure Laterality Date   • APPENDECTOMY     • BACK SURGERY     • ENDOSCOPY N/A 5/22/2017    Procedure: ESOPHAGOGASTRODUODENOSCOPY WITH GASTON DILATATION 46FR, 48FR,52FR  AND ESOPHAGEAL BRUSHINGS;  Surgeon: Rebecca Becerra MD;  Location: [facility] ENDOSCOPY;  Service:    • EYE SURGERY Right 12/10/2008    Vitrectomy-Dr. Calix  "Schaal   • HYSTERECTOMY     • LAPAROTOMY SALPINGO OOPHORECTOMY N/A 10/29/2008    Dr. Lucas Mills   • UPPER GASTROINTESTINAL ENDOSCOPY N/A 07/24/2015    Dr. Jayce Abrams           Review of Systems   Constitution: Positive for malaise/fatigue. Negative for fever.   HENT: Negative for ear pain, hearing loss, nosebleeds and sore throat.    Eyes: Negative for double vision, pain, vision loss in left eye, vision loss in right eye and visual disturbance.   Cardiovascular: Negative for claudication, leg swelling and orthopnea.   Respiratory: Positive for cough and shortness of breath. Negative for snoring and wheezing.    Endocrine: Negative for cold intolerance and polyuria.   Skin: Negative for color change, itching and rash.   Musculoskeletal: Positive for joint pain and joint swelling. Negative for muscle cramps.   Gastrointestinal: Negative for abdominal pain, diarrhea, melena, nausea and vomiting.   Genitourinary: Negative for bladder incontinence and hematuria.   Neurological: Negative for excessive daytime sleepiness, dizziness, light-headedness, paresthesias and seizures.   Psychiatric/Behavioral: Positive for depression. The patient is nervous/anxious.    All other systems reviewed and are negative.    All other systems reviewed and are negative    Allergies   Allergen Reactions   • Morphine      Makes pt feel freaked out/loopy       All aspects of family and social history reviewed.          Objective:     Vitals:    08/25/17 1028   BP: 142/68   BP Location: Left arm   Pulse: 84   Weight: 132 lb (59.9 kg)   Height: 60\" (152.4 cm)     Body mass index is 25.78 kg/(m^2).    PHYSICAL EXAM:  Physical Exam   Constitutional: She is oriented to person, place, and time. She appears well-developed and well-nourished.   HENT:   Head: Normocephalic and atraumatic.   Eyes: Conjunctivae and lids are normal. Pupils are equal, round, and reactive to light. Lids are everted and swept, no foreign bodies found.   Neck: Normal " range of motion. Neck supple. No JVD present. Carotid bruit is not present. No tracheal deviation present. No thyroid mass present.   Cardiovascular: Normal rate, regular rhythm, normal heart sounds and intact distal pulses.    Pulses:       Carotid pulses are 2+ on the right side, and 2+ on the left side.       Radial pulses are 2+ on the right side, and 2+ on the left side.        Dorsalis pedis pulses are 2+ on the right side, and 2+ on the left side.   Pulmonary/Chest: Effort normal and breath sounds normal. No accessory muscle usage. No respiratory distress. She has no rales.   Abdominal: Soft. Normal appearance and bowel sounds are normal. There is no tenderness.   Musculoskeletal: Normal range of motion. She exhibits no edema.   Neurological: She is alert and oriented to person, place, and time. She has normal strength.   Skin: Skin is warm, dry and intact. She is not diaphoretic.   Psychiatric: She has a normal mood and affect. Her speech is normal and behavior is normal. Judgment and thought content normal. Cognition and memory are normal.   Vitals reviewed.        ECG 12 Lead  Date/Time: 8/25/2017 10:54 AM  Performed by: ESHA ROWLEY  Authorized by: ESHA ROWLEY   Comparison: compared with previous ECG from 8/15/2017  Similar to previous ECG  Rhythm: atrial fibrillation  Rate: normal  BPM: 84  Conduction: non-specific intraventricular conduction delay  ST Depression: V6  ST Flattening: V5  QRS axis: normal  Clinical impression: abnormal ECG  Comments: Indication: heart failure                Assessment:       Diagnosis Plan   1. Acute on chronic combined systolic and diastolic congestive heart failure     2. Essential hypertension     3. Permanent atrial fibrillation          Orders Placed This Encounter   Procedures   • ECG 12 Lead     This order was created via procedure documentation       Current Outpatient Prescriptions   Medication Sig Dispense Refill   • acetaminophen (TYLENOL) 500 MG  tablet Take 500 mg by mouth every 6 (six) hours as needed for mild pain (1-3).     • aspirin 81 MG EC tablet Take 81 mg by mouth daily.     • carvedilol (COREG) 12.5 MG tablet Take 1 tablet by mouth  twice a day with meals 180 tablet 1   • clotrimazole-betamethasone (LOTRISONE) 1-0.05 % cream As Needed.     • fexofenadine (ALLEGRA) 180 MG tablet Take 1 tablet by mouth Daily. 30 tablet 3   • furosemide (LASIX) 40 MG tablet Take 1 tablet by mouth Daily. 90 tablet 3   • isosorbide mononitrate (IMDUR) 60 MG 24 hr tablet Take 1 tablet by mouth  twice a day 180 tablet 3   • lansoprazole (PREVACID) 15 MG capsule Take 1 capsule by mouth Daily. 90 capsule 0   • LORazepam (ATIVAN) 0.5 MG tablet TAKE ONE TABLET BY MOUTH EVERY 8 HOURS AS NEEDED FOR ANXIETY 90 tablet 0   • polyethylene glycol (MIRALAX) powder Take 17 g by mouth daily.     • potassium chloride (K-DUR) 10 MEQ CR tablet Take 1 tablet by mouth Daily. 30 tablet 2   • sacubitril-valsartan (ENTRESTO) 49-51 MG tablet Take 1 tablet by mouth Every 12 (Twelve) Hours. 60 tablet 0   • warfarin (COUMADIN) 2 MG tablet 2 mg daily in afternoon and get protime/INR on Monday 30 tablet 0     No current facility-administered medications for this visit.             Plan:       Heart failure  Heart Failure  Assessment  • NYHA class III-A - There is limitation of physical activity. The patient is comfortable at rest, but ordinary activity causes fatigue, palpitations or shortness of breath.  • Beta blocker prescribed  • Diuretics prescribed  • Angiotensin receptor blocker (ARB) prescribed  • Left ventricular function is severely reduced by qualitative assessment    Plan  • The patient has received heart failure education on the following topics: dietary sodium restriction, medication instructions and weight monitoring  • The heart failure care plan was discussed with the patient today including: continuing the current program    Subjective/Objective  • The patient reports dyspnea    •  Physical exam findings negative for rales, peripheral edema and elevated JVP.    Now has acute on chronic systolic and I'll systolic heart failure.  Now on Entresto.  Currently on furosemide 40 mg daily.  Appears euvolemic.  Had lab work drawn at PCP yesterday that was normal.  Patient is able to walk around [employer].  She's feels fatigued afterward.  Denies any chest pain but overall stable.    2. HTN-could probably tolerate an increased dose of Entresto.  With her age, I want to be careful and not cause hypotension.  Will continue to monitor.    3. Afib  Atrial Fibrillation and Atrial Flutter  Assessment  • The patient has permanent atrial fibrillation  • This is non-valvular in etiology  • The patient's CHADS2-VASc score is 7  • A [id number] score of 2 or more is considered a high risk for a thromboembolic event    Plan  • Continue in atrial fibrillation with rate control  • Continue warfarin for antithrombotic therapy, bleeding issues discussed  • Continue beta blocker for rate control        Follow up in office in 3 weeks with Dr. Brantley    As always, it has been a pleasure to participate in this patient's care.      Sincerely,      STEFANO Marie

## 2017-09-07 ENCOUNTER — OFFICE VISIT (OUTPATIENT)
Dept: INTERNAL MEDICINE | Facility: CLINIC | Age: 82
End: 2017-09-07

## 2017-09-07 VITALS
HEART RATE: 104 BPM | OXYGEN SATURATION: 92 % | WEIGHT: 140 LBS | TEMPERATURE: 98.2 F | SYSTOLIC BLOOD PRESSURE: 116 MMHG | BODY MASS INDEX: 27.34 KG/M2 | DIASTOLIC BLOOD PRESSURE: 80 MMHG

## 2017-09-07 DIAGNOSIS — J06.9 ACUTE URI: Primary | ICD-10-CM

## 2017-09-07 DIAGNOSIS — R11.2 NAUSEA AND VOMITING, INTRACTABILITY OF VOMITING NOT SPECIFIED, UNSPECIFIED VOMITING TYPE: ICD-10-CM

## 2017-09-07 PROCEDURE — 99213 OFFICE O/P EST LOW 20 MIN: CPT | Performed by: NURSE PRACTITIONER

## 2017-09-07 RX ORDER — AZITHROMYCIN 250 MG/1
TABLET, FILM COATED ORAL
Qty: 6 TABLET | Refills: 0 | Status: SHIPPED | OUTPATIENT
Start: 2017-09-07 | End: 2017-09-07

## 2017-09-07 RX ORDER — AMOXICILLIN 875 MG/1
875 TABLET, COATED ORAL 2 TIMES DAILY
Qty: 14 TABLET | Refills: 0 | Status: SHIPPED | OUTPATIENT
Start: 2017-09-07 | End: 2017-09-14

## 2017-09-07 RX ORDER — MONTELUKAST SODIUM 10 MG/1
1 TABLET ORAL DAILY
COMMUNITY
Start: 2017-08-30 | End: 2017-09-14 | Stop reason: SINTOL

## 2017-09-07 NOTE — PATIENT INSTRUCTIONS
"Upper Respiratory Infection, Adult  Most upper respiratory infections (URIs) are a viral infection of the air passages leading to the lungs. A URI affects the nose, throat, and upper air passages. The most common type of URI is nasopharyngitis and is typically referred to as \"the common cold.\"  URIs run their course and usually go away on their own. Most of the time, a URI does not require medical attention, but sometimes a bacterial infection in the upper airways can follow a viral infection. This is called a secondary infection. Sinus and middle ear infections are common types of secondary upper respiratory infections.  Bacterial pneumonia can also complicate a URI. A URI can worsen asthma and chronic obstructive pulmonary disease (COPD). Sometimes, these complications can require emergency medical care and may be life threatening.   CAUSES  Almost all URIs are caused by viruses. A virus is a type of germ and can spread from one person to another.   RISKS FACTORS  You may be at risk for a URI if:   · You smoke.    · You have chronic heart or lung disease.  · You have a weakened defense (immune) system.    · You are very young or very old.    · You have nasal allergies or asthma.  · You work in crowded or poorly ventilated areas.  · You work in health care facilities or schools.  SIGNS AND SYMPTOMS   Symptoms typically develop 2-3 days after you come in contact with a cold virus. Most viral URIs last 7-10 days. However, viral URIs from the influenza virus (flu virus) can last 14-18 days and are typically more severe. Symptoms may include:   · Runny or stuffy (congested) nose.    · Sneezing.    · Cough.    · Sore throat.    · Headache.    · Fatigue.    · Fever.    · Loss of appetite.    · Pain in your forehead, behind your eyes, and over your cheekbones (sinus pain).  · Muscle aches.    DIAGNOSIS   Your health care provider may diagnose a URI by:  · Physical exam.  · Tests to check that your symptoms are not due to " another condition such as:  ¨ Strep throat.  ¨ Sinusitis.  ¨ Pneumonia.  ¨ Asthma.  TREATMENT   A URI goes away on its own with time. It cannot be cured with medicines, but medicines may be prescribed or recommended to relieve symptoms. Medicines may help:  · Reduce your fever.  · Reduce your cough.  · Relieve nasal congestion.  HOME CARE INSTRUCTIONS   · Take medicines only as directed by your health care provider.    · Gargle warm saltwater or take cough drops to comfort your throat as directed by your health care provider.  · Use a warm mist humidifier or inhale steam from a shower to increase air moisture. This may make it easier to breathe.  · Drink enough fluid to keep your urine clear or pale yellow.    · Eat soups and other clear broths and maintain good nutrition.    · Rest as needed.    · Return to work when your temperature has returned to normal or as your health care provider advises. You may need to stay home longer to avoid infecting others. You can also use a face mask and careful hand washing to prevent spread of the virus.  · Increase the usage of your inhaler if you have asthma.    · Do not use any tobacco products, including cigarettes, chewing tobacco, or electronic cigarettes. If you need help quitting, ask your health care provider.  PREVENTION   The best way to protect yourself from getting a cold is to practice good hygiene.   · Avoid oral or hand contact with people with cold symptoms.    · Wash your hands often if contact occurs.    There is no clear evidence that vitamin C, vitamin E, echinacea, or exercise reduces the chance of developing a cold. However, it is always recommended to get plenty of rest, exercise, and practice good nutrition.   SEEK MEDICAL CARE IF:   · You are getting worse rather than better.    · Your symptoms are not controlled by medicine.    · You have chills.  · You have worsening shortness of breath.  · You have brown or red mucus.  · You have yellow or brown nasal  discharge.  · You have pain in your face, especially when you bend forward.  · You have a fever.  · You have swollen neck glands.  · You have pain while swallowing.  · You have white areas in the back of your throat.  SEEK IMMEDIATE MEDICAL CARE IF:   · You have severe or persistent:    Headache.    Ear pain.    Sinus pain.    Chest pain.  · You have chronic lung disease and any of the following:    Wheezing.    Prolonged cough.    Coughing up blood.    A change in your usual mucus.  · You have a stiff neck.  · You have changes in your:    Vision.    Hearing.    Thinking.    Mood.  MAKE SURE YOU:   · Understand these instructions.  · Will watch your condition.  · Will get help right away if you are not doing well or get worse.     This information is not intended to replace advice given to you by your health care provider. Make sure you discuss any questions you have with your health care provider.     Document Released: 06/13/2002 Document Revised: 05/03/2016 Document Reviewed: 03/25/2015  [employer] Interactive Patient Education ©2017 [employer]

## 2017-09-07 NOTE — PROGRESS NOTES
Subjective   Yudith Robertson is a 89 y.o. female.     HPI Comments: She saw allergist last week for annual check up and she was started on montelukast which she states makes her stomach upset. No recent travel. No known ID contact.     URI    This is a new problem. The current episode started in the past 7 days. The problem has been gradually worsening. There has been no fever. Associated symptoms include congestion (head and chest ), coughing, nausea, a plugged ear sensation, rhinorrhea, sinus pain, sneezing, vomiting (once with coughing ) and wheezing. Pertinent negatives include no abdominal pain, chest pain, diarrhea, ear pain, headaches, neck pain or sore throat. Treatments tried: allergra and monteulkast. The treatment provided mild relief.   Vomiting    This is a new problem. The current episode started yesterday. The problem occurs less than 2 times per day. The problem has been unchanged. There has been no fever. Associated symptoms include coughing and URI. Pertinent negatives include no abdominal pain, chest pain, chills, diarrhea, fever or headaches.   Nausea   This is a new problem. The current episode started in the past 7 days. The problem has been gradually worsening. Associated symptoms include congestion (head and chest ), coughing, nausea and vomiting (once with coughing ). Pertinent negatives include no abdominal pain, chest pain, chills, fatigue, fever, headaches, neck pain or sore throat. Associated symptoms comments: Last bowel movement today . Nothing aggravates the symptoms. She has tried nothing for the symptoms.        The following portions of the patient's history were reviewed and updated as appropriate: allergies, current medications and problem list.    Review of Systems   Constitutional: Negative for appetite change, chills, fatigue and fever.   HENT: Positive for congestion (head and chest ), postnasal drip, rhinorrhea, sinus pressure and sneezing. Negative for ear discharge, ear pain,  facial swelling, hearing loss, sore throat and tinnitus.    Respiratory: Positive for cough and wheezing. Negative for chest tightness and shortness of breath.    Cardiovascular: Negative for chest pain, palpitations and leg swelling.   Gastrointestinal: Positive for nausea and vomiting (once with coughing ). Negative for abdominal pain, constipation and diarrhea.   Musculoskeletal: Negative for neck pain and neck stiffness.   Allergic/Immunologic: Positive for environmental allergies.   Neurological: Negative for headaches.   Hematological: Negative for adenopathy.       Objective   Physical Exam   Constitutional: She appears well-developed and well-nourished. She is cooperative. She does not have a sickly appearance. She does not appear ill.   HENT:   Head: Normocephalic.   Right Ear: Hearing, tympanic membrane and external ear normal. No drainage, swelling or tenderness. No mastoid tenderness. Tympanic membrane is not injected, not scarred, not erythematous and not bulging. Tympanic membrane mobility is normal. No middle ear effusion. No decreased hearing is noted.   Left Ear: Hearing and external ear normal. No drainage, swelling or tenderness. No mastoid tenderness. Tympanic membrane is scarred. Tympanic membrane is not injected, not erythematous and not bulging. Tympanic membrane mobility is normal.  No middle ear effusion. No decreased hearing is noted.   Nose: No mucosal edema, rhinorrhea, sinus tenderness or nasal deformity. Right sinus exhibits maxillary sinus tenderness. Right sinus exhibits no frontal sinus tenderness. Left sinus exhibits maxillary sinus tenderness. Left sinus exhibits no frontal sinus tenderness.   Mouth/Throat: Oropharynx is clear and moist and mucous membranes are normal. Normal dentition.   Eyes: Conjunctivae and lids are normal. Pupils are equal, round, and reactive to light. Right eye exhibits no discharge and no exudate. Left eye exhibits no discharge and no exudate.   Neck:  Trachea normal and normal range of motion. No edema present. No thyroid mass and no thyromegaly present.   Cardiovascular: Normal heart sounds and normal pulses.  An irregularly irregular rhythm present.   No murmur heard.  No pedal edema    Pulmonary/Chest: Breath sounds normal. No respiratory distress. She has no decreased breath sounds. She has no wheezes. She has no rhonchi. She has no rales.   Moist cough    Lymphadenopathy:        Head (right side): No submental, no submandibular, no tonsillar, no preauricular, no posterior auricular and no occipital adenopathy present.        Head (left side): No submental, no submandibular, no tonsillar, no preauricular, no posterior auricular and no occipital adenopathy present.   Neurological: She is alert.   Skin: Skin is warm, dry and intact. No cyanosis. Nails show no clubbing.       Assessment/Plan   Yudith was seen today for uri, vomiting and nausea.    Diagnoses and all orders for this visit:    Acute URI  Comments:  restart nasal sprays; continue with tessalone pearles, and allegra   Orders:  -     Discontinue: azithromycin (ZITHROMAX Z-MORTEZA) 250 MG tablet; Take 2 tablets the first day, then 1 tablet daily for 4 days.  -     amoxicillin (AMOXIL) 875 MG tablet; Take 1 tablet by mouth 2 (Two) Times a Day for 7 days.    Nausea and vomiting, intractability of vomiting not specified, unspecified vomiting type  Comments:  secondary to mucus production       If symptoms persist she will return to clinic for imaging. She will notify cardiologist of start antibiotic (her last INR 2.1 on 9/5/2017). Her a daughter is accompanying her.

## 2017-09-08 ENCOUNTER — TELEPHONE (OUTPATIENT)
Dept: CARDIOLOGY | Facility: HOSPITAL | Age: 82
End: 2017-09-08

## 2017-09-08 NOTE — TELEPHONE ENCOUNTER
Pt called again and wanted to make sure she should still follow up with Nephrology as you directed. Please advise

## 2017-09-08 NOTE — TELEPHONE ENCOUNTER
Yes she needs to see nephrology! Also, will you find out if HH is going out there and can check her INR on Monday? If not, she needs to come in here on Mon for an INR

## 2017-09-08 NOTE — TELEPHONE ENCOUNTER
Phone call from patient that she started an antibiotic 9/7/17. It appears that she is being tested by home health and being dosed by Dr. Brantley.  Last home health note 9/5/17 states that she needs to be tested in 2 weeks.  If she is being tested by home health they need to be notified to go sooner to test due to the antibiotic, if she no longer needs home health and come in she needs to come in on 9/11/17.  Thank you

## 2017-09-14 ENCOUNTER — HOSPITAL ENCOUNTER (OUTPATIENT)
Dept: CARDIOLOGY | Facility: HOSPITAL | Age: 82
Discharge: HOME | End: 2017-09-14
Admitting: INTERNAL MEDICINE

## 2017-09-14 ENCOUNTER — TELEPHONE (OUTPATIENT)
Dept: CARDIOLOGY | Facility: CLINIC | Age: 82
End: 2017-09-14

## 2017-09-14 ENCOUNTER — APPOINTMENT (OUTPATIENT)
Dept: WOMENS IMAGING | Facility: HOSPITAL | Age: 82
End: 2017-09-14

## 2017-09-14 ENCOUNTER — OFFICE VISIT (OUTPATIENT)
Dept: INTERNAL MEDICINE | Facility: CLINIC | Age: 82
End: 2017-09-14

## 2017-09-14 VITALS
HEART RATE: 82 BPM | DIASTOLIC BLOOD PRESSURE: 80 MMHG | TEMPERATURE: 98.3 F | OXYGEN SATURATION: 94 % | RESPIRATION RATE: 20 BRPM | SYSTOLIC BLOOD PRESSURE: 140 MMHG

## 2017-09-14 DIAGNOSIS — I50.43 ACUTE ON CHRONIC COMBINED SYSTOLIC AND DIASTOLIC CONGESTIVE HEART FAILURE (HCC): ICD-10-CM

## 2017-09-14 DIAGNOSIS — J90 BILATERAL PLEURAL EFFUSION: ICD-10-CM

## 2017-09-14 DIAGNOSIS — R05.9 COUGH: Primary | ICD-10-CM

## 2017-09-14 DIAGNOSIS — I50.40 COMBINED SYSTOLIC AND DIASTOLIC CONGESTIVE HEART FAILURE, UNSPECIFIED CONGESTIVE HEART FAILURE CHRONICITY: Primary | ICD-10-CM

## 2017-09-14 DIAGNOSIS — I50.22 CHRONIC SYSTOLIC CONGESTIVE HEART FAILURE (HCC): ICD-10-CM

## 2017-09-14 PROCEDURE — 71020 CHG CHEST X-RAY 2 VW: CPT | Performed by: RADIOLOGY

## 2017-09-14 PROCEDURE — 71020 XR CHEST 2 VW: CPT | Performed by: NURSE PRACTITIONER

## 2017-09-14 PROCEDURE — 99214 OFFICE O/P EST MOD 30 MIN: CPT | Performed by: NURSE PRACTITIONER

## 2017-09-14 RX ORDER — FUROSEMIDE 10 MG/ML
40 INJECTION INTRAMUSCULAR; INTRAVENOUS ONCE
Status: COMPLETED | OUTPATIENT
Start: 2017-09-14 | End: 2017-09-14

## 2017-09-14 RX ORDER — FUROSEMIDE 10 MG/ML
40 SOLUTION ORAL DAILY
Status: DISCONTINUED | OUTPATIENT
Start: 2017-09-14 | End: 2017-09-14

## 2017-09-14 RX ADMIN — FUROSEMIDE 40 MG: 10 INJECTION, SOLUTION INTRAMUSCULAR; INTRAVENOUS at 15:47

## 2017-09-14 NOTE — TELEPHONE ENCOUNTER
I got a call from my nurse practitioner with Dr. Garzon.  Mrs. Robertson has had a cough and shortness of breath.  Was being treated with antibiotics but symptoms were not improving.  Chest x-ray was performed today and she has a significant pleural effusion.  It was recommended that she go to the hospital but the patient declined.  I am going to have her come to the [facility] [place] [facility] for 40 mg of IV Lasix.  The patient is going to increase her oral the Lasix to 40 mg twice a day.  She is supposed to call me on Monday and let me know how she is feeling.

## 2017-09-14 NOTE — PROGRESS NOTES
Subjective   Yudith Robertson is a 89 y.o. female.     HPI Comments: She does not tolerate singulair (prescribed by allergist). She was seen on 9/7/2017 and given amoxicillin. She presented today because she remains with cough and shortness of breath.     URI    This is a recurrent problem. The current episode started 1 to 4 weeks ago. The problem has been gradually worsening. There has been no fever. Associated symptoms include congestion (chest and head ), coughing, nausea, a plugged ear sensation (right side only ), rhinorrhea, sinus pain, sneezing and a sore throat. Pertinent negatives include no abdominal pain, chest pain, ear pain, headaches or wheezing. She has tried antihistamine (nasacort and amoxicllin ) for the symptoms.        The following portions of the patient's history were reviewed and updated as appropriate: allergies, current medications and problem list.    Review of Systems   Constitutional: Positive for chills and fatigue. Negative for appetite change and fever.   HENT: Positive for congestion (chest and head ), postnasal drip, rhinorrhea, sinus pressure, sneezing and sore throat. Negative for ear discharge, ear pain, facial swelling, hearing loss and tinnitus.    Respiratory: Positive for cough and shortness of breath. Negative for chest tightness and wheezing.    Cardiovascular: Negative for chest pain, palpitations and leg swelling.   Gastrointestinal: Positive for nausea. Negative for abdominal pain.   Allergic/Immunologic: Positive for environmental allergies.   Neurological: Negative for headaches.   Hematological: Negative for adenopathy.       Objective   Physical Exam   Constitutional: She appears well-developed and well-nourished. She is cooperative. She does not have a sickly appearance. She does not appear ill.   HENT:   Head: Normocephalic.   Right Ear: Hearing, tympanic membrane and external ear normal. No drainage, swelling or tenderness. No mastoid tenderness. Tympanic membrane is  not injected, not scarred, not erythematous and not bulging. Tympanic membrane mobility is normal. No middle ear effusion. No decreased hearing is noted.   Left Ear: Hearing, tympanic membrane and external ear normal. No drainage, swelling or tenderness. No mastoid tenderness. Tympanic membrane is not injected, not scarred, not erythematous and not bulging. Tympanic membrane mobility is normal.  No middle ear effusion. No decreased hearing is noted.   Nose: Mucosal edema and rhinorrhea present. No sinus tenderness or nasal deformity. Right sinus exhibits maxillary sinus tenderness. Right sinus exhibits no frontal sinus tenderness. Left sinus exhibits maxillary sinus tenderness. Left sinus exhibits no frontal sinus tenderness.   Mouth/Throat: Mucous membranes are normal. Normal dentition. Posterior oropharyngeal erythema (mild ) present.   Eyes: Conjunctivae and lids are normal. Pupils are equal, round, and reactive to light. Right eye exhibits no discharge and no exudate. Left eye exhibits no discharge and no exudate.   Neck: Trachea normal and normal range of motion. No edema present. No thyroid mass and no thyromegaly present.   Cardiovascular: Regular rhythm, normal heart sounds and normal pulses.    No murmur heard.  Pulmonary/Chest: No respiratory distress. She has decreased breath sounds in the left lower field. She has no wheezes. She has no rhonchi. She has rales in the right lower field.   Moist cough    Lymphadenopathy:        Head (right side): No submental, no submandibular, no tonsillar, no preauricular, no posterior auricular and no occipital adenopathy present.        Head (left side): No submental, no submandibular, no tonsillar, no preauricular, no posterior auricular and no occipital adenopathy present.   Neurological: She is alert. Abnormal gait: in wheelchair    Skin: Skin is warm, dry and intact. No cyanosis. Nails show no clubbing.       Assessment/Plan   Yudith was seen today for  uri.    Diagnoses and all orders for this visit:    Cough  -     XR Chest 2 View    Bilateral pleural effusion  Comments:  she will increase furosemide bid.     Chronic systolic congestive heart failure    CXR with bilateral effusions.  Compared with previous image 8/2016. Sent for final review.  I discussed case with Dr Garzon and Dr Brantley. Patient doesn't want to go to ER. Dr Brantley will have patient get IV dose of lasix at main office today. She will increase her oral lasix 40 mg bid and call on Monday to inform her how she is doing. Patient instructed to go to ER if any worsening of symptoms. Her daughter is accompanying her and voices understanding.

## 2017-09-14 NOTE — PATIENT INSTRUCTIONS
Pleural Effusion  A pleural effusion is an abnormal buildup of fluid in the layers of tissue between your lungs and the inside of your chest (pleural space). These two layers of tissue that line both your lungs and the inside of your chest are called pleura. Usually, there is no air in the space between the pleura, only a thin layer of fluid. If left untreated, a large amount of fluid can build up and cause the lung to collapse. A pleural effusion is usually caused by another disease that requires treatment.  The two main types of pleural effusion are:  · Transudative pleural effusion. This happens when fluid leaks into the pleural space because of a low protein count in your blood or high blood pressure in your vessels. Heart failure often causes this.  · Exudative infusion. This occurs when fluid collects in the pleural space from blocked blood vessels or lymph vessels. Some lung diseases, injuries, and cancers can cause this type of effusion.  CAUSES  Pleural effusion can be caused by:  · Heart failure.  · A blood clot in the lung (pulmonary embolism).  · Pneumonia.  · Cancer.  · Liver failure (cirrhosis).  · Kidney disease.  · Complications from surgery, such as from open heart surgery.  SIGNS AND SYMPTOMS  In some cases, pleural effusion may cause no symptoms. Symptoms can include:  · Shortness of breath, especially when lying down.  · Chest pain, often worse when taking a deep breath.  · Fever.  · Dry cough that is lasting (chronic).  · Hiccups.  · Rapid breathing.  An underlying condition that is causing the pleural effusion (such as heart failure, pneumonia, blood clots, tuberculosis, or cancer) may also cause additional symptoms.  DIAGNOSIS  Your health care provider may suspect pleural effusion based on your symptoms and medical history. Your health care provider will also do a physical exam and a chest X-ray. If the X-ray shows there is fluid in your chest, you may need to have this fluid removed using a  needle (thoracentesis) so it can be tested.  You may also have:  · Imaging studies of the chest, such as:    Ultrasound.    CT scan.  · Blood tests for kidney and liver function.  TREATMENT  Treatment depends on the cause of the pleural effusion. Treatment may include:  · Taking antibiotic medicines to clear up an infection that is causing the pleural effusion.  · Placing a tube in the chest to drain the effusion (tube thoracostomy). This procedure is often used when there is an infection in the fluid.  · Surgery to remove the fibrous outer layer of tissue from the pleural space (decortication).  · Thoracentesis, which can improve cough and shortness of breath.  · A procedure to put medicine into the chest cavity to seal the pleural space to prevent fluid buildup (pleurodesis).  · Chemotherapy and radiation therapy. These may be required in the case of cancerous (malignant) pleural effusion.  HOME CARE INSTRUCTIONS  · Take medicines only as directed by your health care provider.  · Keep track of how long you can gently exercise before you get short of breath. Try simply walking at first.  · Do not use any tobacco products, including cigarettes, chewing tobacco, or electronic cigarettes. If you need help quitting, ask your health care provider.  · Keep all follow-up visits as directed by your health care provider. This is important.  SEEK MEDICAL CARE IF:  · The amount of time that you are able to exercise decreases or does not improve with time.  · You have pain or signs of infection at the puncture site if you had thoracentesis. Watch for:    Drainage.    Redness.    Swelling.  · You have a fever.  SEEK IMMEDIATE MEDICAL CARE IF:  · You are short of breath.  · You develop chest pain.  · You develop a new cough.  MAKE SURE YOU:  · Understand these instructions.  · Will watch your condition.  · Will get help right away if you are not doing well or get worse.     This information is not intended to replace advice  given to you by your health care provider. Make sure you discuss any questions you have with your health care provider.     Document Released: 12/18/2006 Document Revised: 01/08/2016 Document Reviewed: 05/13/2015  [employer] Interactive Patient Education ©2017 [employer]

## 2017-09-16 ENCOUNTER — TELEPHONE (OUTPATIENT)
Dept: CARDIOLOGY | Facility: CLINIC | Age: 82
End: 2017-09-16

## 2017-09-16 ENCOUNTER — APPOINTMENT (OUTPATIENT)
Dept: GENERAL RADIOLOGY | Facility: HOSPITAL | Age: 82
End: 2017-09-16

## 2017-09-16 ENCOUNTER — HOSPITAL ENCOUNTER (INPATIENT)
Facility: HOSPITAL | Age: 82
LOS: 6 days | Discharge: HOME HEALTH | End: 2017-09-22
Attending: EMERGENCY MEDICINE | Admitting: INTERNAL MEDICINE

## 2017-09-16 DIAGNOSIS — R26.2 DIFFICULTY WALKING: ICD-10-CM

## 2017-09-16 DIAGNOSIS — I50.9 ACUTE CONGESTIVE HEART FAILURE, UNSPECIFIED CONGESTIVE HEART FAILURE TYPE: Primary | ICD-10-CM

## 2017-09-16 DIAGNOSIS — I50.21 ACUTE SYSTOLIC CONGESTIVE HEART FAILURE (HCC): ICD-10-CM

## 2017-09-16 LAB
ALBUMIN SERPL-MCNC: 3.5 G/DL (ref 3.5–5.2)
ALBUMIN/GLOB SERPL: 1.3 G/DL
ALP SERPL-CCNC: 67 U/L (ref 39–117)
ALT SERPL W P-5'-P-CCNC: 22 U/L (ref 1–33)
ANION GAP SERPL CALCULATED.3IONS-SCNC: 10.7 MMOL/L
AST SERPL-CCNC: 20 U/L (ref 1–32)
BASOPHILS # BLD AUTO: 0.02 10*3/MM3 (ref 0–0.2)
BASOPHILS NFR BLD AUTO: 0.3 % (ref 0–1.5)
BILIRUB SERPL-MCNC: 0.5 MG/DL (ref 0.1–1.2)
BUN BLD-MCNC: 16 MG/DL (ref 8–23)
BUN/CREAT SERPL: 17.8 (ref 7–25)
CALCIUM SPEC-SCNC: 9.7 MG/DL (ref 8.2–9.6)
CHLORIDE SERPL-SCNC: 93 MMOL/L (ref 98–107)
CO2 SERPL-SCNC: 31.3 MMOL/L (ref 22–29)
CREAT BLD-MCNC: 0.9 MG/DL (ref 0.57–1)
DEPRECATED RDW RBC AUTO: 54.6 FL (ref 37–54)
EOSINOPHIL # BLD AUTO: 0.1 10*3/MM3 (ref 0–0.7)
EOSINOPHIL NFR BLD AUTO: 1.3 % (ref 0.3–6.2)
ERYTHROCYTE [DISTWIDTH] IN BLOOD BY AUTOMATED COUNT: 17.4 % (ref 11.7–13)
GFR SERPL CREATININE-BSD FRML MDRD: 59 ML/MIN/1.73
GLOBULIN UR ELPH-MCNC: 2.8 GM/DL
GLUCOSE BLD-MCNC: 113 MG/DL (ref 65–99)
HCT VFR BLD AUTO: 32.8 % (ref 35.6–45.5)
HGB BLD-MCNC: 10.1 G/DL (ref 11.9–15.5)
IMM GRANULOCYTES # BLD: 0.02 10*3/MM3 (ref 0–0.03)
IMM GRANULOCYTES NFR BLD: 0.3 % (ref 0–0.5)
INR PPP: 3.41 (ref 0.9–1.1)
LYMPHOCYTES # BLD AUTO: 1.54 10*3/MM3 (ref 0.9–4.8)
LYMPHOCYTES NFR BLD AUTO: 19.5 % (ref 19.6–45.3)
MCH RBC QN AUTO: 26.5 PG (ref 26.9–32)
MCHC RBC AUTO-ENTMCNC: 30.8 G/DL (ref 32.4–36.3)
MCV RBC AUTO: 86.1 FL (ref 80.5–98.2)
MONOCYTES # BLD AUTO: 0.88 10*3/MM3 (ref 0.2–1.2)
MONOCYTES NFR BLD AUTO: 11.2 % (ref 5–12)
NEUTROPHILS # BLD AUTO: 5.32 10*3/MM3 (ref 1.9–8.1)
NEUTROPHILS NFR BLD AUTO: 67.4 % (ref 42.7–76)
NT-PROBNP SERPL-MCNC: ABNORMAL PG/ML (ref 0–1800)
PLATELET # BLD AUTO: 309 10*3/MM3 (ref 140–500)
PMV BLD AUTO: 10 FL (ref 6–12)
POTASSIUM BLD-SCNC: 4 MMOL/L (ref 3.5–5.2)
PROCALCITONIN SERPL-MCNC: 0.07 NG/ML (ref 0.1–0.25)
PROT SERPL-MCNC: 6.3 G/DL (ref 6–8.5)
PROTHROMBIN TIME: 33.4 SECONDS (ref 11.7–14.2)
RBC # BLD AUTO: 3.81 10*6/MM3 (ref 3.9–5.2)
SODIUM BLD-SCNC: 135 MMOL/L (ref 136–145)
WBC NRBC COR # BLD: 7.88 10*3/MM3 (ref 4.5–10.7)

## 2017-09-16 PROCEDURE — 80053 COMPREHEN METABOLIC PANEL: CPT | Performed by: EMERGENCY MEDICINE

## 2017-09-16 PROCEDURE — 99285 EMERGENCY DEPT VISIT HI MDM: CPT

## 2017-09-16 PROCEDURE — 83880 ASSAY OF NATRIURETIC PEPTIDE: CPT | Performed by: EMERGENCY MEDICINE

## 2017-09-16 PROCEDURE — 85025 COMPLETE CBC W/AUTO DIFF WBC: CPT | Performed by: EMERGENCY MEDICINE

## 2017-09-16 PROCEDURE — 93005 ELECTROCARDIOGRAM TRACING: CPT | Performed by: EMERGENCY MEDICINE

## 2017-09-16 PROCEDURE — 85610 PROTHROMBIN TIME: CPT | Performed by: EMERGENCY MEDICINE

## 2017-09-16 PROCEDURE — 71020 HC CHEST PA AND LATERAL: CPT

## 2017-09-16 PROCEDURE — 93010 ELECTROCARDIOGRAM REPORT: CPT | Performed by: INTERNAL MEDICINE

## 2017-09-16 PROCEDURE — 25010000002 FUROSEMIDE PER 20 MG: Performed by: INTERNAL MEDICINE

## 2017-09-16 PROCEDURE — 84145 PROCALCITONIN (PCT): CPT | Performed by: EMERGENCY MEDICINE

## 2017-09-16 RX ORDER — CARVEDILOL 6.25 MG/1
6.25 TABLET ORAL 2 TIMES DAILY WITH MEALS
Status: DISCONTINUED | OUTPATIENT
Start: 2017-09-16 | End: 2017-09-18

## 2017-09-16 RX ORDER — ACETAMINOPHEN 500 MG
500 TABLET ORAL EVERY 6 HOURS PRN
Status: DISCONTINUED | OUTPATIENT
Start: 2017-09-16 | End: 2017-09-22 | Stop reason: HOSPADM

## 2017-09-16 RX ORDER — NITROGLYCERIN 0.4 MG/1
0.4 TABLET SUBLINGUAL
Status: DISCONTINUED | OUTPATIENT
Start: 2017-09-16 | End: 2017-09-22 | Stop reason: HOSPADM

## 2017-09-16 RX ORDER — WARFARIN SODIUM 2 MG/1
2 TABLET ORAL
Status: DISCONTINUED | OUTPATIENT
Start: 2017-09-16 | End: 2017-09-18

## 2017-09-16 RX ORDER — POTASSIUM CHLORIDE 750 MG/1
10 CAPSULE, EXTENDED RELEASE ORAL 2 TIMES DAILY WITH MEALS
Status: DISCONTINUED | OUTPATIENT
Start: 2017-09-16 | End: 2017-09-22 | Stop reason: HOSPADM

## 2017-09-16 RX ORDER — FUROSEMIDE 10 MG/ML
40 INJECTION INTRAMUSCULAR; INTRAVENOUS EVERY 12 HOURS
Status: DISCONTINUED | OUTPATIENT
Start: 2017-09-16 | End: 2017-09-19

## 2017-09-16 RX ORDER — SODIUM CHLORIDE 0.9 % (FLUSH) 0.9 %
1-10 SYRINGE (ML) INJECTION AS NEEDED
Status: DISCONTINUED | OUTPATIENT
Start: 2017-09-16 | End: 2017-09-22 | Stop reason: HOSPADM

## 2017-09-16 RX ORDER — ISOSORBIDE MONONITRATE 30 MG/1
30 TABLET, EXTENDED RELEASE ORAL
Status: DISCONTINUED | OUTPATIENT
Start: 2017-09-16 | End: 2017-09-18

## 2017-09-16 RX ORDER — LORAZEPAM 0.5 MG/1
0.5 TABLET ORAL EVERY 6 HOURS PRN
Status: DISCONTINUED | OUTPATIENT
Start: 2017-09-16 | End: 2017-09-22 | Stop reason: HOSPADM

## 2017-09-16 RX ORDER — SODIUM CHLORIDE 0.9 % (FLUSH) 0.9 %
10 SYRINGE (ML) INJECTION AS NEEDED
Status: DISCONTINUED | OUTPATIENT
Start: 2017-09-16 | End: 2017-09-22 | Stop reason: HOSPADM

## 2017-09-16 RX ORDER — POLYETHYLENE GLYCOL 3350 17 G/17G
17 POWDER, FOR SOLUTION ORAL DAILY
Status: DISCONTINUED | OUTPATIENT
Start: 2017-09-16 | End: 2017-09-20 | Stop reason: SDUPTHER

## 2017-09-16 RX ORDER — PANTOPRAZOLE SODIUM 40 MG/1
40 TABLET, DELAYED RELEASE ORAL EVERY MORNING
Status: DISCONTINUED | OUTPATIENT
Start: 2017-09-17 | End: 2017-09-22 | Stop reason: HOSPADM

## 2017-09-16 RX ORDER — BUMETANIDE 0.25 MG/ML
1 INJECTION INTRAMUSCULAR; INTRAVENOUS ONCE
Status: COMPLETED | OUTPATIENT
Start: 2017-09-16 | End: 2017-09-16

## 2017-09-16 RX ORDER — ASPIRIN 81 MG/1
81 TABLET ORAL DAILY
Status: DISCONTINUED | OUTPATIENT
Start: 2017-09-16 | End: 2017-09-22 | Stop reason: HOSPADM

## 2017-09-16 RX ADMIN — SACUBITRIL AND VALSARTAN 1 TABLET: 49; 51 TABLET, FILM COATED ORAL at 20:38

## 2017-09-16 RX ADMIN — BUMETANIDE 1 MG: 0.25 INJECTION, SOLUTION INTRAMUSCULAR; INTRAVENOUS at 15:26

## 2017-09-16 RX ADMIN — ISOSORBIDE MONONITRATE 30 MG: 30 TABLET ORAL at 20:37

## 2017-09-16 RX ADMIN — CARVEDILOL 6.25 MG: 6.25 TABLET, FILM COATED ORAL at 20:37

## 2017-09-16 RX ADMIN — WARFARIN SODIUM 2 MG: 2 TABLET ORAL at 20:38

## 2017-09-16 RX ADMIN — POTASSIUM CHLORIDE 10 MEQ: 750 CAPSULE, EXTENDED RELEASE ORAL at 21:06

## 2017-09-16 RX ADMIN — ASPIRIN 81 MG: 81 TABLET ORAL at 20:37

## 2017-09-16 RX ADMIN — LORAZEPAM 0.5 MG: 0.5 TABLET ORAL at 22:46

## 2017-09-16 RX ADMIN — FUROSEMIDE 40 MG: 10 INJECTION, SOLUTION INTRAMUSCULAR; INTRAVENOUS at 20:38

## 2017-09-16 NOTE — TELEPHONE ENCOUNTER
Her daughter called answering service to say that she was not doing well. When I called back, a family member on the phone said she was heading to the ER.

## 2017-09-16 NOTE — ED PROVIDER NOTES
EMERGENCY DEPARTMENT ENCOUNTER    CHIEF COMPLAINT  Chief Complaint: SOA  History given by: Patient  History limited by: Nothing  Room Number: 25/25  PMD: Deon Garzon MD      HPI:  Pt is a 89 y.o. female who presents complaining of chronic SOA that worsened in severity over the last several days. Pt reports productive cough with yellow sputum onset 3-4 days ago, intermittent CP that she describes as sharp and dull onset about 1 week ago that is worse with cough, leg swelling onset last night that has improved, and reduced appetite. Pt denies fever, HA, sore throat, vomiting, diarrhea, dysuria, bloody stool, and rash. The pt is normally on 2 liters of O2 at home as needed. The pt finished a prescription for Amoxi 2 days ago. The pt was recently started on Lasix.  .    Duration/Onset/Timing: Chronic with symptoms worsening in severity over the last several days/Gradual/Constant  Radiation: None  Quality: Shortness of air  Intensity/Severity: Moderate  Associated Symptoms: Productive cough with yellow sputum onset 3-4 days ago, intermittent CP that she describes as sharp and dull onset about 1 week ago that is worse with cough, leg swelling onset last night that has improved, and reduced appetite  Aggravating or Alleviating Factors: None stated  Previous Episodes: None      PAST MEDICAL HISTORY  Active Ambulatory Problems     Diagnosis Date Noted   • Permanent atrial fibrillation 02/03/2016   • Atypical chest pain 02/03/2016   • Hypertension 02/03/2016   • Diastolic CHF, chronic 02/03/2016   • Tricuspid insufficiency 02/03/2016   • Pulmonary hypertension 02/03/2016   • Left bundle branch block 02/03/2016   • Hyperlipidemia    • GERD (gastroesophageal reflux disease)    • Aspiration pneumonia of right lower lobe 05/18/2017   • Acute on chronic combined systolic and diastolic congestive heart failure 08/14/2017   • Acute on chronic respiratory failure with hypoxia 08/14/2017   • Left upper quadrant abdominal mass  08/14/2017   • Oropharyngeal dysphagia 08/14/2017   • Valvular heart disease 08/14/2017   • Combined systolic and diastolic congestive heart failure 08/24/2017     Resolved Ambulatory Problems     Diagnosis Date Noted   • No Resolved Ambulatory Problems     Past Medical History:   Diagnosis Date   • Allergic rhinitis    • Anemia    • Atrial fibrillation    • Atypical chest pain    • CAD (coronary artery disease)    • Carotid artery stenosis    • Cerebral artery occlusion with cerebral infarction 1997   • CHF (congestive heart failure)    • Diastolic congestive heart failure    • Difficulty swallowing    • Dizzy    • Edema    • Esophageal reflux    • GERD (gastroesophageal reflux disease)    • H/O bone density study 06/16/2015   • H/O mammogram 02/04/2014   • H/O thyroid nodule    • History of esophageal stricture    • Hyperlipidemia    • Hypertension    • IBS (irritable bowel syndrome)    • Intestinal obstruction    • LBBB (left bundle branch block)    • Mild aortic insufficiency    • Mild mitral insufficiency    • Mixed hyperlipidemia    • Moderate tricuspid insufficiency    • Mood disorder in conditions classified elsewhere    • Osteoarthrosis    • Osteopenia    • Polyarthropathy, multiple sites    • SOB (shortness of breath)    • Transient cerebral ischemia    • Valvular heart disease        PAST SURGICAL HISTORY  Past Surgical History:   Procedure Laterality Date   • APPENDECTOMY     • BACK SURGERY     • ENDOSCOPY N/A 5/22/2017    Procedure: ESOPHAGOGASTRODUODENOSCOPY WITH GASTON DILATATION 46FR, 48FR,52FR  AND ESOPHAGEAL BRUSHINGS;  Surgeon: Rebecca Becerra MD;  Location: [facility] ENDOSCOPY;  Service:    • EYE SURGERY Right 12/10/2008    Vitrectomy-Dr. Yogi Finnegan   • HYSTERECTOMY     • LAPAROTOMY SALPINGO OOPHORECTOMY N/A 10/29/2008    Dr. Lucas Mills   • UPPER GASTROINTESTINAL ENDOSCOPY N/A 07/24/2015    Dr. Jayce Abrams       FAMILY HISTORY  Family History   Problem Relation Age of Onset   • Stroke  Other    • Stroke Mother    • No Known Problems Daughter    • No Known Problems Daughter    • No Known Problems Daughter        SOCIAL HISTORY  Social History     Social History   • Marital status:      Spouse name: N/A   • Number of children: N/A   • Years of education: N/A     Occupational History   • Not on file.     Social History Main Topics   • Smoking status: Former Smoker     Packs/day: 1.00     Years: 30.00     Quit date: 1/1/1986   • Smokeless tobacco: Never Used   • Alcohol use No      Comment: stopped drinking   • Drug use: No   • Sexual activity: No     Other Topics Concern   • Not on file     Social History Narrative       ALLERGIES  Montelukast and Morphine    REVIEW OF SYSTEMS  Review of Systems   Constitutional: Positive for appetite change (Reduced). Negative for chills and fever.   HENT: Negative.  Negative for sore throat.    Eyes: Negative.    Respiratory: Positive for cough (Productive with yellow sputum) and shortness of breath.    Cardiovascular: Positive for chest pain (Intermittent) and leg swelling (Onset last night that is now improved).   Gastrointestinal: Negative.    Genitourinary: Negative.  Negative for dysuria.   Musculoskeletal: Negative.  Negative for back pain.   Skin: Negative.  Negative for rash.   Neurological: Negative.  Negative for headaches.       PHYSICAL EXAM  ED Triage Vitals   Temp Heart Rate Resp BP SpO2   09/16/17 1340 09/16/17 1340 09/16/17 1340 09/16/17 1340 09/16/17 1340   99.5 °F (37.5 °C) 104 16 157/93 94 %      Temp src Heart Rate Source Patient Position BP Location FiO2 (%)   09/16/17 1340 09/16/17 1340 09/16/17 1340 09/16/17 1340 --   Tympanic Monitor Sitting Left arm        Physical Exam   Constitutional: No distress.   HENT:   Head: Normocephalic and atraumatic.   Mouth/Throat: Oropharynx is clear and moist.   Eyes:   Unremarkable   Cardiovascular: Regular rhythm.  Tachycardia present.    Pulmonary/Chest: No respiratory distress. She has decreased  breath sounds (Bilaterally). She has rales (Coarse in the bilateral bases).   The pt is 89% on room air.   Abdominal: There is generalized tenderness (Mild).   Musculoskeletal: She exhibits no edema or tenderness.   Neurological: She is alert.   Skin: No rash noted.   Nursing note and vitals reviewed.      LAB RESULTS  Lab Results (last 24 hours)     Procedure Component Value Units Date/Time    CBC & Differential [[id number]] Collected:  09/16/17 1429    Specimen:  Blood Updated:  09/16/17 1442    Narrative:       The following orders were created for panel order CBC & Differential.  Procedure                               Abnormality         Status                     ---------                               -----------         ------                     CBC Auto Differential[[id number]]        Abnormal            Final result                 Please view results for these tests on the individual orders.    Comprehensive Metabolic Panel [[id number]]  (Abnormal) Collected:  09/16/17 1429    Specimen:  Blood Updated:  09/16/17 1500     Glucose 113 (H) mg/dL      BUN 16 mg/dL      Creatinine 0.90 mg/dL      Sodium 135 (L) mmol/L      Potassium 4.0 mmol/L      Chloride 93 (L) mmol/L      CO2 31.3 (H) mmol/L      Calcium 9.7 (H) mg/dL      Total Protein 6.3 g/dL      Albumin 3.50 g/dL      ALT (SGPT) 22 U/L      AST (SGOT) 20 U/L      Alkaline Phosphatase 67 U/L      Total Bilirubin 0.5 mg/dL      eGFR Non African Amer 59 (L) mL/min/1.73      Globulin 2.8 gm/dL      A/G Ratio 1.3 g/dL      BUN/Creatinine Ratio 17.8     Anion Gap 10.7 mmol/L     Narrative:       The MDRD GFR formula is only valid for adults with stable renal function between ages 18 and 70.    Protime-INR [[id number]]  (Abnormal) Collected:  09/16/17 1429    Specimen:  Blood Updated:  09/16/17 1457     Protime 33.4 (H) Seconds      INR 3.41 (H)    BNP [[id number]]  (Abnormal) Collected:  09/16/17 1429    Specimen:  Blood Updated:  09/16/17 1500     proBNP  "53418.0 (H) pg/mL     Narrative:       Among patients with dyspnea, NT-proBNP is highly sensitive for the detection of acute congestive heart failure. In addition NT-proBNP of <300 pg/ml effectively rules out acute congestive heart failure with 99% negative predictive value.    Procalcitonin [[id number]]  (Abnormal) Collected:  09/16/17 1429    Specimen:  Blood Updated:  09/16/17 1509     Procalcitonin 0.07 (L) ng/mL     Narrative:       As a Marker for Sepsis (Non-Neonates):   1. <0.5 ng/mL represents a low risk of severe sepsis and/or septic shock.  1. >2 ng/mL represents a high risk of severe sepsis and/or septic shock.    As a Marker for Lower Respiratory Tract Infections that require antibiotic therapy:  PCT on Admission     Antibiotic Therapy             6-12 Hrs later  > 0.5                Strongly Recommended            >0.25 - <0.5         Recommended  0.1 - 0.25           Discouraged                   Remeasure/reassess PCT  <0.1                 Strongly Discouraged          Remeasure/reassess PCT      As 28 day mortality risk marker: \"Change in Procalcitonin Result\" (> 80 % or <=80 %) if Day 0 (or Day 1) and Day 4 values are available. Refer to http://www.[employer]-pct-calculator.com/   Change in PCT <=80 %   A decrease of PCT levels below or equal to 80 % defines a positive change in PCT test result representing a higher risk for 28-day all-cause mortality of patients diagnosed with severe sepsis or septic shock.  Change in PCT > 80 %   A decrease of PCT levels of more than 80 % defines a negative change in PCT result representing a lower risk for 28-day all-cause mortality of patients diagnosed with severe sepsis or septic shock.                CBC Auto Differential [[id number]]  (Abnormal) Collected:  09/16/17 1429    Specimen:  Blood Updated:  09/16/17 1442     WBC 7.88 10*3/mm3      RBC 3.81 (L) 10*6/mm3      Hemoglobin 10.1 (L) g/dL      Hematocrit 32.8 (L) %      MCV 86.1 fL      MCH 26.5 (L) pg      " MCHC 30.8 (L) g/dL      RDW 17.4 (H) %      RDW-SD 54.6 (H) fl      MPV 10.0 fL      Platelets 309 10*3/mm3      Neutrophil % 67.4 %      Lymphocyte % 19.5 (L) %      Monocyte % 11.2 %      Eosinophil % 1.3 %      Basophil % 0.3 %      Immature Grans % 0.3 %      Neutrophils, Absolute 5.32 10*3/mm3      Lymphocytes, Absolute 1.54 10*3/mm3      Monocytes, Absolute 0.88 10*3/mm3      Eosinophils, Absolute 0.10 10*3/mm3      Basophils, Absolute 0.02 10*3/mm3      Immature Grans, Absolute 0.02 10*3/mm3           I ordered the above labs and reviewed the results    RADIOLOGY  XR Chest 2 View   Final Result   Small bilateral basilar atelectasis/infiltrate and pleural   effusions. Cardiomegaly and pulmonary vascular congestion with slight   edema. Follow-up recommended.       This report was finalized on 9/16/2017 3:14 PM by Dr. Paulino Araiza MD.               I ordered the above noted radiological studies. Interpreted by radiologist. Reviewed by me in PACS.       PROCEDURES  Procedures    EKG           EKG time: 1432  Rhythm/Rate: 99 A fib  P waves and VA: A fib  QRS, axis: IVCD   ST and T waves: Unremarkable     Interpreted Contemporaneously by me, independently viewed  Unchanged compared to prior August 2017      PROGRESS AND CONSULTS  ED Course     1424  CXR, EKG, and labs ordered for further evaluation.    1519  Bumex ordered.    1520  BP- 162/90 HR- 103 Temp- 99.5 °F (37.5 °C) (Tympanic) O2 sat- 92%    Rechecked pt, she is resting comfortably. Discussed the lab and XR results with the pt including the elevated BNP. I informed the pt that admitting her would be the best for her treatment, but I informed her that she can also be discharged home after being improved from the Bumex with the plan to have family keep a vigilant eye on her. The pt states she would be alright with being admitted.    1524  Consult placed to [facility].    1620  Received a call from Dr. Garcia and discussed pt's case. Dr. Garcia  agreed with plan to admit the pt.      MEDICAL DECISION MAKING  Results were reviewed/discussed with the patient and they were also made aware of online access. Pt also made aware that some labs, such as cultures, will not be resulted during ER visit and follow up with PMD is necessary.     MDM  Number of Diagnoses or Management Options  Acute congestive heart failure, unspecified congestive heart failure type:      Amount and/or Complexity of Data Reviewed  Clinical lab tests: ordered and reviewed (BNP - 66186)  Tests in the radiology section of CPT®: ordered and reviewed (CXR - Small bilateral basilar atelectasis/infiltrate and pleural effusions. Cardiomegaly and pulmonary vascular congestion with slight edema. Follow-up recommended.)  Tests in the medicine section of CPT®: ordered and reviewed (Refer to the procedure section of the note for EKG results  )  Discussion of test results with the performing providers: yes (Dr. Garcia)  Decide to obtain previous medical records or to obtain history from someone other than the patient: yes  Discuss the patient with other providers: yes    Patient Progress  Patient progress: stable         DIAGNOSIS  Final diagnoses:   Acute congestive heart failure, unspecified congestive heart failure type       DISPOSITION  ADMISSION    Discussed treatment plan and reason for admission with pt/family and admitting physician.  Pt/family voiced understanding of the plan for admission for further testing/treatment as needed.         Latest Documented Vital Signs:  As of 4:33 PM  BP- 140/75 HR- 108 Temp- 99.5 °F (37.5 °C) (Tympanic) O2 sat- 94%    --  Documentation assistance provided by jeromy Larios for Dr. Levi.  Information recorded by the jeromy was done at my direction and has been verified and validated by me.     Mayo Larios  09/16/17 1285       David Levi MD  09/16/17 4120

## 2017-09-17 LAB
INR PPP: 3.82 (ref 0.9–1.1)
MAGNESIUM SERPL-MCNC: 1.9 MG/DL (ref 1.6–2.4)
POTASSIUM BLD-SCNC: 4.4 MMOL/L (ref 3.5–5.2)
PROTHROMBIN TIME: 36.5 SECONDS (ref 11.7–14.2)
TROPONIN T SERPL-MCNC: <0.01 NG/ML (ref 0–0.03)

## 2017-09-17 PROCEDURE — 99232 SBSQ HOSP IP/OBS MODERATE 35: CPT | Performed by: INTERNAL MEDICINE

## 2017-09-17 PROCEDURE — 25010000002 ENOXAPARIN PER 10 MG: Performed by: INTERNAL MEDICINE

## 2017-09-17 PROCEDURE — 25010000002 FUROSEMIDE PER 20 MG: Performed by: INTERNAL MEDICINE

## 2017-09-17 PROCEDURE — 84484 ASSAY OF TROPONIN QUANT: CPT | Performed by: INTERNAL MEDICINE

## 2017-09-17 PROCEDURE — 84132 ASSAY OF SERUM POTASSIUM: CPT | Performed by: INTERNAL MEDICINE

## 2017-09-17 PROCEDURE — 25010000002 ONDANSETRON PER 1 MG: Performed by: INTERNAL MEDICINE

## 2017-09-17 PROCEDURE — 83735 ASSAY OF MAGNESIUM: CPT | Performed by: INTERNAL MEDICINE

## 2017-09-17 PROCEDURE — 85610 PROTHROMBIN TIME: CPT | Performed by: INTERNAL MEDICINE

## 2017-09-17 RX ORDER — GUAIFENESIN 600 MG/1
600 TABLET, EXTENDED RELEASE ORAL EVERY 12 HOURS SCHEDULED
Status: DISCONTINUED | OUTPATIENT
Start: 2017-09-17 | End: 2017-09-18

## 2017-09-17 RX ORDER — ONDANSETRON 2 MG/ML
4 INJECTION INTRAMUSCULAR; INTRAVENOUS EVERY 6 HOURS PRN
Status: DISCONTINUED | OUTPATIENT
Start: 2017-09-17 | End: 2017-09-22 | Stop reason: HOSPADM

## 2017-09-17 RX ADMIN — ASPIRIN 81 MG: 81 TABLET ORAL at 08:39

## 2017-09-17 RX ADMIN — FUROSEMIDE 40 MG: 10 INJECTION, SOLUTION INTRAMUSCULAR; INTRAVENOUS at 08:39

## 2017-09-17 RX ADMIN — LORAZEPAM 0.5 MG: 0.5 TABLET ORAL at 21:59

## 2017-09-17 RX ADMIN — ISOSORBIDE MONONITRATE 30 MG: 30 TABLET ORAL at 08:39

## 2017-09-17 RX ADMIN — PANTOPRAZOLE SODIUM 40 MG: 40 TABLET, DELAYED RELEASE ORAL at 06:55

## 2017-09-17 RX ADMIN — ONDANSETRON 4 MG: 2 INJECTION INTRAMUSCULAR; INTRAVENOUS at 12:39

## 2017-09-17 RX ADMIN — CARVEDILOL 6.25 MG: 6.25 TABLET, FILM COATED ORAL at 08:39

## 2017-09-17 RX ADMIN — POTASSIUM CHLORIDE 10 MEQ: 750 CAPSULE, EXTENDED RELEASE ORAL at 08:39

## 2017-09-17 RX ADMIN — SACUBITRIL AND VALSARTAN 1 TABLET: 49; 51 TABLET, FILM COATED ORAL at 21:59

## 2017-09-17 RX ADMIN — FUROSEMIDE 40 MG: 10 INJECTION, SOLUTION INTRAMUSCULAR; INTRAVENOUS at 21:59

## 2017-09-17 RX ADMIN — ONDANSETRON 4 MG: 2 INJECTION INTRAMUSCULAR; INTRAVENOUS at 22:18

## 2017-09-17 RX ADMIN — ENOXAPARIN SODIUM 40 MG: 40 INJECTION SUBCUTANEOUS at 08:39

## 2017-09-17 RX ADMIN — SACUBITRIL AND VALSARTAN 1 TABLET: 49; 51 TABLET, FILM COATED ORAL at 08:39

## 2017-09-17 RX ADMIN — LORAZEPAM 0.5 MG: 0.5 TABLET ORAL at 11:53

## 2017-09-17 RX ADMIN — CARVEDILOL 6.25 MG: 6.25 TABLET, FILM COATED ORAL at 17:35

## 2017-09-17 RX ADMIN — POTASSIUM CHLORIDE 10 MEQ: 750 CAPSULE, EXTENDED RELEASE ORAL at 17:35

## 2017-09-17 NOTE — PLAN OF CARE
Problem: Patient Care Overview (Adult)  Goal: Plan of Care Review  Outcome: Ongoing (interventions implemented as appropriate)    09/17/17 0356   Coping/Psychosocial Response Interventions   Plan Of Care Reviewed With patient;daughter   Patient Care Overview   Progress improving   Outcome Evaluation   Outcome Summary/Follow up Plan pt resting comfortably and without complaint; will continue to monitor       Goal: Adult Individualization and Mutuality  Outcome: Ongoing (interventions implemented as appropriate)  Goal: Discharge Needs Assessment  Outcome: Ongoing (interventions implemented as appropriate)    Problem: Fall Risk (Adult)  Goal: Identify Related Risk Factors and Signs and Symptoms  Outcome: Ongoing (interventions implemented as appropriate)  Goal: Absence of Falls  Outcome: Ongoing (interventions implemented as appropriate)    Problem: Cardiac Output, Decreased (Adult)  Goal: Identify Related Risk Factors and Signs and Symptoms  Outcome: Ongoing (interventions implemented as appropriate)  Goal: Adequate Cardiac Output/Effective Tissue Perfusion  Outcome: Ongoing (interventions implemented as appropriate)

## 2017-09-17 NOTE — PROGRESS NOTES
[facility]  Cardiology Progress Note    Patient Identification:  Name: Yudith Robertson  Age: 89 y.o.  Sex: female  :  1927  MRN: [id number]                 Follow Up / Chief Complaint: Shortness of breath    Interval History:  89-year-old white female with a history of atrial fibrillation chronic anticoagulation and shortness of breath feeling better with no chest pains or tightness in chest     Subjective:      Objective:    Past Medical History:  Past Medical History:   Diagnosis Date   • Allergic rhinitis    • Anemia    • Atrial fibrillation    • Atypical chest pain    • CAD (coronary artery disease)    • Carotid artery stenosis     20-30% bilateral   • Cerebral artery occlusion with cerebral infarction    • CHF (congestive heart failure)    • Diastolic congestive heart failure     Echo 3/2012 with normal LVEF, mod LVH   • Difficulty swallowing    • Dizzy     mild   • Edema     In Calf   • Esophageal reflux     GERD, history of esophageal stenosis s/p dilation   • GERD (gastroesophageal reflux disease)    • H/O bone density study 2015    Osteopenia   • H/O mammogram 2014   • H/O thyroid nodule    • History of esophageal stricture    • Hyperlipidemia    • Hypertension    • IBS (irritable bowel syndrome)    • Intestinal obstruction     small bowel obstruction   • LBBB (left bundle branch block)    • Mild aortic insufficiency    • Mild mitral insufficiency    • Mixed hyperlipidemia    • Moderate tricuspid insufficiency    • Mood disorder in conditions classified elsewhere    • Osteoarthrosis    • Osteopenia    • Polyarthropathy, multiple sites    • SOB (shortness of breath)    • Transient cerebral ischemia    • Valvular heart disease     Echo 14: EF 54%, elevated LAP, mild-mod MR, mod-severe TR, moderate PHTN (52mm Hg)     Past Surgical History:  Past Surgical History:   Procedure Laterality Date   • APPENDECTOMY     • BACK SURGERY     • ENDOSCOPY N/A 2017    Procedure:  ESOPHAGOGASTRODUODENOSCOPY WITH GASTON DILATATION 46FR, 48FR,52FR  AND ESOPHAGEAL BRUSHINGS;  Surgeon: Rebecca Becerra MD;  Location: [facility] ENDOSCOPY;  Service:    • EYE SURGERY Right 12/10/2008    Vitrectomy-Dr. Yogi Finnegan   • HYSTERECTOMY     • LAPAROTOMY SALPINGO OOPHORECTOMY N/A 10/29/2008    Dr. Lucas Mills   • UPPER GASTROINTESTINAL ENDOSCOPY N/A 07/24/2015    Dr. Jayce Abrams        Social History:   Social History   Substance Use Topics   • Smoking status: Former Smoker     Packs/day: 1.00     Years: 30.00     Quit date: 1/1/1986   • Smokeless tobacco: Never Used   • Alcohol use No      Comment: stopped drinking      Family History:  Family History   Problem Relation Age of Onset   • Stroke Other    • Stroke Mother    • No Known Problems Daughter    • No Known Problems Daughter    • No Known Problems Daughter           Allergies:  Allergies   Allergen Reactions   • Montelukast Nausea Only   • Morphine      Makes pt feel freaked out/loopy     Scheduled Meds:    aspirin 81 mg Daily   carvedilol 6.25 mg BID With Meals   furosemide 40 mg Q12H   isosorbide mononitrate 30 mg Q24H   pantoprazole 40 mg QAM   polyethylene glycol 17 g Daily   potassium chloride 10 mEq BID With Meals   sacubitril-valsartan 1 tablet Q12H   warfarin 2 mg Daily           INTAKE AND OUTPUT:    Intake/Output Summary (Last 24 hours) at 09/17/17 1526  Last data filed at 09/17/17 1325   Gross per 24 hour   Intake              720 ml   Output             1100 ml   Net             -380 ml       Review of Systems:   GI:  Cardiac:No chest pains and tightness in chest  Pulmonary: Shortness of breath  All other systems have been reviewed and are negative  Constitutional:  Temp:  [97.5 °F (36.4 °C)-99 °F (37.2 °C)] 99 °F (37.2 °C)  Heart Rate:  [] 82  Resp:  [18] 18  BP: (132-157)/(64-85) 132/67    Physical Exam by Rufino Garcia MD  General:  Appears in no acute distress  Eyes: PERTL,  HEENT:  No JVD. Thyroid not visibly  enlarged. No mucosal pallor or cyanosis  Respiratory: Respirations regular and unlabored at rest. BBS with good air entry in all fields. No crackles, rubs or wheezes auscultated  Cardiovascular: S1S2 irregular rate and rhythm. sys murmur, rub or gallop. No carotid bruits. DP/PT pulses     . No pretibial pitting edema  Gastrointestinal: Abdomen soft, flat, non tender. Bowel sounds present. No hepatosplenomegaly. No ascites  Musculoskeletal: GIRALDO x4. No abnormal movements  Extremities: No digital clubbing or cyanosis  Skin: Color pink. Skin warm and dry to touch. No rashes    Neuro: AAO x3 CN II-XII grossly intact  Psych: Mood and affect normal, pleasant and cooperative        Cardiographics  Telemetry:     ECG:     Echocardiogram:     Lab Review     Results from last 7 days  Lab Units 09/17/17  0508   TROPONIN T ng/mL <0.010           Results from last 7 days  Lab Units 09/16/17  1429   SODIUM mmol/L 135*   POTASSIUM mmol/L 4.0   BUN mg/dL 16   CREATININE mg/dL 0.90   CALCIUM mg/dL 9.7*     @[id number]@    Results from last 7 days  Lab Units 09/16/17  1429   WBC 10*3/mm3 7.88   HEMOGLOBIN g/dL 10.1*   HEMATOCRIT % 32.8*   PLATELETS 10*3/mm3 309       Results from last 7 days  Lab Units 09/16/17  1429   INR  3.41*         Assessment:    Patient Active Problem List   Diagnosis   • Permanent atrial fibrillation   • Atypical chest pain   • Hypertension   • Diastolic CHF, chronic   • Tricuspid insufficiency   • Pulmonary hypertension   • Left bundle branch block   • Hyperlipidemia   • GERD (gastroesophageal reflux disease)   • Aspiration pneumonia of right lower lobe   • Acute on chronic combined systolic and diastolic congestive heart failure   • Acute on chronic respiratory failure with hypoxia   • Left upper quadrant abdominal mass   • Oropharyngeal dysphagia   • Valvular heart disease   • Combined systolic and diastolic congestive heart failure   • Acute congestive heart failure         Plan:  Continue the  "anticoagulation    ProTime has been ordered    Vital signs are within normal limits    Continue Coreg as well as Lasix and isosorbide along with ENTRESTO      Repeat EKGs as well as cardiac enzymes    Recent stress test has been negative    )9/17/2017  MD MICHAEL Bunn/Transcription:   \"Dictated utilizing Dragon dictation\".     "

## 2017-09-17 NOTE — PLAN OF CARE
Problem: Patient Care Overview (Adult)  Goal: Plan of Care Review  Outcome: Ongoing (interventions implemented as appropriate)    09/17/17 7382   Coping/Psychosocial Response Interventions   Plan Of Care Reviewed With patient   Patient Care Overview   Progress improving       Goal: Adult Individualization and Mutuality  Outcome: Ongoing (interventions implemented as appropriate)  Goal: Discharge Needs Assessment  Outcome: Ongoing (interventions implemented as appropriate)    Problem: Fall Risk (Adult)  Goal: Identify Related Risk Factors and Signs and Symptoms  Outcome: Ongoing (interventions implemented as appropriate)  Goal: Absence of Falls  Outcome: Ongoing (interventions implemented as appropriate)

## 2017-09-18 LAB
INR PPP: 3.37 (ref 0.9–1.1)
PROTHROMBIN TIME: 33.1 SECONDS (ref 11.7–14.2)

## 2017-09-18 PROCEDURE — 25010000002 FUROSEMIDE PER 20 MG: Performed by: INTERNAL MEDICINE

## 2017-09-18 PROCEDURE — 85610 PROTHROMBIN TIME: CPT | Performed by: INTERNAL MEDICINE

## 2017-09-18 PROCEDURE — 99232 SBSQ HOSP IP/OBS MODERATE 35: CPT | Performed by: INTERNAL MEDICINE

## 2017-09-18 RX ORDER — ISOSORBIDE MONONITRATE 60 MG/1
60 TABLET, EXTENDED RELEASE ORAL 2 TIMES DAILY
Status: DISCONTINUED | OUTPATIENT
Start: 2017-09-18 | End: 2017-09-22 | Stop reason: HOSPADM

## 2017-09-18 RX ORDER — CARVEDILOL 12.5 MG/1
12.5 TABLET ORAL 2 TIMES DAILY WITH MEALS
Status: DISCONTINUED | OUTPATIENT
Start: 2017-09-18 | End: 2017-09-22 | Stop reason: HOSPADM

## 2017-09-18 RX ORDER — GUAIFENESIN/DEXTROMETHORPHAN 100-10MG/5
5 SYRUP ORAL EVERY 4 HOURS PRN
Status: DISCONTINUED | OUTPATIENT
Start: 2017-09-18 | End: 2017-09-22 | Stop reason: HOSPADM

## 2017-09-18 RX ADMIN — GUAIFENESIN 600 MG: 600 TABLET, EXTENDED RELEASE ORAL at 02:03

## 2017-09-18 RX ADMIN — ISOSORBIDE MONONITRATE 30 MG: 30 TABLET ORAL at 08:31

## 2017-09-18 RX ADMIN — CARVEDILOL 6.25 MG: 6.25 TABLET, FILM COATED ORAL at 08:30

## 2017-09-18 RX ADMIN — PANTOPRAZOLE SODIUM 40 MG: 40 TABLET, DELAYED RELEASE ORAL at 06:55

## 2017-09-18 RX ADMIN — GUAIFENESIN AND DEXTROMETHORPHAN 5 ML: 100; 10 SYRUP ORAL at 17:03

## 2017-09-18 RX ADMIN — FUROSEMIDE 40 MG: 10 INJECTION, SOLUTION INTRAMUSCULAR; INTRAVENOUS at 19:54

## 2017-09-18 RX ADMIN — GUAIFENESIN 600 MG: 600 TABLET, EXTENDED RELEASE ORAL at 08:31

## 2017-09-18 RX ADMIN — ASPIRIN 81 MG: 81 TABLET ORAL at 08:31

## 2017-09-18 RX ADMIN — LORAZEPAM 0.5 MG: 0.5 TABLET ORAL at 21:52

## 2017-09-18 RX ADMIN — FUROSEMIDE 40 MG: 10 INJECTION, SOLUTION INTRAMUSCULAR; INTRAVENOUS at 08:31

## 2017-09-18 RX ADMIN — CARVEDILOL 12.5 MG: 12.5 TABLET, FILM COATED ORAL at 17:03

## 2017-09-18 RX ADMIN — SACUBITRIL AND VALSARTAN 1 TABLET: 49; 51 TABLET, FILM COATED ORAL at 19:54

## 2017-09-18 RX ADMIN — LORAZEPAM 0.5 MG: 0.5 TABLET ORAL at 13:56

## 2017-09-18 RX ADMIN — ISOSORBIDE MONONITRATE 60 MG: 60 TABLET, EXTENDED RELEASE ORAL at 18:02

## 2017-09-18 RX ADMIN — SACUBITRIL AND VALSARTAN 1 TABLET: 49; 51 TABLET, FILM COATED ORAL at 08:31

## 2017-09-18 RX ADMIN — POTASSIUM CHLORIDE 10 MEQ: 750 CAPSULE, EXTENDED RELEASE ORAL at 17:03

## 2017-09-18 RX ADMIN — GUAIFENESIN AND DEXTROMETHORPHAN 5 ML: 100; 10 SYRUP ORAL at 21:11

## 2017-09-18 RX ADMIN — POTASSIUM CHLORIDE 10 MEQ: 750 CAPSULE, EXTENDED RELEASE ORAL at 08:30

## 2017-09-18 NOTE — PROGRESS NOTES
"Discharge Planning Assessment  [facility]     Patient Name: Yudith Robertson  MRN: [id number]  Today's Date: 9/18/2017    Admit Date: 9/16/2017          Discharge Needs Assessment       09/18/17 1510    Discharge Needs Assessment    Equipment Currently Used at Home --   o2 @ HS from [place]      09/18/17 1505    Living Environment    Lives With spouse    Living Arrangements house    Home Accessibility no concerns    Stair Railings at Home none    Type of Financial/Environmental Concern none    Transportation Available car;family or friend will provide    Living Environment    Quality Of Family Relationships supportive    Able to Return to Prior Living Arrangements yes    Discharge Needs Assessment    Concerns To Be Addressed denies needs/concerns at this time    Readmission Within The Last 30 Days no previous admission in last 30 days    Anticipated Changes Related to Illness none    Equipment Currently Used at Home walker, rolling;oxygen    Equipment Needed After Discharge none    Discharge Disposition still a patient            Discharge Plan       09/18/17 1512    Case Management/Social Work Plan    Plan home with spouse and [facility]    Patient/Family In Agreement With Plan yes    Additional Comments IMM letter signed.  Facesheet verified.  Spoke with patient, spouse, and daughter, Briseyda Cleveland, in room.  Introduced self and explained role.  Patient lives with her  and he transports her to appointments.  She uses a rolling walker and uses o2 @2L qhs from [place].  Patient current with [facility].  Jocelin/notified of dc.  Patient went to rehab \"years ago\" after a CVA.  At this time, plan is home with spouse [facility]OPH[facility].  Will ask PT to see. CCP will follow.         Discharge Placement     Facility/Agency Request Status Selected? Address Phone Number Fax Number    [facility] Accepted    Yes [address] [place] [id number] [phone]                Demographic Summary       09/18/17 1504 "    Referral Information    Admission Type inpatient    Arrived From admitted as an inpatient    Referral Source admission list    Reason For Consult discharge planning    Primary Care Physician Information    Name Dr Garzon            Functional Status       09/18/17 6335    Functional Status Current    Ambulation 1-->assistive equipment    Transferring 1-->assistive equipment    Toileting 1-->assistive equipment    Bathing 2-->assistive person    Dressing 0-->independent    Eating 0-->independent    Communication 0-->understands/communicates without difficulty    Swallowing (if score 2 or more for any item, consult Rehab Services) 0-->swallows foods/liquids without difficulty    Change in Functional Status Since Onset of Current Illness/Injury no    Functional Status Prior    Ambulation 1-->assistive equipment    Transferring 1-->assistive equipment    Toileting 1-->assistive equipment    Bathing 2-->assistive person    Dressing 0-->independent    Eating 0-->independent    Communication 0-->understands/communicates without difficulty    Swallowing 0-->swallows foods/liquids without difficulty            Psychosocial     None            Abuse/Neglect     None            Legal     None            Substance Abuse     None            Patient Forms     None          Gillian White, RN

## 2017-09-18 NOTE — PLAN OF CARE
Problem: Patient Care Overview (Adult)  Goal: Plan of Care Review  Outcome: Ongoing (interventions implemented as appropriate)    09/18/17 8536   Coping/Psychosocial Response Interventions   Plan Of Care Reviewed With patient   Patient Care Overview   Progress improving       Goal: Adult Individualization and Mutuality  Outcome: Ongoing (interventions implemented as appropriate)  Goal: Discharge Needs Assessment  Outcome: Ongoing (interventions implemented as appropriate)    Problem: Fall Risk (Adult)  Goal: Absence of Falls  Outcome: Ongoing (interventions implemented as appropriate)    Problem: Cardiac Output, Decreased (Adult)  Goal: Adequate Cardiac Output/Effective Tissue Perfusion  Outcome: Ongoing (interventions implemented as appropriate)

## 2017-09-18 NOTE — DISCHARGE PLACEMENT REQUEST
"Dajuan Darden (89 y.o. Female)     Date of Birth Social Security Number Address Home Phone MRN    07/09/1927  [id number] [facility] [address] [phone] [id number]    Synagogue Marital Status          Christianity        Admission Date Admission Type Admitting Provider Attending Provider Department, Room/Bed    9/16/17 Emergency Felicitas Brantley MD Lash, Jennifer A, MD [address], 418/1    Discharge Date Discharge Disposition Discharge Destination                      Attending Provider: Felicitas Brantley MD     Allergies:  Montelukast, Morphine    Isolation:  None   Infection:  None   Code Status:  FULL    Ht:  60\" (152.4 cm)   Wt:  166 lb (75.3 kg)    Admission Cmt:  None   Principal Problem:  None                Active Insurance as of 9/16/2017     Primary Coverage     Payor Plan Insurance Group Employer/Plan Group    MEDICARE RAILROAD MEDICARE      Payor Plan Address Payor Plan Phone Number Effective From Effective To    [address] [phone] 7/1/[address]     [place] [address]       Subscriber Name Subscriber Birth Date Member ID       DAJUAN DARDEN 7/9/1927 [id number]           Secondary Coverage     Payor Plan Insurance Group Employer/Plan Group    [facility] MED SUPP [facility] HEALTH CARE OPTIONS      Payor Plan Address Payor Plan Phone Number Effective From Effective To    [facility] [phone] 4/1/1993     [address]       [place] [address]       Subscriber Name Subscriber Birth Date Member ID       DAJUAN DARDEN 7/9/1927 [id number]                 Emergency Contacts      (Rel.) Home Phone Work Phone Mobile Phone    Bubba Darden (Spouse) [phone] -- [phone]    DownsJune (Daughter) [phone] -- [phone]    CristoferBriseyda (Daughter) [phone] -- [phone]    Ira Romano (Daughter) [phone] -- --              "

## 2017-09-18 NOTE — PROGRESS NOTES
"Patient Name: Yudith Robertson  :1927  89 y.o.      Patient Care Team:  Deon Garzon MD as PCP - General (Internal Medicine)  Deon Garzon MD as PCP - Claims Attributed  Latrice Riley RN as Care Coordinator (Population Health)    Interval History:   IV diuresis    Subjective:  Following for acute systolic heart failure     Objective   Vital Signs  Temp:  [97.5 °F (36.4 °C)-99 °F (37.2 °C)] 98 °F (36.7 °C)  Heart Rate:  [82-97] 89  Resp:  [18-22] 18  BP: (128-156)/(54-92) 156/92    Intake/Output Summary (Last 24 hours) at 17 1303  Last data filed at 17 1055   Gross per 24 hour   Intake              480 ml   Output             1850 ml   Net            -1370 ml     Flowsheet Rows         First Filed Value    Admission Height  60\" (152.4 cm) Documented at 2017 1340    Admission Weight  138 lb (62.6 kg) Documented at 2017 1340          Physical Exam:   General Appearance:    Alert, cooperative, in no acute distress   Lungs:     Crackles bilaterally.  Normal respiratory effort and rate.      Heart:    Irregularly irregular.     Chest Wall:    No abnormalities observed   Abdomen:     Soft, nontender, positive bowel sounds.     Extremities:   no cyanosis, clubbing or edema.  No marked joint deformities.  Adequate musculoskeletal strength.       Results Review:      Results from last 7 days  Lab Units 17  2216 17  1429   SODIUM mmol/L  --  135*   POTASSIUM mmol/L 4.4 4.0   CHLORIDE mmol/L  --  93*   CO2 mmol/L  --  31.3*   BUN mg/dL  --  16   CREATININE mg/dL  --  0.90   GLUCOSE mg/dL  --  113*   CALCIUM mg/dL  --  9.7*       Results from last 7 days  Lab Units 17  0508   TROPONIN T ng/mL <0.010       Results from last 7 days  Lab Units 17  1429   WBC 10*3/mm3 7.88   HEMOGLOBIN g/dL 10.1*   HEMATOCRIT % 32.8*   PLATELETS 10*3/mm3 309       Results from last 7 days  Lab Units 17  1536 17  1429   INR  3.82* 3.41*           Results from last 7 days  Lab " Units 09/17/17  2216   MAGNESIUM mg/dL 1.9             Medication Review:     aspirin 81 mg Oral Daily   carvedilol 6.25 mg Oral BID With Meals   furosemide 40 mg Intravenous Q12H   guaiFENesin 600 mg Oral Q12H   isosorbide mononitrate 30 mg Oral Q24H   pantoprazole 40 mg Oral QAM   polyethylene glycol 17 g Oral Daily   potassium chloride 10 mEq Oral BID With Meals   sacubitril-valsartan 1 tablet Oral Q12H   warfarin 2 mg Oral Daily             Assessment/Plan     1.  Acute systolic congestive heart failure.  Ejection fraction 27%.  Continue carvedilol and Enteresto.  Continue with IV diuresis.  2.  Chest pain.  She had a heart catheterization in the past which showed nonobstructive disease.  She had a normal stress test in August 2017.  3.  Atrial fibrillation.  Rate controlled.  Anticoagulated with warfarin.  4. Valvular heart disease with moderate tricuspid regurgitation and mild mitral regurgitation.   5. Moderate pulmonary hypertension.   6. History of carotid stenosis, 30%-40% bilateral disease.   7. History of gastroesophageal reflux disease and esophageal stenosis.   8. Hyperlipidemia.   9. History of small bowel obstruction.   10. History of left bundle branch block.   11. History of stroke.     Felicitas Brantley MD, [facility]  09/18/17  1:03 PM

## 2017-09-18 NOTE — PLAN OF CARE
Problem: Patient Care Overview (Adult)  Goal: Plan of Care Review  Outcome: Ongoing (interventions implemented as appropriate)    09/18/17 0452   Coping/Psychosocial Response Interventions   Plan Of Care Reviewed With patient   Patient Care Overview   Progress improving   Outcome Evaluation   Outcome Summary/Follow up Plan VSS and awaiting AM labs. Maintaining strict I&O and daily weights. Assist with out of bed activities. Will continue to monitor.       Goal: Adult Individualization and Mutuality  Outcome: Ongoing (interventions implemented as appropriate)  Goal: Discharge Needs Assessment  Outcome: Ongoing (interventions implemented as appropriate)    Problem: Fall Risk (Adult)  Goal: Identify Related Risk Factors and Signs and Symptoms  Outcome: Outcome(s) achieved Date Met:  09/18/17  Goal: Absence of Falls  Outcome: Ongoing (interventions implemented as appropriate)    Problem: Cardiac Output, Decreased (Adult)  Goal: Identify Related Risk Factors and Signs and Symptoms  Outcome: Outcome(s) achieved Date Met:  09/18/17  Goal: Adequate Cardiac Output/Effective Tissue Perfusion  Outcome: Ongoing (interventions implemented as appropriate)

## 2017-09-19 ENCOUNTER — APPOINTMENT (OUTPATIENT)
Dept: GENERAL RADIOLOGY | Facility: HOSPITAL | Age: 82
End: 2017-09-19
Attending: INTERNAL MEDICINE

## 2017-09-19 LAB
ANION GAP SERPL CALCULATED.3IONS-SCNC: 10.5 MMOL/L
BUN BLD-MCNC: 16 MG/DL (ref 8–23)
BUN/CREAT SERPL: 19.5 (ref 7–25)
CALCIUM SPEC-SCNC: 8.6 MG/DL (ref 8.2–9.6)
CHLORIDE SERPL-SCNC: 91 MMOL/L (ref 98–107)
CO2 SERPL-SCNC: 33.5 MMOL/L (ref 22–29)
CREAT BLD-MCNC: 0.82 MG/DL (ref 0.57–1)
GFR SERPL CREATININE-BSD FRML MDRD: 65 ML/MIN/1.73
GLUCOSE BLD-MCNC: 92 MG/DL (ref 65–99)
INR PPP: 3.25 (ref 0.9–1.1)
POTASSIUM BLD-SCNC: 3.4 MMOL/L (ref 3.5–5.2)
PROTHROMBIN TIME: 32.1 SECONDS (ref 11.7–14.2)
SODIUM BLD-SCNC: 135 MMOL/L (ref 136–145)

## 2017-09-19 PROCEDURE — 97110 THERAPEUTIC EXERCISES: CPT

## 2017-09-19 PROCEDURE — 25010000002 FUROSEMIDE PER 20 MG: Performed by: INTERNAL MEDICINE

## 2017-09-19 PROCEDURE — 85610 PROTHROMBIN TIME: CPT | Performed by: INTERNAL MEDICINE

## 2017-09-19 PROCEDURE — 80048 BASIC METABOLIC PNL TOTAL CA: CPT | Performed by: INTERNAL MEDICINE

## 2017-09-19 PROCEDURE — 71020 HC CHEST PA AND LATERAL: CPT

## 2017-09-19 PROCEDURE — 99232 SBSQ HOSP IP/OBS MODERATE 35: CPT | Performed by: INTERNAL MEDICINE

## 2017-09-19 PROCEDURE — 97162 PT EVAL MOD COMPLEX 30 MIN: CPT

## 2017-09-19 PROCEDURE — 25010000002 ONDANSETRON PER 1 MG: Performed by: INTERNAL MEDICINE

## 2017-09-19 RX ORDER — FUROSEMIDE 10 MG/ML
40 INJECTION INTRAMUSCULAR; INTRAVENOUS EVERY 8 HOURS
Status: DISCONTINUED | OUTPATIENT
Start: 2017-09-19 | End: 2017-09-20

## 2017-09-19 RX ORDER — POTASSIUM CHLORIDE 750 MG/1
40 CAPSULE, EXTENDED RELEASE ORAL ONCE
Status: COMPLETED | OUTPATIENT
Start: 2017-09-19 | End: 2017-09-19

## 2017-09-19 RX ADMIN — POTASSIUM CHLORIDE 40 MEQ: 750 CAPSULE, EXTENDED RELEASE ORAL at 05:39

## 2017-09-19 RX ADMIN — FUROSEMIDE 40 MG: 10 INJECTION, SOLUTION INTRAMUSCULAR; INTRAVENOUS at 08:28

## 2017-09-19 RX ADMIN — CARVEDILOL 12.5 MG: 12.5 TABLET, FILM COATED ORAL at 08:28

## 2017-09-19 RX ADMIN — POTASSIUM CHLORIDE 10 MEQ: 750 CAPSULE, EXTENDED RELEASE ORAL at 08:28

## 2017-09-19 RX ADMIN — ASPIRIN 81 MG: 81 TABLET ORAL at 08:28

## 2017-09-19 RX ADMIN — POTASSIUM CHLORIDE 10 MEQ: 750 CAPSULE, EXTENDED RELEASE ORAL at 17:22

## 2017-09-19 RX ADMIN — ONDANSETRON 4 MG: 2 INJECTION INTRAMUSCULAR; INTRAVENOUS at 16:19

## 2017-09-19 RX ADMIN — ACETAMINOPHEN 500 MG: 500 TABLET ORAL at 09:48

## 2017-09-19 RX ADMIN — CARVEDILOL 12.5 MG: 12.5 TABLET, FILM COATED ORAL at 17:22

## 2017-09-19 RX ADMIN — LORAZEPAM 0.5 MG: 0.5 TABLET ORAL at 22:38

## 2017-09-19 RX ADMIN — GUAIFENESIN AND DEXTROMETHORPHAN 5 ML: 100; 10 SYRUP ORAL at 05:39

## 2017-09-19 RX ADMIN — GUAIFENESIN AND DEXTROMETHORPHAN 5 ML: 100; 10 SYRUP ORAL at 23:21

## 2017-09-19 RX ADMIN — ISOSORBIDE MONONITRATE 60 MG: 60 TABLET, EXTENDED RELEASE ORAL at 17:22

## 2017-09-19 RX ADMIN — ISOSORBIDE MONONITRATE 60 MG: 60 TABLET, EXTENDED RELEASE ORAL at 08:28

## 2017-09-19 RX ADMIN — LORAZEPAM 0.5 MG: 0.5 TABLET ORAL at 09:48

## 2017-09-19 RX ADMIN — SACUBITRIL AND VALSARTAN 1 TABLET: 49; 51 TABLET, FILM COATED ORAL at 19:37

## 2017-09-19 RX ADMIN — POLYETHYLENE GLYCOL 3350 17 G: 17 POWDER, FOR SOLUTION ORAL at 11:58

## 2017-09-19 RX ADMIN — PANTOPRAZOLE SODIUM 40 MG: 40 TABLET, DELAYED RELEASE ORAL at 08:28

## 2017-09-19 RX ADMIN — GUAIFENESIN AND DEXTROMETHORPHAN 5 ML: 100; 10 SYRUP ORAL at 14:58

## 2017-09-19 RX ADMIN — SACUBITRIL AND VALSARTAN 1 TABLET: 49; 51 TABLET, FILM COATED ORAL at 08:28

## 2017-09-19 RX ADMIN — FUROSEMIDE 40 MG: 10 INJECTION, SOLUTION INTRAMUSCULAR; INTRAVENOUS at 17:24

## 2017-09-19 RX ADMIN — GUAIFENESIN AND DEXTROMETHORPHAN 5 ML: 100; 10 SYRUP ORAL at 19:37

## 2017-09-19 NOTE — PLAN OF CARE
Problem: Patient Care Overview (Adult)  Goal: Plan of Care Review  Outcome: Ongoing (interventions implemented as appropriate)    09/19/17 0337   Coping/Psychosocial Response Interventions   Plan Of Care Reviewed With patient   Patient Care Overview   Progress improving   Outcome Evaluation   Outcome Summary/Follow up Plan VSS, no c/o pain, infrequent cough, takes pills whole in applesauce, will continue to monitor       Goal: Adult Individualization and Mutuality  Outcome: Ongoing (interventions implemented as appropriate)  Goal: Discharge Needs Assessment  Outcome: Ongoing (interventions implemented as appropriate)    Problem: Fall Risk (Adult)  Goal: Absence of Falls  Outcome: Ongoing (interventions implemented as appropriate)    Problem: Cardiac Output, Decreased (Adult)  Goal: Adequate Cardiac Output/Effective Tissue Perfusion  Outcome: Ongoing (interventions implemented as appropriate)

## 2017-09-19 NOTE — PROGRESS NOTES
Continued Stay Note  [facility]     Patient Name: Yudith Robertson  MRN: [id number]  Today's Date: 9/19/2017    Admit Date: 9/16/2017          Discharge Plan       09/19/17 1211    Case Management/Social Work Plan    Plan [facility](notified patient will need CHF monitor at discharge).     Patient/Family In Agreement With Plan yes    Additional Comments Spoke with patient and brother at chairside and patient states she plans to return home with spouse and [facility]- to continue.  Message left with Jocelin at [facility] at [phone] to inform that patient will need CHF monitoring.  CCP will continue to follow and assist as needed....Kelsy Hoover RN,CCP               Discharge Codes     None            Kelsy Hoover RN

## 2017-09-19 NOTE — PLAN OF CARE
Problem: Patient Care Overview (Adult)  Goal: Plan of Care Review  Outcome: Outcome(s) achieved Date Met:  09/19/17 09/19/17 1133   Coping/Psychosocial Response Interventions   Plan Of Care Reviewed With patient   Patient Care Overview   Progress improving   Outcome Evaluation   Outcome Summary/Follow up Plan Pt doing well, able to ambulate over 200' with SBA and RWX. Pt reports independence at home with R wx. No skilled PT needs at this time. Pt may amulate with nsg or family PRN. Will sign off.

## 2017-09-19 NOTE — THERAPY DISCHARGE NOTE
Acute Care - Physical Therapy Initial Eval/Discharge  [facility]     Patient Name: Yudith Robertson  : 1927  MRN: [id number]  Today's Date: 2017   Onset of Illness/Injury or Date of Surgery Date: 17  Date of Referral to PT: 17  Referring Physician: Karon      Admit Date: 2017    Visit Dx:    ICD-10-CM ICD-9-CM   1. Acute congestive heart failure, unspecified congestive heart failure type I50.9 428.0   2. Difficulty walking R26.2 719.7     Patient Active Problem List   Diagnosis   • Permanent atrial fibrillation   • Atypical chest pain   • Hypertension   • Diastolic CHF, chronic   • Tricuspid insufficiency   • Pulmonary hypertension   • Left bundle branch block   • Hyperlipidemia   • GERD (gastroesophageal reflux disease)   • Aspiration pneumonia of right lower lobe   • Acute on chronic combined systolic and diastolic congestive heart failure   • Acute on chronic respiratory failure with hypoxia   • Left upper quadrant abdominal mass   • Oropharyngeal dysphagia   • Valvular heart disease   • Combined systolic and diastolic congestive heart failure   • Acute congestive heart failure     Past Medical History:   Diagnosis Date   • Allergic rhinitis    • Anemia    • Atrial fibrillation    • Atypical chest pain    • CAD (coronary artery disease)    • Carotid artery stenosis     20-30% bilateral   • Cerebral artery occlusion with cerebral infarction    • CHF (congestive heart failure)    • Diastolic congestive heart failure     Echo 3/2012 with normal LVEF, mod LVH   • Difficulty swallowing    • Dizzy     mild   • Edema     In Calf   • Esophageal reflux     GERD, history of esophageal stenosis s/p dilation   • GERD (gastroesophageal reflux disease)    • H/O bone density study 2015    Osteopenia   • H/O mammogram 2014   • H/O thyroid nodule    • History of esophageal stricture    • Hyperlipidemia    • Hypertension    • IBS (irritable bowel syndrome)    • Intestinal obstruction      small bowel obstruction   • LBBB (left bundle branch block)    • Mild aortic insufficiency    • Mild mitral insufficiency    • Mixed hyperlipidemia    • Moderate tricuspid insufficiency    • Mood disorder in conditions classified elsewhere    • Osteoarthrosis    • Osteopenia    • Polyarthropathy, multiple sites    • SOB (shortness of breath)    • Transient cerebral ischemia    • Valvular heart disease     Echo 1/17/14: EF 54%, elevated LAP, mild-mod MR, mod-severe TR, moderate PHTN (52mm Hg)     Past Surgical History:   Procedure Laterality Date   • APPENDECTOMY     • BACK SURGERY     • ENDOSCOPY N/A 5/22/2017    Procedure: ESOPHAGOGASTRODUODENOSCOPY WITH GASTON DILATATION 46FR, 48FR,52FR  AND ESOPHAGEAL BRUSHINGS;  Surgeon: Rebecca Becerra MD;  Location: [facility] ENDOSCOPY;  Service:    • EYE SURGERY Right 12/10/2008    Vitrectomy-Dr. Yogi Finnegan   • HYSTERECTOMY     • LAPAROTOMY SALPINGO OOPHORECTOMY N/A 10/29/2008    Dr. Lucas Mills   • UPPER GASTROINTESTINAL ENDOSCOPY N/A 07/24/2015    Dr. Jayce Abrams          PT ASSESSMENT (last 72 hours)      PT Evaluation       09/19/17 1100 09/19/17 0337    Rehab Evaluation    Document Type evaluation  -EJ     Subjective Information agree to therapy;no complaints  -EJ     Patient Effort, Rehab Treatment good  -EJ     Symptoms Noted During/After Treatment none  -EJ     General Information    Onset of Illness/Injury or Date of Surgery Date 09/16/17  -EJ     Referring Physician Raphael  -EJ     General Observations sitting up in chair, no acute distress  -EJ     Precautions/Limitations oxygen therapy device and L/min   2L  -EJ     Prior Level of Function independent:;all household mobility;community mobility;ADL's  -EJ     Equipment Currently Used at Home oxygen;walker, rolling  -EJ oxygen  -AD    Plans/Goals Discussed With patient;patient and family;agreed upon  -EJ     Barriers to Rehab none identified  -EJ     Living Environment    Lives With spouse  -EJ     Living  Arrangements house  -EJ     Home Accessibility no concerns  -EJ     Clinical Impression    Date of Referral to PT 09/19/17  -EJ     PT Diagnosis congestive heart failure  -EJ     Patient/Family Goals Statement Pt plans to return home  -EJ     Criteria for Skilled Therapeutic Interventions Met no problems identified which require skilled intervention  -EJ     Pain Assessment    Pain Assessment No/denies pain  -EJ     Cognitive Assessment/Intervention    Current Cognitive/Communication Assessment functional  -EJ     Orientation Status oriented x 4  -EJ     Follows Commands/Answers Questions 100% of the time  -EJ     Personal Safety WNL/WFL  -EJ     Personal Safety Interventions fall prevention program maintained;gait belt;nonskid shoes/slippers when out of bed;supervised activity  -EJ     ROM (Range of Motion)    General ROM no range of motion deficits identified  -EJ     MMT (Manual Muscle Testing)    General MMT Assessment no strength deficits identified  -EJ     Bed Mobility, Assessment/Treatment    Bed Mob, Supine to Sit, [place] not tested  -EJ     Bed Mob, Sit to Supine, [place] not tested  -EJ     Transfer Assessment/Treatment    Transfers, Sit-Stand [place] stand by assist;contact guard assist  -EJ     Transfers, Stand-Sit [place] stand by assist  -EJ     Transfers, Sit-Stand-Sit, Assist Device rolling walker  -EJ     Gait Assessment/Treatment    Gait, [place] Level verbal cues required;stand by assist  -EJ     Gait, Assistive Device rolling walker  -EJ     Gait, Distance (Feet) 200  -EJ     Gait, Gait Deviations roby decreased  -EJ     Positioning and Restraints    Pre-Treatment Position sitting in chair/recliner  -EJ     Post Treatment Position chair  -EJ     In Chair notified nsg;sitting;call light within reach;encouraged to call for assist;with family/caregiver  -EJ       09/18/17 1510 09/18/17 1505    General Information    Equipment Currently Used at Home --   o2 @ HS from  [place]  -KH walker, rolling;oxygen  -KH    Living Environment    Lives With  spouse  -KH    Living Arrangements  house  -KH    Home Accessibility  no concerns  -KH    Stair Railings at Home  none  -KH    Type of Financial/Environmental Concern  none  -KH    Transportation Available  car;family or friend will provide  -KH      09/16/17 1838 09/16/17 1834    General Information    Equipment Currently Used at Home  walker, rolling;oxygen  -AU    Living Environment    Lives With spouse  -AU     Living Arrangements house  -AU     Home Accessibility no concerns  -AU     Type of Financial/Environmental Concern none  -AU     Transportation Available car;family or friend will provide  -AU       User Key  (r) = Recorded By, (t) = Taken By, (c) = Cosigned By    Initials Name Provider Type    SATYA Ashraf, PT Physical Therapist    DARRYL White, RN Case Manager    AD Greta Patel, RN Registered Nurse    TONEY Everett RN Registered Nurse              PT Recommendation and Plan  Anticipated Discharge Disposition: home with assist, home with home health  PT Frequency: evaluation only  Plan of Care Review  Plan Of Care Reviewed With: patient  Progress: improving  Outcome Summary/Follow up Plan: Pt doing well, able to ambulate over 200' with SBA and RWX. Pt reports independence at home with R wx. No skilled PT needs at this time. Pt may amulate with nsg or family PRN. Will sign off.              Outcome Measures       09/19/17 1100          How much help from another person do you currently need...    Turning from your back to your side while in flat bed without using bedrails? 4  -EJ      Moving from lying on back to sitting on the side of a flat bed without bedrails? 4  -EJ      Moving to and from a bed to a chair (including a wheelchair)? 3  -EJ      Standing up from a chair using your arms (e.g., wheelchair, bedside chair)? 3  -EJ      Climbing 3-5 steps with a railing? 3  -EJ      To walk in hospital  room? 3  -EJ      AM-PAC 6 Clicks Score 20  -EJ      Functional Assessment    Outcome Measure Options AM-PAC 6 Clicks Basic Mobility (PT)  -EJ        User Key  (r) = Recorded By, (t) = Taken By, (c) = Cosigned By    Initials Name Provider Type    SATYA Ashraf, PT Physical Therapist           Time Calculation:         PT Charges       09/19/17 1134          Time Calculation    Start Time 1105  -EJ      Stop Time 1129  -EJ      Time Calculation (min) 24 min  -EJ      PT Received On 09/19/17  -EJ        User Key  (r) = Recorded By, (t) = Taken By, (c) = Cosigned By    Initials Name Provider Type    SATYA Ashraf PT Physical Therapist          Therapy Charges for Today     Code Description Service Date Service Provider Modifiers Qty    49904871929 HC PT EVAL MOD COMPLEXITY 1 9/19/2017 Cecilia Ashraf, PT GP 1    21176751747 HC PT THER PROC EA 15 MIN 9/19/2017 Cecilia Ashraf, PT GP 1          PT G-Codes  Outcome Measure Options: AM-PAC 6 Clicks Basic Mobility (PT)    PT Discharge Summary  Anticipated Discharge Disposition: home with assist, home with home health  Reason for Discharge: At baseline function, Independent  Discharge Destination: Home with assist, Home with home health    Cecilia Ashraf, PT  9/19/2017

## 2017-09-19 NOTE — PROGRESS NOTES
"Patient Name: Yudith Robertson  :1927  89 y.o.      Patient Care Team:  Deon Garzon MD as PCP - General (Internal Medicine)  Deon Garzon MD as PCP - Claims Attributed  Latrice Riley RN as Care Coordinator (Population Health)    Interval History:   diuresed    Subjective:  Following for CHF     Objective   Vital Signs  Temp:  [97.7 °F (36.5 °C)-98.6 °F (37 °C)] 98.3 °F (36.8 °C)  Heart Rate:  [75-92] 88  Resp:  [18] 18  BP: (121-153)/(72-85) 153/77    Intake/Output Summary (Last 24 hours) at 17 0856  Last data filed at 17 0730   Gross per 24 hour   Intake              130 ml   Output             1450 ml   Net            -1320 ml     Flowsheet Rows         First Filed Value    Admission Height  60\" (152.4 cm) Documented at 2017 1340    Admission Weight  138 lb (62.6 kg) Documented at 2017 1340          Physical Exam:   General Appearance:    Alert, cooperative, in no acute distress   Lungs:     Ronchi      Heart:    Regular rhythm and normal rate, normal S1 and S2, no murmurs, gallops or rubs.     Chest Wall:    No abnormalities observed   Abdomen:     Soft, nontender, positive bowel sounds.     Extremities:   no cyanosis, clubbing or edema.  No marked joint deformities.  Adequate musculoskeletal strength.       Results Review:      Results from last 7 days  Lab Units 17  0318   SODIUM mmol/L 135*   POTASSIUM mmol/L 3.4*   CHLORIDE mmol/L 91*   CO2 mmol/L 33.5*   BUN mg/dL 16   CREATININE mg/dL 0.82   GLUCOSE mg/dL 92   CALCIUM mg/dL 8.6       Results from last 7 days  Lab Units 17  0508   TROPONIN T ng/mL <0.010       Results from last 7 days  Lab Units 17  1429   WBC 10*3/mm3 7.88   HEMOGLOBIN g/dL 10.1*   HEMATOCRIT % 32.8*   PLATELETS 10*3/mm3 309       Results from last 7 days  Lab Units 17  0318 17  1246 17  1536   INR  3.25* 3.37* 3.82*           Results from last 7 days  Lab Units 17  2216   MAGNESIUM mg/dL 1.9           "   Medication Review:     aspirin 81 mg Oral Daily   carvedilol 12.5 mg Oral BID With Meals   furosemide 40 mg Intravenous Q12H   isosorbide mononitrate 60 mg Oral BID   pantoprazole 40 mg Oral QAM   polyethylene glycol 17 g Oral Daily   potassium chloride 10 mEq Oral BID With Meals   sacubitril-valsartan 1 tablet Oral Q12H             Assessment/Plan     1.  Acute systolic congestive heart failure.  Ejection fraction 27%.  Continue carvedilol and Enteresto.  Continue with IV diuresis.  2.  Chest pain.  She had a heart catheterization in the past which showed nonobstructive disease.  She had a normal stress test in August 2017.  3.  Atrial fibrillation.  Rate controlled.  Anticoagulated with warfarin.  4. Valvular heart disease with moderate tricuspid regurgitation and mild mitral regurgitation.   5. Moderate pulmonary hypertension.   6. History of carotid stenosis, 30%-40% bilateral disease.   7. History of gastroesophageal reflux disease and esophageal stenosis.   8. Hyperlipidemia.   9. History of small bowel obstruction.   10. History of left bundle branch block.   11. History of stroke.   12. Cough    - check to see if needs home O2  - CXR  - CCP to help with more aggressive HH for CHF  - Increase marcia Brantley MD, [facility]  09/19/17  8:56 AM

## 2017-09-20 LAB
ANION GAP SERPL CALCULATED.3IONS-SCNC: 10.3 MMOL/L
BUN BLD-MCNC: 18 MG/DL (ref 8–23)
BUN/CREAT SERPL: 18.9 (ref 7–25)
CALCIUM SPEC-SCNC: 8.9 MG/DL (ref 8.2–9.6)
CHLORIDE SERPL-SCNC: 90 MMOL/L (ref 98–107)
CO2 SERPL-SCNC: 33.7 MMOL/L (ref 22–29)
CREAT BLD-MCNC: 0.95 MG/DL (ref 0.57–1)
GFR SERPL CREATININE-BSD FRML MDRD: 55 ML/MIN/1.73
GLUCOSE BLD-MCNC: 90 MG/DL (ref 65–99)
INR PPP: 2.83 (ref 0.9–1.1)
POTASSIUM BLD-SCNC: 3.7 MMOL/L (ref 3.5–5.2)
PROTHROMBIN TIME: 28.8 SECONDS (ref 11.7–14.2)
SODIUM BLD-SCNC: 134 MMOL/L (ref 136–145)

## 2017-09-20 PROCEDURE — 99233 SBSQ HOSP IP/OBS HIGH 50: CPT | Performed by: INTERNAL MEDICINE

## 2017-09-20 PROCEDURE — 25010000002 ONDANSETRON PER 1 MG: Performed by: INTERNAL MEDICINE

## 2017-09-20 PROCEDURE — 80048 BASIC METABOLIC PNL TOTAL CA: CPT | Performed by: INTERNAL MEDICINE

## 2017-09-20 PROCEDURE — 25010000002 FUROSEMIDE PER 20 MG: Performed by: INTERNAL MEDICINE

## 2017-09-20 PROCEDURE — 85610 PROTHROMBIN TIME: CPT | Performed by: INTERNAL MEDICINE

## 2017-09-20 RX ORDER — WARFARIN SODIUM 1 MG/1
1 TABLET ORAL
Status: DISCONTINUED | OUTPATIENT
Start: 2017-09-20 | End: 2017-09-22 | Stop reason: HOSPADM

## 2017-09-20 RX ORDER — POLYETHYLENE GLYCOL 3350 17 G/17G
17 POWDER, FOR SOLUTION ORAL DAILY
Status: DISCONTINUED | OUTPATIENT
Start: 2017-09-20 | End: 2017-09-20 | Stop reason: SDUPTHER

## 2017-09-20 RX ORDER — DOCUSATE SODIUM 100 MG/1
100 CAPSULE, LIQUID FILLED ORAL 2 TIMES DAILY
Status: DISCONTINUED | OUTPATIENT
Start: 2017-09-20 | End: 2017-09-22 | Stop reason: HOSPADM

## 2017-09-20 RX ORDER — POLYETHYLENE GLYCOL 3350 17 G/17G
17 POWDER, FOR SOLUTION ORAL DAILY
Status: DISCONTINUED | OUTPATIENT
Start: 2017-09-20 | End: 2017-09-22 | Stop reason: HOSPADM

## 2017-09-20 RX ORDER — BUMETANIDE 0.25 MG/ML
2 INJECTION INTRAMUSCULAR; INTRAVENOUS 2 TIMES DAILY
Status: DISCONTINUED | OUTPATIENT
Start: 2017-09-20 | End: 2017-09-21

## 2017-09-20 RX ORDER — BUMETANIDE 0.25 MG/ML
2 INJECTION INTRAMUSCULAR; INTRAVENOUS EVERY 8 HOURS
Status: DISCONTINUED | OUTPATIENT
Start: 2017-09-20 | End: 2017-09-20

## 2017-09-20 RX ADMIN — SACUBITRIL AND VALSARTAN 1 TABLET: 49; 51 TABLET, FILM COATED ORAL at 21:03

## 2017-09-20 RX ADMIN — WARFARIN SODIUM 1 MG: 1 TABLET ORAL at 17:42

## 2017-09-20 RX ADMIN — GUAIFENESIN AND DEXTROMETHORPHAN 5 ML: 100; 10 SYRUP ORAL at 15:33

## 2017-09-20 RX ADMIN — BUMETANIDE 2 MG: 0.25 INJECTION INTRAMUSCULAR; INTRAVENOUS at 23:18

## 2017-09-20 RX ADMIN — SACUBITRIL AND VALSARTAN 1 TABLET: 49; 51 TABLET, FILM COATED ORAL at 09:29

## 2017-09-20 RX ADMIN — FUROSEMIDE 40 MG: 10 INJECTION, SOLUTION INTRAMUSCULAR; INTRAVENOUS at 00:22

## 2017-09-20 RX ADMIN — POTASSIUM CHLORIDE 10 MEQ: 750 CAPSULE, EXTENDED RELEASE ORAL at 17:42

## 2017-09-20 RX ADMIN — ISOSORBIDE MONONITRATE 60 MG: 60 TABLET, EXTENDED RELEASE ORAL at 17:42

## 2017-09-20 RX ADMIN — DOCUSATE SODIUM 100 MG: 100 CAPSULE, LIQUID FILLED ORAL at 17:42

## 2017-09-20 RX ADMIN — ISOSORBIDE MONONITRATE 60 MG: 60 TABLET, EXTENDED RELEASE ORAL at 09:29

## 2017-09-20 RX ADMIN — PANTOPRAZOLE SODIUM 40 MG: 40 TABLET, DELAYED RELEASE ORAL at 09:29

## 2017-09-20 RX ADMIN — POTASSIUM CHLORIDE 10 MEQ: 750 CAPSULE, EXTENDED RELEASE ORAL at 09:29

## 2017-09-20 RX ADMIN — LORAZEPAM 0.5 MG: 0.5 TABLET ORAL at 12:09

## 2017-09-20 RX ADMIN — LORAZEPAM 0.5 MG: 0.5 TABLET ORAL at 22:26

## 2017-09-20 RX ADMIN — GUAIFENESIN AND DEXTROMETHORPHAN 5 ML: 100; 10 SYRUP ORAL at 22:27

## 2017-09-20 RX ADMIN — ASPIRIN 81 MG: 81 TABLET ORAL at 09:29

## 2017-09-20 RX ADMIN — POLYETHYLENE GLYCOL 3350 17 G: 17 POWDER, FOR SOLUTION ORAL at 14:03

## 2017-09-20 RX ADMIN — ONDANSETRON 4 MG: 2 INJECTION INTRAMUSCULAR; INTRAVENOUS at 12:09

## 2017-09-20 RX ADMIN — BUMETANIDE 2 MG: 0.25 INJECTION INTRAMUSCULAR; INTRAVENOUS at 14:02

## 2017-09-20 RX ADMIN — CARVEDILOL 12.5 MG: 12.5 TABLET, FILM COATED ORAL at 09:29

## 2017-09-20 RX ADMIN — DOCUSATE SODIUM 100 MG: 100 CAPSULE, LIQUID FILLED ORAL at 10:17

## 2017-09-20 RX ADMIN — CARVEDILOL 12.5 MG: 12.5 TABLET, FILM COATED ORAL at 17:42

## 2017-09-20 NOTE — PROGRESS NOTES
Attempted to do exercise oximetry with patient. Dr Salomon was at bedside. Patient was taken off oxygen for walk and then said she could only do walk with assistance of rolling walker. A regular walker was found on the nursing unit and patient declined to walk with it stating she becomes too short of breath using this type of walker. I attempted to go to the physical therapy office to borrow a rolling walker and no one was available or answered the door. Dr Salomon was notified that patient would not walk unless she had rolling walker. Walk will be attempted on 9/21 when physical therapy will be in the building to provide proper walker for patient's needs.

## 2017-09-20 NOTE — CONSULTS
Consult Note    Dr [facility]omon. [facility]  Pulmonary/Critical Care/Sleep Medicine    Yudith Robertson  7/9/1927  female  89 y.o.    Reason for consult:  SOB and cough    HPI:   This is 89 y.o. old female admitted on 9/16/2017.  He presented to emergency room with history of having shortness of breath.  On reviewing her medical records patient is being in the hospital multiple times in the past similar complaints.  She does has systolic heart failure with ejection fraction of 28% as bilateral pleural effusion.  She also tells me that she has oxygen at home she uses at nighttime and when she needs it.  She does not have any lung problems.  Does have some cough small amount of mucus production does not have any fever.  Does not have any wheezing.  She did had swelling of feet but that is getting better.      Past Medical History:   Diagnosis Date   • Allergic rhinitis    • Anemia    • Atrial fibrillation    • Atypical chest pain    • CAD (coronary artery disease)    • Carotid artery stenosis     20-30% bilateral   • Cerebral artery occlusion with cerebral infarction 1997   • CHF (congestive heart failure)    • Diastolic congestive heart failure     Echo 3/2012 with normal LVEF, mod LVH   • Difficulty swallowing    • Dizzy     mild   • Edema     In [place]   • Esophageal reflux     GERD, history of esophageal stenosis s/p dilation   • GERD (gastroesophageal reflux disease)    • H/O bone density study 06/16/2015    Osteopenia   • H/O mammogram 02/04/2014   • H/O thyroid nodule    • History of esophageal stricture    • Hyperlipidemia    • Hypertension    • IBS (irritable bowel syndrome)    • Intestinal obstruction     small bowel obstruction   • LBBB (left bundle branch block)    • Mild aortic insufficiency    • Mild mitral insufficiency    • Mixed hyperlipidemia    • Moderate tricuspid insufficiency    • Mood disorder in conditions classified elsewhere    • Osteoarthrosis    • Osteopenia    • Polyarthropathy, multiple  sites    • SOB (shortness of breath)    • Transient cerebral ischemia    • Valvular heart disease     Echo 1/17/14: EF 54%, elevated LAP, mild-mod MR, mod-severe TR, moderate PHTN (52mm Hg)     Past Surgical History:   Procedure Laterality Date   • APPENDECTOMY     • BACK SURGERY     • ENDOSCOPY N/A 5/22/2017    Procedure: ESOPHAGOGASTRODUODENOSCOPY WITH GASTON DILATATION 46FR, 48FR,52FR  AND ESOPHAGEAL BRUSHINGS;  Surgeon: Rebecca Becerra MD;  Location: [facility] ENDOSCOPY;  Service:    • EYE SURGERY Right 12/10/2008    Vitrectomy-Dr. Yogi Finnegan   • HYSTERECTOMY     • LAPAROTOMY SALPINGO OOPHORECTOMY N/A 10/29/2008    Dr. Lucas Mills   • UPPER GASTROINTESTINAL ENDOSCOPY N/A 07/24/2015    Dr. Jayce Abrams     No current facility-administered medications on file prior to encounter.      Current Outpatient Prescriptions on File Prior to Encounter   Medication Sig Dispense Refill   • acetaminophen (TYLENOL) 500 MG tablet Take 500 mg by mouth every 6 (six) hours as needed for mild pain (1-3).     • aspirin 81 MG EC tablet Take 81 mg by mouth daily.     • carvedilol (COREG) 12.5 MG tablet Take 1 tablet by mouth  twice a day with meals 180 tablet 1   • clotrimazole-betamethasone (LOTRISONE) 1-0.05 % cream As Needed.     • fexofenadine (ALLEGRA) 180 MG tablet Take 1 tablet by mouth Daily. 30 tablet 3   • isosorbide mononitrate (IMDUR) 60 MG 24 hr tablet Take 1 tablet by mouth  twice a day 180 tablet 3   • lansoprazole (PREVACID) 15 MG capsule Take 1 capsule by mouth Daily. 90 capsule 0   • LORazepam (ATIVAN) 0.5 MG tablet TAKE ONE TABLET BY MOUTH EVERY 8 HOURS AS NEEDED FOR ANXIETY 90 tablet 0   • polyethylene glycol (MIRALAX) powder Take 17 g by mouth daily.     • potassium chloride (K-DUR) 10 MEQ CR tablet Take 1 tablet by mouth Daily. 30 tablet 2   • sacubitril-valsartan (ENTRESTO) 49-51 MG tablet Take 1 tablet by mouth Every 12 (Twelve) Hours. 60 tablet 0   • warfarin (COUMADIN) 2 MG tablet 2 mg daily in afternoon  and get protime/INR on Monday 30 tablet 0     Allergies as of 09/16/2017 - Ger as Reviewed 09/16/2017   Allergen Reaction Noted   • Montelukast Nausea Only 09/14/2017   • Morphine  02/01/2016     Social History   Substance Use Topics   • Smoking status: Former Smoker     Packs/day: 1.00     Years: 30.00     Quit date: 1/1/1986   • Smokeless tobacco: Never Used   • Alcohol use No      Comment: stopped drinking     Family History   Problem Relation Age of Onset   • Stroke Other    • Stroke Mother    • No Known Problems Daughter    • No Known Problems Daughter    • No Known Problems Daughter        aspirin 81 mg Oral Daily   bumetanide 2 mg Intravenous BID   carvedilol 12.5 mg Oral BID With Meals   docusate sodium 100 mg Oral BID   isosorbide mononitrate 60 mg Oral BID   pantoprazole 40 mg Oral QAM   polyethylene glycol 17 g Oral Daily   potassium chloride 10 mEq Oral BID With Meals   sacubitril-valsartan 1 tablet Oral Q12H   warfarin 1 mg Oral Daily       Review of Systems   Constitution: Negative for chills and fever.   HENT: Negative for congestion, nosebleeds and stridor.    Eyes: Negative for blurred vision and discharge.   Cardiovascular: Positive for leg swelling. Negative for chest pain and cyanosis.   Respiratory: Positive for cough and shortness of breath. Negative for hemoptysis, snoring, sputum production and wheezing.    Endocrine: Negative for cold intolerance and heat intolerance.   Skin: Negative for itching and rash.   Musculoskeletal: Negative for falls, neck pain and stiffness.   Gastrointestinal: Negative for diarrhea, heartburn, jaundice and vomiting.   Genitourinary: Negative for dysuria and hematuria.   Neurological: Negative for dizziness, headaches, numbness and seizures.   Psychiatric/Behavioral: Negative for depression and hallucinations. The patient does not have insomnia.        /75 (BP Location: Right arm, Patient Position: Lying)  Pulse 84  Temp 98.2 °F (36.8 °C) (Oral)   Resp  "18  Ht 60\" (152.4 cm)  Wt 133 lb 4.8 oz (60.5 kg)  SpO2 99%  BMI 26.03 kg/m2  I/O last 3 completed shifts:  In: 200 [P.O.:180; I.V.:20]  Out: 2550 [Urine:2550]  I/O this shift:  In: -   Out: 800 [Urine:800]    Physical Exam   Constitutional: She appears cachectic. She appears ill (chronically ill-looking). No distress. Nasal cannula in place.   HENT:   Head: Normocephalic and atraumatic.   Nose: No epistaxis.   Mouth/Throat: Oropharynx is clear and moist.   Eyes: Conjunctivae and EOM are normal. Pupils are equal, round, and reactive to light.   Neck: No JVD present. No tracheal deviation and no edema present. No thyroid mass and no thyromegaly present.   Cardiovascular: Normal heart sounds.  An irregular rhythm present.   No murmur heard.  Pulmonary/Chest: She has decreased breath sounds. She has no wheezes. She has rales.   Crackles bilaterally   Abdominal: Soft. Normal appearance. She exhibits no ascites. There is no hepatosplenomegaly.   Neurological: She is alert.   Skin: Skin is intact. No cyanosis. Nails show no clubbing.   Psychiatric: Her mood appears anxious.   Vitals reviewed.        Results from last 7 days  Lab Units 09/16/17  1429   WBC 10*3/mm3 7.88   HEMOGLOBIN g/dL 10.1*   HEMATOCRIT % 32.8*   PLATELETS 10*3/mm3 309       Results from last 7 days  Lab Units 09/20/17  0444  09/16/17  1429   SODIUM mmol/L 134*  < > 135*   POTASSIUM mmol/L 3.7  < > 4.0   CHLORIDE mmol/L 90*  < > 93*   CO2 mmol/L 33.7*  < > 31.3*   BUN mg/dL 18  < > 16   CREATININE mg/dL 0.95  < > 0.90   CALCIUM mg/dL 8.9  < > 9.7*   BILIRUBIN mg/dL  --   --  0.5   ALK PHOS U/L  --   --  67   ALT (SGPT) U/L  --   --  22   AST (SGOT) U/L  --   --  20   GLUCOSE mg/dL 90  < > 113*   < > = values in this interval not displayed.        Results from last 7 days  Lab Units 09/20/17  0444   INR  2.83*     proBNP      10254.0     Procalcitonin      0.07     CXR:      Assessment and plan:     · Bilateral pleural effusion which is a chronic " finding on the several x-rays, I do not see any consolidation or air bronchograms on the chest x-rays and the pro-calcitonin is with in normal range and the white count and the differential count are all within normal range.  There is no evidence of pneumonia either clinically or radiologically.  She does not require any antibiotics at this time.  · Bilateral pleural effusion most likely secondary to her heart failure  · Congestive heart failure with ejection fraction of 27%     · Chronic atrial fibrillation                              Given patient's age and her advance heart failure her overall prognosis is poor and guarded.  Patient does admit that she does has oxygen at home and she uses oxygen at nighttime and only if needed in the daytime.  She is going to have a exercise oximetry on room air which I will review                           Jose Juan Salomon MD, [facility]  Pulmonary, Critical Care and Sleep Medicine.  [facility]    9/20/2017 ,    EMR Dragon/Transcription disclaimer:   Much of this encounter note is an electronic transcription/translation of spoken language to printed text. The electronic translation of spoken language may permit erroneous, or at times, nonsensical words or phrases to be inadvertently transcribed; Although I have reviewed the note for such errors, some may still exist.

## 2017-09-20 NOTE — PROGRESS NOTES
"Patient Name: Yudith Robertson  :1927  89 y.o.      Patient Care Team:  Deon Garzon MD as PCP - General (Internal Medicine)  Deon Garzon MD as PCP - Claims Attributed  Latrice Riley RN as Care Coordinator (Population Health)    Interval History:   No real response to increased Lasix    Subjective:  Following for systolic congestive heart failure     Objective   Vital Signs  Temp:  [98 °F (36.7 °C)-98.2 °F (36.8 °C)] 98 °F (36.7 °C)  Heart Rate:  [68-71] 68  Resp:  [18] 18  BP: (123-134)/(57-67) 134/65    Intake/Output Summary (Last 24 hours) at 17 0908  Last data filed at 17 0554   Gross per 24 hour   Intake               70 ml   Output             1250 ml   Net            -1180 ml     Flowsheet Rows         First Filed Value    Admission Height  60\" (152.4 cm) Documented at 2017 1340    Admission Weight  138 lb (62.6 kg) Documented at 2017 1340          Physical Exam:   General Appearance:    Alert, cooperative, in no acute distress   Lungs:     Decreased breath sounds at the bases.      Heart:    Regularly irregular     Chest Wall:    No abnormalities observed   Abdomen:     Soft, nontender, positive bowel sounds.     Extremities:   no cyanosis, clubbing or edema.  No marked joint deformities.  Adequate musculoskeletal strength.       Results Review:      Results from last 7 days  Lab Units 17  0444   SODIUM mmol/L 134*   POTASSIUM mmol/L 3.7   CHLORIDE mmol/L 90*   CO2 mmol/L 33.7*   BUN mg/dL 18   CREATININE mg/dL 0.95   GLUCOSE mg/dL 90   CALCIUM mg/dL 8.9       Results from last 7 days  Lab Units 17  0508   TROPONIN T ng/mL <0.010       Results from last 7 days  Lab Units 17  1429   WBC 10*3/mm3 7.88   HEMOGLOBIN g/dL 10.1*   HEMATOCRIT % 32.8*   PLATELETS 10*3/mm3 309       Results from last 7 days  Lab Units 17  0444 17  0318 17  1246   INR  2.83* 3.25* 3.37*           Results from last 7 days  Lab Units 17  2216   MAGNESIUM mg/dL " 1.9             Medication Review:     aspirin 81 mg Oral Daily   bumetanide 2 mg Intravenous BID   carvedilol 12.5 mg Oral BID With Meals   isosorbide mononitrate 60 mg Oral BID   pantoprazole 40 mg Oral QAM   polyethylene glycol 17 g Oral Daily   potassium chloride 10 mEq Oral BID With Meals   sacubitril-valsartan 1 tablet Oral Q12H   warfarin 1 mg Oral Daily             Assessment/Plan     1.  Acute systolic congestive heart failure.  Ejection fraction 27%.  Continue carvedilol and Enteresto.  Continue with IV diuresis.  2.  Chest pain.  She had a heart catheterization in the past which showed nonobstructive disease.  She had a normal stress test in August 2017.  3.  Atrial fibrillation.  Rate controlled.  Anticoagulated with warfarin.  4. Valvular heart disease with moderate tricuspid regurgitation and mild mitral regurgitation.   5. Moderate pulmonary hypertension.   6. History of carotid stenosis, 30%-40% bilateral disease.   7. History of gastroesophageal reflux disease and esophageal stenosis.   8. Hyperlipidemia.   9. History of small bowel obstruction.   10. History of left bundle branch block.   11. History of stroke.   12. Cough     - check to see if needs home O2  - CXR.  Abnormal.  Patient with cough.  I will ask pulmonary to evaluate to make sure we do not need to treat any pneumonia.  - I think she is getting close to euvolemic but there is still some fluid on her chest x-ray.  I am going to give her a little Bumex today and switch her to oral diuretics tomorrow with the plan of discharge on Friday  - Resume warfarin    Felicitas Brantley MD, [facility]  09/20/17  9:08 AM

## 2017-09-20 NOTE — PLAN OF CARE
Problem: Patient Care Overview (Adult)  Goal: Plan of Care Review    09/19/17 1133 09/20/17 0030 09/20/17 0402   Coping/Psychosocial Response Interventions   Plan Of Care Reviewed With --  patient --    Patient Care Overview   Progress improving --  --    Outcome Evaluation   Outcome Summary/Follow up Plan --  --  VSS, no c/o pain, frequent c/o cough, cough controlled by PRN robitussin, receiving IV lasix and diuresising well, will continue to monitor       Goal: Adult Individualization and Mutuality  Outcome: Ongoing (interventions implemented as appropriate)  Goal: Discharge Needs Assessment  Outcome: Ongoing (interventions implemented as appropriate)    Problem: Fall Risk (Adult)  Goal: Absence of Falls  Outcome: Ongoing (interventions implemented as appropriate)    Problem: Cardiac Output, Decreased (Adult)  Goal: Adequate Cardiac Output/Effective Tissue Perfusion  Outcome: Ongoing (interventions implemented as appropriate)

## 2017-09-20 NOTE — PLAN OF CARE
Problem: Patient Care Overview (Adult)  Goal: Plan of Care Review  Outcome: Ongoing (interventions implemented as appropriate)    09/20/17 1513   Coping/Psychosocial Response Interventions   Plan Of Care Reviewed With patient   Patient Care Overview   Progress improving       Goal: Adult Individualization and Mutuality  Outcome: Ongoing (interventions implemented as appropriate)  Goal: Discharge Needs Assessment  Outcome: Ongoing (interventions implemented as appropriate)    Problem: Fall Risk (Adult)  Goal: Absence of Falls  Outcome: Ongoing (interventions implemented as appropriate)

## 2017-09-21 LAB
INR PPP: 2.44 (ref 0.9–1.1)
PROTHROMBIN TIME: 25.7 SECONDS (ref 11.7–14.2)

## 2017-09-21 PROCEDURE — 99232 SBSQ HOSP IP/OBS MODERATE 35: CPT | Performed by: INTERNAL MEDICINE

## 2017-09-21 PROCEDURE — 85610 PROTHROMBIN TIME: CPT | Performed by: INTERNAL MEDICINE

## 2017-09-21 RX ORDER — DOCUSATE SODIUM 100 MG/1
100 CAPSULE, LIQUID FILLED ORAL 2 TIMES DAILY
Status: DISCONTINUED | OUTPATIENT
Start: 2017-09-21 | End: 2017-09-22 | Stop reason: HOSPADM

## 2017-09-21 RX ORDER — SENNOSIDES 8.6 MG
2 TABLET ORAL ONCE
Status: COMPLETED | OUTPATIENT
Start: 2017-09-21 | End: 2017-09-21

## 2017-09-21 RX ORDER — TORSEMIDE 20 MG/1
40 TABLET ORAL DAILY
Status: DISCONTINUED | OUTPATIENT
Start: 2017-09-21 | End: 2017-09-22 | Stop reason: HOSPADM

## 2017-09-21 RX ADMIN — GUAIFENESIN AND DEXTROMETHORPHAN 5 ML: 100; 10 SYRUP ORAL at 08:39

## 2017-09-21 RX ADMIN — TORSEMIDE 40 MG: 20 TABLET ORAL at 10:39

## 2017-09-21 RX ADMIN — PANTOPRAZOLE SODIUM 40 MG: 40 TABLET, DELAYED RELEASE ORAL at 08:38

## 2017-09-21 RX ADMIN — CARVEDILOL 12.5 MG: 12.5 TABLET, FILM COATED ORAL at 17:20

## 2017-09-21 RX ADMIN — WARFARIN SODIUM 1 MG: 1 TABLET ORAL at 17:20

## 2017-09-21 RX ADMIN — SACUBITRIL AND VALSARTAN 1 TABLET: 49; 51 TABLET, FILM COATED ORAL at 20:29

## 2017-09-21 RX ADMIN — DOCUSATE SODIUM 100 MG: 100 CAPSULE, LIQUID FILLED ORAL at 17:21

## 2017-09-21 RX ADMIN — ACETAMINOPHEN 500 MG: 500 TABLET ORAL at 04:39

## 2017-09-21 RX ADMIN — POTASSIUM CHLORIDE 10 MEQ: 750 CAPSULE, EXTENDED RELEASE ORAL at 08:38

## 2017-09-21 RX ADMIN — LORAZEPAM 0.5 MG: 0.5 TABLET ORAL at 08:38

## 2017-09-21 RX ADMIN — GUAIFENESIN AND DEXTROMETHORPHAN 5 ML: 100; 10 SYRUP ORAL at 20:29

## 2017-09-21 RX ADMIN — DOCUSATE SODIUM 100 MG: 100 CAPSULE, LIQUID FILLED ORAL at 08:38

## 2017-09-21 RX ADMIN — ISOSORBIDE MONONITRATE 60 MG: 60 TABLET, EXTENDED RELEASE ORAL at 08:38

## 2017-09-21 RX ADMIN — BUMETANIDE 2 MG: 0.25 INJECTION INTRAMUSCULAR; INTRAVENOUS at 08:39

## 2017-09-21 RX ADMIN — SENNOSIDES 17.2 MG: 8.6 TABLET, FILM COATED ORAL at 10:40

## 2017-09-21 RX ADMIN — POTASSIUM CHLORIDE 10 MEQ: 750 CAPSULE, EXTENDED RELEASE ORAL at 17:20

## 2017-09-21 RX ADMIN — ASPIRIN 81 MG: 81 TABLET ORAL at 08:38

## 2017-09-21 RX ADMIN — CARVEDILOL 12.5 MG: 12.5 TABLET, FILM COATED ORAL at 08:38

## 2017-09-21 RX ADMIN — POLYETHYLENE GLYCOL 3350 17 G: 17 POWDER, FOR SOLUTION ORAL at 08:39

## 2017-09-21 RX ADMIN — ISOSORBIDE MONONITRATE 60 MG: 60 TABLET, EXTENDED RELEASE ORAL at 17:20

## 2017-09-21 RX ADMIN — LORAZEPAM 0.5 MG: 0.5 TABLET ORAL at 22:03

## 2017-09-21 RX ADMIN — SACUBITRIL AND VALSARTAN 1 TABLET: 49; 51 TABLET, FILM COATED ORAL at 08:38

## 2017-09-21 RX ADMIN — DOCUSATE SODIUM 100 MG: 100 CAPSULE, LIQUID FILLED ORAL at 17:20

## 2017-09-21 NOTE — PLAN OF CARE
Problem: Patient Care Overview (Adult)  Goal: Plan of Care Review  Outcome: Ongoing (interventions implemented as appropriate)    09/21/17 6576   Coping/Psychosocial Response Interventions   Plan Of Care Reviewed With patient   Patient Care Overview   Progress improving   Outcome Evaluation   Outcome Summary/Follow up Plan VSS, no c/o pain, infequent c/o cough controlled by PRN Robitussin, Lasix changed to IV Bumex, diuresising well, planned to change to PO Bumex tomorrow, walking oximetry attempted to day but pt refused due to no availability of rolling walker, will attempt tomorrow, colace now a scheduled medication to help with pt constipation, no BMs from pt this shift, educated pt on using incentive spirometer - pt used throughout night, warfarin resumed today, plan to discharge on Friday, will continue to monitor       Goal: Adult Individualization and Mutuality  Outcome: Ongoing (interventions implemented as appropriate)  Goal: Discharge Needs Assessment  Outcome: Ongoing (interventions implemented as appropriate)    Problem: Fall Risk (Adult)  Goal: Absence of Falls  Outcome: Ongoing (interventions implemented as appropriate)    Problem: Cardiac Output, Decreased (Adult)  Goal: Adequate Cardiac Output/Effective Tissue Perfusion  Outcome: Ongoing (interventions implemented as appropriate)

## 2017-09-21 NOTE — PROGRESS NOTES
Exercise Oximetry    Patient Name:Yudith Robertson   MRN: [id number]   Date: 09/21/17             ROOM AIR BASELINE   SpO2% 91   Heart Rate 87   Blood Pressure      EXERCISE ON ROOM AIR SpO2% EXERCISE ON O2 @ 2 LPM SpO2%   1 MINUTE 85 1 MINUTE    2 MINUTES  2 MINUTES 92   3 MINUTES  3 MINUTES 97   4 MINUTES  4 MINUTES 97   5 MINUTES  5 MINUTES 97   6 MINUTES  6 MINUTES 95              Distance Walked  240 feet Distance Walked   Dyspnea (Arnel Scale)   Dyspnea (Arnel Scale)   Fatigue (Arnel Scale)   Fatigue (Arnel Scale)   SpO2% Post Exercise 95 SpO2% Post Exercise   HR Post Exercise  82 HR Post Exercise   Time to Recovery  2 minutes Time to Recovery     Comments: The patient completed two laps around the nurses station and did very well.

## 2017-09-21 NOTE — PROGRESS NOTES
Daily Progress Note.     9/21/2017    Yudith Robertson ,  89 y.o. ,female  [address]      Brief problem (summary)  · Hypoxic respiratory failure, she needs home oxygen  · Bilateral pleural effusion due to CHF  · CHF, EF 27%  · A fib    Today, She has ex oximetry which showed desaturation and need home oxygen    PMS/FH/SH: reviewed and unchanged.  Medications:     aspirin 81 mg Oral Daily   carvedilol 12.5 mg Oral BID With Meals   docusate sodium 100 mg Oral BID   docusate sodium 100 mg Oral BID   isosorbide mononitrate 60 mg Oral BID   pantoprazole 40 mg Oral QAM   polyethylene glycol 17 g Oral Daily   potassium chloride 10 mEq Oral BID With Meals   sacubitril-valsartan 1 tablet Oral Q12H   torsemide 40 mg Oral Daily   warfarin 1 mg Oral Daily          ROS:  Respiratory ROS: positive for - shortness of breath    Objective  Temp:  [97.6 °F (36.4 °C)-98 °F (36.7 °C)] 97.6 °F (36.4 °C)  Heart Rate:  [78-86] 86  Resp:  [18] 18  BP: (126-150)/(70-89) 150/70  I/O last 3 completed shifts:  In: 80 [P.O.:60; I.V.:20]  Out: 3850 [Urine:3850]  I/O this shift:  In: 620 [P.O.:570; I.V.:50]  Out: 1200 [Urine:1200]    Physical Exam   Constitutional: She is oriented to person, place, and time. She appears cachectic. No distress. Nasal cannula in place.   HENT:   Head: Normocephalic and atraumatic.   Eyes: Pupils are equal, round, and reactive to light. No scleral icterus.   Cardiovascular: Normal rate and regular rhythm.    Pulmonary/Chest: Effort normal. No respiratory distress. She has decreased breath sounds. She has no wheezes.   Dull percussion note at bases    Abdominal: Soft. Bowel sounds are normal. There is no hepatosplenomegaly.   Neurological: She is alert and oriented to person, place, and time.   Skin: No cyanosis. Nails show no clubbing.   Psychiatric: She has a normal mood and affect. Her behavior is normal.         Results from last 7 days  Lab Units 09/16/17  1429   WBC 10*3/mm3 7.88    HEMOGLOBIN g/dL 10.1*   HEMATOCRIT % 32.8*   PLATELETS 10*3/mm3 309       Results from last 7 days  Lab Units 09/20/17  0444 09/19/17  0318 09/17/17  2216 09/16/17  1429   SODIUM mmol/L 134* 135*  --  135*   POTASSIUM mmol/L 3.7 3.4* 4.4 4.0   CHLORIDE mmol/L 90* 91*  --  93*   CO2 mmol/L 33.7* 33.5*  --  31.3*   BUN mg/dL 18 16  --  16   CREATININE mg/dL 0.95 0.82  --  0.90   GLUCOSE mg/dL 90 92  --  113*   CALCIUM mg/dL 8.9 8.6  --  9.7*       Results from last 7 days  Lab Units 09/21/17  0427   INR  2.44*            ASSESSMENT/ PLAN:  · Hypoxic respiratory failure, arrange home oxygen at 2 L  ·  CHF, EF27%  · A fib  · Bilateral pleural effusion     OK to discharge home    Jose Juan Salomon MD, FCCP  Pulmonary, Critical Care and Sleep Medicine.  [facility]    9/21/2017 ,    EMR Dragon/Transcription disclaimer:   Much of this encounter note is an electronic transcription/translation of spoken language to printed text. The electronic translation of spoken language may permit erroneous, or at times, nonsensical words or phrases to be inadvertently transcribed; Although I have reviewed the note for such errors, some may still exist.

## 2017-09-21 NOTE — PLAN OF CARE
Problem: Patient Care Overview (Adult)  Goal: Discharge Needs Assessment  Outcome: Ongoing (interventions implemented as appropriate)    Problem: Fall Risk (Adult)  Goal: Absence of Falls  Outcome: Ongoing (interventions implemented as appropriate)    Problem: Cardiac Output, Decreased (Adult)  Goal: Adequate Cardiac Output/Effective Tissue Perfusion  Outcome: Ongoing (interventions implemented as appropriate)

## 2017-09-21 NOTE — PROGRESS NOTES
"Patient Name: Yudith Robertson  :1927  89 y.o.      Patient Care Team:  Deon Garzon MD as PCP - General (Internal Medicine)  Deon Garzon MD as PCP - Claims Attributed  Latrice Riley RN as Care Coordinator (Population Health)    Interval History:   IV Bumex yesterday.  Seen by pulmonary.    Subjective:  Following for acute on chronic systolic congestive heart failure     Objective   Vital Signs  Temp:  [97.7 °F (36.5 °C)-98.6 °F (37 °C)] 97.7 °F (36.5 °C)  Heart Rate:  [78-89] 79  Resp:  [18] 18  BP: (126-151)/(69-89) 139/73    Intake/Output Summary (Last 24 hours) at 17 0925  Last data filed at 17 0640   Gross per 24 hour   Intake               10 ml   Output             2650 ml   Net            -2640 ml     Flowsheet Rows         First Filed Value    Admission Height  60\" (152.4 cm) Documented at 2017 1340    Admission Weight  138 lb (62.6 kg) Documented at 2017 1340          Physical Exam:   General Appearance:    Alert, cooperative, in no acute distress   Lungs:     Clear to auscultation.  Normal respiratory effort and rate.      Heart:    Regular rhythm and normal rate, normal S1 and S2, no murmurs, gallops or rubs.     Chest Wall:    No abnormalities observed   Abdomen:     Soft, nontender, positive bowel sounds.     Extremities:   no cyanosis, clubbing or edema.  No marked joint deformities.  Adequate musculoskeletal strength.       Results Review:      Results from last 7 days  Lab Units 17  0444   SODIUM mmol/L 134*   POTASSIUM mmol/L 3.7   CHLORIDE mmol/L 90*   CO2 mmol/L 33.7*   BUN mg/dL 18   CREATININE mg/dL 0.95   GLUCOSE mg/dL 90   CALCIUM mg/dL 8.9       Results from last 7 days  Lab Units 17  0508   TROPONIN T ng/mL <0.010       Results from last 7 days  Lab Units 17  1429   WBC 10*3/mm3 7.88   HEMOGLOBIN g/dL 10.1*   HEMATOCRIT % 32.8*   PLATELETS 10*3/mm3 309       Results from last 7 days  Lab Units 17  0427 17  0444 17  0318 "   INR  2.44* 2.83* 3.25*           Results from last 7 days  Lab Units 09/17/17  2216   MAGNESIUM mg/dL 1.9             Medication Review:     aspirin 81 mg Oral Daily   bumetanide 2 mg Intravenous BID   carvedilol 12.5 mg Oral BID With Meals   docusate sodium 100 mg Oral BID   isosorbide mononitrate 60 mg Oral BID   pantoprazole 40 mg Oral QAM   polyethylene glycol 17 g Oral Daily   potassium chloride 10 mEq Oral BID With Meals   sacubitril-valsartan 1 tablet Oral Q12H   warfarin 1 mg Oral Daily             Assessment/Plan     1.  Acute systolic congestive heart failure.  Ejection fraction 27%.  Continue carvedilol and Enteresto.  Continue with IV diuresis.  2.  Chest pain.  She had a heart catheterization in the past which showed nonobstructive disease.  She had a normal stress test in August 2017.  3.  Atrial fibrillation.  Rate controlled.  Anticoagulated with warfarin.  4. Valvular heart disease with moderate tricuspid regurgitation and mild mitral regurgitation.   5. Moderate pulmonary hypertension.   6. History of carotid stenosis, 30%-40% bilateral disease.   7. History of gastroesophageal reflux disease and esophageal stenosis.   8. Hyperlipidemia.   9. History of small bowel obstruction.   10. History of left bundle branch block.   11. History of stroke.   12. Cough      - She could not do the ambulatory oxygen saturation yesterday because no rolling walker was available.  We'll do it today with physical therapy.  - Appreciate pulmonary input.  No antibiotics needed.  - She appears to be euvolemic.  Discontinue IV diuretics and transitioned to oral.  - Continue warfarin.  - I will have her follow up with Aminata next week and keep her appointment in early October with me.  She will need close outpatient monitoring to prevent readmission.    Fleicitas Brantley MD, [facility]  09/21/17  9:25 AM

## 2017-09-21 NOTE — PROGRESS NOTES
Continued Stay Note  [facility]     Patient Name: Yudith Robertson  MRN: [id number]  Today's Date: 9/21/2017    Admit Date: 9/16/2017          Discharge Plan       09/21/17 1312    Case Management/Social Work Plan    Plan Home with spouse and [facility] with CHF monitor    Patient/Family In Agreement With Plan yes    Additional Comments Spoke with patient and daughter at bedside to clarify that [facility] is supplying her oxygen as she had stated a few days ago and daughter states patient has been using her spouse's oxygen prn and hs.   Walking oximetry noted need for continous oxygen and patient states she wants to use [facility](Yudith notiified of walking ox results and awaiting DME from MD).  Patient states she plans to return home with spouse and [facility] with CHF monitor.  CCP will continue to follow and assist as needed...Kelsy Hoover RN,CCP               Discharge Codes     None            Kelsy Hoover RN

## 2017-09-22 ENCOUNTER — TELEPHONE (OUTPATIENT)
Dept: INTERNAL MEDICINE | Facility: CLINIC | Age: 82
End: 2017-09-22

## 2017-09-22 VITALS
DIASTOLIC BLOOD PRESSURE: 58 MMHG | OXYGEN SATURATION: 97 % | RESPIRATION RATE: 16 BRPM | BODY MASS INDEX: 26.68 KG/M2 | SYSTOLIC BLOOD PRESSURE: 123 MMHG | WEIGHT: 135.9 LBS | TEMPERATURE: 98.1 F | HEIGHT: 60 IN | HEART RATE: 69 BPM

## 2017-09-22 LAB
INR PPP: 2.11 (ref 0.9–1.1)
NT-PROBNP SERPL-MCNC: ABNORMAL PG/ML (ref 0–1800)
PROTHROMBIN TIME: 22.9 SECONDS (ref 11.7–14.2)

## 2017-09-22 PROCEDURE — 99239 HOSP IP/OBS DSCHRG MGMT >30: CPT | Performed by: INTERNAL MEDICINE

## 2017-09-22 PROCEDURE — 83880 ASSAY OF NATRIURETIC PEPTIDE: CPT | Performed by: INTERNAL MEDICINE

## 2017-09-22 PROCEDURE — 85610 PROTHROMBIN TIME: CPT | Performed by: INTERNAL MEDICINE

## 2017-09-22 RX ORDER — TORSEMIDE 20 MG/1
40 TABLET ORAL DAILY
Qty: 30 TABLET | Refills: 11 | Status: SHIPPED | OUTPATIENT
Start: 2017-09-22 | End: 2017-11-08 | Stop reason: SDUPTHER

## 2017-09-22 RX ADMIN — GUAIFENESIN AND DEXTROMETHORPHAN 5 ML: 100; 10 SYRUP ORAL at 09:30

## 2017-09-22 RX ADMIN — SACUBITRIL AND VALSARTAN 1 TABLET: 49; 51 TABLET, FILM COATED ORAL at 09:28

## 2017-09-22 RX ADMIN — DOCUSATE SODIUM 100 MG: 100 CAPSULE, LIQUID FILLED ORAL at 09:29

## 2017-09-22 RX ADMIN — POLYETHYLENE GLYCOL 3350 17 G: 17 POWDER, FOR SOLUTION ORAL at 09:26

## 2017-09-22 RX ADMIN — ISOSORBIDE MONONITRATE 60 MG: 60 TABLET, EXTENDED RELEASE ORAL at 09:27

## 2017-09-22 RX ADMIN — TORSEMIDE 40 MG: 20 TABLET ORAL at 09:27

## 2017-09-22 RX ADMIN — GUAIFENESIN AND DEXTROMETHORPHAN 5 ML: 100; 10 SYRUP ORAL at 06:06

## 2017-09-22 RX ADMIN — POTASSIUM CHLORIDE 10 MEQ: 750 CAPSULE, EXTENDED RELEASE ORAL at 09:26

## 2017-09-22 RX ADMIN — CARVEDILOL 12.5 MG: 12.5 TABLET, FILM COATED ORAL at 09:28

## 2017-09-22 RX ADMIN — DOCUSATE SODIUM 100 MG: 100 CAPSULE, LIQUID FILLED ORAL at 09:28

## 2017-09-22 RX ADMIN — PANTOPRAZOLE SODIUM 40 MG: 40 TABLET, DELAYED RELEASE ORAL at 06:06

## 2017-09-22 RX ADMIN — ASPIRIN 81 MG: 81 TABLET ORAL at 09:27

## 2017-09-22 NOTE — PLAN OF CARE
Problem: Patient Care Overview (Adult)  Goal: Plan of Care Review  Outcome: Ongoing (interventions implemented as appropriate)    09/22/17 0952   Coping/Psychosocial Response Interventions   Plan Of Care Reviewed With patient   Outcome Evaluation   Outcome Summary/Follow up Plan Patient is calm in bed with family by her bedside,she had a BM this morning,She was seen by the MD and medications were reviewed,no new complain,vss are stable ,pt is for possible D/C today with O2 of 2liters at home,Meanwhile will awaits O2 supply from [facility] before pt can leave the facility,will continue to monitor.       Goal: Adult Individualization and Mutuality  Outcome: Ongoing (interventions implemented as appropriate)  Goal: Discharge Needs Assessment  Outcome: Ongoing (interventions implemented as appropriate)    Problem: Fall Risk (Adult)  Goal: Absence of Falls  Outcome: Ongoing (interventions implemented as appropriate)    Problem: Cardiac Output, Decreased (Adult)  Goal: Adequate Cardiac Output/Effective Tissue Perfusion  Outcome: Ongoing (interventions implemented as appropriate)

## 2017-09-22 NOTE — DISCHARGE SUMMARY
Patient Name: Yudith Robertson  :1927  89 y.o.    Date of Admit: 2017  Date of Discharge:  2017    Discharge Diagnosis:  Problem List Items Addressed This Visit        Cardiovascular and Mediastinum    Acute congestive heart failure - Primary    Relevant Medications    sacubitril-valsartan (ENTRESTO) 49-51 MG tablet    Other Relevant Orders    Referral to Home Health    Basic Metabolic Panel    proBNP          Hospital Course:     1.  Acute systolic congestive heart failure.  Ejection fraction 27%. She is stable for discharge today.  She will continue with carvedilol and Enteresto.  I have changed her diuretics and put her on torsemide 40 mg once a day.  She will have home health to help monitor her volume status.  She will have close follow-up in my office.  She really does not want to come back to the hospital so we will do all we can as an outpatient to monitor her volume status.  She will have a proBNP today at discharge.  She will have a BMP and proBNP in one week.  She will follow up as lab results with Aminata.  2.  Chest pain.  She had a heart catheterization in the past which showed nonobstructive disease.  She had a normal stress test in 2017.  3.  Atrial fibrillation.  Rate controlled.  Anticoagulated with warfarin.  4. Valvular heart disease with moderate tricuspid regurgitation and mild mitral regurgitation.   5. Moderate pulmonary hypertension.   6. History of carotid stenosis, 30%-40% bilateral disease.   7. History of gastroesophageal reflux disease and esophageal stenosis.   8. Hyperlipidemia.   9. History of small bowel obstruction.   10. History of left bundle branch block.   11. History of stroke.   12. Cough. Better.  13.  Hypoxia.  She was seen by Dr. Salomon in the hospital.  She is going to be discharged on 2 L of continuous oxygen.  She had desaturations with ambulation.         Consults     Date and Time Order Name Status Description    2017 0904 Inpatient Consult to  Pulmonology Completed     9/16/2017 1524 LCG (on-call MD unless specified) Completed           Pertinent Test Results:     Results from last 7 days  Lab Units 09/20/17  0444  09/16/17  1429   SODIUM mmol/L 134*  < > 135*   POTASSIUM mmol/L 3.7  < > 4.0   CHLORIDE mmol/L 90*  < > 93*   CO2 mmol/L 33.7*  < > 31.3*   BUN mg/dL 18  < > 16   CREATININE mg/dL 0.95  < > 0.90   CALCIUM mg/dL 8.9  < > 9.7*   BILIRUBIN mg/dL  --   --  0.5   ALK PHOS U/L  --   --  67   ALT (SGPT) U/L  --   --  22   AST (SGOT) U/L  --   --  20   GLUCOSE mg/dL 90  < > 113*   < > = values in this interval not displayed.    Results from last 7 days  Lab Units 09/17/17  0508   TROPONIN T ng/mL <0.010       Results from last 7 days  Lab Units 09/16/17  1429   WBC 10*3/mm3 7.88   HEMOGLOBIN g/dL 10.1*   HEMATOCRIT % 32.8*   PLATELETS 10*3/mm3 309       Results from last 7 days  Lab Units 09/22/17  0540 09/21/17  0427 09/20/17  0444   INR  2.11* 2.44* 2.83*       Results from last 7 days  Lab Units 09/17/17  2216   MAGNESIUM mg/dL 1.9           Condition on Discharge: stable    Discharge Medications   Yudith Robertson   [facility] Medication Instructions OLE[facility]ARIO:[id number]    Printed on:09/22/17 0917   Medication Information                      acetaminophen (TYLENOL) 500 MG tablet  Take 500 mg by mouth every 6 (six) hours as needed for mild pain (1-3).             aspirin 81 MG EC tablet  Take 81 mg by mouth daily.             carvedilol (COREG) 12.5 MG tablet  Take 1 tablet by mouth  twice a day with meals             clotrimazole-betamethasone (LOTRISONE) 1-0.05 % cream  As Needed.             fexofenadine (ALLEGRA) 180 MG tablet  Take 1 tablet by mouth Daily.             isosorbide mononitrate (IMDUR) 60 MG 24 hr tablet  Take 1 tablet by mouth  twice a day             lansoprazole (PREVACID) 15 MG capsule  Take 1 capsule by mouth Daily.             LORazepam (ATIVAN) 0.5 MG tablet  TAKE ONE TABLET BY MOUTH EVERY 8 HOURS AS NEEDED FOR ANXIETY              polyethylene glycol (MIRALAX) powder  Take 17 g by mouth daily.             potassium chloride (K-DUR) 10 MEQ CR tablet  Take 1 tablet by mouth Daily.             sacubitril-valsartan (ENTRESTO) 49-51 MG tablet  Take 1 tablet by mouth Every 12 (Twelve) Hours.             torsemide (DEMADEX) 20 MG tablet  Take 2 tablets by mouth Daily.             warfarin (COUMADIN) 2 MG tablet  2 mg daily in afternoon and get protime/INR on Monday                 Discharge Diet:   Diet Instructions     Diet: Cardiac; Thin       Discharge Diet:  Cardiac   Fluid Consistency:  Thin                 Activity at Discharge:   Activity Instructions     Activity as Tolerated           Measure Weight                     Discharge disposition: home    Follow-up Appointments  Future Appointments  Date Time Provider Department Center   9/26/2017 1:45 PM MD [facility]INITA [facility] None   10/3/2017 12:20 PM Lee Ann Brantley MD [facility] None   11/14/2017 10:00 AM MD [facility] None   2/6/2018 10:00 AM MD VINITA [facility] None     Additional Instructions for the Follow-ups that You Need to Schedule     Discharge Follow-Up With Specified Provider    As directed    To:  Aminata in 1 week. Dr Brantley in 3 weeks. INR in 1 week. BMP and BNP in 1 week with .       Referral to Home Health    As directed    Daily weights. Monitor for volume overload. Labs in 1 week. BMP and BNP ordered through [facility]   Face to Face Visit Date:  9/22/2017   Follow-up Provider for Plan of Care?:  I will be treating the patient on an ongoing basis.  Please send me the Plan of Care for signature.   Follow-up Provider:  LEE ANN BRANTLEY   Reason/Clinical Findings:  CHF   Describe mobility limitations that make leaving home difficult:  cannot drive. needs family to take her to app   Nursing/Therapeutic Services Requested:  Skilled Nursing   Skilled nursing orders:   CHF management  O2 instruction      Frequency:  Other Comment - 2x a week        Basic Metabolic Panel    Sep 28, 2017 (Approximate)        proBNP    Sep 28, 2017 (Approximate)                     Felicitas Brantley MD, [facility]  09/22/17  9:17 AM    Time: Discharge 45 min

## 2017-09-22 NOTE — TELEPHONE ENCOUNTER
----- Message from Kamryn Alvarado sent at 9/22/2017 12:11 PM EDT -----  Contact: Patient's daughter  Patient's daughter, Ira Gale, called stating Dr. Brantley gave her a script for     torsemide (DEMADEX) tablet 40 mg    Her pharmacy will not fill it without Dr. Garzon's okay.  (Dr. Garzon's original script was for 20 mg)  Please advise.     Patient's daughterBriseyda:   [phone]    Pharmacy:  [address] [phone] [facility] [phone]

## 2017-09-22 NOTE — PROGRESS NOTES
Case Management Discharge Note    Final Note: Discharged  9/22/17 to home with [facility]-    Discharge Placement     Facility/Agency Request Status Selected? Address Phone Number Fax Number    [facility] Accepted    Yes [address] [place] [id number] [phone]        Other: Other (Family provided transportation home)    Discharge Codes: 06  Discharged/transferred to home under care of organized home health service organization in anticipation of skilled care

## 2017-09-22 NOTE — PROGRESS NOTES
Continued Stay Note  [facility]     Patient Name: Yudith Robertson  MRN: [id number]  Today's Date: 9/22/2017    Admit Date: 9/16/2017          Discharge Plan       09/22/17 0832    Case Management/Social Work Plan    Plan Home with spouse and [facility]- with CHF monitor    Additional Comments DME for oxygen 2L continous noted- Spoke with Yudith at [facility] at [phone] to inform of DME for oxygen 2L continuous and she states she will deliver today to patient's room.  CCP will continue to follow and assist as needed....Kelsy Hoover RN,CCP               Discharge Codes     None            Kelsy Hoover RN

## 2017-09-22 NOTE — PLAN OF CARE
Problem: Patient Care Overview (Adult)  Goal: Plan of Care Review  Outcome: Ongoing (interventions implemented as appropriate)    09/22/17 0314   Coping/Psychosocial Response Interventions   Plan Of Care Reviewed With patient   Patient Care Overview   Progress no change   Outcome Evaluation   Outcome Summary/Follow up Plan PT has not complained of any pain throughout the shift. PT has been resting throughout the night. PT is a little concerned that she may be constipated due to her not having a BM since monday. PT has had miralax and 3 doses of colace today. PT has been using the IS throughout the shift and no SOA noted while ambulating to the bathroom. PT has had a frequent cough but havent noted her coughing up anything. PT states that she is ready to go home. Will conintue to monitor.        Goal: Adult Individualization and Mutuality  Outcome: Ongoing (interventions implemented as appropriate)  Goal: Discharge Needs Assessment  Outcome: Ongoing (interventions implemented as appropriate)    Problem: Fall Risk (Adult)  Goal: Absence of Falls  Outcome: Ongoing (interventions implemented as appropriate)    Problem: Cardiac Output, Decreased (Adult)  Goal: Adequate Cardiac Output/Effective Tissue Perfusion  Outcome: Ongoing (interventions implemented as appropriate)

## 2017-09-22 NOTE — PROGRESS NOTES
Continued Stay Note  [facility]     Patient Name: Yudith Robertson  MRN: [id number]  Today's Date: 9/22/2017    Admit Date: 9/16/2017          Discharge Plan       09/22/17 1033    Case Management/Social Work Plan    Plan Home with spouse and [facility] with CHF monitor     Additional Comments Yudith with [facility] delivered to portable oxygen to patient's room now and will deliver remaining oxygen supplies to her home.  Notified Jocelin at [facility] at [phone] of patient's discharge and need for CHF monitor and she states she has CHF monitor arranged for patient.....Kelsy Hoover RN,CCP                   Discharge Codes     None        Expected Discharge Date and Time     Expected Discharge Date Expected Discharge Time    Sep 22, 2017             Kelsy Hoover RN

## 2017-09-22 NOTE — PROGRESS NOTES
Daily Progress Note.     9/22/2017    Yudith Robertson ,  89 y.o. ,female  [address]      Brief problem (summary)  · Hypoxic respiratory failure, she needs home oxygen  · Bilateral pleural effusion due to CHF  · CHF, EF 27%  · A fib    Today, She had ex oximetry which showed desaturation and needs home oxygen, which has been arranged     PMS/FH/SH: reviewed and unchanged.  Medications:     aspirin 81 mg Oral Daily   carvedilol 12.5 mg Oral BID With Meals   docusate sodium 100 mg Oral BID   docusate sodium 100 mg Oral BID   isosorbide mononitrate 60 mg Oral BID   pantoprazole 40 mg Oral QAM   polyethylene glycol 17 g Oral Daily   potassium chloride 10 mEq Oral BID With Meals   sacubitril-valsartan 1 tablet Oral Q12H   torsemide 40 mg Oral Daily   warfarin 1 mg Oral Daily          ROS:  Respiratory ROS: positive for - shortness of breath    Objective  Temp:  [97.6 °F (36.4 °C)-98.1 °F (36.7 °C)] 98.1 °F (36.7 °C)  Heart Rate:  [69-86] 69  Resp:  [16-18] 16  BP: (123-150)/(58-70) 123/58  I/O last 3 completed shifts:  In: 750 [P.O.:690; I.V.:60]  Out: 3800 [Urine:3800]  I/O this shift:  In: -   Out: 200 [Urine:200]    Physical Exam   Constitutional: She is oriented to person, place, and time. She appears cachectic. No distress. Nasal cannula in place.   HENT:   Head: Normocephalic and atraumatic.   Eyes: Pupils are equal, round, and reactive to light. No scleral icterus.   Cardiovascular: Normal rate and regular rhythm.    Pulmonary/Chest: Effort normal. No respiratory distress. She has decreased breath sounds. She has no wheezes.   Dull percussion note at bases    Abdominal: Soft. Bowel sounds are normal. There is no hepatosplenomegaly.   Neurological: She is alert and oriented to person, place, and time.   Skin: No cyanosis. Nails show no clubbing.   Psychiatric: She has a normal mood and affect. Her behavior is normal.         Results from last 7 days  Lab Units 09/16/17  1429   WBC 10*3/mm3 7.88    HEMOGLOBIN g/dL 10.1*   HEMATOCRIT % 32.8*   PLATELETS 10*3/mm3 309       Results from last 7 days  Lab Units 09/20/17  0444 09/19/17  0318 09/17/17  2216 09/16/17  1429   SODIUM mmol/L 134* 135*  --  135*   POTASSIUM mmol/L 3.7 3.4* 4.4 4.0   CHLORIDE mmol/L 90* 91*  --  93*   CO2 mmol/L 33.7* 33.5*  --  31.3*   BUN mg/dL 18 16  --  16   CREATININE mg/dL 0.95 0.82  --  0.90   GLUCOSE mg/dL 90 92  --  113*   CALCIUM mg/dL 8.9 8.6  --  9.7*       Results from last 7 days  Lab Units 09/22/17  0540   INR  2.11*            ASSESSMENT/ PLAN:  · Hypoxic respiratory failure,  home oxygen at 2 L  · CHF, EF27%  · A fib  · Bilateral pleural effusion     OK to discharge home    Jose Juan Salomon MD, FCCP  Pulmonary, Critical Care and Sleep Medicine.  [facility]    9/22/2017 ,    EMR Dragon/Transcription disclaimer:   Much of this encounter note is an electronic transcription/translation of spoken language to printed text. The electronic translation of spoken language may permit erroneous, or at times, nonsensical words or phrases to be inadvertently transcribed; Although I have reviewed the note for such errors, some may still exist.

## 2017-09-26 ENCOUNTER — OFFICE VISIT (OUTPATIENT)
Dept: INTERNAL MEDICINE | Facility: CLINIC | Age: 82
End: 2017-09-26

## 2017-09-26 ENCOUNTER — PATIENT OUTREACH (OUTPATIENT)
Dept: CASE MANAGEMENT | Facility: OTHER | Age: 82
End: 2017-09-26

## 2017-09-26 VITALS
HEIGHT: 60 IN | WEIGHT: 125 LBS | BODY MASS INDEX: 24.54 KG/M2 | SYSTOLIC BLOOD PRESSURE: 110 MMHG | DIASTOLIC BLOOD PRESSURE: 60 MMHG

## 2017-09-26 DIAGNOSIS — I48.21 PERMANENT ATRIAL FIBRILLATION (HCC): ICD-10-CM

## 2017-09-26 DIAGNOSIS — I50.21 CHF (CONGESTIVE HEART FAILURE), NYHA CLASS I, ACUTE, SYSTOLIC (HCC): Primary | ICD-10-CM

## 2017-09-26 DIAGNOSIS — Z23 IMMUNIZATION DUE: ICD-10-CM

## 2017-09-26 PROCEDURE — 99214 OFFICE O/P EST MOD 30 MIN: CPT | Performed by: INTERNAL MEDICINE

## 2017-09-26 PROCEDURE — 90662 IIV NO PRSV INCREASED AG IM: CPT | Performed by: INTERNAL MEDICINE

## 2017-09-26 PROCEDURE — G0008 ADMIN INFLUENZA VIRUS VAC: HCPCS | Performed by: INTERNAL MEDICINE

## 2017-09-26 NOTE — PROGRESS NOTES
Subjective   Yudith Robertson is a 89 y.o. female.     Congestive Heart Failure   Presents for follow-up visit. Associated symptoms include shortness of breath ( Just got out of hospital for CHF Is doing better Having visiting nurses & will be seeing Dr Adela GERMAIN next week ). Pertinent negatives include no chest pain or palpitations.        The following portions of the patient's history were reviewed and updated as appropriate: allergies, current medications, past family history, past medical history, past social history, past surgical history and problem list.    Review of Systems   Constitutional:        Here for hospital F/U for CHF    HENT: Negative.    Eyes: Negative.    Respiratory: Positive for shortness of breath ( Just got out of hospital for CHF Is doing better Having visiting nurses & will be seeing Dr Adela GERMAIN next week ).    Cardiovascular: Negative for chest pain, palpitations and leg swelling.   Gastrointestinal: Negative.    Genitourinary: Negative.    Musculoskeletal: Negative.    Neurological: Negative.        Objective   Physical Exam   Constitutional: She is oriented to person, place, and time. She appears well-developed.   HENT:   Head: Normocephalic.   Eyes: EOM are normal.   Neck: Neck supple.   Cardiovascular: Normal rate and normal heart sounds.    Repeat 150/60  Irreg irreg   Pulmonary/Chest: Effort normal. She has rales ( Few rales L base).   Musculoskeletal: Normal range of motion. She exhibits no edema.   Neurological: She is alert and oriented to person, place, and time.   Vitals reviewed.      Assessment/Plan   Yudith was seen today for congestive heart failure.    Diagnoses and all orders for this visit:    CHF (congestive heart failure), NYHA class I, acute, systolic  -     Discontinue: sacubitril-valsartan (ENTRESTO) 49-51 MG tablet; Take 1 tablet by mouth Every 12 (Twelve) Hours.  -     sacubitril-valsartan (ENTRESTO) 49-51 MG tablet; Take 1 tablet by mouth Every 12 (Twelve)  Hours.    Permanent atrial fibrillation    Immunization due  -     Flu Vaccine High Dose PF 65YR+

## 2017-09-28 RX ORDER — POTASSIUM CHLORIDE 750 MG/1
10 TABLET, FILM COATED, EXTENDED RELEASE ORAL DAILY
Qty: 30 TABLET | Refills: 2 | Status: SHIPPED | OUTPATIENT
Start: 2017-09-28 | End: 2017-12-28 | Stop reason: SDUPTHER

## 2017-10-02 ENCOUNTER — LAB (OUTPATIENT)
Dept: LAB | Facility: HOSPITAL | Age: 82
End: 2017-10-02

## 2017-10-02 ENCOUNTER — OFFICE VISIT (OUTPATIENT)
Dept: CARDIOLOGY | Facility: CLINIC | Age: 82
End: 2017-10-02

## 2017-10-02 VITALS
SYSTOLIC BLOOD PRESSURE: 130 MMHG | DIASTOLIC BLOOD PRESSURE: 68 MMHG | BODY MASS INDEX: 24.94 KG/M2 | HEART RATE: 67 BPM | HEIGHT: 60 IN | WEIGHT: 127 LBS

## 2017-10-02 DIAGNOSIS — I48.21 PERMANENT ATRIAL FIBRILLATION (HCC): ICD-10-CM

## 2017-10-02 DIAGNOSIS — I50.43 ACUTE ON CHRONIC COMBINED SYSTOLIC AND DIASTOLIC CONGESTIVE HEART FAILURE (HCC): Primary | ICD-10-CM

## 2017-10-02 DIAGNOSIS — I50.21 ACUTE SYSTOLIC CONGESTIVE HEART FAILURE (HCC): ICD-10-CM

## 2017-10-02 LAB
ANION GAP SERPL CALCULATED.3IONS-SCNC: 11.6 MMOL/L
BUN BLD-MCNC: 22 MG/DL (ref 8–23)
BUN/CREAT SERPL: 24.7 (ref 7–25)
CALCIUM SPEC-SCNC: 9.6 MG/DL (ref 8.2–9.6)
CHLORIDE SERPL-SCNC: 90 MMOL/L (ref 98–107)
CO2 SERPL-SCNC: 33.4 MMOL/L (ref 22–29)
CREAT BLD-MCNC: 0.89 MG/DL (ref 0.57–1)
GFR SERPL CREATININE-BSD FRML MDRD: 60 ML/MIN/1.73
GLUCOSE BLD-MCNC: 151 MG/DL (ref 65–99)
INR PPP: 2.4 (ref 0.9–1.1)
NT-PROBNP SERPL-MCNC: ABNORMAL PG/ML (ref 0–1800)
POTASSIUM BLD-SCNC: 3.8 MMOL/L (ref 3.5–5.2)
PROTHROMBIN TIME: 25.4 SECONDS (ref 11.7–14.2)
SODIUM BLD-SCNC: 135 MMOL/L (ref 136–145)

## 2017-10-02 PROCEDURE — 85610 PROTHROMBIN TIME: CPT

## 2017-10-02 PROCEDURE — 99213 OFFICE O/P EST LOW 20 MIN: CPT | Performed by: NURSE PRACTITIONER

## 2017-10-02 PROCEDURE — 80048 BASIC METABOLIC PNL TOTAL CA: CPT

## 2017-10-02 PROCEDURE — 83880 ASSAY OF NATRIURETIC PEPTIDE: CPT

## 2017-10-02 PROCEDURE — 36415 COLL VENOUS BLD VENIPUNCTURE: CPT

## 2017-10-02 PROCEDURE — 93000 ELECTROCARDIOGRAM COMPLETE: CPT | Performed by: NURSE PRACTITIONER

## 2017-10-02 NOTE — PROGRESS NOTES
Date of Office Visit: 10/02/2017  Encounter Provider: STEFANO Marie  Place of Service: [facility] CARDIOLOGY  Patient Name: Yudith Robertson  :1927    Chief Complaint   Patient presents with   • Shortness of Breath   :     HPI: Yuidth Robertson is a 89 y.o. female comes in today for hospital follow up.     She has a history of permanent atrial fibrillation, nonobstructive coronary disease, cardiomyopathy, hypertension, and a stroke.She has had an EF as low as 35-40%. Her last echocardiogram was in 2016 showing an EF of 63%, mild aortic regurgitation, mild mitral regurgitation and moderate tricuspid valve stenosis. Mild tricuspid regurgitation with elevated RVSP of 51.       In May 2017, she was admitted to the hospital with pneumonia. She was worked up for aspiration pneumonia. A week after discharge, she was admitted to [facility] for stroke. She did have a thrombectomy. She then followed up with Dr. Brantley and was doing relatively well. She had a follow-up with her nephrology and her furosemide was decreased to 10 mg daily.     In 2017, she came in with complaints of shortness of breath.  She was volume overloaded.  We attempted to adjust her diuretics as an outpatient as she did not want to be admitted to the hospital.     2017, she went to the emergency room with complaints of shortness of breath.  She also had some chest pressure and congestion.  She had orthopnea.  She had a repeat echocardiogram showing an EF of 27%, mildly reduced RVSP, moderate aortic valve regurgitation, no aortic valve stenosis, mild MR, mild to moderate TR.  She was taken off valsartan and started on Entresto.      2017, she was complaining of some shortness of breath and cough.  She had a large pleural effusion.  It was recommended she go to the hospital but she declined.  She was given 40 mg of Lasix IV.  She was then admitted to the hospital with systolic  heart failure.  She is now on torsemide 40 mg once a day.    Today, she comes in accompanied by a family member.  She is no longer on oxygen.  She denies feeling her heart racing.  She has mild shortness of breath.  She is monitoring how far she is walking around the house and she is walking about a half mile per day around the house.  She is up walking with her walker.  She denies edema or abdominal distention.  Her cough is improved.  She denies dizziness or chest pain.  She denies orthopnea or PND.      Past Medical History:   Diagnosis Date   • Allergic rhinitis    • Anemia    • Atrial fibrillation    • Atypical chest pain    • CAD (coronary artery disease)    • Carotid artery stenosis     20-30% bilateral   • Cerebral artery occlusion with cerebral infarction 1997   • CHF (congestive heart failure)    • Diastolic congestive heart failure     Echo 3/2012 with normal LVEF, mod LVH   • Difficulty swallowing    • Dizzy     mild   • Edema     In Calf   • Esophageal reflux     GERD, history of esophageal stenosis s/p dilation   • GERD (gastroesophageal reflux disease)    • H/O bone density study 06/16/2015    Osteopenia   • H/O mammogram 02/04/2014   • H/O thyroid nodule    • History of esophageal stricture    • Hyperlipidemia    • Hypertension    • IBS (irritable bowel syndrome)    • Intestinal obstruction     small bowel obstruction   • LBBB (left bundle branch block)    • Mild aortic insufficiency    • Mild mitral insufficiency    • Mixed hyperlipidemia    • Moderate tricuspid insufficiency    • Mood disorder in conditions classified elsewhere    • Osteoarthrosis    • Osteopenia    • Polyarthropathy, multiple sites    • SOB (shortness of breath)    • Transient cerebral ischemia    • Valvular heart disease     Echo 1/17/14: EF 54%, elevated LAP, mild-mod MR, mod-severe TR, moderate PHTN (52mm Hg)       Past Surgical History:   Procedure Laterality Date   • APPENDECTOMY     • BACK SURGERY     • ENDOSCOPY N/A  "5/22/2017    Procedure: ESOPHAGOGASTRODUODENOSCOPY WITH GASTON DILATATION 46FR, 48FR,52FR  AND ESOPHAGEAL BRUSHINGS;  Surgeon: Rebecca Becerra MD;  Location: [facility] ENDOSCOPY;  Service:    • EYE SURGERY Right 12/10/2008    Vitrectomy-Dr. Yogi Finnegan   • HYSTERECTOMY     • LAPAROTOMY SALPINGO OOPHORECTOMY N/A 10/29/2008    Dr. Lucas Mills   • UPPER GASTROINTESTINAL ENDOSCOPY N/A 07/24/2015    Dr. Jayce Abrams           Review of Systems   Cardiovascular: Negative for orthopnea.   Respiratory: Positive for cough and shortness of breath.    Endocrine: Positive for heat intolerance.   Musculoskeletal: Positive for myalgias.   Neurological: Positive for paresthesias.     All other systems reviewed and are negative    Allergies   Allergen Reactions   • Montelukast Nausea Only   • Morphine      Makes pt feel freaked out/loopy       All aspects of family and social history reviewed.          Objective:     Vitals:    10/02/17 1044   BP: 130/68   BP Location: Left arm   Pulse: 67   Weight: 127 lb (57.6 kg)   Height: 60\" (152.4 cm)     Body mass index is 24.8 kg/(m^2).    PHYSICAL EXAM:  Physical Exam   Constitutional: She is oriented to person, place, and time. She appears well-developed and well-nourished.   HENT:   Head: Normocephalic and atraumatic.   Neck: Neck supple. No JVD present.   Cardiovascular: Normal rate, regular rhythm, normal heart sounds and intact distal pulses.    Pulses:       Carotid pulses are 2+ on the right side, and 2+ on the left side.       Radial pulses are 2+ on the right side, and 2+ on the left side.        Dorsalis pedis pulses are 2+ on the right side, and 2+ on the left side.   Pulmonary/Chest: Effort normal and breath sounds normal. No accessory muscle usage. No respiratory distress. She has no rales.   Abdominal: Soft. Normal appearance and bowel sounds are normal. There is no tenderness.   Musculoskeletal: Normal range of motion. She exhibits no edema.   Neurological: She is alert " and oriented to person, place, and time.   Skin: Skin is warm, dry and intact. She is not diaphoretic.   Psychiatric: She has a normal mood and affect. Her speech is normal and behavior is normal. Judgment and thought content normal. Cognition and memory are normal.         ECG 12 Lead  Date/Time: 10/2/2017 11:05 AM  Performed by: ESHA ROWLEY  Authorized by: ESHA ROWLEY   Comparison: compared with previous ECG from 9/16/2017  Similar to previous ECG  Comparison to previous ECG: Rate improved  Rhythm: atrial fibrillation  Rate: normal  BPM: 67  Conduction: non-specific intraventricular conduction delay  QRS axis: normal  Q waves: V2, I, II and aVF  Clinical impression: abnormal ECG  Comments: Indication: afib/cardiomyopathy                Assessment:       Diagnosis Plan   1. Permanent atrial fibrillation  Protime-INR   2. Acute on chronic combined systolic and diastolic congestive heart failure          Orders Placed This Encounter   Procedures   • Protime-INR     Standing Status:   Future     Standing Expiration Date:   10/2/2018   • ECG 12 Lead     This order was created via procedure documentation       Current Outpatient Prescriptions   Medication Sig Dispense Refill   • acetaminophen (TYLENOL) 500 MG tablet Take 500 mg by mouth every 6 (six) hours as needed for mild pain (1-3).     • aspirin 81 MG EC tablet Take 81 mg by mouth daily.     • carvedilol (COREG) 12.5 MG tablet Take 1 tablet by mouth  twice a day with meals 180 tablet 1   • clotrimazole-betamethasone (LOTRISONE) 1-0.05 % cream As Needed.     • fexofenadine (ALLEGRA) 180 MG tablet Take 1 tablet by mouth Daily. 30 tablet 3   • isosorbide mononitrate (IMDUR) 60 MG 24 hr tablet Take 1 tablet by mouth  twice a day 180 tablet 3   • lansoprazole (PREVACID) 15 MG capsule Take 1 capsule by mouth Daily. 90 capsule 0   • LORazepam (ATIVAN) 0.5 MG tablet TAKE ONE TABLET BY MOUTH EVERY 8 HOURS AS NEEDED FOR ANXIETY 90 tablet 0   • polyethylene glycol  (MIRALAX) powder Take 17 g by mouth daily.     • potassium chloride (K-DUR) 10 MEQ CR tablet Take 1 tablet by mouth Daily. 30 tablet 2   • sacubitril-valsartan (ENTRESTO) 49-51 MG tablet Take 1 tablet by mouth Every 12 (Twelve) Hours. 60 tablet 3   • torsemide (DEMADEX) 20 MG tablet Take 2 tablets by mouth Daily. 30 tablet 11   • warfarin (COUMADIN) 2 MG tablet 2 mg daily in afternoon and get protime/INR on Monday 30 tablet 0     No current facility-administered medications for this visit.             Plan:       1.  Heart failure-EF 27%, she is on carvedilol and Entresto.  She is now on torsemide 40 mg once daily.  She is walking a half mile daily around her house.  Cough is improved.  Home health is following and her weight is stable.  Will have her get a BMP and proBNP.  Her proBNP may be an accurate due to her Entresto.  If BMP stable, we'll have her take an extra 20 mg of torsemide tomorrow and Wednesday as she does still have crackles in her lower lungs.  Heart Failure  Assessment  • NYHA class III-A - There is limitation of physical activity. The patient is comfortable at rest, but ordinary activity causes fatigue, palpitations or shortness of breath.  • Beta blocker prescribed  • Diuretics prescribed  • Angiotensin receptor blocker (ARB) prescribed  • Left ventricular function is severely reduced by qualitative assessment    Plan  • The patient has received heart failure education on the following topics: dietary sodium restriction, medication instructions and weight monitoring  • The heart failure care plan was discussed with the patient today including: continuing the current program    Subjective/Objective  • The patient reports dyspnea    • Physical exam findings negative for rales and elevated JVP.      2.  Atrial fibrillation-rate improved. Continue carvedilol  Atrial Fibrillation and Atrial Flutter  Assessment  • The patient has permanent atrial fibrillation  • This is non-valvular in etiology  • The  patient's CHADS2-VASc score is 7  • A [id number] score of 2 or more is considered a high risk for a thromboembolic event    Plan  • Continue in atrial fibrillation with rate control  • Continue warfarin for antithrombotic therapy, bleeding issues discussed  • Continue beta blocker for rate control      3.  Hypoxia-improved. Using oxygen PRN    4. Pleural effusion-increased breath sounds on left. I do hear crackles. May increase diuretic for a couple of days    Follow up in office in one week. Will need weekly follow up.     As always, it has been a pleasure to participate in this patient's care.      Sincerely,      STEFANO Marie

## 2017-10-06 ENCOUNTER — EPISODE CHANGES (OUTPATIENT)
Dept: CASE MANAGEMENT | Facility: OTHER | Age: 82
End: 2017-10-06

## 2017-10-09 ENCOUNTER — OFFICE VISIT (OUTPATIENT)
Dept: CARDIOLOGY | Facility: CLINIC | Age: 82
End: 2017-10-09

## 2017-10-09 ENCOUNTER — HOSPITAL ENCOUNTER (OUTPATIENT)
Dept: CARDIOLOGY | Facility: HOSPITAL | Age: 82
Setting detail: RECURRING SERIES
Discharge: HOME | End: 2017-10-09

## 2017-10-09 VITALS
WEIGHT: 119 LBS | SYSTOLIC BLOOD PRESSURE: 144 MMHG | DIASTOLIC BLOOD PRESSURE: 72 MMHG | BODY MASS INDEX: 23.36 KG/M2 | HEIGHT: 60 IN | HEART RATE: 77 BPM

## 2017-10-09 DIAGNOSIS — I50.43 ACUTE ON CHRONIC COMBINED SYSTOLIC AND DIASTOLIC CONGESTIVE HEART FAILURE (HCC): Primary | ICD-10-CM

## 2017-10-09 PROCEDURE — 99213 OFFICE O/P EST LOW 20 MIN: CPT | Performed by: NURSE PRACTITIONER

## 2017-10-09 PROCEDURE — 36416 COLLJ CAPILLARY BLOOD SPEC: CPT

## 2017-10-09 PROCEDURE — 85610 PROTHROMBIN TIME: CPT

## 2017-10-09 RX ORDER — WARFARIN SODIUM 2 MG/1
TABLET ORAL
Qty: 30 TABLET | Refills: 1 | Status: SHIPPED | OUTPATIENT
Start: 2017-10-09 | End: 2017-12-28 | Stop reason: SDUPTHER

## 2017-10-09 RX ORDER — WARFARIN SODIUM 2 MG/1
TABLET ORAL
Qty: 30 TABLET | Refills: 0 | Status: SHIPPED | OUTPATIENT
Start: 2017-10-09 | End: 2017-10-09 | Stop reason: SDUPTHER

## 2017-10-10 ENCOUNTER — PATIENT OUTREACH (OUTPATIENT)
Dept: CASE MANAGEMENT | Facility: OTHER | Age: 82
End: 2017-10-10

## 2017-10-10 RX ORDER — ISOSORBIDE MONONITRATE 60 MG/1
TABLET, EXTENDED RELEASE ORAL
Qty: 180 TABLET | Refills: 3 | Status: SHIPPED | OUTPATIENT
Start: 2017-10-10 | End: 2018-10-14 | Stop reason: SDUPTHER

## 2017-10-12 NOTE — PROGRESS NOTES
Date of Office Visit: 10/09/2017  Encounter Provider: STEFANO Marie  Place of Service: [facility] CARDIOLOGY  Patient Name: Yudith Robertson  :1927    Chief Complaint   Patient presents with   • Shortness of Breath   :     HPI: Yudith Robertson is a 89 y.o. female comes in today for follow up for cardiomyopathy.     She has a history of permanent atrial fibrillation, nonobstructive coronary disease, cardiomyopathy, hypertension, and a stroke.She has had an EF as low as 35-40%. Her last echocardiogram was in 2016 showing an EF of 63%, mild aortic regurgitation, mild mitral regurgitation and moderate tricuspid valve stenosis. Mild tricuspid regurgitation with elevated RVSP of 51.       In May 2017, she was admitted to the hospital with pneumonia. She was worked up for aspiration pneumonia. A week after discharge, she was admitted to [facility] for stroke. She did have a thrombectomy. She then followed up with Dr. Brantley and was doing relatively well. She had a follow-up with her nephrology and her furosemide was decreased to 10 mg daily.      In 2017, she came in with complaints of shortness of breath.  She was volume overloaded.  We attempted to adjust her diuretics as an outpatient as she did not want to be admitted to the hospital.      2017, she went to the emergency room with complaints of shortness of breath.  She also had some chest pressure and congestion.  She had orthopnea.  She had a repeat echocardiogram showing an EF of 27%, mildly reduced RVSP, moderate aortic valve regurgitation, no aortic valve stenosis, mild MR, mild to moderate TR.  She was taken off valsartan and started on Entresto.        2017, she was complaining of some shortness of breath and cough.  She had a large pleural effusion.  It was recommended she go to the hospital but she declined.  She was given 40 mg of Lasix IV.  She was then admitted to the hospital  with systolic heart failure.  She is now on torsemide 40 mg once a day.     Doing well. Walking around her house. Appears euvolemic. Walking with walker and walking 1/2 mile around house. Denies cough    Past Medical History:   Diagnosis Date   • Allergic rhinitis    • Anemia    • Atrial fibrillation    • Atypical chest pain    • CAD (coronary artery disease)    • Carotid artery stenosis     20-30% bilateral   • Cerebral artery occlusion with cerebral infarction 1997   • CHF (congestive heart failure)    • Diastolic congestive heart failure     Echo 3/2012 with normal LVEF, mod LVH   • Difficulty swallowing    • Dizzy     mild   • Edema     In [place]   • Esophageal reflux     GERD, history of esophageal stenosis s/p dilation   • GERD (gastroesophageal reflux disease)    • H/O bone density study 06/16/2015    Osteopenia   • H/O mammogram 02/04/2014   • H/O thyroid nodule    • History of esophageal stricture    • Hyperlipidemia    • Hypertension    • IBS (irritable bowel syndrome)    • Intestinal obstruction     small bowel obstruction   • LBBB (left bundle branch block)    • Mild aortic insufficiency    • Mild mitral insufficiency    • Mixed hyperlipidemia    • Moderate tricuspid insufficiency    • Mood disorder in conditions classified elsewhere    • Osteoarthrosis    • Osteopenia    • Polyarthropathy, multiple sites    • SOB (shortness of breath)    • Transient cerebral ischemia    • Valvular heart disease     Echo 1/17/14: EF 54%, elevated LAP, mild-mod MR, mod-severe TR, moderate PHTN (52mm Hg)       Past Surgical History:   Procedure Laterality Date   • APPENDECTOMY     • BACK SURGERY     • ENDOSCOPY N/A 5/22/2017    Procedure: ESOPHAGOGASTRODUODENOSCOPY WITH GASTON DILATATION 46FR, 48FR,52FR  AND ESOPHAGEAL BRUSHINGS;  Surgeon: Rebecca Becerra MD;  Location: [facility] ENDOSCOPY;  Service:    • EYE SURGERY Right 12/10/2008    Vitrectomy-Dr. Yogi Finnegan   • HYSTERECTOMY     • LAPAROTOMY SALPINGO OOPHORECTOMY N/A  "10/29/2008    Dr. Lucas Mills   • UPPER GASTROINTESTINAL ENDOSCOPY N/A 07/24/2015    Dr. Jayce Abrams           Review of Systems   Constitution: Negative for fever and malaise/fatigue.   HENT: Negative for ear pain, hearing loss, nosebleeds and sore throat.    Eyes: Negative for double vision, pain, vision loss in left eye, vision loss in right eye and visual disturbance.   Cardiovascular: Negative for claudication, leg swelling and orthopnea.   Respiratory: Positive for shortness of breath. Negative for cough, snoring and wheezing.    Endocrine: Positive for heat intolerance. Negative for cold intolerance and polyuria.   Skin: Negative for color change, itching and rash.   Musculoskeletal: Positive for myalgias. Negative for joint pain, joint swelling and muscle cramps.   Gastrointestinal: Negative for abdominal pain, diarrhea, melena, nausea and vomiting.   Genitourinary: Negative for bladder incontinence and hematuria.   Neurological: Positive for paresthesias. Negative for excessive daytime sleepiness, dizziness, light-headedness and seizures.   Psychiatric/Behavioral: Negative for depression. The patient is not nervous/anxious.    All other systems reviewed and are negative.    All other systems reviewed and are negative    Allergies   Allergen Reactions   • Montelukast Nausea Only   • Morphine      Makes pt feel freaked out/loopy       All aspects of family and social history reviewed.          Objective:     Vitals:    10/09/17 1107 10/09/17 1112   BP: 160/78 144/72   BP Location: Left arm Right arm   Pulse:  77   Weight: 119 lb (54 kg)    Height: 60\" (152.4 cm)      Body mass index is 23.24 kg/(m^2).    PHYSICAL EXAM:  Physical Exam   Constitutional: She is oriented to person, place, and time. She appears well-developed and well-nourished.   HENT:   Head: Normocephalic and atraumatic.   Neck: Neck supple. No JVD present.   Cardiovascular: Normal rate, regular rhythm, normal heart sounds and intact distal " pulses.    Pulses:       Carotid pulses are 2+ on the right side, and 2+ on the left side.       Radial pulses are 2+ on the right side, and 2+ on the left side.        Dorsalis pedis pulses are 2+ on the right side, and 2+ on the left side.   Pulmonary/Chest: Effort normal and breath sounds normal. No accessory muscle usage. No respiratory distress. She has no rales.   Abdominal: Soft. Normal appearance and bowel sounds are normal. There is no tenderness.   Musculoskeletal: Normal range of motion. She exhibits no edema.   Neurological: She is alert and oriented to person, place, and time.   Skin: Skin is warm, dry and intact. She is not diaphoretic.   Psychiatric: She has a normal mood and affect. Her speech is normal and behavior is normal. Judgment and thought content normal. Cognition and memory are normal.       Procedures        Assessment:       Diagnosis Plan   1. Acute on chronic combined systolic and diastolic congestive heart failure          No orders of the defined types were placed in this encounter.      Current Outpatient Prescriptions   Medication Sig Dispense Refill   • acetaminophen (TYLENOL) 500 MG tablet Take 500 mg by mouth every 6 (six) hours as needed for mild pain (1-3).     • aspirin 81 MG EC tablet Take 81 mg by mouth daily.     • carvedilol (COREG) 12.5 MG tablet Take 1 tablet by mouth  twice a day with meals 180 tablet 1   • clotrimazole-betamethasone (LOTRISONE) 1-0.05 % cream As Needed.     • fexofenadine (ALLEGRA) 180 MG tablet Take 1 tablet by mouth Daily. 30 tablet 3   • lansoprazole (PREVACID) 15 MG capsule Take 1 capsule by mouth Daily. 90 capsule 0   • LORazepam (ATIVAN) 0.5 MG tablet TAKE ONE TABLET BY MOUTH EVERY 8 HOURS AS NEEDED FOR ANXIETY 90 tablet 0   • O2 (OXYGEN) Inhale 2 L/min 1 (One) Time.     • polyethylene glycol (MIRALAX) powder Take 17 g by mouth daily.     • potassium chloride (K-DUR) 10 MEQ CR tablet Take 1 tablet by mouth Daily. 30 tablet 2   •  sacubitril-valsartan (ENTRESTO) 49-51 MG tablet Take 1 tablet by mouth Every 12 (Twelve) Hours. 60 tablet 3   • torsemide (DEMADEX) 20 MG tablet Take 2 tablets by mouth Daily. 30 tablet 11   • isosorbide mononitrate (IMDUR) 60 MG 24 hr tablet TAKE 1 TABLET BY MOUTH  TWICE A  tablet 3   • warfarin (COUMADIN) 2 MG tablet TAKE 1 TABLET BY MOUTH DAILY OR AS DIRECTED 30 tablet 1     No current facility-administered medications for this visit.             Plan:       1. Heart failure-Heart failure-EF 27%, she is on carvedilol and Entresto.  She is now on torsemide 40 mg once daily.  She is walking a half mile daily around her house.  Cough is improved.  Doing well. Continue to follow weekly.       Follow up in office in one week    As always, it has been a pleasure to participate in this patient's care.      Sincerely,      STEFANO Marie

## 2017-10-17 ENCOUNTER — OFFICE VISIT (OUTPATIENT)
Dept: CARDIOLOGY | Facility: CLINIC | Age: 82
End: 2017-10-17

## 2017-10-17 VITALS
HEART RATE: 96 BPM | SYSTOLIC BLOOD PRESSURE: 128 MMHG | BODY MASS INDEX: 23.85 KG/M2 | DIASTOLIC BLOOD PRESSURE: 60 MMHG | HEIGHT: 60 IN | WEIGHT: 121.5 LBS

## 2017-10-17 DIAGNOSIS — I50.32 DIASTOLIC CHF, CHRONIC (HCC): Primary | ICD-10-CM

## 2017-10-17 DIAGNOSIS — I50.40 COMBINED SYSTOLIC AND DIASTOLIC CONGESTIVE HEART FAILURE, UNSPECIFIED CONGESTIVE HEART FAILURE CHRONICITY: ICD-10-CM

## 2017-10-17 PROCEDURE — 99213 OFFICE O/P EST LOW 20 MIN: CPT | Performed by: NURSE PRACTITIONER

## 2017-10-17 NOTE — PROGRESS NOTES
Date of Office Visit: 10/17/2017  Encounter Provider: STEFANO Marie  Place of Service: [facility] CARDIOLOGY  Patient Name: Yudith Robertson  :1927    Chief Complaint   Patient presents with   • Shortness of Breath   :     HPI: Yudith Robertson is a 89 y.o. female comes in today for weekly follow up.     She has a history of permanent atrial fibrillation, nonobstructive coronary disease, cardiomyopathy, hypertension, and a stroke.She has had an EF as low as 35-40%. Her last echocardiogram was in 2016 showing an EF of 63%, mild aortic regurgitation, mild mitral regurgitation and moderate tricuspid valve stenosis. Mild tricuspid regurgitation with elevated RVSP of 51.       In May 2017, she was admitted to the hospital with pneumonia. She was worked up for aspiration pneumonia. A week after discharge, she was admitted to [facility] for stroke. She did have a thrombectomy. She then followed up with Dr. Brantley and was doing relatively well. She had a follow-up with her nephrology and her furosemide was decreased to 10 mg daily.      In 2017, she came in with complaints of shortness of breath.  She was volume overloaded.  We attempted to adjust her diuretics as an outpatient as she did not want to be admitted to the hospital.      2017, she went to the emergency room with complaints of shortness of breath.  She also had some chest pressure and congestion.  She had orthopnea.  She had a repeat echocardiogram showing an EF of 27%, mildly reduced RVSP, moderate aortic valve regurgitation, no aortic valve stenosis, mild MR, mild to moderate TR.  She was taken off valsartan and started on Entresto.          2017, she was complaining of some shortness of breath and cough.  She had a large pleural effusion.  It was recommended she go to the hospital but she declined.  She was given 40 mg of Lasix IV.  She was then admitted to the hospital with  systolic heart failure.  She is now on torsemide 40 mg once a day.    Doing well on torsemide.  Palpitations every now and then but stable.  Shortness of breath is stable.  She is walking about a half mile around the house still.  She says she barely cough at all.  Denies edema, fatigue, orthopnea or PND.  Some episodes of dizziness that are not new.  Some episodes of chest pressure that are not new as well.    Past Medical History:   Diagnosis Date   • Allergic rhinitis    • Anemia    • Atrial fibrillation    • Atypical chest pain    • CAD (coronary artery disease)    • Carotid artery stenosis     20-30% bilateral   • Cerebral artery occlusion with cerebral infarction 1997   • CHF (congestive heart failure)    • Diastolic congestive heart failure     Echo 3/2012 with normal LVEF, mod LVH   • Difficulty swallowing    • Dizzy     mild   • Edema     In [place]   • Esophageal reflux     GERD, history of esophageal stenosis s/p dilation   • GERD (gastroesophageal reflux disease)    • H/O bone density study 06/16/2015    Osteopenia   • H/O mammogram 02/04/2014   • H/O thyroid nodule    • History of esophageal stricture    • Hyperlipidemia    • Hypertension    • IBS (irritable bowel syndrome)    • Intestinal obstruction     small bowel obstruction   • LBBB (left bundle branch block)    • Mild aortic insufficiency    • Mild mitral insufficiency    • Mixed hyperlipidemia    • Moderate tricuspid insufficiency    • Mood disorder in conditions classified elsewhere    • Osteoarthrosis    • Osteopenia    • Polyarthropathy, multiple sites    • SOB (shortness of breath)    • Transient cerebral ischemia    • Valvular heart disease     Echo 1/17/14: EF 54%, elevated LAP, mild-mod MR, mod-severe TR, moderate PHTN (52mm Hg)       Past Surgical History:   Procedure Laterality Date   • APPENDECTOMY     • BACK SURGERY     • ENDOSCOPY N/A 5/22/2017    Procedure: ESOPHAGOGASTRODUODENOSCOPY WITH GASTON DILATATION 46FR, 48FR,52FR  AND  "ESOPHAGEAL BRUSHINGS;  Surgeon: Rebecca Becerra MD;  Location: [facility] ENDOSCOPY;  Service:    • EYE SURGERY Right 12/10/2008    Vitrectomy-Dr. Yogi Finnegan   • HYSTERECTOMY     • LAPAROTOMY SALPINGO OOPHORECTOMY N/A 10/29/2008    Dr. Lucas Mills   • UPPER GASTROINTESTINAL ENDOSCOPY N/A 07/24/2015    Dr. Jayce Abrams           Review of Systems   Constitution: Negative for fever and malaise/fatigue.   HENT: Negative for ear pain, hearing loss, nosebleeds and sore throat.    Eyes: Negative for double vision, pain, vision loss in left eye, vision loss in right eye and visual disturbance.   Cardiovascular: Negative for claudication, leg swelling and orthopnea.   Respiratory: Positive for shortness of breath. Negative for cough, snoring and wheezing.    Endocrine: Positive for heat intolerance. Negative for cold intolerance and polyuria.   Skin: Negative for color change, itching and rash.   Musculoskeletal: Positive for myalgias. Negative for joint pain, joint swelling and muscle cramps.   Gastrointestinal: Negative for abdominal pain, diarrhea, melena, nausea and vomiting.   Genitourinary: Negative for bladder incontinence and hematuria.   Neurological: Positive for paresthesias. Negative for excessive daytime sleepiness, dizziness, light-headedness and seizures.   Psychiatric/Behavioral: Negative for depression. The patient is not nervous/anxious.    All other systems reviewed and are negative.    All other systems reviewed and are negative    Allergies   Allergen Reactions   • Montelukast Nausea Only   • Morphine      Makes pt feel freaked out/loopy       All aspects of family and social history reviewed.          Objective:     Vitals:    10/17/17 1400   BP: 128/60   Pulse: 96   Weight: 121 lb 8 oz (55.1 kg)   Height: 60\" (152.4 cm)     Body mass index is 23.73 kg/(m^2).    PHYSICAL EXAM:  Physical Exam   Constitutional: She is oriented to person, place, and time. She appears well-developed and well-nourished. "   HENT:   Head: Normocephalic and atraumatic.   Neck: Neck supple. No JVD present.   Cardiovascular: Normal rate, regular rhythm, normal heart sounds and intact distal pulses.    Pulses:       Carotid pulses are 2+ on the right side, and 2+ on the left side.       Radial pulses are 2+ on the right side, and 2+ on the left side.        Dorsalis pedis pulses are 2+ on the right side, and 2+ on the left side.   Pulmonary/Chest: Effort normal and breath sounds normal. No accessory muscle usage. No respiratory distress. She has no rales.   Abdominal: Soft. Normal appearance and bowel sounds are normal. There is no tenderness.   Musculoskeletal: Normal range of motion. She exhibits no edema.   Neurological: She is alert and oriented to person, place, and time.   Skin: Skin is warm, dry and intact. She is not diaphoretic.   Psychiatric: She has a normal mood and affect. Her speech is normal and behavior is normal. Judgment and thought content normal. Cognition and memory are normal.       Procedures        Assessment:       Diagnosis Plan   1. Diastolic CHF, chronic     2. Combined systolic and diastolic congestive heart failure, unspecified congestive heart failure chronicity          No orders of the defined types were placed in this encounter.      Current Outpatient Prescriptions   Medication Sig Dispense Refill   • acetaminophen (TYLENOL) 500 MG tablet Take 500 mg by mouth every 6 (six) hours as needed for mild pain (1-3).     • carvedilol (COREG) 12.5 MG tablet Take 1 tablet by mouth  twice a day with meals 180 tablet 1   • clotrimazole-betamethasone (LOTRISONE) 1-0.05 % cream As Needed.     • fexofenadine (ALLEGRA) 180 MG tablet Take 1 tablet by mouth Daily. 30 tablet 3   • isosorbide mononitrate (IMDUR) 60 MG 24 hr tablet TAKE 1 TABLET BY MOUTH  TWICE A  tablet 3   • lansoprazole (PREVACID) 15 MG capsule Take 1 capsule by mouth Daily. 90 capsule 0   • LORazepam (ATIVAN) 0.5 MG tablet TAKE ONE TABLET BY MOUTH  EVERY 8 HOURS AS NEEDED FOR ANXIETY 90 tablet 0   • O2 (OXYGEN) Inhale 2 L/min 1 (One) Time.     • polyethylene glycol (MIRALAX) powder Take 17 g by mouth daily.     • potassium chloride (K-DUR) 10 MEQ CR tablet Take 1 tablet by mouth Daily. 30 tablet 2   • sacubitril-valsartan (ENTRESTO) 49-51 MG tablet Take 1 tablet by mouth Every 12 (Twelve) Hours. 60 tablet 3   • torsemide (DEMADEX) 20 MG tablet Take 2 tablets by mouth Daily. 30 tablet 11   • warfarin (COUMADIN) 2 MG tablet TAKE 1 TABLET BY MOUTH DAILY OR AS DIRECTED 30 tablet 1     No current facility-administered medications for this visit.             Plan:       Heart failure-comes in today for a weekly follow-up for heart failure.  Appears euvolemic.  Currently on torsemide 40 mg daily.  She does the nephrology next week so will not check a BMP.  She comes to see Dr. Castellon in one week.        Follow up in office in one week.     As always, it has been a pleasure to participate in this patient's care.      Sincerely,      STEFANO Marie

## 2017-10-19 DIAGNOSIS — F39 MOOD DISORDER (HCC): ICD-10-CM

## 2017-10-19 RX ORDER — LORAZEPAM 0.5 MG/1
0.5 TABLET ORAL EVERY 8 HOURS PRN
Qty: 90 TABLET | Refills: 0 | OUTPATIENT
Start: 2017-10-19 | End: 2017-12-12 | Stop reason: SDUPTHER

## 2017-10-19 NOTE — TELEPHONE ENCOUNTER
Patient called requesting refill on     LORazepam (ATIVAN) 0.5 MG tablet; states she is almost out of medication.  Please advise.     Patient:  [phone]   Last OV   09/26  Last refill 08/21  christiano done 09/16  please review med refill and advise

## 2017-10-25 ENCOUNTER — LAB (OUTPATIENT)
Dept: LAB | Facility: HOSPITAL | Age: 82
End: 2017-10-25

## 2017-10-25 ENCOUNTER — HOSPITAL ENCOUNTER (OUTPATIENT)
Dept: CARDIOLOGY | Facility: HOSPITAL | Age: 82
Setting detail: RECURRING SERIES
Discharge: HOME | End: 2017-10-25

## 2017-10-25 ENCOUNTER — OFFICE VISIT (OUTPATIENT)
Dept: CARDIOLOGY | Facility: CLINIC | Age: 82
End: 2017-10-25

## 2017-10-25 VITALS
HEART RATE: 79 BPM | HEIGHT: 60 IN | WEIGHT: 124 LBS | SYSTOLIC BLOOD PRESSURE: 148 MMHG | BODY MASS INDEX: 24.35 KG/M2 | DIASTOLIC BLOOD PRESSURE: 72 MMHG

## 2017-10-25 DIAGNOSIS — I10 ESSENTIAL HYPERTENSION: ICD-10-CM

## 2017-10-25 DIAGNOSIS — I50.43 ACUTE ON CHRONIC COMBINED SYSTOLIC AND DIASTOLIC CONGESTIVE HEART FAILURE (HCC): Primary | ICD-10-CM

## 2017-10-25 DIAGNOSIS — I38 VALVULAR HEART DISEASE: ICD-10-CM

## 2017-10-25 DIAGNOSIS — I44.7 LEFT BUNDLE BRANCH BLOCK: ICD-10-CM

## 2017-10-25 DIAGNOSIS — I48.21 PERMANENT ATRIAL FIBRILLATION (HCC): ICD-10-CM

## 2017-10-25 DIAGNOSIS — E78.2 MIXED HYPERLIPIDEMIA: ICD-10-CM

## 2017-10-25 DIAGNOSIS — I50.43 ACUTE ON CHRONIC COMBINED SYSTOLIC AND DIASTOLIC CONGESTIVE HEART FAILURE (HCC): ICD-10-CM

## 2017-10-25 LAB
ANION GAP SERPL CALCULATED.3IONS-SCNC: 11.3 MMOL/L
BUN BLD-MCNC: 25 MG/DL (ref 8–23)
BUN/CREAT SERPL: 26 (ref 7–25)
CALCIUM SPEC-SCNC: 9.7 MG/DL (ref 8.2–9.6)
CHLORIDE SERPL-SCNC: 95 MMOL/L (ref 98–107)
CO2 SERPL-SCNC: 32.7 MMOL/L (ref 22–29)
CREAT BLD-MCNC: 0.96 MG/DL (ref 0.57–1)
GFR SERPL CREATININE-BSD FRML MDRD: 55 ML/MIN/1.73
GLUCOSE BLD-MCNC: 86 MG/DL (ref 65–99)
MAGNESIUM SERPL-MCNC: 2.4 MG/DL (ref 1.6–2.4)
POTASSIUM BLD-SCNC: 4.5 MMOL/L (ref 3.5–5.2)
SODIUM BLD-SCNC: 139 MMOL/L (ref 136–145)

## 2017-10-25 PROCEDURE — 36415 COLL VENOUS BLD VENIPUNCTURE: CPT

## 2017-10-25 PROCEDURE — 85610 PROTHROMBIN TIME: CPT

## 2017-10-25 PROCEDURE — 93000 ELECTROCARDIOGRAM COMPLETE: CPT | Performed by: INTERNAL MEDICINE

## 2017-10-25 PROCEDURE — 36416 COLLJ CAPILLARY BLOOD SPEC: CPT

## 2017-10-25 PROCEDURE — 80048 BASIC METABOLIC PNL TOTAL CA: CPT

## 2017-10-25 PROCEDURE — 83735 ASSAY OF MAGNESIUM: CPT

## 2017-10-25 PROCEDURE — 99214 OFFICE O/P EST MOD 30 MIN: CPT | Performed by: INTERNAL MEDICINE

## 2017-10-25 NOTE — PROGRESS NOTES
PATIENTINFORMATION    Date of Office Visit: 10/25/2017  Encounter Provider: Felicitas Brantley MD  Place of Service: [facility] CARDIOLOGY  Patient Name: Yudith Robertson  : 1927    Subjective:     Encounter Date:10/25/2017      Patient ID: Yudith Robertson is a 89 y.o. female.      History of Present Illness    This is an 88-year-old woman who is well known to me. She had a heart catheterization in , which showed nonobstructive disease with 30%-40% stenoses in various vessels and an ejection fraction of 50%. She came to [facility] in 2015 with chest pain. She had a PET stress test 2/15 in our office, which was normal and showed normal left ventricular function. Echocardiogram was in 2014 and showed normal left ventricular systolic function with an ejection fraction of 54%. There was abnormal diastolic filling pressures. There was mild to moderate mitral regurgitation, moderate to severe tricuspid regurgitation, and right ventricular systolic pressure of 52 mmHg. She has persistent atrial fibrillation.         She was admitted to [facility] on 2015, with acute diastolic congestive heart failure. She diuresed with intravenous Lasix and then with oral Lasix. She complained of chest pain, which sounded like an esophageal stricture. Dr. Morales saw her and recommended an outpatient EGD with Dr. Abrams. She did have this and had an esophageal stricture dilated.       I saw her in 2016. At that time she was complaining of shortness of breath with paroxysmal nocturnal dyspnea and orthopnea. Because of this, she had a repeat echocardiogram performed on August 3, 2016:   - Left ventricular function is normal. Calculated EF = 63.6%. Estimated EF was in agreement with the calculated EF. Estimated EF = 64%. Normal left ventricular cavity size noted. Left ventricular wall thickness is consistent with mild concentric hypertrophy. Left ventricular diastolic function was  unable to be assessed. Elevated left atrial pressure.  - The following segments are hypokinetic: basal inferior and mid inferior. All other segments are normal.  - Left atrial volume is severely increased.  - Right atrial cavity size is severely dilated  - There is severe thickening of the aortic valve. Mild aortic valve regurgitation is present.  - Severe mitral annular calcification is present. Mild mitral valve regurgitation is present.  - The tricuspid valve is grossly normal. Moderate tricuspid valve stenosis is present. Mild tricuspid valve regurgitation is present. Estimated right ventricular systolic pressure from tricuspid regurgitation is moderately elevated (45-55 mmHg). The calculated RV systolic pressures 51 mmHg.      She was hospitalized at [facility] on 05/17/2017.  She came in because of difficulty swallowing and she was in a little bit of diastolic heart failure. Her warfarin was held and she underwent an esophageal dilation.     Around 05/26/2017, she was acting confused and Ashly was concerned.  She contacted the patient's daughter.  The patient ended up being taken to [facility] by ambulance for stroke. She was treated with tPA while in the hospital there.  I do not know what her INR was when she arrived at [place], but she had been running supratherapeutic around that time because of treatment with fluconazole.      We struggled with volume overload over the summer and fall of 2017.  She repeatedly declined admission to the hospital.  Ultimately, we were unsuccessful in controlling her volume status as an outpatient.  She was admitted to the hospital in August 2017 and at that time underwent an echocardiogram which showed her ejection fraction had dropped to 27%.  She was also noted to have severe left atrial enlargement, moderate aortic regurgitation, mild mitral regurgitation, mild to moderate tricuspid regurgitation with a right ventricular systolic pressure 65 mmHg.  She  also had a nuclear stress test on August 17, 2017 which was normal at the exception of an ejection fraction of 30%.  Valsartan was discontinued and she was placed on Enteresto.    She was readmitted to the hospital in September 2017 with acute on chronic systolic congestive heart failure.  She was discharged on torsemide.  Since then, she has seen Aminata multiple times.  She has remained euvolemic.    She comes in today for follow-up.  She has generally been feeling well.  Her weight has been stable and excellent declined a few pounds in the last day or 2.  She wears oxygen at night but is not wearing it during the day.  She has a quick occasional quick palpitation sensation.  Her cough is gone.  She has had no lower extremity edema.     Review of Systems   Constitution: Negative for fever, malaise/fatigue, weight gain and weight loss.   HENT: Negative for ear pain, hearing loss, nosebleeds and sore throat.    Eyes: Negative for double vision, pain, vision loss in left eye and vision loss in right eye.   Cardiovascular:        See history of present illness.   Respiratory: Negative for cough, shortness of breath, sleep disturbances due to breathing, snoring and wheezing.    Endocrine: Negative for cold intolerance, heat intolerance and polyuria.   Skin: Negative for itching, poor wound healing and rash.   Musculoskeletal: Negative for joint pain, joint swelling and myalgias.   Gastrointestinal: Negative for abdominal pain, diarrhea, hematochezia, nausea and vomiting.   Genitourinary: Negative for hematuria and hesitancy.   Neurological: Negative for numbness, paresthesias and seizures.   Psychiatric/Behavioral: Negative for depression. The patient is nervous/anxious.            ECG 12 Lead  Date/Time: 10/25/2017 12:50 PM  Performed by: LEE ANN REARDON  Authorized by: LEE ANN REARDON   Comparison: compared with previous ECG from 10/2/2017  Similar to previous ECG  Rhythm: atrial fibrillation  BPM: 79  Conduction: left  "bundle branch block  Clinical impression: abnormal ECG               Objective:     /72  Pulse 79  Ht 60\" (152.4 cm)  Wt 124 lb (56.2 kg)  BMI 24.22 kg/m2 Body mass index is 24.22 kg/(m^2).     Physical Exam   Constitutional: She appears well-developed.   HENT:   Head: Normocephalic and atraumatic.   Eyes: Conjunctivae and lids are normal. Pupils are equal, round, and reactive to light. Lids are everted and swept, no foreign bodies found.   Neck: Normal range of motion. No JVD present. Carotid bruit is not present. No tracheal deviation present. No thyroid mass present.   Cardiovascular: Normal rate, regular rhythm and normal heart sounds.    Pulses:       Dorsalis pedis pulses are 2+ on the right side, and 2+ on the left side.   Pulmonary/Chest: Effort normal and breath sounds normal.   Abdominal: Normal appearance and bowel sounds are normal.   Musculoskeletal: Normal range of motion.   Neurological: She is alert. She has normal strength.   Skin: Skin is warm, dry and intact.   Psychiatric: She has a normal mood and affect. Her behavior is normal.   Vitals reviewed.      Lab Review: I reviewed old labs and ordered new labs today.      Assessment/Plan:       1.  Chronic systolic congestive heart failure.  Ejection fraction 27%.  Continue carvedilol, Enteresto and torsemide.  I am going to check labs on her today.  2.  Chest pain.  She had a heart catheterization in the past which showed nonobstructive disease.  She had a normal stress test in August 2017.  3.  Atrial fibrillation.  Rate controlled.  Anticoagulated with warfarin.  4. Valvular heart disease with moderate tricuspid regurgitation and mild mitral regurgitation.   5. Moderate pulmonary hypertension.   6. History of carotid stenosis, 30%-40% bilateral disease.   7. History of gastroesophageal reflux disease and esophageal stenosis.   8. Hyperlipidemia.   9. History of small bowel obstruction.   10. History of left bundle branch block.   11. " History of stroke.   12. Cough. Better.  13.  Hypoxia.  She follows with Dr. Salomon.  She wears oxygen at night.       Follow up with Aminata in 8 weeks. She will see her nephrologist and Dr Garzon in the meantime. She is already scheduled to see me in Feb 2018.      Orders Placed This Encounter   Procedures   • Basic Metabolic Panel     Standing Status:   Future     Standing Expiration Date:   10/25/2018   • Magnesium     Standing Status:   Future     Standing Expiration Date:   10/25/2018   • ECG 12 Lead     This order was created via procedure documentation      Yudith Robertson   [facility] Medication Instructions OLE[facility]IO:    Printed on:10/25/17 8368   Medication Information                      acetaminophen (TYLENOL) 500 MG tablet  Take 500 mg by mouth every 6 (six) hours as needed for mild pain (1-3).             carvedilol (COREG) 12.5 MG tablet  Take 1 tablet by mouth  twice a day with meals             clotrimazole-betamethasone (LOTRISONE) 1-0.05 % cream  As Needed.             fexofenadine (ALLEGRA) 180 MG tablet  Take 1 tablet by mouth Daily.             isosorbide mononitrate (IMDUR) 60 MG 24 hr tablet  TAKE 1 TABLET BY MOUTH  TWICE A DAY             lansoprazole (PREVACID) 15 MG capsule  Take 1 capsule by mouth Daily.             LORazepam (ATIVAN) 0.5 MG tablet  Take 1 tablet by mouth Every 8 (Eight) Hours As Needed for Anxiety.             O2 (OXYGEN)  Inhale 2 L/min 1 (One) Time.             polyethylene glycol (MIRALAX) powder  Take 17 g by mouth daily.             potassium chloride (K-DUR) 10 MEQ CR tablet  Take 1 tablet by mouth Daily.             sacubitril-valsartan (ENTRESTO) 49-51 MG tablet  Take 1 tablet by mouth Every 12 (Twelve) Hours.             torsemide (DEMADEX) 20 MG tablet  Take 2 tablets by mouth Daily.             warfarin (COUMADIN) 2 MG tablet  TAKE 1 TABLET BY MOUTH DAILY OR AS DIRECTED                        Felicitas Brantley MD  10/25/17  12:50 PM

## 2017-11-08 ENCOUNTER — EPISODE CHANGES (OUTPATIENT)
Dept: CASE MANAGEMENT | Facility: OTHER | Age: 82
End: 2017-11-08

## 2017-11-08 ENCOUNTER — TELEPHONE (OUTPATIENT)
Dept: CARDIOLOGY | Facility: CLINIC | Age: 82
End: 2017-11-08

## 2017-11-08 RX ORDER — TORSEMIDE 20 MG/1
40 TABLET ORAL DAILY
Qty: 60 TABLET | Refills: 11 | Status: SHIPPED | OUTPATIENT
Start: 2017-11-08 | End: 2018-10-23 | Stop reason: SDUPTHER

## 2017-11-08 NOTE — TELEPHONE ENCOUNTER
Pt called and LMM re: needing RF on Lasix.  Pt said rx states QD she take BID but wants to take TID.      I called pt and LM re: Lasix?? Did she mean Torsemide?  Call us back or request from pharmacy to make sure we have correct med.

## 2017-11-08 NOTE — TELEPHONE ENCOUNTER
Have her check her weight tomorrow.  If it is going up tomorrow, take an additional torsemide.  If it's back down, stick with what she is doing

## 2017-11-08 NOTE — TELEPHONE ENCOUNTER
Spoke to Pt, she was talking about her Rx for Torsemide. She has been told to take Torsemide 20 mg twice a day. She needs a new Rx sent in for this dosage increase. Pt was also wanting to know what she should do about her 2 lb weight gain over the last 2 days. She wants to know if she should take an additional Torsemide tablet for a total of 3 today (60 mg) please advise.

## 2017-11-09 NOTE — TELEPHONE ENCOUNTER
Pt called to inform that her base weight has typically been 120lbs. For quite some time she has been at 117lbs. Over the last three days it has gone back up to 120lbs. She didn't know if you would consider this a weight gain or not, she states she does not want to take extra medicine if she does not have to. Please advise

## 2017-11-13 ENCOUNTER — OFFICE VISIT (OUTPATIENT)
Dept: INTERNAL MEDICINE | Facility: CLINIC | Age: 82
End: 2017-11-13

## 2017-11-13 VITALS
DIASTOLIC BLOOD PRESSURE: 70 MMHG | HEART RATE: 78 BPM | SYSTOLIC BLOOD PRESSURE: 140 MMHG | WEIGHT: 129 LBS | BODY MASS INDEX: 25.32 KG/M2 | HEIGHT: 60 IN | OXYGEN SATURATION: 94 %

## 2017-11-13 DIAGNOSIS — E78.2 MIXED HYPERLIPIDEMIA: ICD-10-CM

## 2017-11-13 DIAGNOSIS — I48.20 CHRONIC ATRIAL FIBRILLATION (HCC): ICD-10-CM

## 2017-11-13 DIAGNOSIS — I50.43 ACUTE ON CHRONIC COMBINED SYSTOLIC AND DIASTOLIC CONGESTIVE HEART FAILURE (HCC): ICD-10-CM

## 2017-11-13 DIAGNOSIS — K21.00 GASTROESOPHAGEAL REFLUX DISEASE WITH ESOPHAGITIS: Primary | ICD-10-CM

## 2017-11-13 PROCEDURE — 99214 OFFICE O/P EST MOD 30 MIN: CPT | Performed by: INTERNAL MEDICINE

## 2017-11-13 NOTE — PROGRESS NOTES
Subjective   Yudith Robertson is a 89 y.o. female.     Abdominal Pain   This is a new problem. The current episode started in the past 7 days. Diarrhea:  Having  loose BMs after eating for past week Has bentyl Advised to use them    Atrial Fibrillation   Past medical history includes atrial fibrillation.        The following portions of the patient's history were reviewed and updated as appropriate: allergies, current medications, past family history, past medical history, past social history, past surgical history and problem list.    Review of Systems   Constitutional:        Here for F/U    HENT: Negative.    Eyes: Negative.    Cardiovascular:        Saw Dr Brantley a couple weeks ago & thought she wqas doing OK heartwise    Gastrointestinal: Positive for abdominal pain. Diarrhea:  Having  loose BMs after eating for past week Has bentyl Advised to use them    Genitourinary:        Sees Nephrologist as well       Objective   Physical Exam   Constitutional: She is oriented to person, place, and time. She appears well-developed and well-nourished.   HENT:   Head: Normocephalic.   Eyes: EOM are normal.   Neck: Neck supple.   Cardiovascular: Normal rate and normal heart sounds.    Repeat 140/80 Irreg irreg   Pulmonary/Chest: Effort normal and breath sounds normal.   Musculoskeletal: Normal range of motion.   Neurological: She is alert and oriented to person, place, and time.   Vitals reviewed.      Assessment/Plan   Yudith was seen today for abdominal pain and atrial fibrillation.    Diagnoses and all orders for this visit:    Gastroesophageal reflux disease with esophagitis  -     CBC Auto Differential; Future  -     Comprehensive Metabolic Panel; Future    Chronic atrial fibrillation    Acute on chronic combined systolic and diastolic congestive heart failure    Mixed hyperlipidemia  -     Lipid Panel; Future

## 2017-11-15 ENCOUNTER — LAB (OUTPATIENT)
Dept: INTERNAL MEDICINE | Facility: CLINIC | Age: 82
End: 2017-11-15

## 2017-11-15 DIAGNOSIS — K21.00 GASTROESOPHAGEAL REFLUX DISEASE WITH ESOPHAGITIS: ICD-10-CM

## 2017-11-15 DIAGNOSIS — E78.2 MIXED HYPERLIPIDEMIA: ICD-10-CM

## 2017-11-15 LAB
ALBUMIN SERPL-MCNC: 3.3 G/DL (ref 3.5–5.2)
ALBUMIN/GLOB SERPL: 0.9 G/DL
ALP SERPL-CCNC: 83 U/L (ref 39–117)
ALT SERPL W P-5'-P-CCNC: 8 U/L (ref 1–33)
ANION GAP SERPL CALCULATED.3IONS-SCNC: 14.7 MMOL/L
AST SERPL-CCNC: 16 U/L (ref 1–32)
BASOPHILS # BLD AUTO: 0.04 10*3/MM3 (ref 0–0.2)
BASOPHILS NFR BLD AUTO: 0.6 % (ref 0–2)
BILIRUB SERPL-MCNC: 0.5 MG/DL (ref 0.1–1.2)
BUN BLD-MCNC: 26 MG/DL (ref 8–23)
BUN/CREAT SERPL: 26.8 (ref 7–25)
CALCIUM SPEC-SCNC: 9.6 MG/DL (ref 8.2–9.6)
CHLORIDE SERPL-SCNC: 92 MMOL/L (ref 98–107)
CHOLEST SERPL-MCNC: 159 MG/DL (ref 0–200)
CO2 SERPL-SCNC: 31.3 MMOL/L (ref 22–29)
CREAT BLD-MCNC: 0.97 MG/DL (ref 0.57–1)
DEPRECATED RDW RBC AUTO: 54.7 FL (ref 37–54)
EOSINOPHIL # BLD AUTO: 0.36 10*3/MM3 (ref 0–0.7)
EOSINOPHIL NFR BLD AUTO: 5.2 % (ref 0–5)
ERYTHROCYTE [DISTWIDTH] IN BLOOD BY AUTOMATED COUNT: 18.5 % (ref 11.5–15)
GFR SERPL CREATININE-BSD FRML MDRD: 54 ML/MIN/1.73
GLOBULIN UR ELPH-MCNC: 3.6 GM/DL
GLUCOSE BLD-MCNC: 100 MG/DL (ref 65–99)
HCT VFR BLD AUTO: 30.3 % (ref 34.1–44.9)
HDLC SERPL-MCNC: 38 MG/DL (ref 40–60)
HGB BLD-MCNC: 9.3 G/DL (ref 11.2–15.7)
LDLC SERPL CALC-MCNC: 107 MG/DL (ref 0–100)
LDLC/HDLC SERPL: 2.82 {RATIO}
LYMPHOCYTES # BLD AUTO: 1.1 10*3/MM3 (ref 0.8–7)
LYMPHOCYTES NFR BLD AUTO: 16 % (ref 10–60)
MCH RBC QN AUTO: 25.7 PG (ref 26–34)
MCHC RBC AUTO-ENTMCNC: 30.7 G/DL (ref 31–37)
MCV RBC AUTO: 83.7 FL (ref 80–100)
MONOCYTES # BLD AUTO: 0.83 10*3/MM3 (ref 0–1)
MONOCYTES NFR BLD AUTO: 12.1 % (ref 0–13)
NEUTROPHILS # BLD AUTO: 4.53 10*3/MM3 (ref 1–11)
NEUTROPHILS NFR BLD AUTO: 66.1 % (ref 30–85)
PLATELET # BLD AUTO: 359 10*3/MM3 (ref 150–450)
PMV BLD AUTO: 9.9 FL (ref 6–12)
POTASSIUM BLD-SCNC: 4.1 MMOL/L (ref 3.5–5.2)
PROT SERPL-MCNC: 6.9 G/DL (ref 6–8.5)
RBC # BLD AUTO: 3.62 10*6/MM3 (ref 3.93–5.22)
SODIUM BLD-SCNC: 138 MMOL/L (ref 136–145)
TRIGL SERPL-MCNC: 70 MG/DL (ref 0–150)
VLDLC SERPL-MCNC: 14 MG/DL (ref 5–40)
WBC NRBC COR # BLD: 6.86 10*3/MM3 (ref 5–10)

## 2017-11-15 PROCEDURE — 80061 LIPID PANEL: CPT | Performed by: INTERNAL MEDICINE

## 2017-11-15 PROCEDURE — 80053 COMPREHEN METABOLIC PANEL: CPT | Performed by: INTERNAL MEDICINE

## 2017-11-15 PROCEDURE — 85025 COMPLETE CBC W/AUTO DIFF WBC: CPT | Performed by: INTERNAL MEDICINE

## 2017-11-15 PROCEDURE — 36415 COLL VENOUS BLD VENIPUNCTURE: CPT | Performed by: INTERNAL MEDICINE

## 2017-11-21 ENCOUNTER — HOSPITAL ENCOUNTER (OUTPATIENT)
Dept: CARDIOLOGY | Facility: HOSPITAL | Age: 82
Setting detail: RECURRING SERIES
Discharge: HOME | End: 2017-11-21

## 2017-11-21 PROCEDURE — 36416 COLLJ CAPILLARY BLOOD SPEC: CPT

## 2017-11-21 PROCEDURE — 85610 PROTHROMBIN TIME: CPT

## 2017-11-28 ENCOUNTER — TELEPHONE (OUTPATIENT)
Dept: INTERNAL MEDICINE | Facility: CLINIC | Age: 82
End: 2017-11-28

## 2017-11-28 DIAGNOSIS — J30.89 ALLERGIC RHINITIS DUE TO OTHER ALLERGIC TRIGGER, UNSPECIFIED CHRONICITY, UNSPECIFIED SEASONALITY: Primary | ICD-10-CM

## 2017-11-28 DIAGNOSIS — K21.00 GASTROESOPHAGEAL REFLUX DISEASE WITH ESOPHAGITIS: ICD-10-CM

## 2017-11-28 RX ORDER — LANSOPRAZOLE 15 MG/1
15 CAPSULE, DELAYED RELEASE ORAL DAILY
Qty: 90 CAPSULE | Refills: 3 | Status: SHIPPED | OUTPATIENT
Start: 2017-11-28 | End: 2018-01-01 | Stop reason: SDUPTHER

## 2017-11-28 RX ORDER — FEXOFENADINE HCL 180 MG/1
180 TABLET ORAL DAILY
Qty: 90 TABLET | Refills: 3 | Status: SHIPPED | OUTPATIENT
Start: 2017-11-28 | End: 2018-01-01 | Stop reason: SDUPTHER

## 2017-11-28 NOTE — TELEPHONE ENCOUNTER
----- Message from Kamryn Alvarado sent at 11/28/2017  9:19 AM EST -----  Contact: Patient  Patient called requesting written scripts for the following    fexofenadine (ALLEGRA) 180 MG tablet  lansoprazole (PREVACID) 15 MG capsule    Patient states she can get help with paying for these if written out.  Daughter will .  Please let patient know when ready for .  Thanks.     Patient:  [phone]

## 2017-12-05 ENCOUNTER — HOSPITAL ENCOUNTER (OUTPATIENT)
Dept: CARDIOLOGY | Facility: HOSPITAL | Age: 82
Setting detail: RECURRING SERIES
Discharge: HOME | End: 2017-12-05

## 2017-12-05 PROCEDURE — 36416 COLLJ CAPILLARY BLOOD SPEC: CPT

## 2017-12-05 PROCEDURE — 85610 PROTHROMBIN TIME: CPT

## 2017-12-07 ENCOUNTER — TELEPHONE (OUTPATIENT)
Dept: CARDIOLOGY | Facility: HOSPITAL | Age: 82
End: 2017-12-07

## 2017-12-12 DIAGNOSIS — F39 MOOD DISORDER (HCC): ICD-10-CM

## 2017-12-12 RX ORDER — LORAZEPAM 0.5 MG/1
0.5 TABLET ORAL EVERY 8 HOURS PRN
Qty: 90 TABLET | Refills: 0 | OUTPATIENT
Start: 2017-12-12 | End: 2017-12-13 | Stop reason: SDUPTHER

## 2017-12-12 NOTE — TELEPHONE ENCOUNTER
Contact: Patient  Patient called requesting refill, patient is almost out of medication. Please advise    LORazepam (ATIVAN) 0.5 MG tablet    [facility] [address] [place] [address] -   Pt# [phone]    Last OV   11/13   please review med refill and advise

## 2017-12-13 ENCOUNTER — TELEPHONE (OUTPATIENT)
Dept: INTERNAL MEDICINE | Facility: CLINIC | Age: 82
End: 2017-12-13

## 2017-12-13 DIAGNOSIS — F39 MOOD DISORDER (HCC): ICD-10-CM

## 2017-12-13 NOTE — TELEPHONE ENCOUNTER
Pharmacist from [facility] called for verbal as it was not received on their voice mail    Verbally given to pharmacist and read back    Lorazepam .5mg 1 po tid PRN #[id number]

## 2017-12-14 RX ORDER — LORAZEPAM 0.5 MG/1
TABLET ORAL
Qty: 90 TABLET | Refills: 0 | OUTPATIENT
Start: 2017-12-14 | End: 2018-02-02 | Stop reason: SDUPTHER

## 2017-12-27 ENCOUNTER — HOSPITAL ENCOUNTER (OUTPATIENT)
Dept: CARDIOLOGY | Facility: HOSPITAL | Age: 82
Setting detail: RECURRING SERIES
Discharge: HOME | End: 2017-12-27

## 2017-12-27 ENCOUNTER — OFFICE VISIT (OUTPATIENT)
Dept: CARDIOLOGY | Facility: CLINIC | Age: 82
End: 2017-12-27

## 2017-12-27 VITALS
WEIGHT: 120 LBS | SYSTOLIC BLOOD PRESSURE: 128 MMHG | BODY MASS INDEX: 23.56 KG/M2 | HEART RATE: 84 BPM | DIASTOLIC BLOOD PRESSURE: 68 MMHG | HEIGHT: 60 IN

## 2017-12-27 DIAGNOSIS — I44.7 LEFT BUNDLE BRANCH BLOCK: ICD-10-CM

## 2017-12-27 DIAGNOSIS — I50.43 ACUTE ON CHRONIC COMBINED SYSTOLIC AND DIASTOLIC CONGESTIVE HEART FAILURE (HCC): Primary | ICD-10-CM

## 2017-12-27 DIAGNOSIS — I48.21 PERMANENT ATRIAL FIBRILLATION (HCC): ICD-10-CM

## 2017-12-27 DIAGNOSIS — I10 ESSENTIAL HYPERTENSION: ICD-10-CM

## 2017-12-27 PROCEDURE — 36416 COLLJ CAPILLARY BLOOD SPEC: CPT

## 2017-12-27 PROCEDURE — 99213 OFFICE O/P EST LOW 20 MIN: CPT | Performed by: NURSE PRACTITIONER

## 2017-12-27 PROCEDURE — 85610 PROTHROMBIN TIME: CPT

## 2017-12-27 PROCEDURE — 93000 ELECTROCARDIOGRAM COMPLETE: CPT | Performed by: NURSE PRACTITIONER

## 2017-12-27 NOTE — PROGRESS NOTES
Date of Office Visit: 2017  Encounter Provider: STEFANO Marie  Place of Service: [facility] CARDIOLOGY  Patient Name: Yudith Robertson  :1927    No chief complaint on file.  :     HPI: Yudith Robertson is a 89 y.o. female comes in today for 2 month follow up.     She has a history of permanent atrial fibrillation, nonobstructive coronary disease, cardiomyopathy, hypertension, and a stroke.She has had an EF as low as 35-40%. Her last echocardiogram was in 2016 showing an EF of 63%, mild aortic regurgitation, mild mitral regurgitation and moderate tricuspid valve stenosis. Mild tricuspid regurgitation with elevated RVSP of 51.       In May 2017, she was admitted to the hospital with pneumonia. She was worked up for aspiration pneumonia. A week after discharge, she was admitted to [facility] for stroke. She did have a thrombectomy. She then followed up with Dr. Brantley and was doing relatively well. She had a follow-up with her nephrology and her furosemide was decreased to 10 mg daily.      In 2017, she came in with complaints of shortness of breath.  She was volume overloaded.  We attempted to adjust her diuretics as an outpatient as she did not want to be admitted to the hospital.      2017, she went to the emergency room with complaints of shortness of breath.  She also had some chest pressure and congestion.  She had orthopnea.  She had a repeat echocardiogram showing an EF of 27%, mildly reduced RVSP, moderate aortic valve regurgitation, no aortic valve stenosis, mild MR, mild to moderate TR.  She was taken off valsartan and started on Entresto.        2017, she was complaining of some shortness of breath and cough.  She had a large pleural effusion.  It was recommended she go to the hospital but she declined.  She was given 40 mg of Lasix IV.  She was then admitted to the hospital with systolic heart failure.  She is now on  torsemide 40 mg once a day.    Comes in for follow-up today. Denies palpitations or tachycardia, shortness of breath, edema, dizziness, chest pain, fatigue, orthopnea or PND.  Continues to walk.  Weight has been as high as 122 but returns to 120.  Has been doing very well over the past few months.      Past Medical History:   Diagnosis Date   • Allergic rhinitis    • Anemia    • Atrial fibrillation    • Atypical chest pain    • CAD (coronary artery disease)    • Carotid artery stenosis     20-30% bilateral   • Cerebral artery occlusion with cerebral infarction 1997   • CHF (congestive heart failure)    • Diastolic congestive heart failure     Echo 3/2012 with normal LVEF, mod LVH   • Difficulty swallowing    • Dizzy     mild   • Edema     In Calf   • Esophageal reflux     GERD, history of esophageal stenosis s/p dilation   • GERD (gastroesophageal reflux disease)    • H/O bone density study 06/16/2015    Osteopenia   • H/O mammogram 02/04/2014   • H/O thyroid nodule    • History of esophageal stricture    • Hyperlipidemia    • Hypertension    • IBS (irritable bowel syndrome)    • Intestinal obstruction     small bowel obstruction   • LBBB (left bundle branch block)    • Mild aortic insufficiency    • Mild mitral insufficiency    • Mixed hyperlipidemia    • Moderate tricuspid insufficiency    • Mood disorder in conditions classified elsewhere    • Osteoarthrosis    • Osteopenia    • Polyarthropathy, multiple sites    • SOB (shortness of breath)    • Transient cerebral ischemia    • Valvular heart disease     Echo 1/17/14: EF 54%, elevated LAP, mild-mod MR, mod-severe TR, moderate PHTN (52mm Hg)       Past Surgical History:   Procedure Laterality Date   • APPENDECTOMY     • BACK SURGERY     • ENDOSCOPY N/A 5/22/2017    Procedure: ESOPHAGOGASTRODUODENOSCOPY WITH GASTON DILATATION 46FR, 48FR,52FR  AND ESOPHAGEAL BRUSHINGS;  Surgeon: Rebecca Becerra MD;  Location: [facility] ENDOSCOPY;  Service:    • EYE SURGERY Right  "12/10/2008    Vitrectomy-Dr. Yogi Finnegan   • HYSTERECTOMY     • LAPAROTOMY SALPINGO OOPHORECTOMY N/A 10/29/2008    Dr. Lucas Mills   • UPPER GASTROINTESTINAL ENDOSCOPY N/A 07/24/2015    Dr. Jayce Abrams           Review of Systems   Constitution: Negative for fever and malaise/fatigue.   HENT: Negative for ear pain, hearing loss, nosebleeds and sore throat.    Eyes: Negative for double vision, pain, vision loss in left eye, vision loss in right eye and visual disturbance.   Cardiovascular: Negative for claudication, leg swelling and orthopnea.   Respiratory: Positive for shortness of breath. Negative for cough, snoring and wheezing.    Endocrine: Negative for cold intolerance, heat intolerance and polyuria.   Skin: Negative for color change, itching and rash.   Musculoskeletal: Negative for joint pain, joint swelling and muscle cramps.   Gastrointestinal: Negative for abdominal pain, diarrhea, melena, nausea and vomiting.   Genitourinary: Negative for bladder incontinence and hematuria.   Neurological: Negative for excessive daytime sleepiness, dizziness, light-headedness, paresthesias and seizures.   Psychiatric/Behavioral: Negative for depression. The patient is not nervous/anxious.    All other systems reviewed and are negative.    All other systems reviewed and are negative    Allergies   Allergen Reactions   • Montelukast Nausea Only   • Morphine      Makes pt feel freaked out/loopy       All aspects of family and social history reviewed.          Objective:     Vitals:    12/27/17 1018   BP: 128/68   BP Location: Left arm   Pulse: 84   Weight: 54.4 kg (120 lb)   Height: 152.4 cm (60\")     Body mass index is 23.44 kg/(m^2).    PHYSICAL EXAM:  Physical Exam   Constitutional: She is oriented to person, place, and time. She appears well-developed and well-nourished.   HENT:   Head: Normocephalic and atraumatic.   Neck: Neck supple. No JVD present.   Cardiovascular: Normal rate, regular rhythm, normal heart " sounds and intact distal pulses.    Pulses:       Carotid pulses are 2+ on the right side, and 2+ on the left side.       Radial pulses are 2+ on the right side, and 2+ on the left side.        Dorsalis pedis pulses are 2+ on the right side, and 2+ on the left side.   Pulmonary/Chest: Effort normal and breath sounds normal. No accessory muscle usage. No respiratory distress. She has no rales.   Abdominal: Soft. Normal appearance and bowel sounds are normal. There is no tenderness.   Musculoskeletal: Normal range of motion. She exhibits no edema.   Neurological: She is alert and oriented to person, place, and time.   Skin: Skin is warm, dry and intact. She is not diaphoretic.   Psychiatric: She has a normal mood and affect. Her speech is normal and behavior is normal. Judgment and thought content normal. Cognition and memory are normal.         ECG 12 Lead  Date/Time: 12/27/2017 11:08 AM  Performed by: ESHA ROWLEY  Authorized by: ESHA ROWLEY   Comparison: compared with previous ECG from 10/25/2017  Similar to previous ECG  Rhythm: atrial fibrillation  Rate: normal  BPM: 84  Conduction: conduction normal  QRS axis: normal  Clinical impression: abnormal ECG  Comments: Indication: heart failure, afib                Assessment:       Diagnosis Plan   1. Acute on chronic combined systolic and diastolic congestive heart failure     2. Permanent atrial fibrillation     3. Essential hypertension     4. Left bundle branch block          Orders Placed This Encounter   Procedures   • ECG 12 Lead     This order was created via procedure documentation       Current Outpatient Prescriptions   Medication Sig Dispense Refill   • acetaminophen (TYLENOL) 500 MG tablet Take 500 mg by mouth every 6 (six) hours as needed for mild pain (1-3).     • carvedilol (COREG) 12.5 MG tablet Take 1 tablet by mouth  twice a day with meals 180 tablet 1   • fexofenadine (ALLEGRA) 180 MG tablet Take 1 tablet by mouth Daily. 90 tablet 3   •  isosorbide mononitrate (IMDUR) 60 MG 24 hr tablet TAKE 1 TABLET BY MOUTH  TWICE A  tablet 3   • lansoprazole (PREVACID) 15 MG capsule Take 1 capsule by mouth Daily. 90 capsule 3   • LORazepam (ATIVAN) 0.5 MG tablet TAKE ONE TABLET BY MOUTH EVERY 8 HOURS AS NEEDED FOR ANXIETY 90 tablet 0   • O2 (OXYGEN) Inhale 2 L/min 1 (One) Time.     • potassium chloride (K-DUR) 10 MEQ CR tablet Take 1 tablet by mouth Daily. 30 tablet 2   • sacubitril-valsartan (ENTRESTO) 49-51 MG tablet Take 1 tablet by mouth Every 12 (Twelve) Hours. 60 tablet 3   • torsemide (DEMADEX) 20 MG tablet Take 2 tablets by mouth Daily. 60 tablet 11   • warfarin (COUMADIN) 2 MG tablet TAKE 1 TABLET BY MOUTH DAILY OR AS DIRECTED 30 tablet 1     No current facility-administered medications for this visit.             Plan:      Heart Failure  Assessment  • NYHA class II - There is slight limitation of physical activity. The patient is comfortable at rest, but physical activity results in fatigue, palpitations or shortness of breath.  • Beta blocker prescribed  • Diuretics prescribed  • Angiotensin receptor blocker (ARB) prescribed  • Left ventricular function is severely reduced by qualitative assessment    Plan  • The patient has received heart failure education on the following topics: dietary sodium restriction, medication instructions and weight monitoring  • The heart failure care plan was discussed with the patient today including: continuing the current program    Subjective/Objective  • The patient reports dyspnea    • Physical exam findings negative for rales and elevated JVP.    EF 27%.  On good medical management.  Continue torsemide  2.  Atrial Fibrillation and Atrial Flutter  Assessment  • The patient has permanent atrial fibrillation  • This is non-valvular in etiology  • The patient's CHADS2-VASc score is 7  • A [id number] score of 2 or more is considered a high risk for a thromboembolic event    Plan  • Continue in atrial  fibrillation with rate control  • Continue warfarin for antithrombotic therapy, bleeding issues discussed  • Continue beta blocker for rate control    3.  Valvular heart disease with moderate TR and mild MR.  Moderate pulmonary 4. hypertension-stable    Doing well.  Continue current treatment.  Follow-up with Dr. painter in February        As always, it has been a pleasure to participate in this patient's care.      Sincerely,      STEFANO Marie

## 2017-12-28 ENCOUNTER — TELEPHONE (OUTPATIENT)
Dept: INTERNAL MEDICINE | Facility: CLINIC | Age: 82
End: 2017-12-28

## 2017-12-28 RX ORDER — POTASSIUM CHLORIDE 750 MG/1
TABLET, FILM COATED, EXTENDED RELEASE ORAL
Qty: 30 TABLET | Refills: 2 | Status: SHIPPED | OUTPATIENT
Start: 2017-12-28 | End: 2018-03-29 | Stop reason: SDUPTHER

## 2017-12-28 RX ORDER — WARFARIN SODIUM 2 MG/1
TABLET ORAL
Qty: 30 TABLET | Refills: 1 | Status: SHIPPED | OUTPATIENT
Start: 2017-12-28 | End: 2018-02-13 | Stop reason: SDUPTHER

## 2017-12-28 NOTE — TELEPHONE ENCOUNTER
[facility] pharmacy faxed request on pt's med sacubitril-valsartan (ENTRESTO) 49-51 MG tablet , is currently on backorder . Can we use another ACE or different strength ?    sacubitril-valsartan (ENTRESTO) 49-51 MG tablet  Take 1 tablet by mouth Every 12 (Twelve) Hours.    Please advise

## 2017-12-29 DIAGNOSIS — I10 ESSENTIAL HYPERTENSION: Primary | ICD-10-CM

## 2017-12-29 RX ORDER — VALSARTAN 40 MG/1
40 TABLET ORAL DAILY
Qty: 30 TABLET | Refills: 1 | Status: SHIPPED | OUTPATIENT
Start: 2017-12-29 | End: 2018-02-27

## 2018-01-01 ENCOUNTER — OFFICE VISIT (OUTPATIENT)
Dept: INTERNAL MEDICINE | Facility: CLINIC | Age: 83
End: 2018-01-01

## 2018-01-01 ENCOUNTER — TELEPHONE (OUTPATIENT)
Dept: INTERNAL MEDICINE | Facility: CLINIC | Age: 83
End: 2018-01-01

## 2018-01-01 ENCOUNTER — ANTICOAGULATION VISIT (OUTPATIENT)
Dept: PHARMACY | Facility: HOSPITAL | Age: 83
End: 2018-01-01

## 2018-01-01 VITALS
HEIGHT: 60 IN | BODY MASS INDEX: 28.47 KG/M2 | DIASTOLIC BLOOD PRESSURE: 70 MMHG | SYSTOLIC BLOOD PRESSURE: 160 MMHG | OXYGEN SATURATION: 97 % | HEART RATE: 77 BPM | WEIGHT: 145 LBS

## 2018-01-01 VITALS
DIASTOLIC BLOOD PRESSURE: 70 MMHG | HEART RATE: 70 BPM | BODY MASS INDEX: 28.07 KG/M2 | WEIGHT: 143 LBS | TEMPERATURE: 97.9 F | SYSTOLIC BLOOD PRESSURE: 154 MMHG | OXYGEN SATURATION: 95 % | HEIGHT: 60 IN

## 2018-01-01 DIAGNOSIS — I10 ESSENTIAL HYPERTENSION: ICD-10-CM

## 2018-01-01 DIAGNOSIS — S41.112A SKIN TEAR OF LEFT UPPER EXTREMITY: Primary | ICD-10-CM

## 2018-01-01 DIAGNOSIS — I48.21 PERMANENT ATRIAL FIBRILLATION (HCC): ICD-10-CM

## 2018-01-01 DIAGNOSIS — J30.9 ALLERGIC RHINITIS, UNSPECIFIED SEASONALITY, UNSPECIFIED TRIGGER: ICD-10-CM

## 2018-01-01 DIAGNOSIS — K21.00 GASTROESOPHAGEAL REFLUX DISEASE WITH ESOPHAGITIS: ICD-10-CM

## 2018-01-01 DIAGNOSIS — E87.6 LOW BLOOD POTASSIUM: Primary | ICD-10-CM

## 2018-01-01 LAB
INR PPP: 1.6 (ref 0.91–1.09)
PROTHROMBIN TIME: 19.7 SECONDS (ref 10–13.8)

## 2018-01-01 PROCEDURE — 99213 OFFICE O/P EST LOW 20 MIN: CPT | Performed by: NURSE PRACTITIONER

## 2018-01-01 PROCEDURE — 36416 COLLJ CAPILLARY BLOOD SPEC: CPT

## 2018-01-01 PROCEDURE — G0463 HOSPITAL OUTPT CLINIC VISIT: HCPCS

## 2018-01-01 PROCEDURE — 85610 PROTHROMBIN TIME: CPT

## 2018-01-01 RX ORDER — POTASSIUM CHLORIDE 750 MG/1
10 TABLET, FILM COATED, EXTENDED RELEASE ORAL DAILY
Qty: 90 TABLET | Refills: 2 | Status: SHIPPED | OUTPATIENT
Start: 2018-01-01 | End: 2019-01-01 | Stop reason: SDUPTHER

## 2018-01-01 RX ORDER — WARFARIN SODIUM 2 MG/1
TABLET ORAL
Qty: 34 TABLET | Refills: 1 | Status: SHIPPED | OUTPATIENT
Start: 2018-01-01 | End: 2019-01-01 | Stop reason: SDUPTHER

## 2018-01-01 RX ORDER — LANSOPRAZOLE 15 MG/1
30 CAPSULE, DELAYED RELEASE ORAL DAILY
Qty: 180 CAPSULE | Refills: 2 | Status: ON HOLD | OUTPATIENT
Start: 2018-01-01 | End: 2019-01-01

## 2018-01-01 RX ORDER — FEXOFENADINE HCL 180 MG/1
180 TABLET ORAL DAILY
Qty: 90 TABLET | Refills: 2 | Status: ON HOLD | OUTPATIENT
Start: 2018-01-01 | End: 2019-01-01

## 2018-01-01 RX ORDER — CEPHALEXIN 500 MG/1
500 CAPSULE ORAL 2 TIMES DAILY
Qty: 14 CAPSULE | Refills: 0 | Status: SHIPPED | OUTPATIENT
Start: 2018-01-01 | End: 2018-01-01

## 2018-01-09 ENCOUNTER — HOSPITAL ENCOUNTER (OUTPATIENT)
Dept: CARDIOLOGY | Facility: HOSPITAL | Age: 83
Setting detail: RECURRING SERIES
Discharge: HOME | End: 2018-01-09

## 2018-01-09 PROCEDURE — 36416 COLLJ CAPILLARY BLOOD SPEC: CPT

## 2018-01-09 PROCEDURE — 85610 PROTHROMBIN TIME: CPT

## 2018-01-30 ENCOUNTER — HOSPITAL ENCOUNTER (OUTPATIENT)
Dept: CARDIOLOGY | Facility: HOSPITAL | Age: 83
Setting detail: RECURRING SERIES
Discharge: HOME | End: 2018-01-30

## 2018-01-30 PROCEDURE — 85610 PROTHROMBIN TIME: CPT

## 2018-01-30 PROCEDURE — 36416 COLLJ CAPILLARY BLOOD SPEC: CPT

## 2018-02-02 DIAGNOSIS — F39 MOOD DISORDER (HCC): ICD-10-CM

## 2018-02-02 NOTE — TELEPHONE ENCOUNTER
Pt calling and would like a refill on Rx    LORazepam (ATIVAN) 0.5 MG tablet 90 tablet      [address] [phone] [facility] [phone] FX    Last OV   11/13  Last refill 12/14  christiano done   please review med refill and advise     Pt # [phone] or [phone]

## 2018-02-05 RX ORDER — LORAZEPAM 0.5 MG/1
0.5 TABLET ORAL EVERY 8 HOURS PRN
Qty: 90 TABLET | Refills: 0 | OUTPATIENT
Start: 2018-02-05 | End: 2018-05-18 | Stop reason: SDUPTHER

## 2018-02-06 ENCOUNTER — HOSPITAL ENCOUNTER (OUTPATIENT)
Dept: CARDIOLOGY | Facility: HOSPITAL | Age: 83
Setting detail: RECURRING SERIES
Discharge: HOME | End: 2018-02-06

## 2018-02-06 ENCOUNTER — OFFICE VISIT (OUTPATIENT)
Dept: CARDIOLOGY | Facility: CLINIC | Age: 83
End: 2018-02-06

## 2018-02-06 VITALS
WEIGHT: 131.2 LBS | SYSTOLIC BLOOD PRESSURE: 132 MMHG | DIASTOLIC BLOOD PRESSURE: 80 MMHG | HEIGHT: 60 IN | HEART RATE: 83 BPM | RESPIRATION RATE: 16 BRPM | BODY MASS INDEX: 25.76 KG/M2

## 2018-02-06 DIAGNOSIS — I50.43 ACUTE ON CHRONIC COMBINED SYSTOLIC AND DIASTOLIC CONGESTIVE HEART FAILURE (HCC): ICD-10-CM

## 2018-02-06 DIAGNOSIS — I48.21 PERMANENT ATRIAL FIBRILLATION (HCC): Primary | ICD-10-CM

## 2018-02-06 DIAGNOSIS — I10 ESSENTIAL HYPERTENSION: ICD-10-CM

## 2018-02-06 PROCEDURE — 85610 PROTHROMBIN TIME: CPT

## 2018-02-06 PROCEDURE — 36416 COLLJ CAPILLARY BLOOD SPEC: CPT

## 2018-02-06 PROCEDURE — 99213 OFFICE O/P EST LOW 20 MIN: CPT | Performed by: NURSE PRACTITIONER

## 2018-02-06 PROCEDURE — 93000 ELECTROCARDIOGRAM COMPLETE: CPT | Performed by: NURSE PRACTITIONER

## 2018-02-06 RX ORDER — CARVEDILOL 12.5 MG/1
TABLET ORAL
Qty: 180 TABLET | Refills: 3 | Status: SHIPPED | OUTPATIENT
Start: 2018-02-06 | End: 2019-01-01 | Stop reason: SDUPTHER

## 2018-02-07 NOTE — PROGRESS NOTES
Date of Office Visit: 2018  Encounter Provider: STEFANO Marie  Place of Service: [facility] CARDIOLOGY  Patient Name: Yudith Robertson  :1927    Chief Complaint   Patient presents with   • Cardiomyopathy   :     HPI: Yudith Robertson is a 89 y.o. female comes in today for follow up.     She has a history of permanent atrial fibrillation, nonobstructive coronary disease, cardiomyopathy, hypertension, and a stroke.    Late , she had several admissions due to hear failure.   She had a repeat echocardiogram showing an EF of 27%, mildly reduced RVSP, moderate aortic valve regurgitation, no aortic valve stenosis, mild MR, mild to moderate TR.  She was taken off valsartan and started on Entresto. Also started on torsemide    She has been out of the hospital since September. Overall has been doing very well. Denies palpitations or tachycardia, shortness of breath, edema, dizziness, chest pain, fatigue, orthopnea or PND. WAlking 1 mile around her house.       Past Medical History:   Diagnosis Date   • Allergic rhinitis    • Anemia    • Atrial fibrillation    • Atypical chest pain    • CAD (coronary artery disease)    • Carotid artery stenosis     20-30% bilateral   • Cerebral artery occlusion with cerebral infarction    • CHF (congestive heart failure)    • Diastolic congestive heart failure     Echo 3/2012 with normal LVEF, mod LVH   • Difficulty swallowing    • Dizzy     mild   • Edema     In Calf   • Esophageal reflux     GERD, history of esophageal stenosis s/p dilation   • GERD (gastroesophageal reflux disease)    • H/O bone density study 2015    Osteopenia   • H/O mammogram 2014   • H/O thyroid nodule    • History of esophageal stricture    • Hyperlipidemia    • Hypertension    • IBS (irritable bowel syndrome)    • Intestinal obstruction     small bowel obstruction   • LBBB (left bundle branch block)    • Mild aortic insufficiency    • Mild mitral  "insufficiency    • Mixed hyperlipidemia    • Moderate tricuspid insufficiency    • Mood disorder in conditions classified elsewhere    • Osteoarthrosis    • Osteopenia    • Polyarthropathy, multiple sites    • SOB (shortness of breath)    • Transient cerebral ischemia    • Valvular heart disease     Echo 1/17/14: EF 54%, elevated LAP, mild-mod MR, mod-severe TR, moderate PHTN (52mm Hg)       Past Surgical History:   Procedure Laterality Date   • APPENDECTOMY     • BACK SURGERY     • ENDOSCOPY N/A 5/22/2017    Procedure: ESOPHAGOGASTRODUODENOSCOPY WITH GASTON DILATATION 46FR, 48FR,52FR  AND ESOPHAGEAL BRUSHINGS;  Surgeon: Rebecca Becerra MD;  Location: [facility] ENDOSCOPY;  Service:    • EYE SURGERY Right 12/10/2008    Vitrectomy-Dr. Yogi Finnegan   • HYSTERECTOMY     • LAPAROTOMY SALPINGO OOPHORECTOMY N/A 10/29/2008    Dr. Lucas Mills   • UPPER GASTROINTESTINAL ENDOSCOPY N/A 07/24/2015    Dr. Jayce Abrams           Review of Systems   Cardiovascular: Negative for orthopnea.   Respiratory: Positive for shortness of breath.    All other systems reviewed and are negative.    All other systems reviewed and are negative    Allergies   Allergen Reactions   • Montelukast Nausea Only   • Morphine      Makes pt feel freaked out/loopy       All aspects of family and social history reviewed.          Objective:     Vitals:    02/06/18 0950   BP: 132/80   BP Location: Right arm   Patient Position: Sitting   Cuff Size: Adult   Pulse: 83   Resp: 16   Weight: 59.5 kg (131 lb 3.2 oz)   Height: 152.4 cm (60\")     Body mass index is 25.62 kg/(m^2).    PHYSICAL EXAM:  Physical Exam   Constitutional: She is oriented to person, place, and time. She appears well-developed and well-nourished.   HENT:   Head: Normocephalic and atraumatic.   Neck: Neck supple. No JVD present.   Cardiovascular: Normal rate, regular rhythm, normal heart sounds and intact distal pulses.    Pulses:       Carotid pulses are 2+ on the right side, and 2+ on the " left side.       Radial pulses are 2+ on the right side, and 2+ on the left side.        Dorsalis pedis pulses are 2+ on the right side, and 2+ on the left side.   Pulmonary/Chest: Effort normal and breath sounds normal. No accessory muscle usage. No respiratory distress. She has no rales.   Abdominal: Soft. Normal appearance and bowel sounds are normal. There is no tenderness.   Musculoskeletal: Normal range of motion. She exhibits no edema.   Neurological: She is alert and oriented to person, place, and time.   Skin: Skin is warm, dry and intact. She is not diaphoretic.   Psychiatric: She has a normal mood and affect. Her speech is normal and behavior is normal. Judgment and thought content normal. Cognition and memory are normal.         ECG 12 Lead  Date/Time: 2/6/2018 9:35 AM  Performed by: ESHA ROWLEY  Authorized by: ESHA ROWLEY   Comparison: compared with previous ECG from 12/27/2017  Similar to previous ECG  Rhythm: atrial fibrillation  BPM: 83  Conduction: right bundle branch block  ST Segments: ST segments normal  T Waves: T waves normal  T depression: all  Q waves: V1 and V2  Clinical impression: abnormal ECG  Comments: Indication: afib                Assessment:      No diagnosis found.     No orders of the defined types were placed in this encounter.      Current Outpatient Prescriptions   Medication Sig Dispense Refill   • acetaminophen (TYLENOL) 500 MG tablet Take 500 mg by mouth every 6 (six) hours as needed for mild pain (1-3).     • carvedilol (COREG) 12.5 MG tablet TAKE 1 TABLET BY MOUTH  TWICE A DAY WITH MEALS 180 tablet 3   • fexofenadine (ALLEGRA) 180 MG tablet Take 1 tablet by mouth Daily. 90 tablet 3   • isosorbide mononitrate (IMDUR) 60 MG 24 hr tablet TAKE 1 TABLET BY MOUTH  TWICE A  tablet 3   • lansoprazole (PREVACID) 15 MG capsule Take 1 capsule by mouth Daily. 90 capsule 3   • LORazepam (ATIVAN) 0.5 MG tablet Take 1 tablet by mouth Every 8 (Eight) Hours As Needed for  Anxiety. 90 tablet 0   • O2 (OXYGEN) Inhale 2 L/min 1 (One) Time.     • potassium chloride (K-DUR) 10 MEQ CR tablet TAKE ONE TABLET BY MOUTH ONCE DAILY 30 tablet 2   • sacubitril-valsartan (ENTRESTO) 49-51 MG tablet Take 1 tablet by mouth Every 12 (Twelve) Hours.     • torsemide (DEMADEX) 20 MG tablet Take 2 tablets by mouth Daily. 60 tablet 11   • valsartan (DIOVAN) 40 MG tablet Take 1 tablet by mouth Daily. 30 tablet 1   • warfarin (COUMADIN) 2 MG tablet TAKE ONE TABLET BY MOUTH ONCE DAILY OR  AS  DIRECTED 30 tablet 1     No current facility-administered medications for this visit.             Plan:       1. Atrial Fibrillation and Atrial Flutter  Assessment  • The patient has permanent atrial fibrillation  • This is non-valvular in etiology  • The patient's CHADS2-VASc score is 7  • A [id number] score of 2 or more is considered a high risk for a thromboembolic event    Plan  • Continue in atrial fibrillation with rate control  • Continue warfarin for antithrombotic therapy, bleeding issues discussed  • Continue beta blocker for rate control    Continue same    2. Heat failure.     Heart Failure  Assessment  • NYHA class II - There is slight limitation of physical activity. The patient is comfortable at rest, but physical activity results in fatigue, palpitations or shortness of breath.  • Beta blocker prescribed  • Diuretics prescribed  • Angiotensin receptor blocker (ARB) prescribed  • Left ventricular function is severely reduced by qualitative assessment    Plan  • The patient has received heart failure education on the following topics: dietary sodium restriction, medication instructions and weight monitoring  • The heart failure care plan was discussed with the patient today including: continuing the current program    Subjective/Objective  • The patient reports dyspnea    • Physical exam findings negative for rales and elevated JVP.    EF 25%. Continue on coreg, entresto and torsemide. Euvolemic  today    3. Valvular heart disease-mod TR and mil MR  4. Pulm HTN  5. Carotid [id number]% bilaterally  6 Hyperlipidemia  7 Hx. Of stroke      Follow up in office in 3 months    As always, it has been a pleasure to participate in this patient's care.      Sincerely,      STEFANO Marie

## 2018-02-13 ENCOUNTER — TELEPHONE (OUTPATIENT)
Dept: CARDIOLOGY | Facility: CLINIC | Age: 83
End: 2018-02-13

## 2018-02-13 RX ORDER — WARFARIN SODIUM 2 MG/1
TABLET ORAL
Qty: 30 TABLET | Refills: 1 | Status: SHIPPED | OUTPATIENT
Start: 2018-02-13 | End: 2018-04-09 | Stop reason: SDUPTHER

## 2018-02-13 NOTE — TELEPHONE ENCOUNTER
Info to Pt, she will call with update on weight tomorrow or Thursday. Expressed understanding. Will call sooner if symptoms develop or worsen

## 2018-02-13 NOTE — TELEPHONE ENCOUNTER
Pt reports that her weight has gone up three pounds on her scale in the last 3 days and today it is still up by three pounds. She reports that she does not feel like she is swelling, and denies any increased shortness of breath. She wants to know if she needs to make any changes.

## 2018-02-14 NOTE — TELEPHONE ENCOUNTER
Pt reports that her weight is down 1 lbs. Do you want her to continue to take an extra torsemide until she is back to baseline? Please advise

## 2018-02-16 NOTE — TELEPHONE ENCOUNTER
Pt states that despite taking the additional dose of torsemide the last three days, she took it Tuesday, Wednesday and Thursday. Her weight is back up to 123 from 120. She states that she does not feel like she has any swelling, no shortness of breath, she states that she currently doesn't have any new symptoms she is just concerned about her weight increasing. Please advise if any changes.    [phone]

## 2018-02-16 NOTE — TELEPHONE ENCOUNTER
Thru the weekend, take 2 tablets in the morning and 1/2 tablet 6 hours later. Call on Monday with an update.

## 2018-02-19 NOTE — TELEPHONE ENCOUNTER
Pt calls today and reports that her weight has been stable at 122lbs. She wanted to know if she needs to continue to take 2 tablets in the morning and a 1/2 tablet 6 hours later or resume her normal dose. Please advise    Pt denies any complaints at this time.

## 2018-02-20 ENCOUNTER — HOSPITAL ENCOUNTER (OUTPATIENT)
Dept: CARDIOLOGY | Facility: HOSPITAL | Age: 83
Setting detail: RECURRING SERIES
Discharge: HOME | End: 2018-02-20

## 2018-02-20 PROCEDURE — 85610 PROTHROMBIN TIME: CPT

## 2018-02-20 PROCEDURE — 36416 COLLJ CAPILLARY BLOOD SPEC: CPT

## 2018-02-23 ENCOUNTER — OFFICE VISIT (OUTPATIENT)
Dept: INTERNAL MEDICINE | Facility: CLINIC | Age: 83
End: 2018-02-23

## 2018-02-23 VITALS
HEIGHT: 60 IN | SYSTOLIC BLOOD PRESSURE: 130 MMHG | BODY MASS INDEX: 26.11 KG/M2 | DIASTOLIC BLOOD PRESSURE: 60 MMHG | WEIGHT: 133 LBS | OXYGEN SATURATION: 95 % | HEART RATE: 60 BPM

## 2018-02-23 DIAGNOSIS — I50.21 CHF (CONGESTIVE HEART FAILURE), NYHA CLASS I, ACUTE, SYSTOLIC (HCC): ICD-10-CM

## 2018-02-23 DIAGNOSIS — I10 ESSENTIAL HYPERTENSION: ICD-10-CM

## 2018-02-23 DIAGNOSIS — S40.811A ABRASION OF RIGHT UPPER EXTREMITY, INITIAL ENCOUNTER: Primary | ICD-10-CM

## 2018-02-23 DIAGNOSIS — K21.00 GASTROESOPHAGEAL REFLUX DISEASE WITH ESOPHAGITIS: ICD-10-CM

## 2018-02-23 DIAGNOSIS — I48.21 PERMANENT ATRIAL FIBRILLATION (HCC): ICD-10-CM

## 2018-02-23 PROCEDURE — 99214 OFFICE O/P EST MOD 30 MIN: CPT | Performed by: INTERNAL MEDICINE

## 2018-02-23 RX ORDER — SACUBITRIL AND VALSARTAN 49; 51 MG/1; MG/1
TABLET, FILM COATED ORAL
Qty: 60 TABLET | Refills: 3 | Status: SHIPPED | OUTPATIENT
Start: 2018-02-23 | End: 2018-06-22 | Stop reason: SDUPTHER

## 2018-02-23 RX ORDER — CEPHALEXIN 250 MG/1
250 CAPSULE ORAL 3 TIMES DAILY
COMMUNITY
End: 2018-02-27 | Stop reason: SDUPTHER

## 2018-02-23 NOTE — PROGRESS NOTES
Subjective   Yudith Robertson is a 89 y.o. female.     Arm Injury    The incident occurred more than 1 week ago. There was no injury mechanism. The pain is present in the right forearm. The pain is mild. Pertinent negatives include no chest pain.        The following portions of the patient's history were reviewed and updated as appropriate: allergies, current medications, past family history, past medical history, past social history, past surgical history and problem list.    Review of Systems   Constitutional:        Generally doing about the same   HENT: Negative.    Eyes: Negative.    Respiratory: Negative for shortness of breath and wheezing.         On O2 @ night  Breathing about  The same   Cardiovascular: Negative for chest pain and palpitations.   Gastrointestinal: Negative.    Genitourinary: Negative.    Skin: Positive for wound (Developed lesion on Rt arm about amonth ago Started getting worse & went to C earlier this week & was placed on  Keflex Is doing better now).       Objective   Physical Exam   Constitutional: She appears well-developed.   HENT:   Head: Normocephalic.   Eyes: EOM are normal.   Neck: Neck supple.   Cardiovascular: Normal rate and normal heart sounds.    Repeat 126/60  Irreg irreg   Pulmonary/Chest: Effort normal and breath sounds normal. She has no wheezes. She has no rales.   Musculoskeletal: Normal range of motion.   Neurological: She is alert.   Skin:   Has 2 CM skin tear dorsum Rt wrist Appearsclean & non infected   Vitals reviewed.      Assessment/Plan   Yudith was seen today for arm injury.    Diagnoses and all orders for this visit:    Abrasion of right upper extremity, initial encounter    Permanent atrial fibrillation    Essential hypertension    Gastroesophageal reflux disease with esophagitis

## 2018-02-26 ENCOUNTER — HOSPITAL ENCOUNTER (OUTPATIENT)
Dept: CARDIOLOGY | Facility: HOSPITAL | Age: 83
Setting detail: RECURRING SERIES
Discharge: HOME | End: 2018-02-26

## 2018-02-26 PROCEDURE — 85610 PROTHROMBIN TIME: CPT

## 2018-02-26 PROCEDURE — 36416 COLLJ CAPILLARY BLOOD SPEC: CPT

## 2018-02-27 ENCOUNTER — OFFICE VISIT (OUTPATIENT)
Dept: INTERNAL MEDICINE | Facility: CLINIC | Age: 83
End: 2018-02-27

## 2018-02-27 VITALS
SYSTOLIC BLOOD PRESSURE: 140 MMHG | TEMPERATURE: 98 F | OXYGEN SATURATION: 95 % | DIASTOLIC BLOOD PRESSURE: 78 MMHG | WEIGHT: 133 LBS | BODY MASS INDEX: 26.11 KG/M2 | HEART RATE: 100 BPM | HEIGHT: 60 IN

## 2018-02-27 DIAGNOSIS — L02.419 ABSCESS OF AXILLARY REGION: Primary | ICD-10-CM

## 2018-02-27 PROCEDURE — 99213 OFFICE O/P EST LOW 20 MIN: CPT | Performed by: NURSE PRACTITIONER

## 2018-02-27 PROCEDURE — 10060 I&D ABSCESS SIMPLE/SINGLE: CPT | Performed by: NURSE PRACTITIONER

## 2018-02-27 RX ORDER — CEPHALEXIN 250 MG/1
250 CAPSULE ORAL 3 TIMES DAILY
Qty: 15 CAPSULE | Refills: 0 | Status: SHIPPED | OUTPATIENT
Start: 2018-02-27 | End: 2018-03-04

## 2018-03-02 ENCOUNTER — OFFICE VISIT (OUTPATIENT)
Dept: INTERNAL MEDICINE | Facility: CLINIC | Age: 83
End: 2018-03-02

## 2018-03-02 VITALS
OXYGEN SATURATION: 97 % | SYSTOLIC BLOOD PRESSURE: 134 MMHG | HEIGHT: 60 IN | DIASTOLIC BLOOD PRESSURE: 78 MMHG | BODY MASS INDEX: 26.11 KG/M2 | HEART RATE: 75 BPM | TEMPERATURE: 97.8 F | WEIGHT: 133 LBS

## 2018-03-02 DIAGNOSIS — L02.419 ABSCESS OF AXILLARY REGION: Primary | ICD-10-CM

## 2018-03-04 NOTE — PROGRESS NOTES
Subjective   Yudith Robertson is a 89 y.o. female who presents for f/u regarding axillary abscess.      HPI Comments: She reports minimal drainage and resolving tenderness in the left axillary area. Denies fever/chills.    Cyst   This is a chronic problem. The current episode started more than 1 year ago. The problem has been rapidly improving. Pertinent negatives include no abdominal pain, arthralgias, chest pain, chills, congestion, coughing, fatigue, fever, joint swelling, nausea, rash, sore throat or vomiting. Nothing aggravates the symptoms. Treatments tried: I&D on 2/27. The treatment provided significant relief.        The following portions of the patient's history were reviewed and updated as appropriate: allergies, current medications, past social history and problem list.    Past Medical History:   Diagnosis Date   • Allergic rhinitis    • Anemia    • Atrial fibrillation    • Atypical chest pain    • CAD (coronary artery disease)    • Carotid artery stenosis     20-30% bilateral   • Cerebral artery occlusion with cerebral infarction 1997   • CHF (congestive heart failure)    • Diastolic congestive heart failure     Echo 3/2012 with normal LVEF, mod LVH   • Difficulty swallowing    • Dizzy     mild   • Edema     In C[place]   • Esophageal reflux     GERD, history of esophageal stenosis s/p dilation   • GERD (gastroesophageal reflux disease)    • H/O bone density study 06/16/2015    Osteopenia   • H/O mammogram 02/04/2014   • H/O thyroid nodule    • History of esophageal stricture    • Hyperlipidemia    • Hypertension    • IBS (irritable bowel syndrome)    • Intestinal obstruction     small bowel obstruction   • LBBB (left bundle branch block)    • Mild aortic insufficiency    • Mild mitral insufficiency    • Mixed hyperlipidemia    • Moderate tricuspid insufficiency    • Mood disorder in conditions classified elsewhere    • Osteoarthrosis    • Osteopenia    • Polyarthropathy, multiple sites    • SOB (shortness of  breath)    • Transient cerebral ischemia    • Valvular heart disease     Echo 1/17/14: EF 54%, elevated LAP, mild-mod MR, mod-severe TR, moderate PHTN (52mm Hg)         Current Outpatient Prescriptions:   •  acetaminophen (TYLENOL) 500 MG tablet, Take 500 mg by mouth every 6 (six) hours as needed for mild pain (1-3)., Disp: , Rfl:   •  carvedilol (COREG) 12.5 MG tablet, TAKE 1 TABLET BY MOUTH  TWICE A DAY WITH MEALS, Disp: 180 tablet, Rfl: 3  •  cephalexin (KEFLEX) 250 MG capsule, Take 1 capsule by mouth 3 (Three) Times a Day for 5 days., Disp: 15 capsule, Rfl: 0  •  ENTRESTO 49-51 MG tablet, TAKE ONE TABLET BY MOUTH EVERY 12 HOURS, Disp: 60 tablet, Rfl: 3  •  fexofenadine (ALLEGRA) 180 MG tablet, Take 1 tablet by mouth Daily., Disp: 90 tablet, Rfl: 3  •  isosorbide mononitrate (IMDUR) 60 MG 24 hr tablet, TAKE 1 TABLET BY MOUTH  TWICE A DAY, Disp: 180 tablet, Rfl: 3  •  lansoprazole (PREVACID) 15 MG capsule, Take 1 capsule by mouth Daily., Disp: 90 capsule, Rfl: 3  •  LORazepam (ATIVAN) 0.5 MG tablet, Take 1 tablet by mouth Every 8 (Eight) Hours As Needed for Anxiety., Disp: 90 tablet, Rfl: 0  •  O2 (OXYGEN), Inhale 2 L/min 1 (One) Time., Disp: , Rfl:   •  potassium chloride (K-DUR) 10 MEQ CR tablet, TAKE ONE TABLET BY MOUTH ONCE DAILY, Disp: 30 tablet, Rfl: 2  •  sacubitril-valsartan (ENTRESTO) 49-51 MG tablet, Take 1 tablet by mouth Every 12 (Twelve) Hours., Disp: , Rfl:   •  torsemide (DEMADEX) 20 MG tablet, Take 2 tablets by mouth Daily., Disp: 60 tablet, Rfl: 11  •  warfarin (COUMADIN) 2 MG tablet, TAKE ONE TABLET BY MOUTH ONCE DAILY AS DIRECTED, Disp: 30 tablet, Rfl: 1    Allergies   Allergen Reactions   • Montelukast Nausea Only   • Morphine      Makes pt feel freaked out/loopy       Review of Systems   Constitutional: Negative for chills, fatigue and fever.   HENT: Negative for congestion and sore throat.    Respiratory: Negative for cough.    Cardiovascular: Negative for chest pain.   Gastrointestinal:  "Negative for abdominal pain, nausea and vomiting.   Musculoskeletal: Negative for arthralgias and joint swelling.   Skin: Positive for wound. Negative for rash.       Objective   Vitals:    03/02/18 0935   BP: 134/78   BP Location: Left arm   Patient Position: Sitting   Cuff Size: Adult   Pulse: 75   Temp: 97.8 °F (36.6 °C)   TempSrc: Oral   SpO2: 97%   Weight: 60.3 kg (133 lb)   Height: 152.4 cm (60\")     Physical Exam   Cardiovascular: Normal rate and regular rhythm.    Pulmonary/Chest: Effort normal and breath sounds normal.   Skin:   Packing removed from abscess in left axillary area, destin 3cm packing re-applied (daughter will remove Sunday or Monday). There is minimal surrounding erythema and induration from wound.       Assessment/Plan   Yudith was seen today for follow-up.    Diagnoses and all orders for this visit:    Abscess of axillary region  Comments:  resovling, continue to monitor               "

## 2018-03-05 ENCOUNTER — TELEPHONE (OUTPATIENT)
Dept: INTERNAL MEDICINE | Facility: CLINIC | Age: 83
End: 2018-03-05

## 2018-03-05 NOTE — TELEPHONE ENCOUNTER
Discussed with patient, advised to keep wound covered while in shower. Will re-evaluate at f/u appt next week.

## 2018-03-05 NOTE — TELEPHONE ENCOUNTER
----- Message from Shaina Zavala sent at 3/5/2018  2:39 PM EST -----  Contact: Patient  Patient called wanting to if she could take a shower with the wound being uncovered or does she need to cover it. Please advise.    Patient:[phone]

## 2018-03-06 ENCOUNTER — HOSPITAL ENCOUNTER (OUTPATIENT)
Dept: CARDIOLOGY | Facility: HOSPITAL | Age: 83
Setting detail: RECURRING SERIES
Discharge: HOME | End: 2018-03-06

## 2018-03-06 PROCEDURE — 36416 COLLJ CAPILLARY BLOOD SPEC: CPT

## 2018-03-06 PROCEDURE — 85610 PROTHROMBIN TIME: CPT

## 2018-03-13 ENCOUNTER — OFFICE VISIT (OUTPATIENT)
Dept: INTERNAL MEDICINE | Facility: CLINIC | Age: 83
End: 2018-03-13

## 2018-03-13 VITALS
DIASTOLIC BLOOD PRESSURE: 68 MMHG | SYSTOLIC BLOOD PRESSURE: 152 MMHG | WEIGHT: 135 LBS | BODY MASS INDEX: 26.5 KG/M2 | HEIGHT: 60 IN

## 2018-03-13 DIAGNOSIS — I48.20 CHRONIC ATRIAL FIBRILLATION (HCC): ICD-10-CM

## 2018-03-13 DIAGNOSIS — L02.412 ABSCESS OF AXILLA, LEFT: ICD-10-CM

## 2018-03-13 DIAGNOSIS — I50.9 CHRONIC CONGESTIVE HEART FAILURE, UNSPECIFIED CONGESTIVE HEART FAILURE TYPE: ICD-10-CM

## 2018-03-13 DIAGNOSIS — I10 ESSENTIAL HYPERTENSION: Primary | ICD-10-CM

## 2018-03-13 LAB
ALBUMIN SERPL-MCNC: 3.8 G/DL (ref 3.5–5.2)
ALBUMIN/GLOB SERPL: 1.1 G/DL
ALP SERPL-CCNC: 78 U/L (ref 39–117)
ALT SERPL W P-5'-P-CCNC: 9 U/L (ref 1–33)
ANION GAP SERPL CALCULATED.3IONS-SCNC: 12.2 MMOL/L
AST SERPL-CCNC: 17 U/L (ref 1–32)
BASOPHILS # BLD AUTO: 0 10*3/MM3 (ref 0–0.2)
BASOPHILS NFR BLD AUTO: 0 % (ref 0–2)
BILIRUB SERPL-MCNC: 0.3 MG/DL (ref 0.1–1.2)
BUN BLD-MCNC: 30 MG/DL (ref 8–23)
BUN/CREAT SERPL: 26.1 (ref 7–25)
CALCIUM SPEC-SCNC: 10 MG/DL (ref 8.2–9.6)
CHLORIDE SERPL-SCNC: 96 MMOL/L (ref 98–107)
CHOLEST SERPL-MCNC: 207 MG/DL (ref 0–200)
CO2 SERPL-SCNC: 31.8 MMOL/L (ref 22–29)
CREAT BLD-MCNC: 1.15 MG/DL (ref 0.57–1)
DEPRECATED RDW RBC AUTO: 53.7 FL (ref 37–54)
EOSINOPHIL # BLD AUTO: 0 10*3/MM3 (ref 0–0.7)
EOSINOPHIL NFR BLD AUTO: 0 % (ref 0–5)
ERYTHROCYTE [DISTWIDTH] IN BLOOD BY AUTOMATED COUNT: 16.5 % (ref 11.5–15)
GFR SERPL CREATININE-BSD FRML MDRD: 44 ML/MIN/1.73
GLOBULIN UR ELPH-MCNC: 3.4 GM/DL
GLUCOSE BLD-MCNC: 123 MG/DL (ref 65–99)
HCT VFR BLD AUTO: 34.1 % (ref 34.1–44.9)
HDLC SERPL-MCNC: 60 MG/DL (ref 40–60)
HGB BLD-MCNC: 10.7 G/DL (ref 11.2–15.7)
LDLC SERPL CALC-MCNC: 136 MG/DL (ref 0–100)
LDLC/HDLC SERPL: 2.27 {RATIO}
LYMPHOCYTES # BLD AUTO: 1.07 10*3/MM3 (ref 0.8–7)
LYMPHOCYTES NFR BLD AUTO: 13 % (ref 10–60)
MCH RBC QN AUTO: 28.8 PG (ref 26–34)
MCHC RBC AUTO-ENTMCNC: 31.4 G/DL (ref 31–37)
MCV RBC AUTO: 91.7 FL (ref 80–100)
MONOCYTES # BLD AUTO: 0.71 10*3/MM3 (ref 0–1)
MONOCYTES NFR BLD AUTO: 8.6 % (ref 0–13)
NEUTROPHILS # BLD AUTO: 6.45 10*3/MM3 (ref 1–11)
NEUTROPHILS NFR BLD AUTO: 78.4 % (ref 30–85)
NT-PROBNP SERPL-MCNC: 8515 PG/ML (ref 0–1800)
PLATELET # BLD AUTO: 259 10*3/MM3 (ref 150–450)
PMV BLD AUTO: 11.3 FL (ref 6–12)
POTASSIUM BLD-SCNC: 3.7 MMOL/L (ref 3.5–5.2)
PROT SERPL-MCNC: 7.2 G/DL (ref 6–8.5)
RBC # BLD AUTO: 3.72 10*6/MM3 (ref 3.93–5.22)
SODIUM BLD-SCNC: 140 MMOL/L (ref 136–145)
TRIGL SERPL-MCNC: 54 MG/DL (ref 0–150)
VLDLC SERPL-MCNC: 10.8 MG/DL (ref 5–40)
WBC NRBC COR # BLD: 8.23 10*3/MM3 (ref 5–10)

## 2018-03-13 PROCEDURE — 83880 ASSAY OF NATRIURETIC PEPTIDE: CPT | Performed by: INTERNAL MEDICINE

## 2018-03-13 PROCEDURE — 85025 COMPLETE CBC W/AUTO DIFF WBC: CPT | Performed by: INTERNAL MEDICINE

## 2018-03-13 PROCEDURE — 80053 COMPREHEN METABOLIC PANEL: CPT | Performed by: INTERNAL MEDICINE

## 2018-03-13 PROCEDURE — 80061 LIPID PANEL: CPT | Performed by: INTERNAL MEDICINE

## 2018-03-13 PROCEDURE — 99214 OFFICE O/P EST MOD 30 MIN: CPT | Performed by: INTERNAL MEDICINE

## 2018-03-13 NOTE — PROGRESS NOTES
Subjective   Yudith Robertson is a 89 y.o. female.     Hypertension   This is a chronic problem. The current episode started more than 1 month ago. Pertinent negatives include no chest pain or palpitations.   Wound Check          The following portions of the patient's history were reviewed and updated as appropriate: allergies, current medications, past family history, past medical history, past social history, past surgical history and problem list.    Review of Systems   Constitutional:        Here for F/U    HENT: Negative.    Eyes: Negative.    Respiratory: Negative.    Cardiovascular: Negative for chest pain and palpitations.   Gastrointestinal: Negative.    Genitourinary: Negative.    Skin:        Had abscess  of Laxilla Is doing better now        Objective   Physical Exam   Constitutional: She appears well-developed.   HENT:   Head: Normocephalic.   Eyes: EOM are normal.   Neck: Neck supple. No JVD present.   Cardiovascular: Normal rate and normal heart sounds.    Repeat 130/70 Irreg irreg   Pulmonary/Chest: Effort normal and breath sounds normal.   Skin:   Abscess L axilla has healed ASlso small abrasion dorsum of Rt wrist is healed   Vitals reviewed.        Assessment/Plan   Yudtih was seen today for hypertension and wound check.    Diagnoses and all orders for this visit:    Essential hypertension  -     CBC Auto Differential; Future  -     Comprehensive Metabolic Panel; Future  -     Lipid Panel; Future  -     CBC Auto Differential  -     Comprehensive Metabolic Panel  -     Lipid Panel    Abscess of axilla, left    Chronic congestive heart failure, unspecified congestive heart failure type  -     proBNP; Future  -     proBNP    Chronic atrial fibrillation

## 2018-03-20 ENCOUNTER — HOSPITAL ENCOUNTER (OUTPATIENT)
Dept: CARDIOLOGY | Facility: HOSPITAL | Age: 83
Setting detail: RECURRING SERIES
Discharge: HOME | End: 2018-03-20

## 2018-03-20 PROCEDURE — 36416 COLLJ CAPILLARY BLOOD SPEC: CPT

## 2018-03-20 PROCEDURE — 85610 PROTHROMBIN TIME: CPT

## 2018-03-29 ENCOUNTER — TELEPHONE (OUTPATIENT)
Dept: INTERNAL MEDICINE | Facility: CLINIC | Age: 83
End: 2018-03-29

## 2018-03-29 RX ORDER — POTASSIUM CHLORIDE 750 MG/1
10 TABLET, FILM COATED, EXTENDED RELEASE ORAL DAILY
Qty: 90 TABLET | Refills: 2 | Status: SHIPPED | OUTPATIENT
Start: 2018-03-29 | End: 2018-01-01 | Stop reason: SDUPTHER

## 2018-03-29 NOTE — TELEPHONE ENCOUNTER
----- Message from Gayla Melgar sent at 3/29/2018 11:31 AM EDT -----  Contact: pt  Pt is calling for a refill     FOR  potassium chloride (K-DUR) 10 MEQ CR tablet      Patient requests RX SENT TO   [facility] - [address] [phone] [facility] [phone] FX      Caller# Phone:  #H:[phone]; M:[phone]    Please and thank you.

## 2018-04-09 RX ORDER — WARFARIN SODIUM 2 MG/1
TABLET ORAL
Qty: 34 TABLET | Refills: 1 | Status: SHIPPED | OUTPATIENT
Start: 2018-04-09 | End: 2018-06-07 | Stop reason: SDUPTHER

## 2018-04-16 ENCOUNTER — HOSPITAL ENCOUNTER (OUTPATIENT)
Dept: CARDIOLOGY | Facility: HOSPITAL | Age: 83
Setting detail: RECURRING SERIES
Discharge: HOME | End: 2018-04-16

## 2018-04-16 PROCEDURE — 85610 PROTHROMBIN TIME: CPT

## 2018-04-16 PROCEDURE — 36416 COLLJ CAPILLARY BLOOD SPEC: CPT

## 2018-05-15 ENCOUNTER — OFFICE VISIT (OUTPATIENT)
Dept: CARDIOLOGY | Facility: CLINIC | Age: 83
End: 2018-05-15

## 2018-05-15 ENCOUNTER — HOSPITAL ENCOUNTER (OUTPATIENT)
Dept: CARDIOLOGY | Facility: HOSPITAL | Age: 83
Setting detail: RECURRING SERIES
Discharge: HOME | End: 2018-05-15

## 2018-05-15 VITALS
WEIGHT: 135.6 LBS | HEIGHT: 60 IN | HEART RATE: 81 BPM | SYSTOLIC BLOOD PRESSURE: 124 MMHG | DIASTOLIC BLOOD PRESSURE: 72 MMHG | RESPIRATION RATE: 16 BRPM | BODY MASS INDEX: 26.62 KG/M2

## 2018-05-15 DIAGNOSIS — R42 POSTURAL DIZZINESS WITH PRESYNCOPE: ICD-10-CM

## 2018-05-15 DIAGNOSIS — I44.7 LEFT BUNDLE BRANCH BLOCK: ICD-10-CM

## 2018-05-15 DIAGNOSIS — R55 POSTURAL DIZZINESS WITH PRESYNCOPE: ICD-10-CM

## 2018-05-15 DIAGNOSIS — I36.1 NON-RHEUMATIC TRICUSPID VALVE INSUFFICIENCY: ICD-10-CM

## 2018-05-15 DIAGNOSIS — I50.43 ACUTE ON CHRONIC COMBINED SYSTOLIC AND DIASTOLIC CONGESTIVE HEART FAILURE (HCC): ICD-10-CM

## 2018-05-15 DIAGNOSIS — I27.20 PULMONARY HYPERTENSION (HCC): ICD-10-CM

## 2018-05-15 DIAGNOSIS — E78.2 MIXED HYPERLIPIDEMIA: ICD-10-CM

## 2018-05-15 DIAGNOSIS — I10 ESSENTIAL HYPERTENSION: ICD-10-CM

## 2018-05-15 DIAGNOSIS — I48.21 PERMANENT ATRIAL FIBRILLATION (HCC): Primary | ICD-10-CM

## 2018-05-15 PROCEDURE — 93000 ELECTROCARDIOGRAM COMPLETE: CPT | Performed by: INTERNAL MEDICINE

## 2018-05-15 PROCEDURE — 85610 PROTHROMBIN TIME: CPT

## 2018-05-15 PROCEDURE — 36416 COLLJ CAPILLARY BLOOD SPEC: CPT

## 2018-05-15 PROCEDURE — 99214 OFFICE O/P EST MOD 30 MIN: CPT | Performed by: INTERNAL MEDICINE

## 2018-05-15 NOTE — PROGRESS NOTES
PATIENTINFORMATION    Date of Office Visit: 05/15/2018  Encounter Provider: Felicitas Brantley MD  Place of Service: [facility]  Patient Name: Yudith Robertson  : 1927    Subjective:     Encounter Date:05/15/2018      Patient ID: Yudith Robertson is a 89 y.o. female.      History of Present Illness    This is an 89 y.o. woman who is well known to me. She had a heart catheterization in , which showed nonobstructive disease with 30%-40% stenoses in various vessels and an ejection fraction of 50%. She came to [facility] in 2015 with chest pain. She had a PET stress test 2/15 in our office, which was normal and showed normal left ventricular function. Echocardiogram was in 2014 and showed normal left ventricular systolic function with an ejection fraction of 54%. There was abnormal diastolic filling pressures. There was mild to moderate mitral regurgitation, moderate to severe tricuspid regurgitation, and right ventricular systolic pressure of 52 mmHg. She has persistent atrial fibrillation.         She was admitted to [facility] on 2015, with acute diastolic congestive heart failure. She diuresed with intravenous Lasix and then with oral Lasix. She complained of chest pain, which sounded like an esophageal stricture. Dr. Morales saw her and recommended an outpatient EGD with Dr. Abrams. She did have this and had an esophageal stricture dilated.       I saw her in 2016. At that time she was complaining of shortness of breath with paroxysmal nocturnal dyspnea and orthopnea. Because of this, she had a repeat echocardiogram performed on August 3, 2016:   - Left ventricular function is normal. Calculated EF = 63.6%. Estimated EF was in agreement with the calculated EF. Estimated EF = 64%. Normal left ventricular cavity size noted. Left ventricular wall thickness is consistent with mild concentric hypertrophy. Left ventricular diastolic function was unable to  be assessed. Elevated left atrial pressure.  - The following segments are hypokinetic: basal inferior and mid inferior. All other segments are normal.  - Left atrial volume is severely increased.  - Right atrial cavity size is severely dilated  - There is severe thickening of the aortic valve. Mild aortic valve regurgitation is present.  - Severe mitral annular calcification is present. Mild mitral valve regurgitation is present.  - The tricuspid valve is grossly normal. Moderate tricuspid valve stenosis is present. Mild tricuspid valve regurgitation is present. Estimated right ventricular systolic pressure from tricuspid regurgitation is moderately elevated (45-55 mmHg). The calculated RV systolic pressures 51 mmHg.      She was hospitalized at [facility] on 05/17/2017.  She came in because of difficulty swallowing and she was in a little bit of diastolic heart failure. Her warfarin was held and she underwent an esophageal dilation.      Around 05/26/2017, she was acting confused and Ashly was concerned.  She contacted the patient's daughter.  The patient ended up being taken to [facility] by ambulance for stroke. She was treated with tPA while in the hospital there.  I do not know what her INR was when she arrived at [place], but she had been running supratherapeutic around that time because of treatment with fluconazole.       We struggled with volume overload over the summer and fall of 2017.  She repeatedly declined admission to the hospital.  Ultimately, we were unsuccessful in controlling her volume status as an outpatient.  She was admitted to the hospital in August 2017 and at that time underwent an echocardiogram which showed her ejection fraction had dropped to 27%.  She was also noted to have severe left atrial enlargement, moderate aortic regurgitation, mild mitral regurgitation, mild to moderate tricuspid regurgitation with a right ventricular systolic pressure 65 mmHg.  She also had  a nuclear stress test on August 17, 2017 which was normal at the exception of an ejection fraction of 30%.  Valsartan was discontinued and she was placed on Enteresto.     She was readmitted to the hospital in September 2017 with acute on chronic systolic congestive heart failure.  She was discharged on torsemide.  Since then, she has seen Aminata multiple times.  She has remained euvolemic.    She comes in today for follow-up.  Her breathing has remained stable.  She has not had edema.  Her main complaint are of presyncopal spells.  They can occur when she's standing or seated.  She suddenly feels like she is going to black out.  This sensation passes on its own.  So far she has not actually lost consciousness.    Review of Systems   Constitution: Negative for fever, malaise/fatigue, weight gain and weight loss.   HENT: Negative for ear pain, hearing loss, nosebleeds and sore throat.    Eyes: Negative for double vision, pain, vision loss in left eye and vision loss in right eye.   Cardiovascular: Positive for dyspnea on exertion. Negative for orthopnea.        See history of present illness.   Respiratory: Negative for cough, shortness of breath, sleep disturbances due to breathing, snoring and wheezing.    Endocrine: Positive for cold intolerance and heat intolerance. Negative for polyuria.   Skin: Negative for itching, poor wound healing and rash.   Musculoskeletal: Positive for joint pain. Negative for joint swelling and myalgias.   Gastrointestinal: Negative for abdominal pain, diarrhea, hematochezia, nausea and vomiting.   Genitourinary: Negative for hematuria and hesitancy.   Neurological: Negative for numbness, paresthesias and seizures.   Psychiatric/Behavioral: Negative for depression. The patient is not nervous/anxious.            ECG 12 Lead  Date/Time: 5/15/2018 10:46 AM  Performed by: LEE ANN REARDON  Authorized by: LEE ANN REARDON   Comparison: compared with previous ECG from 2/6/2018  Similar to  "previous ECG  Rhythm: atrial fibrillation  BPM: 81  Conduction: left bundle branch block  Clinical impression: abnormal ECG               Objective:     /72 (BP Location: Right arm, Patient Position: Sitting, Cuff Size: Adult)   Pulse 81   Resp 16   Ht 152.4 cm (60\")   Wt 61.5 kg (135 lb 9.6 oz)   BMI 26.48 kg/m²  Body mass index is 26.48 kg/m².     Physical Exam   Constitutional: She appears well-developed.   HENT:   Head: Normocephalic and atraumatic.   Eyes: Conjunctivae and lids are normal. Pupils are equal, round, and reactive to light. Lids are everted and swept, no foreign bodies found.   Neck: Normal range of motion. No JVD present. Carotid bruit is not present. No tracheal deviation present. No thyroid mass present.   Cardiovascular: Normal rate.  An irregularly irregular rhythm present.   Murmur heard.  Pulses:       Dorsalis pedis pulses are 2+ on the right side, and 2+ on the left side.   Pulmonary/Chest: Effort normal and breath sounds normal.   Abdominal: Normal appearance and bowel sounds are normal.   Musculoskeletal: Normal range of motion.   Neurological: She is alert. She has normal strength.   Skin: Skin is warm, dry and intact.   Psychiatric: She has a normal mood and affect. Her behavior is normal.   Vitals reviewed.          Assessment/Plan:       1. Heart Failure  Assessment  • NYHA class III-B - There is significant limitation of physical activity. The patient is comfortable at rest, but minimal activity causes fatigue, palpitations or shortness of breath.  • Beta blocker prescribed  • Angiotensin receptor blocker (ARB) prescribed  • The most recent ejection fraction is 27%    Subjective/Objective    • Physical exam findings negative for peripheral edema and elevated JVP.    Continue carvedilol, Enteresto and torsemide.  I may need to decrease her Enteresto dose due to the dizzy spells.  2.  Chest pain.  She had a heart catheterization in the past which showed nonobstructive " disease.  She had a normal stress test in August 2017.  3.  Atrial Fibrillation and Atrial Flutter  Assessment  • The patient has permanent atrial fibrillation  • The patient's CHADS2-VASc score is 7  • A [id number] score of 2 or more is considered a high risk for a thromboembolic event    Plan  • Continue in atrial fibrillation with rate control  • Continue warfarin for antithrombotic therapy, bleeding issues discussed  • Continue beta blocker for rate control    4. Valvular heart disease with moderate tricuspid regurgitation and mild mitral regurgitation.   5. Moderate pulmonary hypertension.   6. History of carotid stenosis, 30%-40% bilateral disease.   7. History of gastroesophageal reflux disease and esophageal stenosis.   8. Hyperlipidemia.   9. History of small bowel obstruction.   10. History of left bundle branch block.   11. History of stroke.   12. Cough. Better.  13.  Hypoxia.  She follows with Dr. Salomon.  She wears oxygen at night.        I will go over the results of her Zio patch when it is available.  I will see her back in 3 months.      Orders Placed This Encounter   Procedures   • Holter Monitor - 72 Hour Up To 21 Days     zio 14 day     Standing Status:   Future     Standing Expiration Date:   5/15/2019     Order Specific Question:   Reason for exam?     Answer:   Presyncope or Syncope   • ECG 12 Lead     This order was created via procedure documentation      Yudith Robertson   [place] Medication Instructions OLEGARIO:    Printed on:05/15/18 6399   Medication Information                      acetaminophen (TYLENOL) 500 MG tablet  Take 500 mg by mouth every 6 (six) hours as needed for mild pain (1-3).             carvedilol (COREG) 12.5 MG tablet  TAKE 1 TABLET BY MOUTH  TWICE A DAY WITH MEALS             ENTRESTO 49-51 MG tablet  TAKE ONE TABLET BY MOUTH EVERY 12 HOURS             fexofenadine (ALLEGRA) 180 MG tablet  Take 1 tablet by mouth Daily.             isosorbide mononitrate (IMDUR) 60 MG 24 hr  tablet  TAKE 1 TABLET BY MOUTH  TWICE A DAY             lansoprazole (PREVACID) 15 MG capsule  Take 1 capsule by mouth Daily.             LORazepam (ATIVAN) 0.5 MG tablet  Take 1 tablet by mouth Every 8 (Eight) Hours As Needed for Anxiety.             O2 (OXYGEN)  Inhale 2 L/min 1 (One) Time.             potassium chloride (K-DUR) 10 MEQ CR tablet  Take 1 tablet by mouth Daily.             sacubitril-valsartan (ENTRESTO) 49-51 MG tablet  Take 1 tablet by mouth Every 12 (Twelve) Hours.             torsemide (DEMADEX) 20 MG tablet  Take 2 tablets by mouth Daily.             warfarin (COUMADIN) 2 MG tablet  Take 2 tablets on Monday and 1 tablet all other days or as directed                        Felicitas Brantley MD  05/15/18  11:04 AM

## 2018-05-18 DIAGNOSIS — F39 MOOD DISORDER (HCC): ICD-10-CM

## 2018-05-18 RX ORDER — LORAZEPAM 0.5 MG/1
0.5 TABLET ORAL EVERY 8 HOURS PRN
Qty: 90 TABLET | Refills: 0 | Status: SHIPPED | OUTPATIENT
Start: 2018-05-18 | End: 2018-07-13 | Stop reason: SDUPTHER

## 2018-05-18 NOTE — TELEPHONE ENCOUNTER
Pt calling and would like a refill on Rx    LORazepam (ATIVAN) 0.5 MG tablet 90 tablet    [address] [phone] [facility] [phone] FX    Last OV  03/13  Last refill 02/05  christiano done  please review med refill and advise     Pt # [phone]

## 2018-05-29 ENCOUNTER — HOSPITAL ENCOUNTER (OUTPATIENT)
Dept: CARDIOLOGY | Facility: HOSPITAL | Age: 83
Setting detail: RECURRING SERIES
Discharge: HOME | End: 2018-05-29

## 2018-05-29 PROCEDURE — 85610 PROTHROMBIN TIME: CPT

## 2018-05-29 PROCEDURE — 36416 COLLJ CAPILLARY BLOOD SPEC: CPT

## 2018-06-04 ENCOUNTER — TELEPHONE (OUTPATIENT)
Dept: CARDIOLOGY | Facility: CLINIC | Age: 83
End: 2018-06-04

## 2018-06-08 RX ORDER — WARFARIN SODIUM 2 MG/1
TABLET ORAL
Qty: 34 TABLET | Refills: 1 | Status: SHIPPED | OUTPATIENT
Start: 2018-06-08 | End: 2018-10-22 | Stop reason: SDUPTHER

## 2018-06-15 ENCOUNTER — TELEPHONE (OUTPATIENT)
Dept: INTERNAL MEDICINE | Facility: CLINIC | Age: 83
End: 2018-06-15

## 2018-06-15 DIAGNOSIS — J30.9 CHRONIC ALLERGIC RHINITIS, UNSPECIFIED SEASONALITY, UNSPECIFIED TRIGGER: ICD-10-CM

## 2018-06-15 DIAGNOSIS — J30.9 CHRONIC ALLERGIC RHINITIS, UNSPECIFIED SEASONALITY, UNSPECIFIED TRIGGER: Primary | ICD-10-CM

## 2018-06-15 RX ORDER — TRIAMCINOLONE ACETONIDE 55 UG/1
2 SPRAY, METERED NASAL DAILY
Qty: 1 BOTTLE | Refills: 2 | Status: SHIPPED | OUTPATIENT
Start: 2018-06-15 | End: 2019-01-01

## 2018-06-15 RX ORDER — TRIAMCINOLONE ACETONIDE 55 UG/1
2 SPRAY, METERED NASAL DAILY
Qty: 16.5 G | Refills: 2 | Status: SHIPPED | OUTPATIENT
Start: 2018-06-15 | End: 2018-06-15 | Stop reason: SDUPTHER

## 2018-06-15 NOTE — TELEPHONE ENCOUNTER
----- Message from Corie Hernandez sent at 6/15/2018 10:12 AM EDT -----  Contact: pt - Dr Garzon's pt - RE: script  Pt calling ad would like a Rx for OTC nasal spray - NasalCort. Could you please call Rx to pt's pharmacy, as pt would like help paying for medication? Please advise. Thanks    Pt would like a script so that pt can send to spouse's insurance as pt can get cheaper.      Pt # [phone] -- Pls hold til pt's appt on Tuesday. Pt will  script

## 2018-06-19 ENCOUNTER — OFFICE VISIT (OUTPATIENT)
Dept: INTERNAL MEDICINE | Facility: CLINIC | Age: 83
End: 2018-06-19

## 2018-06-19 VITALS
WEIGHT: 134 LBS | HEART RATE: 81 BPM | BODY MASS INDEX: 26.31 KG/M2 | DIASTOLIC BLOOD PRESSURE: 70 MMHG | OXYGEN SATURATION: 97 % | SYSTOLIC BLOOD PRESSURE: 130 MMHG | HEIGHT: 60 IN

## 2018-06-19 DIAGNOSIS — K21.00 GASTROESOPHAGEAL REFLUX DISEASE WITH ESOPHAGITIS: ICD-10-CM

## 2018-06-19 DIAGNOSIS — I48.21 PERMANENT ATRIAL FIBRILLATION (HCC): Primary | ICD-10-CM

## 2018-06-19 DIAGNOSIS — I10 ESSENTIAL HYPERTENSION: ICD-10-CM

## 2018-06-19 DIAGNOSIS — I50.43 ACUTE ON CHRONIC COMBINED SYSTOLIC AND DIASTOLIC CONGESTIVE HEART FAILURE (HCC): ICD-10-CM

## 2018-06-19 LAB
ALBUMIN SERPL-MCNC: 4.4 G/DL (ref 3.5–5.2)
ALBUMIN/GLOB SERPL: 1.3 G/DL
ALP SERPL-CCNC: 87 U/L (ref 39–117)
ALT SERPL W P-5'-P-CCNC: 6 U/L (ref 1–33)
ANION GAP SERPL CALCULATED.3IONS-SCNC: 12.2 MMOL/L
AST SERPL-CCNC: 17 U/L (ref 1–32)
BASOPHILS # BLD AUTO: 0.02 10*3/MM3 (ref 0–0.2)
BASOPHILS NFR BLD AUTO: 0.3 % (ref 0–2)
BILIRUB SERPL-MCNC: 0.4 MG/DL (ref 0.1–1.2)
BUN BLD-MCNC: 33 MG/DL (ref 8–23)
BUN/CREAT SERPL: 30.8 (ref 7–25)
CALCIUM SPEC-SCNC: 10.2 MG/DL (ref 8.2–9.6)
CHLORIDE SERPL-SCNC: 96 MMOL/L (ref 98–107)
CHOLEST SERPL-MCNC: 202 MG/DL (ref 0–200)
CO2 SERPL-SCNC: 33.8 MMOL/L (ref 22–29)
CREAT BLD-MCNC: 1.07 MG/DL (ref 0.57–1)
DEPRECATED RDW RBC AUTO: 51.5 FL (ref 37–54)
EOSINOPHIL # BLD AUTO: 0.05 10*3/MM3 (ref 0–0.7)
EOSINOPHIL NFR BLD AUTO: 0.7 % (ref 0–5)
ERYTHROCYTE [DISTWIDTH] IN BLOOD BY AUTOMATED COUNT: 15.7 % (ref 11.5–15)
GFR SERPL CREATININE-BSD FRML MDRD: 48 ML/MIN/1.73
GLOBULIN UR ELPH-MCNC: 3.5 GM/DL
GLUCOSE BLD-MCNC: 110 MG/DL (ref 65–99)
HCT VFR BLD AUTO: 35.5 % (ref 34.1–44.9)
HDLC SERPL-MCNC: 63 MG/DL (ref 40–60)
HGB BLD-MCNC: 11.1 G/DL (ref 11.2–15.7)
LDLC SERPL CALC-MCNC: 124 MG/DL (ref 0–100)
LDLC/HDLC SERPL: 1.97 {RATIO}
LYMPHOCYTES # BLD AUTO: 1.62 10*3/MM3 (ref 0.8–7)
LYMPHOCYTES NFR BLD AUTO: 22.5 % (ref 10–60)
MCH RBC QN AUTO: 28.6 PG (ref 26–34)
MCHC RBC AUTO-ENTMCNC: 31.3 G/DL (ref 31–37)
MCV RBC AUTO: 91.5 FL (ref 80–100)
MONOCYTES # BLD AUTO: 0.73 10*3/MM3 (ref 0–1)
MONOCYTES NFR BLD AUTO: 10.1 % (ref 0–13)
NEUTROPHILS # BLD AUTO: 4.79 10*3/MM3 (ref 1–11)
NEUTROPHILS NFR BLD AUTO: 66.4 % (ref 30–85)
NT-PROBNP SERPL-MCNC: 4598 PG/ML (ref 0–1800)
PLATELET # BLD AUTO: 329 10*3/MM3 (ref 150–450)
PMV BLD AUTO: 10.7 FL (ref 6–12)
POTASSIUM BLD-SCNC: 4.9 MMOL/L (ref 3.5–5.2)
PROT SERPL-MCNC: 7.9 G/DL (ref 6–8.5)
RBC # BLD AUTO: 3.88 10*6/MM3 (ref 3.93–5.22)
SODIUM BLD-SCNC: 142 MMOL/L (ref 136–145)
TRIGL SERPL-MCNC: 73 MG/DL (ref 0–150)
VLDLC SERPL-MCNC: 14.6 MG/DL (ref 5–40)
WBC NRBC COR # BLD: 7.21 10*3/MM3 (ref 5–10)

## 2018-06-19 PROCEDURE — 83880 ASSAY OF NATRIURETIC PEPTIDE: CPT | Performed by: INTERNAL MEDICINE

## 2018-06-19 PROCEDURE — 85025 COMPLETE CBC W/AUTO DIFF WBC: CPT | Performed by: INTERNAL MEDICINE

## 2018-06-19 PROCEDURE — 80061 LIPID PANEL: CPT | Performed by: INTERNAL MEDICINE

## 2018-06-19 PROCEDURE — 80053 COMPREHEN METABOLIC PANEL: CPT | Performed by: INTERNAL MEDICINE

## 2018-06-19 PROCEDURE — 36415 COLL VENOUS BLD VENIPUNCTURE: CPT | Performed by: INTERNAL MEDICINE

## 2018-06-19 PROCEDURE — 99214 OFFICE O/P EST MOD 30 MIN: CPT | Performed by: INTERNAL MEDICINE

## 2018-06-19 NOTE — PROGRESS NOTES
Subjective   Yudith Robertson is a 89 y.o. female.     Atrial Fibrillation   Presents for follow-up visit. Symptoms include hypertension and shortness of breath ( has been dong pretty well). Symptoms are negative for chest pain and palpitations. Past medical history includes atrial fibrillation.        The following portions of the patient's history were reviewed and updated as appropriate: allergies, current medications, past family history, past medical history, past social history, past surgical history and problem list.    Review of Systems   Constitutional:        HAS maxine doing about the same Sees Dr Brantley & had monitor which just showed her A fib  Sister passe away Is the 2nd one in a year   HENT: Negative.    Respiratory: Positive for shortness of breath ( has been dong pretty well).    Cardiovascular: Negative for chest pain and palpitations.   Gastrointestinal: Positive for constipation ( Takes colace which helps).   Genitourinary: Negative.    Musculoskeletal: Negative.    Neurological: Negative.        Objective   Physical Exam   Constitutional: She is oriented to person, place, and time. She appears well-developed and well-nourished.   HENT:   Head: Normocephalic.   Eyes: EOM are normal.   Neck: Neck supple.   Cardiovascular: Normal rate and normal heart sounds.    Repeat 140/60 Irreg irreg   Pulmonary/Chest: Effort normal and breath sounds normal.   Musculoskeletal: Normal range of motion.   Neurological: She is alert and oriented to person, place, and time.   Skin: Skin is warm and dry.         Assessment/Plan   Yudith was seen today for atrial fibrillation and hypertension.    Diagnoses and all orders for this visit:    Permanent atrial fibrillation    Acute on chronic combined systolic and diastolic congestive heart failure  -     proBNP; Future    Essential hypertension  -     CBC Auto Differential; Future  -     Comprehensive Metabolic Panel; Future  -     Lipid Panel; Future    Gastroesophageal reflux  disease with esophagitis

## 2018-06-21 ENCOUNTER — EPISODE CHANGES (OUTPATIENT)
Dept: CASE MANAGEMENT | Facility: OTHER | Age: 83
End: 2018-06-21

## 2018-06-22 DIAGNOSIS — I50.21 CHF (CONGESTIVE HEART FAILURE), NYHA CLASS I, ACUTE, SYSTOLIC (HCC): ICD-10-CM

## 2018-06-22 RX ORDER — SACUBITRIL AND VALSARTAN 49; 51 MG/1; MG/1
TABLET, FILM COATED ORAL
Qty: 90 TABLET | Refills: 1 | Status: SHIPPED | OUTPATIENT
Start: 2018-06-22 | End: 2018-08-28

## 2018-06-25 ENCOUNTER — HOSPITAL ENCOUNTER (OUTPATIENT)
Dept: CARDIOLOGY | Facility: HOSPITAL | Age: 83
Setting detail: RECURRING SERIES
Discharge: HOME | End: 2018-06-25

## 2018-06-25 ENCOUNTER — TELEPHONE (OUTPATIENT)
Dept: CARDIOLOGY | Facility: CLINIC | Age: 83
End: 2018-06-25

## 2018-06-25 PROCEDURE — 85610 PROTHROMBIN TIME: CPT

## 2018-06-25 PROCEDURE — 36416 COLLJ CAPILLARY BLOOD SPEC: CPT

## 2018-06-25 NOTE — TELEPHONE ENCOUNTER
I don't usually do O2 see if its really me who perscribed it? i'd rather she take the letter to her PCP and have them take care of it.

## 2018-06-25 NOTE — TELEPHONE ENCOUNTER
Pt received a letter from [facility] requesting medical necessity be met to continue her oxygen therapy. I have placed the letter in your in box ( for reference.) she will have to come in per the letter request they need up dated chart notes, need for continuous or nocturnal use of oxygen. Must state how the Pt uses the oxygen, and oxygen saturations during exertion and rest with and without oxygen therapy.      Janessa we will need to schedule the Pt when it works for her, you can use any of the MA add ons.

## 2018-06-25 NOTE — TELEPHONE ENCOUNTER
Janessa, If the Pt calls back and you speak to her, route her to me.  wants the PCP to take over care of oxygen therapy

## 2018-06-26 ENCOUNTER — OFFICE VISIT (OUTPATIENT)
Dept: INTERNAL MEDICINE | Facility: CLINIC | Age: 83
End: 2018-06-26

## 2018-06-26 VITALS
DIASTOLIC BLOOD PRESSURE: 60 MMHG | WEIGHT: 135 LBS | HEIGHT: 60 IN | SYSTOLIC BLOOD PRESSURE: 124 MMHG | HEART RATE: 75 BPM | BODY MASS INDEX: 26.5 KG/M2 | OXYGEN SATURATION: 94 %

## 2018-06-26 DIAGNOSIS — I50.43 ACUTE ON CHRONIC COMBINED SYSTOLIC AND DIASTOLIC CONGESTIVE HEART FAILURE (HCC): ICD-10-CM

## 2018-06-26 DIAGNOSIS — I48.20 ATRIAL FIBRILLATION, CHRONIC (HCC): Primary | ICD-10-CM

## 2018-06-26 DIAGNOSIS — I10 ESSENTIAL HYPERTENSION: ICD-10-CM

## 2018-06-26 DIAGNOSIS — I25.10 CORONARY ARTERY DISEASE INVOLVING NATIVE CORONARY ARTERY OF NATIVE HEART WITHOUT ANGINA PECTORIS: ICD-10-CM

## 2018-06-26 PROCEDURE — 99214 OFFICE O/P EST MOD 30 MIN: CPT | Performed by: INTERNAL MEDICINE

## 2018-06-26 NOTE — PROGRESS NOTES
Subjective   Yudith Robertson is a 89 y.o. female.     Atrial Fibrillation   Presents for follow-up visit. Symptoms include hypertension and shortness of breath (Fairly well controlled on current regimen). Past medical history includes atrial fibrillation.        The following portions of the patient's history were reviewed and updated as appropriate: allergies, current medications, past family history, past medical history, past social history, past surgical history and problem list.    Review of Systems   Constitutional:        Has been doing pretty good  Doing about the same   HENT: Negative.    Eyes: Positive for visual disturbance (Wears glasses but vision remains impaired).   Respiratory: Positive for cough (Occasional cough ) and shortness of breath (Fairly well controlled on current regimen).         Breathing is doing about  The same Continues to use nasal O2 regularly nightly @ 2 LPM & prn during the day when she feels SOB    Cardiovascular: Positive for leg swelling (No edema).        Sees Dr Brantley for her chronic A Fib & chronic CHF which is fairlt well controlled on current TX  Currently not taking Entresto & doing well   Gastrointestinal: Positive for constipation (Taking Colace regularly).   Genitourinary: Positive for frequency.   Musculoskeletal: Positive for arthralgias (Stays the same).   Neurological: Negative.        Objective   Physical Exam   Constitutional: She is oriented to person, place, and time. She appears well-developed and well-nourished.   HENT:   Head: Normocephalic.   Eyes: EOM are normal.   Neck: Neck supple.   Cardiovascular: Normal rate and normal heart sounds.    Repeat 120/60  Irreg irreg   Pulmonary/Chest: Effort normal and breath sounds normal.   Musculoskeletal: Normal range of motion.   Neurological: She is alert and oriented to person, place, and time.   Skin: Skin is warm and dry.       Assessment/Plan   Yudith was seen today for atrial fibrillation and  hypertension.    Diagnoses and all orders for this visit:    Atrial fibrillation, chronic    Acute on chronic combined systolic and diastolic congestive heart failure    Coronary artery disease involving native coronary artery of native heart without angina pectoris    Essential hypertension

## 2018-07-02 DIAGNOSIS — R10.84 GENERALIZED ABDOMINAL PAIN: ICD-10-CM

## 2018-07-03 RX ORDER — DICYCLOMINE HYDROCHLORIDE 10 MG/1
CAPSULE ORAL
Qty: 90 CAPSULE | Refills: 1 | Status: SHIPPED | OUTPATIENT
Start: 2018-07-03 | End: 2019-01-01 | Stop reason: SDUPTHER

## 2018-07-13 DIAGNOSIS — F39 MOOD DISORDER (HCC): ICD-10-CM

## 2018-07-16 RX ORDER — LORAZEPAM 0.5 MG/1
TABLET ORAL
Qty: 50 TABLET | Refills: 5 | OUTPATIENT
Start: 2018-07-16 | End: 2019-01-01 | Stop reason: SDUPTHER

## 2018-07-16 NOTE — TELEPHONE ENCOUNTER
Please review refill request:    Last OV: 6/26/18    Last Prescribed: 5/18/18    RADHA: Ordered    Thank you,    Von

## 2018-07-24 ENCOUNTER — HOSPITAL ENCOUNTER (OUTPATIENT)
Dept: CARDIOLOGY | Facility: HOSPITAL | Age: 83
Setting detail: RECURRING SERIES
Discharge: HOME | End: 2018-07-24

## 2018-07-24 PROCEDURE — 36416 COLLJ CAPILLARY BLOOD SPEC: CPT

## 2018-07-24 PROCEDURE — 85610 PROTHROMBIN TIME: CPT

## 2018-07-30 RX ORDER — WARFARIN SODIUM 2 MG/1
TABLET ORAL
Qty: 34 TABLET | Refills: 1 | Status: SHIPPED | OUTPATIENT
Start: 2018-07-30 | End: 2018-09-25 | Stop reason: SDUPTHER

## 2018-08-20 ENCOUNTER — ANTICOAGULATION VISIT (OUTPATIENT)
Dept: PHARMACY | Facility: HOSPITAL | Age: 83
End: 2018-08-20

## 2018-08-20 DIAGNOSIS — I48.21 PERMANENT ATRIAL FIBRILLATION (HCC): ICD-10-CM

## 2018-08-20 LAB
INR PPP: 1.8 (ref 0.91–1.09)
PROTHROMBIN TIME: 21.6 SECONDS (ref 10–13.8)

## 2018-08-20 PROCEDURE — 85610 PROTHROMBIN TIME: CPT

## 2018-08-20 PROCEDURE — 36416 COLLJ CAPILLARY BLOOD SPEC: CPT

## 2018-08-20 PROCEDURE — G0463 HOSPITAL OUTPT CLINIC VISIT: HCPCS

## 2018-08-20 NOTE — PROGRESS NOTES
Anticoagulation Clinic Progress Note  Anticoagulation Summary  As of 2018    INR goal:   2.0-3.0   TTR:   --   Today's INR:   1.8!   Warfarin maintenance plan:   4 mg on Mon; 2 mg all other days   Weekly warfarin total:   16 mg   Plan last modified:   Brianna Sears RPH (2018)   Next INR check:   2018   Target end date:   Indefinite    Indications    Permanent atrial fibrillation (CMS/HCC) [I48.2]             Anticoagulation Episode Summary     INR check location:       Preferred lab:       Send INR reminders to:    [facility] [place]    Comments:         Anticoagulation Care Providers     Provider Role Specialty Phone number    Felicitas Brantley MD Referring Cardiology [phone]    Brianna Sears RPH Responsible              Drug interactions: No medication changes.  Diet: Consistent 1-2 times per week salads.    Clinic Interview:  Patient Findings     Negatives:   Signs/symptoms of thrombosis, Signs/symptoms of bleeding,   Laboratory test error suspected, Change in health, Change in alcohol use,   Change in activity, Upcoming invasive procedure, Emergency department   visit, Upcoming dental procedure, Missed doses, Extra doses, Change in   medications, Change in diet/appetite, Hospital admission, Bruising, Other   complaints      Clinical Outcomes     Negatives:   Major bleeding event, Thromboembolic event,   Anticoagulation-related hospital admission, Anticoagulation-related ED   visit, Anticoagulation-related fatality        Education:  Yudith Robertson is a new start in the [facility]. We discussed the followin) Warfarin's indication, mechanism, and dosing  2) Enforced the importance of taking warfarin as instructed and at the same time every day, preferably in the evening so that we can make dose adjustments more easily following subsequent clinic visits  3) What she should do about a missed dose; pts can take missed doses within about 12 hours of their usual  scheduled dose, but she was instructed on the importance of not doubling up on doses unless told to do so by the [facility]  4) Explained possible side effects of warfarin therapy, including increased risk of bleeding, s/sx of bleeding and s/sx of any additional clots/PE/CVA.   5) Discussed monitoring of warfarin, the INR, goal INR range, and the frequency of monitoring  6) Reviewed drug/food/tobacco/EtOH interactions and provided written information covering these topics in more detail, explaining that green, leafy vegetables interact most heavily with warfarin  7) Instructed the pt not to take or discontinue any medications without informing her physician/pharmacist and reminded her to inform us of any dietary changes, as well  8) Explained that she would be coming into the clinic more frequently in these first few weeks of therapy as we try to adjust her dose and achieve a therapeutic INR x 2 consecutive readings. Once that is achieved, patient will follow up in clinic every 4 weeks, on average.    She stated no problems with transportation or scheduling clinic appts in this manner. she expressed understanding of the information provided and has no additional questions at this time.    Yudith Robertson was presented with a copy of the Patients Rights and Responsibilities. she expressed verbal consent and agreement to receive care in the [facility] under the current collaborative care agreement with [facility].       INR History:  Previous INR data from [facility] is recorded in [facility] and scanned into the patient's chart in "[employer]. These results have been analyzed and reviewed.      Plan:  1. INR is Subtherapeutic today- see above in Anticoagulation Summary.   Will instruct Yudith Robertson to Continue their warfarin regimen- see above in Anticoagulation Summary.  2. Follow up in 2 weeks  3. Patient declines warfarin refills.  4. Verbal and written information  provided. Patient expresses understanding and has no further questions at this time.    Brianna Sears, [facility]

## 2018-08-22 ENCOUNTER — TELEPHONE (OUTPATIENT)
Dept: PHARMACY | Facility: HOSPITAL | Age: 83
End: 2018-08-22

## 2018-08-28 ENCOUNTER — OFFICE VISIT (OUTPATIENT)
Dept: CARDIOLOGY | Facility: CLINIC | Age: 83
End: 2018-08-28

## 2018-08-28 VITALS
RESPIRATION RATE: 16 BRPM | BODY MASS INDEX: 27.72 KG/M2 | WEIGHT: 141.2 LBS | SYSTOLIC BLOOD PRESSURE: 132 MMHG | HEART RATE: 69 BPM | HEIGHT: 60 IN | DIASTOLIC BLOOD PRESSURE: 70 MMHG

## 2018-08-28 DIAGNOSIS — I50.43 ACUTE ON CHRONIC COMBINED SYSTOLIC AND DIASTOLIC CONGESTIVE HEART FAILURE (HCC): ICD-10-CM

## 2018-08-28 DIAGNOSIS — I48.21 PERMANENT ATRIAL FIBRILLATION (HCC): Primary | ICD-10-CM

## 2018-08-28 DIAGNOSIS — I44.7 LEFT BUNDLE BRANCH BLOCK: ICD-10-CM

## 2018-08-28 DIAGNOSIS — E78.2 MIXED HYPERLIPIDEMIA: ICD-10-CM

## 2018-08-28 DIAGNOSIS — I10 ESSENTIAL HYPERTENSION: ICD-10-CM

## 2018-08-28 DIAGNOSIS — I27.20 PULMONARY HYPERTENSION (HCC): ICD-10-CM

## 2018-08-28 PROCEDURE — 99214 OFFICE O/P EST MOD 30 MIN: CPT | Performed by: INTERNAL MEDICINE

## 2018-08-28 PROCEDURE — 93000 ELECTROCARDIOGRAM COMPLETE: CPT | Performed by: INTERNAL MEDICINE

## 2018-08-28 NOTE — PROGRESS NOTES
PATIENTINFORMATION    Date of Office Visit: 2018  Encounter Provider: Felicitas Brantley MD  Place of Service: [facility] CARDIOLOGY  Patient Name: Yudith Robertson  : 1927    Subjective:     Encounter Date:2018      Patient ID: Yudith Robertson is a 89 y.o. female.      History of Present Illness    This is an 89 y.o. woman who is well known to me. She had a heart catheterization in , which showed nonobstructive disease with 30%-40% stenoses in various vessels and an ejection fraction of 50%. She came to [facility] in 2015 with chest pain. She had a PET stress test 2/15 in our office, which was normal and showed normal left ventricular function. Echocardiogram was in 2014 and showed normal left ventricular systolic function with an ejection fraction of 54%. There was abnormal diastolic filling pressures. There was mild to moderate mitral regurgitation, moderate to severe tricuspid regurgitation, and right ventricular systolic pressure of 52 mmHg. She has persistent atrial fibrillation.         She was admitted to [facility] on 2015, with acute diastolic congestive heart failure. She diuresed with intravenous Lasix and then with oral Lasix. She complained of chest pain, which sounded like an esophageal stricture. Dr. Morales saw her and recommended an outpatient EGD with Dr. Abrams. She did have this and had an esophageal stricture dilated.       I saw her in 2016. At that time she was complaining of shortness of breath with paroxysmal nocturnal dyspnea and orthopnea. Because of this, she had a repeat echocardiogram performed on August 3, 2016:   - Left ventricular function is normal. Calculated EF = 63.6%. Estimated EF was in agreement with the calculated EF. Estimated EF = 64%. Normal left ventricular cavity size noted. Left ventricular wall thickness is consistent with mild concentric hypertrophy. Left ventricular diastolic function was unable to  be assessed. Elevated left atrial pressure.  - The following segments are hypokinetic: basal inferior and mid inferior. All other segments are normal.  - Left atrial volume is severely increased.  - Right atrial cavity size is severely dilated  - There is severe thickening of the aortic valve. Mild aortic valve regurgitation is present.  - Severe mitral annular calcification is present. Mild mitral valve regurgitation is present.  - The tricuspid valve is grossly normal. Moderate tricuspid valve stenosis is present. Mild tricuspid valve regurgitation is present. Estimated right ventricular systolic pressure from tricuspid regurgitation is moderately elevated (45-55 mmHg). The calculated RV systolic pressures 51 mmHg.      She was hospitalized at [facility] on 05/17/2017.  She came in because of difficulty swallowing and she was in a little bit of diastolic heart failure. Her warfarin was held and she underwent an esophageal dilation.      Around 05/26/2017, she was acting confused and Ashly was concerned.  She contacted the patient's daughter.  The patient ended up being taken to [facility] by ambulance for stroke. She was treated with tPA while in the hospital there.  I do not know what her INR was when she arrived at [facility], but she had been running supratherapeutic around that time because of treatment with fluconazole.       We struggled with volume overload over the summer and fall of 2017.  She repeatedly declined admission to the hospital.  Ultimately, we were unsuccessful in controlling her volume status as an outpatient.  She was admitted to the hospital in August 2017 and at that time underwent an echocardiogram which showed her ejection fraction had dropped to 27%.  She was also noted to have severe left atrial enlargement, moderate aortic regurgitation, mild mitral regurgitation, mild to moderate tricuspid regurgitation with a right ventricular systolic pressure 65 mmHg.  She also had  a nuclear stress test on August 17, 2017 which was normal at the exception of an ejection fraction of 30%.  Valsartan was discontinued and she was placed on Enteresto.     She was readmitted to the hospital in September 2017 with acute on chronic systolic congestive heart failure.  She was discharged on torsemide.  Since then, she has seen Aminata multiple times.  She has remained euvolemic.    She comes in today with no new complaints.  She continues to have her usual complaints of no energy, lightheadedness and shortness of breath.  Her blood pressure has been well-controlled.  She does note that it is elevated at night.    Review of Systems   Constitution: Positive for weight gain. Negative for fever, malaise/fatigue and weight loss.   HENT: Positive for hearing loss. Negative for ear pain, nosebleeds and sore throat.    Eyes: Positive for pain. Negative for double vision, vision loss in left eye and vision loss in right eye.   Cardiovascular:        See history of present illness.   Respiratory: Negative for cough, shortness of breath, sleep disturbances due to breathing, snoring and wheezing.    Endocrine: Positive for cold intolerance and heat intolerance. Negative for polyuria.   Skin: Negative for itching, poor wound healing and rash.   Musculoskeletal: Positive for joint pain. Negative for joint swelling and myalgias.   Gastrointestinal: Negative for abdominal pain, diarrhea, hematochezia, nausea and vomiting.   Genitourinary: Negative for hematuria and hesitancy.   Neurological: Negative for numbness, paresthesias and seizures.   Psychiatric/Behavioral: Negative for depression. The patient is not nervous/anxious.            ECG 12 Lead  Date/Time: 8/28/2018 10:25 AM  Performed by: LEE ANN REARDON  Authorized by: LEE ANN REARDON   Comparison: compared with previous ECG from 5/15/2018  Similar to previous ECG  Rhythm: atrial fibrillation  BPM: 69  Conduction: left bundle branch block  Clinical impression:  "abnormal ECG               Objective:     /70 (BP Location: Right arm, Patient Position: Sitting, Cuff Size: Adult)   Pulse 69   Resp 16   Ht 152.4 cm (60\")   Wt 64 kg (141 lb 3.2 oz)   BMI 27.58 kg/m²  Body mass index is 27.58 kg/m².     Physical Exam   Constitutional: She appears well-developed.   HENT:   Head: Normocephalic and atraumatic.   Eyes: Pupils are equal, round, and reactive to light. Conjunctivae and lids are normal. Lids are everted and swept, no foreign bodies found.   Neck: Normal range of motion. No JVD present. Carotid bruit is not present. No tracheal deviation present. No thyroid mass present.   Cardiovascular: Normal rate and normal heart sounds.  An irregularly irregular rhythm present.   Pulses:       Dorsalis pedis pulses are 2+ on the right side, and 2+ on the left side.   Pulmonary/Chest: Effort normal and breath sounds normal.   Abdominal: Normal appearance and bowel sounds are normal.   Musculoskeletal: Normal range of motion.   Neurological: She is alert. She has normal strength.   Skin: Skin is warm, dry and intact.   Psychiatric: She has a normal mood and affect. Her behavior is normal.   Vitals reviewed.          Assessment/Plan:       1. Heart Failure  Assessment  • NYHA class III-B - There is significant limitation of physical activity. The patient is comfortable at rest, but minimal activity causes fatigue, palpitations or shortness of breath.  • Beta blocker prescribed  • Angiotensin receptor blocker (ARB) prescribed  • The most recent ejection fraction is 27%     Subjective/Objective    • Physical exam findings negative for peripheral edema and elevated JVP.  Continue carvedilol, Enteresto and torsemide.  I may need to decrease her Enteresto dose due to the dizzy spells.  2.  Chest pain.  She had a heart catheterization in the past which showed nonobstructive disease.  She had a normal stress test in August 2017.  3.  Atrial Fibrillation and Atrial " Flutter  Assessment  • The patient has permanent atrial fibrillation  • The patient's CHADS2-VASc score is 7  • A [id number] score of 2 or more is considered a high risk for a thromboembolic event     Plan  • Continue in atrial fibrillation with rate control  • Continue warfarin for antithrombotic therapy, bleeding issues discussed  • Continue beta blocker for rate control   She wore a Zio patch in May 2018 which showed she was in atrial fibrillation throughout the time that she was wearing the monitor.  Her average heart rate was 79 bpm.  There were 7 runs of wide complex tachycardia which appeared to be atrial fibrillation with aberrancy.  4. Valvular heart disease with moderate tricuspid regurgitation and mild mitral regurgitation.   5. Moderate pulmonary hypertension.   6. History of carotid stenosis, 30%-40% bilateral disease.   7. History of gastroesophageal reflux disease and esophageal stenosis.   8. Hyperlipidemia.   9. History of small bowel obstruction.   10. History of left bundle branch block.   11. History of stroke.   12. Cough. Better.  13.  Hypoxia.  She follows with Dr. Salomon.  She wears oxygen at night.        I will see her back in 6 months.    Orders Placed This Encounter   Procedures   • ECG 12 Lead     This order was created via procedure documentation        Discharge Medications          Accurate as of 8/28/18 12:41 PM. If you have any questions, ask your nurse or doctor.               Changes to Medications      Instructions Start Date   ENTRESTO 49-51 MG tablet  Generic drug:  sacubitril-valsartan  What changed:  Another medication with the same name was removed. Continue taking this medication, and follow the directions you see here.  Changed by:  Felicitas Brantley MD   1 tablet, Oral, Every 12 Hours Scheduled         Continue These Medications      Instructions Start Date   acetaminophen 500 MG tablet  Commonly known as:  TYLENOL   500 mg, Oral, Every 6 Hours PRN      carvedilol  12.5 MG tablet  Commonly known as:  COREG   TAKE 1 TABLET BY MOUTH  TWICE A DAY WITH MEALS      dicyclomine 10 MG capsule  Commonly known as:  BENTYL   TAKE ONE CAPSULE BY MOUTH EVERY 6 HOURS AS NEEDED (CRAMPING)      fexofenadine 180 MG tablet  Commonly known as:  ALLEGRA   180 mg, Oral, Daily      isosorbide mononitrate 60 MG 24 hr tablet  Commonly known as:  IMDUR   TAKE 1 TABLET BY MOUTH  TWICE A DAY      lansoprazole 15 MG capsule  Commonly known as:  PREVACID   15 mg, Oral, Daily      LORazepam 0.5 MG tablet  Commonly known as:  ATIVAN   TAKE 1 TABLET BY MOUTH EVERY 8 HOURS AS NEEDED FOR ANXIETY      O2  Commonly known as:  OXYGEN   2 L/min, Inhalation, Once      potassium chloride 10 MEQ CR tablet  Commonly known as:  K-DUR   10 mEq, Oral, Daily      torsemide 20 MG tablet  Commonly known as:  DEMADEX   40 mg, Oral, Daily      Triamcinolone Acetonide 55 MCG/ACT nasal inhaler  Commonly known as:  NASACORT ALLERGY 24HR   2 sprays, Nasal, Daily      warfarin 2 MG tablet  Commonly known as:  COUMADIN   TAKE 1 TABLET BY MOUTH ONCE DAILY EXCEPT  TAKE  2  TABS  ON  MONDAY  OR  AS  DIRECTED      warfarin 2 MG tablet  Commonly known as:  COUMADIN   TAKE 1 TABLET BY MOUTH ONCE DAILY EXCEPT  TAKE  2  TABS  ON  MONDAY  OR  AS  DIRECTED                    Felicitas Brantley MD  08/28/18  12:41 PM

## 2018-09-04 ENCOUNTER — ANTICOAGULATION VISIT (OUTPATIENT)
Dept: PHARMACY | Facility: HOSPITAL | Age: 83
End: 2018-09-04

## 2018-09-04 DIAGNOSIS — I48.21 PERMANENT ATRIAL FIBRILLATION (HCC): ICD-10-CM

## 2018-09-04 LAB
INR PPP: 2.3 (ref 0.91–1.09)
PROTHROMBIN TIME: 27.2 SECONDS (ref 10–13.8)

## 2018-09-04 PROCEDURE — 36416 COLLJ CAPILLARY BLOOD SPEC: CPT

## 2018-09-04 PROCEDURE — 85610 PROTHROMBIN TIME: CPT

## 2018-09-04 PROCEDURE — G0463 HOSPITAL OUTPT CLINIC VISIT: HCPCS

## 2018-09-04 NOTE — PROGRESS NOTES
Anticoagulation Clinic Progress Note    Anticoagulation Summary  As of 9/4/2018    INR goal:   2.0-3.0   TTR:   100.0 % (5 d)   Today's INR:   2.3   Warfarin maintenance plan:   4 mg on Mon; 2 mg all other days   Weekly warfarin total:   16 mg   Plan last modified:   Brianna Sears RPH (8/20/2018)   Next INR check:   10/2/2018   Priority:   Maintenance   Target end date:   Indefinite    Indications    Permanent atrial fibrillation (CMS/HCC) [I48.2]             Anticoagulation Episode Summary     INR check location:       Preferred lab:       Send INR reminders to:   [facility] POOL    Comments:         Anticoagulation Care Providers     Provider Role Specialty Phone number    Felicitas Brantley MD Referring Cardiology [phone]    Brianna Sears RPH Responsible            Drug interactions: Patient has been on erythromycin eye ointment and therapy completed yesterday.  Diet: has remained unchanged.  Discussed that green peas and green beans are much lower in vitamin K than kale/broccoli.    Clinic Interview:      INR History:  Anticoagulation Monitoring 8/20/2018 9/4/2018   INR 1.8 2.3   INR Date 8/20/2018 9/4/2018   INR Goal 2.0-3.0 2.0-3.0   Trend - Same   Last Week Total 0 mg 16 mg   Next Week Total 16 mg 16 mg   Sun 2 mg 2 mg   Mon 4 mg 4 mg   Tue 2 mg 2 mg   Wed 2 mg 2 mg   Thu 2 mg 2 mg   Fri 2 mg 2 mg   Sat 2 mg 2 mg   Visit Report - -       Previous INR data from [facility] is recorded in [facility] and scanned into the patient's chart in [facility]. These results have been analyzed and reviewed.      Plan:  1. INR is Therapeutic today- see above in Anticoagulation Summary.  Will instruct Yudith Robertson to Continue their warfarin regimen- see above in Anticoagulation Summary.  2. Follow up in 1 month.  Actual date is 3 weeks based on coordinating with another appointment.  3. Patient declines warfarin refills.  4. Verbal and written information provided. Patient expresses understanding  and has no further questions at this time.    Brianna Sears, [facility]

## 2018-09-11 RX ORDER — POTASSIUM CHLORIDE 750 MG/1
TABLET, EXTENDED RELEASE ORAL
Qty: 90 TABLET | Refills: 1 | Status: SHIPPED | OUTPATIENT
Start: 2018-09-11 | End: 2018-09-25 | Stop reason: SDUPTHER

## 2018-09-24 DIAGNOSIS — I50.21 CHF (CONGESTIVE HEART FAILURE), NYHA CLASS I, ACUTE, SYSTOLIC (HCC): ICD-10-CM

## 2018-09-24 RX ORDER — SACUBITRIL AND VALSARTAN 49; 51 MG/1; MG/1
TABLET, FILM COATED ORAL
Qty: 90 TABLET | Refills: 1 | Status: SHIPPED | OUTPATIENT
Start: 2018-09-24 | End: 2018-09-24 | Stop reason: SDUPTHER

## 2018-09-25 ENCOUNTER — ANTICOAGULATION VISIT (OUTPATIENT)
Dept: PHARMACY | Facility: HOSPITAL | Age: 83
End: 2018-09-25

## 2018-09-25 ENCOUNTER — OFFICE VISIT (OUTPATIENT)
Dept: INTERNAL MEDICINE | Facility: CLINIC | Age: 83
End: 2018-09-25

## 2018-09-25 VITALS
HEIGHT: 60 IN | HEART RATE: 80 BPM | OXYGEN SATURATION: 96 % | DIASTOLIC BLOOD PRESSURE: 82 MMHG | WEIGHT: 138 LBS | SYSTOLIC BLOOD PRESSURE: 138 MMHG | BODY MASS INDEX: 27.09 KG/M2

## 2018-09-25 DIAGNOSIS — I10 ESSENTIAL HYPERTENSION: ICD-10-CM

## 2018-09-25 DIAGNOSIS — I50.42 CHRONIC COMBINED SYSTOLIC AND DIASTOLIC CONGESTIVE HEART FAILURE (HCC): ICD-10-CM

## 2018-09-25 DIAGNOSIS — I48.21 PERMANENT ATRIAL FIBRILLATION (HCC): ICD-10-CM

## 2018-09-25 DIAGNOSIS — Z23 NEED FOR INFLUENZA VACCINATION: ICD-10-CM

## 2018-09-25 DIAGNOSIS — R09.02 HYPOXIA: ICD-10-CM

## 2018-09-25 DIAGNOSIS — I48.20 ATRIAL FIBRILLATION, CHRONIC (HCC): ICD-10-CM

## 2018-09-25 DIAGNOSIS — K21.00 GASTROESOPHAGEAL REFLUX DISEASE WITH ESOPHAGITIS: ICD-10-CM

## 2018-09-25 DIAGNOSIS — J32.9 CHRONIC SINUSITIS, UNSPECIFIED LOCATION: ICD-10-CM

## 2018-09-25 DIAGNOSIS — I25.10 CORONARY ARTERY DISEASE INVOLVING NATIVE CORONARY ARTERY OF NATIVE HEART WITHOUT ANGINA PECTORIS: Primary | ICD-10-CM

## 2018-09-25 LAB
ALBUMIN SERPL-MCNC: 4.6 G/DL (ref 3.5–5.2)
ALBUMIN/GLOB SERPL: 1.4 G/DL
ALP SERPL-CCNC: 85 U/L (ref 39–117)
ALT SERPL W P-5'-P-CCNC: 10 U/L (ref 1–33)
ANION GAP SERPL CALCULATED.3IONS-SCNC: 10.4 MMOL/L
AST SERPL-CCNC: 17 U/L (ref 1–32)
BASOPHILS # BLD AUTO: 0.03 10*3/MM3 (ref 0–0.2)
BASOPHILS NFR BLD AUTO: 0.4 % (ref 0–2)
BILIRUB SERPL-MCNC: 0.6 MG/DL (ref 0.1–1.2)
BUN BLD-MCNC: 29 MG/DL (ref 8–23)
BUN/CREAT SERPL: 25.7 (ref 7–25)
CALCIUM SPEC-SCNC: 9.9 MG/DL (ref 8.2–9.6)
CHLORIDE SERPL-SCNC: 94 MMOL/L (ref 98–107)
CHOLEST SERPL-MCNC: 243 MG/DL (ref 0–200)
CO2 SERPL-SCNC: 34.6 MMOL/L (ref 22–29)
CREAT BLD-MCNC: 1.13 MG/DL (ref 0.57–1)
DEPRECATED RDW RBC AUTO: 56.5 FL (ref 37–54)
EOSINOPHIL # BLD AUTO: 0.04 10*3/MM3 (ref 0–0.7)
EOSINOPHIL NFR BLD AUTO: 0.5 % (ref 0–5)
ERYTHROCYTE [DISTWIDTH] IN BLOOD BY AUTOMATED COUNT: 17.2 % (ref 11.5–15)
GFR SERPL CREATININE-BSD FRML MDRD: 45 ML/MIN/1.73
GLOBULIN UR ELPH-MCNC: 3.4 GM/DL
GLUCOSE BLD-MCNC: 116 MG/DL (ref 65–99)
HCT VFR BLD AUTO: 36.9 % (ref 34.1–44.9)
HDLC SERPL-MCNC: 65 MG/DL (ref 40–60)
HGB BLD-MCNC: 11.5 G/DL (ref 11.2–15.7)
INR PPP: 2 (ref 0.91–1.09)
LDLC SERPL CALC-MCNC: 160 MG/DL (ref 0–100)
LDLC/HDLC SERPL: 2.46 {RATIO}
LYMPHOCYTES # BLD AUTO: 1.74 10*3/MM3 (ref 0.8–7)
LYMPHOCYTES NFR BLD AUTO: 23.9 % (ref 10–60)
MCH RBC QN AUTO: 28.8 PG (ref 26–34)
MCHC RBC AUTO-ENTMCNC: 31.2 G/DL (ref 31–37)
MCV RBC AUTO: 92.3 FL (ref 80–100)
MONOCYTES # BLD AUTO: 0.68 10*3/MM3 (ref 0–1)
MONOCYTES NFR BLD AUTO: 9.3 % (ref 0–13)
NEUTROPHILS # BLD AUTO: 4.8 10*3/MM3 (ref 1–11)
NEUTROPHILS NFR BLD AUTO: 65.9 % (ref 30–85)
PLATELET # BLD AUTO: 221 10*3/MM3 (ref 150–450)
PMV BLD AUTO: 10.2 FL (ref 6–12)
POTASSIUM BLD-SCNC: 4 MMOL/L (ref 3.5–5.2)
PROT SERPL-MCNC: 8 G/DL (ref 6–8.5)
PROTHROMBIN TIME: 24 SECONDS (ref 10–13.8)
RBC # BLD AUTO: 4 10*6/MM3 (ref 3.93–5.22)
SODIUM BLD-SCNC: 139 MMOL/L (ref 136–145)
TRIGL SERPL-MCNC: 91 MG/DL (ref 0–150)
TSH SERPL DL<=0.05 MIU/L-ACNC: 3.46 MIU/ML (ref 0.27–4.2)
VLDLC SERPL-MCNC: 18.2 MG/DL (ref 5–40)
WBC NRBC COR # BLD: 7.29 10*3/MM3 (ref 5–10)

## 2018-09-25 PROCEDURE — 90662 IIV NO PRSV INCREASED AG IM: CPT | Performed by: NURSE PRACTITIONER

## 2018-09-25 PROCEDURE — 85610 PROTHROMBIN TIME: CPT

## 2018-09-25 PROCEDURE — 80053 COMPREHEN METABOLIC PANEL: CPT | Performed by: NURSE PRACTITIONER

## 2018-09-25 PROCEDURE — 36416 COLLJ CAPILLARY BLOOD SPEC: CPT

## 2018-09-25 PROCEDURE — G0008 ADMIN INFLUENZA VIRUS VAC: HCPCS | Performed by: NURSE PRACTITIONER

## 2018-09-25 PROCEDURE — 99214 OFFICE O/P EST MOD 30 MIN: CPT | Performed by: NURSE PRACTITIONER

## 2018-09-25 PROCEDURE — 80061 LIPID PANEL: CPT | Performed by: NURSE PRACTITIONER

## 2018-09-25 PROCEDURE — 85025 COMPLETE CBC W/AUTO DIFF WBC: CPT | Performed by: NURSE PRACTITIONER

## 2018-09-25 PROCEDURE — 36415 COLL VENOUS BLD VENIPUNCTURE: CPT | Performed by: NURSE PRACTITIONER

## 2018-09-25 PROCEDURE — 84443 ASSAY THYROID STIM HORMONE: CPT | Performed by: NURSE PRACTITIONER

## 2018-09-25 NOTE — PROGRESS NOTES
Anticoagulation Clinic Progress Note    Anticoagulation Summary  As of 9/25/2018    INR goal:   2.0-3.0   TTR:   100.0 % (3.7 wk)   Today's INR:   2.0   Warfarin maintenance plan:   4 mg on Mon; 2 mg all other days   Weekly warfarin total:   16 mg   No change documented:   Rhonda Rascon RPH   Plan last modified:   Brianna Sears RPH (8/20/2018)   Next INR check:   10/23/2018   Priority:   Maintenance   Target end date:   Indefinite    Indications    Permanent atrial fibrillation (CMS/HCC) [I48.2]             Anticoagulation Episode Summary     INR check location:       Preferred lab:       Send INR reminders to:    [facility]    Comments:         Anticoagulation Care Providers     Provider Role Specialty Phone number    Felicitas Brantley MD Referring Cardiology [phone]    Brianna Sears RPH Responsible            Drug interactions: has remained unchanged.  Diet: has remained unchanged.    Clinic Interview:  Patient Findings     Negatives:   Signs/symptoms of thrombosis, Signs/symptoms of bleeding,   Laboratory test error suspected, Change in health, Change in alcohol use,   Change in activity, Upcoming invasive procedure, Emergency department   visit, Upcoming dental procedure, Missed doses, Extra doses, Change in   medications, Change in diet/appetite, Hospital admission, Bruising, Other   complaints      Clinical Outcomes     Negatives:   Major bleeding event, Thromboembolic event,   Anticoagulation-related hospital admission, Anticoagulation-related ED   visit, Anticoagulation-related fatality        INR History:  Anticoagulation Monitoring 8/20/2018 9/4/2018 9/25/2018   INR 1.8 2.3 2.0   INR Date 8/20/2018 9/4/2018 9/25/2018   INR Goal 2.0-3.0 2.0-3.0 2.0-3.0   Trend - Same Same   Last Week Total 0 mg 16 mg 16 mg   Next Week Total 16 mg 16 mg 16 mg   Sun 2 mg 2 mg 2 mg   Mon 4 mg 4 mg 4 mg   Tue 2 mg 2 mg 2 mg   Wed 2 mg 2 mg 2 mg   Thu 2 mg 2 mg 2 mg   Fri 2 mg 2 mg 2 mg   Sat 2 mg 2 mg  2 mg   Visit Report - - -   Some recent data might be hidden         Plan:  1. INR is Therapeutic today- see above in Anticoagulation Summary.  Will instruct Yudith Robertson to Continue their warfarin regimen- see above in Anticoagulation Summary.  2. Follow up in 1 month  3. Patient declines warfarin refills.  4. Verbal and written information provided. Patient expresses understanding and has no further questions at this time.    Rhonda Rascon [facility]

## 2018-09-26 NOTE — PROGRESS NOTES
Subjective   Yudith Robertson is a 89 y.o. female who presents for f/u regarding HTN, atrial fibrillation and GERD.    She is overall feeling well, continues to c/o dyspnea with exertion. She wears O2 at 2LPM at night and occasionally during the day. She continues to dyspepsia, managed on reflux. She had a EGD in 2015 with dilatation of esophageal stricture.      Hypertension   This is a chronic problem. The current episode started more than 1 year ago. The problem is unchanged. The problem is controlled. Associated symptoms include peripheral edema (improved with use of Demadex) and shortness of breath. Pertinent negatives include no chest pain, headaches or palpitations. Current antihypertension treatment includes angiotensin blockers. The current treatment provides significant improvement. There are no compliance problems.    Atrial Fibrillation   Presents for follow-up visit. Symptoms include dizziness, shortness of breath and weakness. Symptoms are negative for chest pain and palpitations. The symptoms have been stable. Past medical history includes atrial fibrillation and hyperlipidemia.        The following portions of the patient's history were reviewed and updated as appropriate: allergies, current medications, past social history and problem list.    Past Medical History:   Diagnosis Date   • Allergic rhinitis    • Anemia    • Atrial fibrillation (CMS/[facility])    • Atypical chest pain    • CAD (coronary artery disease)    • Carotid artery stenosis     20-30% bilateral   • Cerebral artery occlusion with cerebral infarction (CMS/HCC) 1997   • CHF (congestive heart failure) (CMS/HCC)    • Diastolic congestive heart failure (CMS/HCC)     Echo 3/2012 with normal LVEF, mod LVH   • Difficulty swallowing    • Dizzy     mild   • Edema     In [place]   • Esophageal reflux     GERD, history of esophageal stenosis s/p dilation   • GERD (gastroesophageal reflux disease)    • H/O bone density study 06/16/2015    Osteopenia   • H/O  mammogram 02/04/2014   • H/O thyroid nodule    • History of esophageal stricture    • Hyperlipidemia    • Hypertension    • IBS (irritable bowel syndrome)    • Intestinal obstruction     small bowel obstruction   • LBBB (left bundle branch block)    • Mild aortic insufficiency    • Mild mitral insufficiency    • Mixed hyperlipidemia    • Moderate tricuspid insufficiency    • Mood disorder in conditions classified elsewhere    • Osteoarthrosis    • Osteopenia    • Polyarthropathy, multiple sites    • Postural dizziness with presyncope    • SOB (shortness of breath)    • Transient cerebral ischemia    • Valvular heart disease     Echo 1/17/14: EF 54%, elevated LAP, mild-mod MR, mod-severe TR, moderate PHTN (52mm Hg)         Current Outpatient Prescriptions:   •  acetaminophen (TYLENOL) 500 MG tablet, Take 500 mg by mouth every 6 (six) hours as needed for mild pain (1-3)., Disp: , Rfl:   •  carvedilol (COREG) 12.5 MG tablet, TAKE 1 TABLET BY MOUTH  TWICE A DAY WITH MEALS, Disp: 180 tablet, Rfl: 3  •  dicyclomine (BENTYL) 10 MG capsule, TAKE ONE CAPSULE BY MOUTH EVERY 6 HOURS AS NEEDED (CRAMPING), Disp: 90 capsule, Rfl: 1  •  fexofenadine (ALLEGRA) 180 MG tablet, Take 1 tablet by mouth Daily., Disp: 90 tablet, Rfl: 3  •  isosorbide mononitrate (IMDUR) 60 MG 24 hr tablet, TAKE 1 TABLET BY MOUTH  TWICE A DAY, Disp: 180 tablet, Rfl: 3  •  lansoprazole (PREVACID) 15 MG capsule, Take 1 capsule by mouth Daily., Disp: 90 capsule, Rfl: 3  •  LORazepam (ATIVAN) 0.5 MG tablet, TAKE 1 TABLET BY MOUTH EVERY 8 HOURS AS NEEDED FOR ANXIETY, Disp: 50 tablet, Rfl: 5  •  O2 (OXYGEN), Inhale 2 L/min 1 (One) Time., Disp: , Rfl:   •  potassium chloride (K-DUR) 10 MEQ CR tablet, Take 1 tablet by mouth Daily., Disp: 90 tablet, Rfl: 2  •  sacubitril-valsartan (ENTRESTO) 49-51 MG tablet, Take 1 tablet by mouth Every 12 (Twelve) Hours., Disp: , Rfl:   •  torsemide (DEMADEX) 20 MG tablet, Take 2 tablets by mouth Daily., Disp: 60 tablet, Rfl:  "11  •  Triamcinolone Acetonide (NASACORT ALLERGY 24HR) 55 MCG/ACT nasal inhaler, 2 sprays into each nostril Daily., Disp: 1 bottle, Rfl: 2  •  warfarin (COUMADIN) 2 MG tablet, TAKE 1 TABLET BY MOUTH ONCE DAILY EXCEPT  TAKE  2  TABS  ON  MONDAY  OR  AS  DIRECTED, Disp: 34 tablet, Rfl: 1    Allergies   Allergen Reactions   • Montelukast Nausea Only   • Morphine      Makes pt feel freaked out/loopy       Review of Systems   Constitutional: Positive for fatigue. Negative for chills, fever and unexpected weight change.   HENT: Positive for congestion, postnasal drip and sinus pressure. Negative for ear pain, sore throat and trouble swallowing.    Eyes: Negative for visual disturbance.   Respiratory: Positive for shortness of breath. Negative for cough, chest tightness and wheezing.    Cardiovascular: Negative for chest pain, palpitations and leg swelling.   Gastrointestinal: Negative for abdominal pain, blood in stool, nausea and vomiting.   Genitourinary: Negative for dysuria, frequency and urgency.   Musculoskeletal: Positive for arthralgias and back pain. Negative for joint swelling.   Skin: Negative for color change.   Neurological: Positive for dizziness and weakness. Negative for syncope and headaches.   Hematological: Does not bruise/bleed easily.       Objective   Vitals:    09/25/18 1110   BP: 138/82   BP Location: Left arm   Patient Position: Sitting   Cuff Size: Adult   Pulse: 80   SpO2: 96%   Weight: 62.6 kg (138 lb)   Height: 152.4 cm (60\")     Physical Exam   Constitutional: She appears well-developed and well-nourished. She is cooperative. She does not have a sickly appearance. She does not appear ill.   HENT:   Head: Normocephalic.   Right Ear: Hearing, tympanic membrane and external ear normal.   Left Ear: Hearing, tympanic membrane and external ear normal.   Nose: Nose normal. No mucosal edema, rhinorrhea, sinus tenderness or nasal deformity. Right sinus exhibits no maxillary sinus tenderness and no " frontal sinus tenderness. Left sinus exhibits no maxillary sinus tenderness and no frontal sinus tenderness.   Mouth/Throat: Mucous membranes are normal. Normal dentition. Posterior oropharyngeal erythema present.   Eyes: Conjunctivae and lids are normal. Right eye exhibits no discharge and no exudate. Left eye exhibits no discharge and no exudate.   Neck: Trachea normal. Carotid bruit is not present. No edema present. No thyroid mass present.   Cardiovascular: Normal heart sounds and normal pulses.  An irregularly irregular rhythm present.   No murmur heard.  Pulmonary/Chest: Breath sounds normal. No respiratory distress. She has no decreased breath sounds. She has no wheezes. She has no rhonchi. She has no rales.   Musculoskeletal:   Ambulating with walker   Lymphadenopathy:        Head (right side): No submental, no submandibular, no tonsillar, no preauricular, no posterior auricular and no occipital adenopathy present.        Head (left side): No submental, no submandibular, no tonsillar, no preauricular, no posterior auricular and no occipital adenopathy present.   Neurological: She is alert.   Skin: Skin is warm, dry and intact. No cyanosis. Nails show no clubbing.       Assessment/Plan   Yudith was seen today for hypertension and hyperlipidemia.    Diagnoses and all orders for this visit:    Coronary artery disease involving native coronary artery of native heart without angina pectoris    Atrial fibrillation, chronic (CMS/HCC)  Comments:  managed on Coumadin    Essential hypertension  Comments:  stable on current meds, goal <140/90  Orders:  -     CBC Auto Differential; Future  -     Comprehensive Metabolic Panel; Future  -     Lipid Panel; Future  -     TSH; Future  -     CBC Auto Differential  -     Comprehensive Metabolic Panel  -     Lipid Panel  -     TSH    Chronic sinusitis, unspecified location  Comments:  followed by Dr. Segovia    Gastroesophageal reflux disease with esophagitis  Comments:  hx  esophageal stricture dilatation, managed on Lansoprazole    Hypoxia  Comments:  uses O2 at 2LPM qhs, followed by pulm    Chronic combined systolic and diastolic congestive heart failure (CMS/HCC)  Comments:  managed on Entresto and Demadex    Need for influenza vaccination  -     Fluzone High Dose =>65Years    Check fasting labs today, overall she is doing well. Influenza shot administered, she will f/u with Dr. Garzon in 3-4 months.

## 2018-10-02 RX ORDER — WARFARIN SODIUM 2 MG/1
TABLET ORAL
Qty: 34 TABLET | Refills: 1 | Status: SHIPPED | OUTPATIENT
Start: 2018-10-02 | End: 2018-01-01 | Stop reason: SDUPTHER

## 2018-10-05 ENCOUNTER — TELEPHONE (OUTPATIENT)
Dept: INTERNAL MEDICINE | Facility: CLINIC | Age: 83
End: 2018-10-05

## 2018-10-05 DIAGNOSIS — E78.2 MIXED HYPERLIPIDEMIA: Primary | ICD-10-CM

## 2018-10-05 RX ORDER — SIMVASTATIN 20 MG
20 TABLET ORAL NIGHTLY
Qty: 90 TABLET | Refills: 1 | Status: SHIPPED | OUTPATIENT
Start: 2018-10-05 | End: 2019-01-01 | Stop reason: SDUPTHER

## 2018-10-05 NOTE — TELEPHONE ENCOUNTER
----- Message from Kamryn Alvarado sent at 10/5/2018  9:16 AM EDT -----  Contact: Patient   Patient called requesting to speak to Janessa about recent labs.  Please advise.     Patient:  [phone]    [address] [place] [address] [phone] [facility] [phone]

## 2018-10-05 NOTE — TELEPHONE ENCOUNTER
Discussed with patient, she requests to restart Simvastatin due to elevated cholesterol ( with recent labs). She states med was stopped during 9/2017 hospitalization, discussed benefits/risks with medication. Will restart but start at 20mg dose instead of the 40mg dose.

## 2018-10-05 NOTE — TELEPHONE ENCOUNTER
Mrs Robertson is concerned about cholesterol being high, she states she was taken of the cholesterol medication in hospital and hasn't been taking it for sometime should she start back on a cholesterol med?

## 2018-10-09 ENCOUNTER — EPISODE CHANGES (OUTPATIENT)
Dept: CASE MANAGEMENT | Facility: OTHER | Age: 83
End: 2018-10-09

## 2018-10-15 RX ORDER — ISOSORBIDE MONONITRATE 60 MG/1
TABLET, EXTENDED RELEASE ORAL
Qty: 180 TABLET | Refills: 3 | Status: SHIPPED | OUTPATIENT
Start: 2018-10-15 | End: 2019-01-01 | Stop reason: SDUPTHER

## 2018-10-16 ENCOUNTER — OFFICE VISIT (OUTPATIENT)
Dept: INTERNAL MEDICINE | Facility: CLINIC | Age: 83
End: 2018-10-16

## 2018-10-16 VITALS
SYSTOLIC BLOOD PRESSURE: 120 MMHG | TEMPERATURE: 97.5 F | WEIGHT: 140 LBS | BODY MASS INDEX: 27.48 KG/M2 | DIASTOLIC BLOOD PRESSURE: 82 MMHG | HEIGHT: 60 IN | OXYGEN SATURATION: 98 % | HEART RATE: 66 BPM

## 2018-10-16 DIAGNOSIS — K21.00 GASTROESOPHAGEAL REFLUX DISEASE WITH ESOPHAGITIS: ICD-10-CM

## 2018-10-16 DIAGNOSIS — R30.0 DYSURIA: ICD-10-CM

## 2018-10-16 DIAGNOSIS — R19.7 DIARRHEA, UNSPECIFIED TYPE: Primary | ICD-10-CM

## 2018-10-16 LAB
APPEARANCE UR: CLEAR
BILIRUB UR QL STRIP: NEGATIVE
COLOR UR: YELLOW
GLUCOSE UR QL: NEGATIVE
HGB UR QL STRIP: NEGATIVE
KETONES UR QL STRIP: NEGATIVE
LEUKOCYTE ESTERASE UR QL STRIP: NEGATIVE
NITRITE UR QL STRIP: NEGATIVE
PH UR STRIP.AUTO: 7.5 [PH] (ref 5–8)
PROTEIN: NEGATIVE
SP GR UR: 1.01 (ref 1–1.03)
UROBILINOGEN UR STRIP-MCNC: NORMAL MG/DL

## 2018-10-16 PROCEDURE — 99213 OFFICE O/P EST LOW 20 MIN: CPT | Performed by: NURSE PRACTITIONER

## 2018-10-16 NOTE — PATIENT INSTRUCTIONS
Gastroesophageal Reflux Disease, Adult  Normally, food travels down the esophagus and stays in the stomach to be digested. However, when a person has gastroesophageal reflux disease (GERD), food and stomach acid move back up into the esophagus. When this happens, the esophagus becomes sore and inflamed. Over time, GERD can create small holes (ulcers) in the lining of the esophagus.  What are the causes?  This condition is caused by a problem with the muscle between the esophagus and the stomach (lower esophageal sphincter, or LES). Normally, the LES muscle closes after food passes through the esophagus to the stomach. When the LES is weakened or abnormal, it does not close properly, and that allows food and stomach acid to go back up into the esophagus. The LES can be weakened by certain dietary substances, medicines, and medical conditions, including:  · Tobacco use.  · Pregnancy.  · Having a hiatal hernia.  · Heavy alcohol use.  · Certain foods and beverages, such as coffee, chocolate, onions, and peppermint.    What increases the risk?  This condition is more likely to develop in:  · People who have an increased body weight.  · People who have connective tissue disorders.  · People who use NSAID medicines.    What are the signs or symptoms?  Symptoms of this condition include:  · Heartburn.  · Difficult or painful swallowing.  · The feeling of having a lump in the throat.  · A bitter taste in the mouth.  · Bad breath.  · Having a large amount of saliva.  · Having an upset or bloated stomach.  · Belching.  · Chest pain.  · Shortness of breath or wheezing.  · Ongoing (chronic) cough or a night-time cough.  · Wearing away of tooth enamel.  · Weight loss.    Different conditions can cause chest pain. Make sure to see your health care provider if you experience chest pain.  How is this diagnosed?  Your health care provider will take a medical history and perform a physical exam. To determine if you have mild or severe  GERD, your health care provider may also monitor how you respond to treatment. You may also have other tests, including:  · An endoscopy to examine your stomach and esophagus with a small camera.  · A test that measures the acidity level in your esophagus.  · A test that measures how much pressure is on your esophagus.  · A barium swallow or modified barium swallow to show the shape, size, and functioning of your esophagus.    How is this treated?  The goal of treatment is to help relieve your symptoms and to prevent complications. Treatment for this condition may vary depending on how severe your symptoms are. Your health care provider may recommend:  · Changes to your diet.  · Medicine.  · Surgery.    Follow these instructions at home:  Diet  · Follow a diet as recommended by your health care provider. This may involve avoiding foods and drinks such as:  ? Coffee and tea (with or without caffeine).  ? Drinks that contain alcohol.  ? Energy drinks and sports drinks.  ? Carbonated drinks or sodas.  ? Chocolate and cocoa.  ? Peppermint and mint flavorings.  ? Garlic and onions.  ? Horseradish.  ? Spicy and acidic foods, including peppers, chili powder, king powder, vinegar, hot sauces, and barbecue sauce.  ? Citrus fruit juices and citrus fruits, such as oranges, radha, and limes.  ? Tomato-based foods, such as red sauce, chili, salsa, and pizza with red sauce.  ? Fried and fatty foods, such as donuts, french fries, potato chips, and high-fat dressings.  ? High-fat meats, such as hot dogs and fatty cuts of red and white meats, such as rib eye steak, sausage, ham, and palacio.  ? High-fat dairy items, such as whole milk, butter, and cream cheese.  · Eat small, frequent meals instead of large meals.  · Avoid drinking large amounts of liquid with your meals.  · Avoid eating meals during the 2-3 hours before bedtime.  · Avoid lying down right after you eat.  · Do not exercise right after you eat.  General  instructions  · Pay attention to any changes in your symptoms.  · Take over-the-counter and prescription medicines only as told by your health care provider. Do not take aspirin, ibuprofen, or other NSAIDs unless your health care provider told you to do so.  · Do not use any tobacco products, including cigarettes, chewing tobacco, and e-cigarettes. If you need help quitting, ask your health care provider.  · Wear loose-fitting clothing. Do not wear anything tight around your waist that causes pressure on your abdomen.  · Raise (elevate) the head of your bed 6 inches (15cm).  · Try to reduce your stress, such as with yoga or meditation. If you need help reducing stress, ask your health care provider.  · If you are overweight, reduce your weight to an amount that is healthy for you. Ask your health care provider for guidance about a safe weight loss goal.  · Keep all follow-up visits as told by your health care provider. This is important.  Contact a health care provider if:  · You have new symptoms.  · You have unexplained weight loss.  · You have difficulty swallowing, or it hurts to swallow.  · You have wheezing or a persistent cough.  · Your symptoms do not improve with treatment.  · You have a hoarse voice.  Get help right away if:  · You have pain in your arms, neck, jaw, teeth, or back.  · You feel sweaty, dizzy, or light-headed.  · You have chest pain or shortness of breath.  · You vomit and your vomit looks like blood or coffee grounds.  · You faint.  · Your stool is bloody or black.  · You cannot swallow, drink, or eat.  This information is not intended to replace advice given to you by your health care provider. Make sure you discuss any questions you have with your health care provider.  Document Released: 09/27/2006 Document Revised: 05/17/2017 Document Reviewed: 04/13/2016  [employer] Patient Education © 2018 [employer]

## 2018-10-16 NOTE — PROGRESS NOTES
Subjective   Yudith Robertson is a 89 y.o. female.     No recent travel. No known ID contact. She has history irritable bowel. She reports in the last two weeks she has diarrhea and burning sensation started in the last few days (had before). She did recently start simvastatin.       Diarrhea    This is a chronic problem. The problem occurs 2 to 4 times per day. The stool consistency is described as watery. The patient states that diarrhea does not awaken her from sleep. Associated symptoms include abdominal pain (burning in upper, cramping ). Pertinent negatives include no bloating, chills, coughing, fever, increased  flatus, sweats, vomiting or weight loss. Associated symptoms comments: Reflux . Treatments tried: bentyl  The treatment provided moderate relief.        The following portions of the patient's history were reviewed and updated as appropriate: allergies, current medications, past family history, past medical history, past social history, past surgical history and problem list.    Review of Systems   Constitutional: Negative for chills, fatigue, fever and weight loss.   Respiratory: Negative for cough and shortness of breath.    Cardiovascular: Negative for chest pain, palpitations and leg swelling.   Gastrointestinal: Positive for abdominal pain (burning in upper, cramping ) and diarrhea. Negative for abdominal distention, bloating, blood in stool, constipation, flatus, nausea and vomiting.   Genitourinary: Positive for dysuria. Negative for frequency, hematuria and urgency.        Vaginal itching        Objective   Physical Exam   Constitutional: She is oriented to person, place, and time. She appears well-developed and well-nourished.   HENT:   Head: Normocephalic.   Nose: Nose normal.   Cardiovascular: Regular rhythm and normal heart sounds.  Exam reveals no S3 and no S4.    No murmur heard.  Pulmonary/Chest: Effort normal and breath sounds normal. She has no decreased breath sounds. She has no wheezes.  She has no rhonchi. She has no rales.   Abdominal: Normal appearance and bowel sounds are normal. There is no hepatosplenomegaly. There is no tenderness. There is no CVA tenderness.   Musculoskeletal: She exhibits no edema.   Neurological: She is alert and oriented to person, place, and time. Gait normal.   Skin: Skin is warm and dry.   Psychiatric: She has a normal mood and affect.       Assessment/Plan   Yudith was seen today for diarrhea.    Diagnoses and all orders for this visit:    Diarrhea, unspecified type  Comments:  bland diet; use bentyl prn     Gastroesophageal reflux disease with esophagitis  Comments:  avoid spicy acidic foods; add zantac     Dysuria  -     Urinalysis With Microscopic If Indicated (No Culture) - Urine, Clean Catch; Future  -     Cancel: Urinalysis With Microscopic If Indicated (No Culture) - Urine, Clean Catch  -     Urinalysis, Microscopic Only - Urine, Clean Catch; Future  -     Cancel: Urinalysis, Microscopic Only - Urine, Clean Catch  -     Urinalysis With Microscopic If Indicated (No Culture) - Urine, Clean Catch; Future  -     Urinalysis With Microscopic If Indicated (No Culture) - Urine, Clean Catch    Urine dipstick with trace leukocytes sent for micro.   She had recent lab work which I reviewed.

## 2018-10-19 ENCOUNTER — TELEPHONE (OUTPATIENT)
Dept: INTERNAL MEDICINE | Facility: CLINIC | Age: 83
End: 2018-10-19

## 2018-10-19 RX ORDER — RANITIDINE 150 MG/1
150 TABLET ORAL NIGHTLY PRN
Qty: 30 TABLET | Refills: 0 | Status: SHIPPED | OUTPATIENT
Start: 2018-10-19 | End: 2019-01-01

## 2018-10-19 NOTE — TELEPHONE ENCOUNTER
----- Message from Gayla Melgar sent at 10/19/2018  3:23 PM EDT -----  Contact: Pt   Pt was seen 10/16 by Kati and was advised to add zantac.      However patient needs a letter/prescription written here in the office to  even tho it is OTC because of insurance reasons.    Pt# [phone]

## 2018-10-22 ENCOUNTER — OFFICE VISIT (OUTPATIENT)
Dept: INTERNAL MEDICINE | Facility: CLINIC | Age: 83
End: 2018-10-22

## 2018-10-22 ENCOUNTER — ANTICOAGULATION VISIT (OUTPATIENT)
Dept: PHARMACY | Facility: HOSPITAL | Age: 83
End: 2018-10-22

## 2018-10-22 VITALS
TEMPERATURE: 98 F | SYSTOLIC BLOOD PRESSURE: 130 MMHG | BODY MASS INDEX: 27.29 KG/M2 | HEART RATE: 64 BPM | WEIGHT: 139 LBS | HEIGHT: 60 IN | OXYGEN SATURATION: 98 % | DIASTOLIC BLOOD PRESSURE: 64 MMHG

## 2018-10-22 DIAGNOSIS — R19.7 DIARRHEA, UNSPECIFIED TYPE: Primary | ICD-10-CM

## 2018-10-22 DIAGNOSIS — K21.00 GASTROESOPHAGEAL REFLUX DISEASE WITH ESOPHAGITIS: ICD-10-CM

## 2018-10-22 DIAGNOSIS — I48.21 PERMANENT ATRIAL FIBRILLATION (HCC): ICD-10-CM

## 2018-10-22 LAB
INR PPP: 2.4 (ref 0.91–1.09)
PROTHROMBIN TIME: 29.3 SECONDS (ref 10–13.8)

## 2018-10-22 PROCEDURE — 85610 PROTHROMBIN TIME: CPT

## 2018-10-22 PROCEDURE — 99212 OFFICE O/P EST SF 10 MIN: CPT | Performed by: NURSE PRACTITIONER

## 2018-10-22 PROCEDURE — 36416 COLLJ CAPILLARY BLOOD SPEC: CPT

## 2018-10-22 NOTE — PROGRESS NOTES
Anticoagulation Clinic Progress Note    Anticoagulation Summary  As of 10/22/2018    INR goal:   2.0-3.0   TTR:   100.0 % (1.8 mo)   Today's INR:   2.4   Warfarin maintenance plan:   4 mg on Mon; 2 mg all other days   Weekly warfarin total:   16 mg   No change documented:   Thuy Gracia   Plan last modified:   Brianna Sears RPH (8/20/2018)   Next INR check:   11/20/2018   Priority:   Maintenance   Target end date:   Indefinite    Indications    Permanent atrial fibrillation (CMS/HCC) [I48.2]             Anticoagulation Episode Summary     INR check location:       Preferred lab:       Send INR reminders to:    [facility]    Comments:         Anticoagulation Care Providers     Provider Role Specialty Phone number    Felicitas Brantley MD Referring Cardiology [phone]    Brianna Sears RPH Responsible            Drug interactions: has remained unchanged.  Diet: has remained unchanged.    Clinic Interview:  Patient Findings     Negatives:   Signs/symptoms of thrombosis, Signs/symptoms of bleeding,   Laboratory test error suspected, Change in health, Change in alcohol use,   Change in activity, Upcoming invasive procedure, Emergency department   visit, Upcoming dental procedure, Missed doses, Extra doses, Change in   medications, Change in diet/appetite, Hospital admission, Bruising, Other   complaints      Clinical Outcomes     Negatives:   Major bleeding event, Thromboembolic event,   Anticoagulation-related hospital admission, Anticoagulation-related ED   visit, Anticoagulation-related fatality        INR History:  Anticoagulation Monitoring 9/4/2018 9/25/2018 10/22/2018   INR 2.3 2.0 2.4   INR Date 9/4/2018 9/25/2018 10/22/2018   INR Goal 2.0-3.0 2.0-3.0 2.0-3.0   Trend Same Same Same   Last Week Total 16 mg 16 mg 16 mg   Next Week Total 16 mg 16 mg 16 mg   Sun 2 mg 2 mg 2 mg   Mon 4 mg 4 mg 4 mg   Tue 2 mg 2 mg 2 mg   Wed 2 mg 2 mg 2 mg   Thu 2 mg 2 mg 2 mg   Fri 2 mg 2 mg 2 mg   Sat  2 mg 2 mg 2 mg   Visit Report - - -   Some recent data might be hidden       Plan:  1. INR is therapeutic today- see above in Anticoagulation Summary.   Will instruct Yudith Robertson to continue their warfarin regimen- see above in Anticoagulation Summary.  2. Follow up in 4 weeks.  3. Patient declines warfarin refills.  4. Verbal and written information provided. Patient expresses understanding and has no further questions at this time.    Thuy Gracia

## 2018-10-22 NOTE — PROGRESS NOTES
Subjective   Yudith Robertson is a 89 y.o. female.     She is doing much better. Her diarrhea has resolved and her reflux is better controlled.       Diarrhea    This is a new problem. The current episode started 1 to 4 weeks ago. The problem has been resolved. Pertinent negatives include no abdominal pain, arthralgias, bloating, chills, coughing, fever, headaches, increased  flatus, URI or vomiting. Treatments tried: bentyl         The following portions of the patient's history were reviewed and updated as appropriate: allergies, current medications, past family history, past medical history, past social history, past surgical history and problem list.    Review of Systems   Constitutional: Negative for chills, fatigue and fever.   Respiratory: Negative for cough.    Cardiovascular: Negative for chest pain, palpitations and leg swelling.   Gastrointestinal: Positive for diarrhea. Negative for abdominal distention, abdominal pain, bloating, blood in stool, constipation, flatus and vomiting.        Reflux better   Last bowel movement yesterday    Genitourinary: Negative for dysuria, frequency, hematuria and urgency.   Musculoskeletal: Negative for arthralgias.   Neurological: Negative for headaches.       Objective   Physical Exam   Constitutional: She is oriented to person, place, and time. She appears well-developed and well-nourished.   HENT:   Head: Normocephalic.   Nose: Nose normal.   Cardiovascular: Regular rhythm and normal heart sounds.  Exam reveals no S3 and no S4.    No murmur heard.  Pulmonary/Chest: Effort normal and breath sounds normal. She has no decreased breath sounds. She has no wheezes. She has no rhonchi. She has no rales.   Abdominal: Normal appearance and bowel sounds are normal. There is no CVA tenderness.   Musculoskeletal: She exhibits no edema.   Neurological: She is alert and oriented to person, place, and time. Gait (using walker ) abnormal.   Skin: Skin is warm and dry.   Psychiatric: She  has a normal mood and affect.       Assessment/Plan   Yudith was seen today for diarrhea.    Diagnoses and all orders for this visit:    Diarrhea, unspecified type  Comments:  resolved     Gastroesophageal reflux disease with esophagitis  Comments:  use zantac as needed

## 2018-10-24 RX ORDER — TORSEMIDE 20 MG/1
TABLET ORAL
Qty: 60 TABLET | Refills: 11 | Status: SHIPPED | OUTPATIENT
Start: 2018-10-24 | End: 2019-01-01 | Stop reason: SDUPTHER

## 2018-11-06 ENCOUNTER — TELEPHONE (OUTPATIENT)
Dept: CARDIOLOGY | Facility: CLINIC | Age: 83
End: 2018-11-06

## 2018-11-06 NOTE — TELEPHONE ENCOUNTER
Pt was seen by Nephrology today and advised to stop one of her diuretics, she was uncertain which one when I spoke with her, I am having the note sent out here to [place]. She is reluctant to stop any of her diuretics and wants your opinion.

## 2018-11-06 NOTE — TELEPHONE ENCOUNTER
Reviewed note. Looks like dropping torsemide to QD. I am ok with it. Monitor weight. Will be better for her kidneys

## 2018-11-19 ENCOUNTER — ANTICOAGULATION VISIT (OUTPATIENT)
Dept: PHARMACY | Facility: HOSPITAL | Age: 83
End: 2018-11-19

## 2018-11-19 DIAGNOSIS — I48.21 PERMANENT ATRIAL FIBRILLATION (HCC): ICD-10-CM

## 2018-11-19 LAB
INR PPP: 2.2 (ref 0.91–1.09)
PROTHROMBIN TIME: 26.1 SECONDS (ref 10–13.8)

## 2018-11-19 PROCEDURE — 36416 COLLJ CAPILLARY BLOOD SPEC: CPT

## 2018-11-19 PROCEDURE — 85610 PROTHROMBIN TIME: CPT

## 2018-11-19 NOTE — PROGRESS NOTES
Anticoagulation Clinic Progress Note    Anticoagulation Summary  As of 11/19/2018    INR goal:   2.0-3.0   TTR:   100.0 % (1.8 mo)   INR used for dosing:      Warfarin maintenance plan:   4 mg on Mon; 2 mg all other days   Weekly warfarin total:   16 mg   Plan last modified:   Brianna Sears RPH (8/20/2018)   Next INR check:   12/17/2018   Priority:   Maintenance   Target end date:   Indefinite    Indications    Permanent atrial fibrillation (CMS/HCC) [I48.2]             Anticoagulation Episode Summary     INR check location:       Preferred lab:       Send INR reminders to:   [facility] [place] CLINICAL POOL    Comments:         Anticoagulation Care Providers     Provider Role Specialty Phone number    Felicitas Brantley MD Referring Cardiology [phone]    Brianna Sears RPH Responsible  [phone]          Drug interactions: has remained unchanged.  Diet: has remained unchanged.    Clinic Interview:  Patient Findings     Negatives:   Signs/symptoms of thrombosis, Signs/symptoms of bleeding,   Laboratory test error suspected, Change in health, Change in alcohol use,   Change in activity, Upcoming invasive procedure, Emergency department   visit, Upcoming dental procedure, Missed doses, Extra doses, Change in   medications, Change in diet/appetite, Hospital admission, Bruising, Other   complaints      Clinical Outcomes     Negatives:   Major bleeding event, Thromboembolic event,   Anticoagulation-related hospital admission, Anticoagulation-related ED   visit, Anticoagulation-related fatality        INR History:  Anticoagulation Monitoring 9/25/2018 10/22/2018 11/19/2018   INR 2.0 2.4 -   INR Date 9/25/2018 10/22/2018 -   INR Goal 2.0-3.0 2.0-3.0 2.0-3.0   Trend Same Same Same   Last Week Total 16 mg 16 mg 16 mg   Next Week Total 16 mg 16 mg 16 mg   Sun 2 mg 2 mg 2 mg   Mon 4 mg 4 mg 4 mg   Tue 2 mg 2 mg 2 mg   Wed 2 mg 2 mg 2 mg   Thu 2 mg 2 mg 2 mg   Fri 2 mg 2 mg 2 mg   Sat 2 mg 2 mg 2 mg   Visit Report - - -    Some recent data might be hidden       Plan:  1. INR is therapeutic today- see above in Anticoagulation Summary.   Will instruct Yudith Robertson to continue their warfarin regimen- see above in Anticoagulation Summary.  2. Follow up in 4 weeks.  3. Patient declines warfarin refills.  4. Verbal and written information provided. Patient expresses understanding and has no further questions at this time.    Shonna Steele

## 2018-12-10 NOTE — PROGRESS NOTES
Anticoagulation Clinic Progress Note    Anticoagulation Summary  As of 12/10/2018    INR goal:   2.0-3.0   TTR:   100.0 % (2.7 mo)   INR used for dosing:   No new INR was available at the time of this encounter.   Warfarin maintenance plan:   4 mg on Mon; 2 mg all other days   Weekly warfarin total:   16 mg   Plan last modified:   Brianna Sears RPH (8/20/2018)   Next INR check:   12/17/2018   Priority:   High   Target end date:   Indefinite    Indications    Permanent atrial fibrillation (CMS/HCC) [I48.2]             Anticoagulation Episode Summary     INR check location:       Preferred lab:       Send INR reminders to:    [facility]    Comments:         Anticoagulation Care Providers     Provider Role Specialty Phone number    Felicitas Brantley MD Referring Cardiology [phone]    Brianna Sears RPH Responsible  [phone]          Drug interactions: has remained unchanged.  Diet: has remained unchanged.    Clinic Interview:  Patient Findings     Positives:   Signs/symptoms of bleeding, Missed doses    Negatives:   Signs/symptoms of thrombosis, Laboratory test error   suspected, Change in health, Change in alcohol use, Change in activity,   Upcoming invasive procedure, Emergency department visit, Upcoming dental   procedure, Extra doses, Change in medications, Change in diet/appetite,   Hospital admission, Bruising, Other complaints    Comments:   Pt called today and states she cut her arm. She does not know   if it is still bleeding because it is bandaged, but she is very concerned   on taking her double 4mg of warfarin tonight so she is only taking 2mg      Clinical Outcomes     Negatives:   Major bleeding event, Thromboembolic event,   Anticoagulation-related hospital admission, Anticoagulation-related ED   visit, Anticoagulation-related fatality    Comments:   Pt called today and states she cut her arm. She does not know   if it is still bleeding because it is bandaged, but she is very  concerned   on taking her double 4mg of warfarin tonight so she is only taking 2mg        INR History:  Anticoagulation Monitoring 10/22/2018 11/19/2018 12/10/2018   INR 2.4 2.2 -   INR Date 10/22/2018 11/19/2018 -   INR Goal 2.0-3.0 2.0-3.0 2.0-3.0   Trend Same Same Same   Last Week Total 16 mg 16 mg 16 mg   Next Week Total 16 mg 16 mg 14 mg   Sun 2 mg 2 mg 2 mg   Mon 4 mg 4 mg 2 mg (12/10)   Tue 2 mg 2 mg 2 mg   Wed 2 mg 2 mg 2 mg   Thu 2 mg 2 mg 2 mg   Fri 2 mg 2 mg 2 mg   Sat 2 mg 2 mg 2 mg   Visit Report - - -   Some recent data might be hidden       Plan:  1.Will instruct Yudith Robertson to Continue their warfarin regimen- see above in Anticoagulation Summary. Pt called today and states she cut her arm. She does not know if it is still bleeding because it is bandaged, but she is very concerned   on taking her double 4mg of warfarin tonight so she is only taking 2mg today  2. Follow up in 1 week  3. Patient declines warfarin refills.  4. Verbal and written information provided. Patient expresses understanding and has no further questions at this time.    Rhonda Rascon [facility]

## 2018-12-11 NOTE — TELEPHONE ENCOUNTER
----- Message from Gayla Melgar sent at 12/11/2018  3:17 PM EST -----  Contact: pt  Pt is calling for a refill     FOR  potassium chloride (K-DUR) 10 MEQ CR tablet      Patient requests RX SENT TO   [facility] - [address] [phone] [facility] [phone] FX      Caller# [phone]     Please and thank you.

## 2018-12-12 NOTE — PROGRESS NOTES
Subjective   Yudith Robertson is a 89 y.o. female.     She reports last Thursday while at home she was coming out of the bathroom and scrapped her left arm on the door frame.she reports she went to [facility] on Monday and was cleansed with peroxide and apply bacitracin twice day. She reports yesterday she noticed redness and swelling to arm.       Abrasion   This is a new problem. The current episode started 1 to 4 weeks ago. The problem has been gradually worsening. Pertinent negatives include no chest pain, coughing, fatigue or fever. Associated symptoms comments: Skin tear to left forearm .        The following portions of the patient's history were reviewed and updated as appropriate: allergies, current medications, past social history and problem list.    Review of Systems   Constitutional: Negative for activity change, appetite change, fatigue and fever.   Respiratory: Negative for cough, shortness of breath and wheezing.    Cardiovascular: Negative for chest pain, palpitations and leg swelling.   Skin: Positive for wound (skin tear left arm; surrounding swelling and erythema ).       Objective   Physical Exam   Constitutional: She is oriented to person, place, and time. She appears well-developed and well-nourished.   HENT:   Head: Normocephalic.   Nose: Nose normal.   Cardiovascular: Regular rhythm and normal heart sounds. Exam reveals no S3 and no S4.   No murmur heard.  Pulmonary/Chest: Effort normal and breath sounds normal. She has no decreased breath sounds. She has no wheezes. She has no rhonchi. She has no rales.   Musculoskeletal: She exhibits no edema.   Neurological: She is alert and oriented to person, place, and time. Gait normal.   Skin: Skin is warm and dry.   Skin tear to left forearm measuring 6.5cm. Surrounding erythema and swelling noted, no pus    Psychiatric: She has a normal mood and affect.       Assessment/Plan   Yudith was seen today for skin tear.    Diagnoses and all orders for this  visit:    Skin tear of left upper extremity  Comments:  cleansed with dial soap, keep open to air   Orders:  -     cephalexin (KEFLEX) 500 MG capsule; Take 1 capsule by mouth 2 (Two) Times a Day for 7 days.    Allergic rhinitis, unspecified seasonality, unspecified trigger  -     fexofenadine (ALLEGRA) 180 MG tablet; Take 1 tablet by mouth Daily.    Gastroesophageal reflux disease with esophagitis  -     lansoprazole (PREVACID) 15 MG capsule; Take 2 capsules by mouth Daily.    Her last INR 2.2. She goes for protime on Monday, will inform cardiologist of start of antibiotic.

## 2018-12-17 NOTE — PROGRESS NOTES
Anticoagulation Clinic Progress Note    Anticoagulation Summary  As of 2018    INR goal:   2.0-3.0   TTR:   82.9 % (3.6 mo)   INR used for dosin.6! (2018)   Warfarin maintenance plan:   4 mg on Mon; 2 mg all other days   Weekly warfarin total:   16 mg   No change documented:   Dixon Al RPH   Plan last modified:   Brianna Sears RPH (2018)   Next INR check:   2019   Priority:   High   Target end date:   Indefinite    Indications    Permanent atrial fibrillation (CMS/HCC) [I48.2]             Anticoagulation Episode Summary     INR check location:       Preferred lab:       Send INR reminders to:    JEREMY HUNTER CLINICAL POOL    Comments:         Anticoagulation Care Providers     Provider Role Specialty Phone number    Felicitas Brantley MD Referring Cardiology [phone]          Clinic Interview:  Patient Findings     Negatives:   Signs/symptoms of thrombosis, Signs/symptoms of bleeding,   Laboratory test error suspected, Change in health, Change in alcohol use,   Change in activity, Upcoming invasive procedure, Emergency department   visit, Upcoming dental procedure, Missed doses, Extra doses, Change in   medications, Change in diet/appetite, Hospital admission, Bruising, Other   complaints      Clinical Outcomes     Negatives:   Major bleeding event, Thromboembolic event,   Anticoagulation-related hospital admission, Anticoagulation-related ED   visit, Anticoagulation-related fatality        INR History:  Anticoagulation Monitoring 2018 12/10/2018 2018   INR 2.2 - 1.6   INR Date 2018 - 2018   INR Goal 2.0-3.0 2.0-3.0 2.0-3.0   Trend Same Same Same   Last Week Total 16 mg 16 mg 14 mg   Next Week Total 16 mg 14 mg 16 mg   Sun 2 mg 2 mg 2 mg   Mon 4 mg 2 mg (12/10) 4 mg   Tue 2 mg 2 mg 2 mg   Wed 2 mg 2 mg 2 mg   Thu 2 mg 2 mg 2 mg   Fri 2 mg 2 mg 2 mg   Sat 2 mg 2 mg 2 mg   Visit Report - - -   Some recent data might be hidden       Plan:  1. INR is  Subtherapeutic today- see above in Anticoagulation Summary.  Will instruct Yudith Robertson to Continue their warfarin regimen- see above in Anticoagulation Summary.  2. Follow up in 3 weeks. Patient refused a 1-2 week follow up.  3. Patient declines warfarin refills.  4. Verbal and written information provided. Patient expresses understanding and has no further questions at this time.    Dixon Al [facility]

## 2018-12-18 NOTE — PROGRESS NOTES
Subjective   Yudith Robertson is a 89 y.o. female.     Overall doing much better. Unable to tolerated dial soap so used dove.      Wound Check   She was originally treated more than 14 days ago. Previous treatment included oral antibiotics. There has been bloody discharge from the wound. The redness has improved. The swelling has improved. The pain has improved. She has no difficulty moving the affected extremity or digit.        The following portions of the patient's history were reviewed and updated as appropriate: allergies, current medications, past social history and problem list.    Review of Systems   Constitutional: Negative for activity change, appetite change, fatigue and fever.   Respiratory: Negative for cough, shortness of breath and wheezing.    Cardiovascular: Negative for chest pain, palpitations and leg swelling.   Skin: Positive for wound (doing better ).       Objective   Physical Exam   Constitutional: She is oriented to person, place, and time. She appears well-developed and well-nourished.   HENT:   Head: Normocephalic.   Nose: Nose normal.   Cardiovascular: Regular rhythm and normal heart sounds. Exam reveals no S3 and no S4.   No murmur heard.  Repeat bp left arm 142/70  No pedal edema    Pulmonary/Chest: Effort normal and breath sounds normal. She has no decreased breath sounds. She has no wheezes. She has no rhonchi. She has no rales.   Musculoskeletal: She exhibits no edema.   Neurological: She is alert and oriented to person, place, and time. Gait (using walker for assistance ) abnormal.   Skin: Skin is warm and dry. Abrasion noted.   Healing left forearm wound 4.5 cm, no pus, trace blood noted.   Psychiatric: She has a normal mood and affect.       Assessment/Plan   Yudith was seen today for wound check.    Diagnoses and all orders for this visit:    Skin tear of left upper extremity    Essential hypertension

## 2019-01-01 ENCOUNTER — ANTICOAGULATION VISIT (OUTPATIENT)
Dept: PHARMACY | Facility: HOSPITAL | Age: 84
End: 2019-01-01

## 2019-01-01 ENCOUNTER — HOSPITAL ENCOUNTER (INPATIENT)
Facility: HOSPITAL | Age: 84
LOS: 7 days | Discharge: NURSING, REHAB OR LONG-TERM CARE | End: 2019-11-21
Attending: EMERGENCY MEDICINE | Admitting: HOSPITALIST

## 2019-01-01 ENCOUNTER — HOSPITAL ENCOUNTER (INPATIENT)
Facility: HOSPITAL | Age: 84
LOS: 2 days | Discharge: HOSPICE | End: 2019-11-25
Attending: EMERGENCY MEDICINE | Admitting: HOSPITALIST

## 2019-01-01 ENCOUNTER — OFFICE VISIT (OUTPATIENT)
Dept: INTERNAL MEDICINE | Facility: CLINIC | Age: 84
End: 2019-01-01

## 2019-01-01 ENCOUNTER — APPOINTMENT (OUTPATIENT)
Dept: GENERAL RADIOLOGY | Facility: HOSPITAL | Age: 84
End: 2019-01-01

## 2019-01-01 ENCOUNTER — TELEPHONE (OUTPATIENT)
Dept: INTERNAL MEDICINE | Facility: CLINIC | Age: 84
End: 2019-01-01

## 2019-01-01 ENCOUNTER — LAB (OUTPATIENT)
Dept: INTERNAL MEDICINE | Facility: CLINIC | Age: 84
End: 2019-01-01

## 2019-01-01 ENCOUNTER — TELEPHONE (OUTPATIENT)
Dept: PEDIATRICS | Facility: OTHER | Age: 84
End: 2019-01-01

## 2019-01-01 ENCOUNTER — OFFICE VISIT (OUTPATIENT)
Dept: CARDIOLOGY | Facility: CLINIC | Age: 84
End: 2019-01-01

## 2019-01-01 ENCOUNTER — TELEPHONE (OUTPATIENT)
Dept: CARDIOLOGY | Facility: CLINIC | Age: 84
End: 2019-01-01

## 2019-01-01 ENCOUNTER — APPOINTMENT (OUTPATIENT)
Dept: ULTRASOUND IMAGING | Facility: HOSPITAL | Age: 84
End: 2019-01-01

## 2019-01-01 ENCOUNTER — HOSPITAL ENCOUNTER (INPATIENT)
Facility: HOSPITAL | Age: 84
LOS: 1 days | End: 2019-11-26
Attending: INTERNAL MEDICINE | Admitting: INTERNAL MEDICINE

## 2019-01-01 ENCOUNTER — APPOINTMENT (OUTPATIENT)
Dept: PHARMACY | Facility: HOSPITAL | Age: 84
End: 2019-01-01

## 2019-01-01 ENCOUNTER — APPOINTMENT (OUTPATIENT)
Dept: CT IMAGING | Facility: HOSPITAL | Age: 84
End: 2019-01-01

## 2019-01-01 ENCOUNTER — DOCUMENTATION (OUTPATIENT)
Dept: INTERNAL MEDICINE | Facility: CLINIC | Age: 84
End: 2019-01-01

## 2019-01-01 ENCOUNTER — APPOINTMENT (OUTPATIENT)
Dept: WOMENS IMAGING | Facility: HOSPITAL | Age: 84
End: 2019-01-01

## 2019-01-01 ENCOUNTER — EPISODE CHANGES (OUTPATIENT)
Dept: CASE MANAGEMENT | Facility: OTHER | Age: 84
End: 2019-01-01

## 2019-01-01 VITALS
TEMPERATURE: 97.9 F | SYSTOLIC BLOOD PRESSURE: 132 MMHG | HEART RATE: 65 BPM | WEIGHT: 164.7 LBS | HEIGHT: 60 IN | DIASTOLIC BLOOD PRESSURE: 64 MMHG | BODY MASS INDEX: 32.34 KG/M2 | OXYGEN SATURATION: 96 % | RESPIRATION RATE: 18 BRPM

## 2019-01-01 VITALS
SYSTOLIC BLOOD PRESSURE: 132 MMHG | BODY MASS INDEX: 29.84 KG/M2 | DIASTOLIC BLOOD PRESSURE: 80 MMHG | HEIGHT: 60 IN | TEMPERATURE: 98 F | WEIGHT: 152 LBS | OXYGEN SATURATION: 95 % | HEART RATE: 70 BPM

## 2019-01-01 VITALS
HEART RATE: 85 BPM | DIASTOLIC BLOOD PRESSURE: 82 MMHG | SYSTOLIC BLOOD PRESSURE: 136 MMHG | HEIGHT: 60 IN | WEIGHT: 147 LBS | BODY MASS INDEX: 28.86 KG/M2 | OXYGEN SATURATION: 93 %

## 2019-01-01 VITALS
DIASTOLIC BLOOD PRESSURE: 76 MMHG | WEIGHT: 148 LBS | BODY MASS INDEX: 29.06 KG/M2 | SYSTOLIC BLOOD PRESSURE: 166 MMHG | TEMPERATURE: 97.6 F | HEIGHT: 60 IN

## 2019-01-01 VITALS
DIASTOLIC BLOOD PRESSURE: 82 MMHG | HEART RATE: 91 BPM | OXYGEN SATURATION: 94 % | WEIGHT: 148.6 LBS | HEIGHT: 60 IN | BODY MASS INDEX: 29.17 KG/M2 | SYSTOLIC BLOOD PRESSURE: 162 MMHG

## 2019-01-01 VITALS
BODY MASS INDEX: 28.51 KG/M2 | DIASTOLIC BLOOD PRESSURE: 82 MMHG | OXYGEN SATURATION: 96 % | HEART RATE: 75 BPM | WEIGHT: 146 LBS | SYSTOLIC BLOOD PRESSURE: 148 MMHG

## 2019-01-01 VITALS
WEIGHT: 150 LBS | TEMPERATURE: 98.2 F | SYSTOLIC BLOOD PRESSURE: 162 MMHG | DIASTOLIC BLOOD PRESSURE: 74 MMHG | OXYGEN SATURATION: 98 % | HEIGHT: 60 IN | HEART RATE: 77 BPM | BODY MASS INDEX: 29.45 KG/M2

## 2019-01-01 VITALS
HEART RATE: 75 BPM | BODY MASS INDEX: 27.88 KG/M2 | HEIGHT: 60 IN | OXYGEN SATURATION: 95 % | SYSTOLIC BLOOD PRESSURE: 136 MMHG | WEIGHT: 142 LBS | DIASTOLIC BLOOD PRESSURE: 60 MMHG

## 2019-01-01 VITALS
TEMPERATURE: 97.7 F | SYSTOLIC BLOOD PRESSURE: 130 MMHG | DIASTOLIC BLOOD PRESSURE: 80 MMHG | OXYGEN SATURATION: 99 % | WEIGHT: 152.6 LBS | HEART RATE: 73 BPM | BODY MASS INDEX: 29.96 KG/M2 | HEIGHT: 60 IN

## 2019-01-01 VITALS
HEART RATE: 99 BPM | BODY MASS INDEX: 32.46 KG/M2 | TEMPERATURE: 97.4 F | DIASTOLIC BLOOD PRESSURE: 78 MMHG | RESPIRATION RATE: 20 BRPM | HEIGHT: 60 IN | WEIGHT: 165.34 LBS | SYSTOLIC BLOOD PRESSURE: 174 MMHG | OXYGEN SATURATION: 97 %

## 2019-01-01 VITALS
HEART RATE: 68 BPM | HEIGHT: 60 IN | WEIGHT: 147.6 LBS | TEMPERATURE: 97.8 F | OXYGEN SATURATION: 96 % | BODY MASS INDEX: 28.98 KG/M2 | SYSTOLIC BLOOD PRESSURE: 144 MMHG | DIASTOLIC BLOOD PRESSURE: 82 MMHG

## 2019-01-01 VITALS
BODY MASS INDEX: 29.57 KG/M2 | HEIGHT: 60 IN | HEART RATE: 75 BPM | WEIGHT: 150.6 LBS | DIASTOLIC BLOOD PRESSURE: 68 MMHG | SYSTOLIC BLOOD PRESSURE: 180 MMHG | RESPIRATION RATE: 16 BRPM

## 2019-01-01 VITALS — RESPIRATION RATE: 20 BRPM | TEMPERATURE: 97 F

## 2019-01-01 DIAGNOSIS — F39 MOOD DISORDER (HCC): ICD-10-CM

## 2019-01-01 DIAGNOSIS — I50.43 ACUTE ON CHRONIC COMBINED SYSTOLIC AND DIASTOLIC CONGESTIVE HEART FAILURE (HCC): ICD-10-CM

## 2019-01-01 DIAGNOSIS — R10.9 ABDOMINAL PAIN, UNSPECIFIED ABDOMINAL LOCATION: ICD-10-CM

## 2019-01-01 DIAGNOSIS — B96.29 URINARY TRACT INFECTION DUE TO EXTENDED-SPECTRUM BETA LACTAMASE (ESBL) PRODUCING ESCHERICHIA COLI: ICD-10-CM

## 2019-01-01 DIAGNOSIS — I48.21 PERMANENT ATRIAL FIBRILLATION (HCC): ICD-10-CM

## 2019-01-01 DIAGNOSIS — N39.0 URINARY TRACT INFECTION DUE TO EXTENDED-SPECTRUM BETA LACTAMASE (ESBL) PRODUCING ESCHERICHIA COLI: ICD-10-CM

## 2019-01-01 DIAGNOSIS — J06.9 URI, ACUTE: ICD-10-CM

## 2019-01-01 DIAGNOSIS — I50.21 CHF (CONGESTIVE HEART FAILURE), NYHA CLASS I, ACUTE, SYSTOLIC (HCC): ICD-10-CM

## 2019-01-01 DIAGNOSIS — B96.29 URINARY TRACT INFECTION DUE TO EXTENDED-SPECTRUM BETA LACTAMASE (ESBL) PRODUCING ESCHERICHIA COLI: Primary | ICD-10-CM

## 2019-01-01 DIAGNOSIS — I48.20 ATRIAL FIBRILLATION, CHRONIC (HCC): Primary | ICD-10-CM

## 2019-01-01 DIAGNOSIS — I10 ESSENTIAL HYPERTENSION: ICD-10-CM

## 2019-01-01 DIAGNOSIS — R31.9 URINARY TRACT INFECTION WITH HEMATURIA, SITE UNSPECIFIED: Primary | ICD-10-CM

## 2019-01-01 DIAGNOSIS — Z16.12 URINARY TRACT INFECTION DUE TO EXTENDED-SPECTRUM BETA LACTAMASE (ESBL) PRODUCING ESCHERICHIA COLI: Primary | ICD-10-CM

## 2019-01-01 DIAGNOSIS — N39.0 URINARY TRACT INFECTION DUE TO EXTENDED-SPECTRUM BETA LACTAMASE (ESBL) PRODUCING ESCHERICHIA COLI: Primary | ICD-10-CM

## 2019-01-01 DIAGNOSIS — I27.20 PULMONARY HYPERTENSION (HCC): ICD-10-CM

## 2019-01-01 DIAGNOSIS — Z16.12 URINARY TRACT INFECTION DUE TO EXTENDED-SPECTRUM BETA LACTAMASE (ESBL) PRODUCING ESCHERICHIA COLI: ICD-10-CM

## 2019-01-01 DIAGNOSIS — K21.00 GASTROESOPHAGEAL REFLUX DISEASE WITH ESOPHAGITIS: ICD-10-CM

## 2019-01-01 DIAGNOSIS — E78.2 MIXED HYPERLIPIDEMIA: ICD-10-CM

## 2019-01-01 DIAGNOSIS — N39.0 URINARY TRACT INFECTION WITH HEMATURIA, SITE UNSPECIFIED: Primary | ICD-10-CM

## 2019-01-01 DIAGNOSIS — I48.20 ATRIAL FIBRILLATION, CHRONIC (HCC): ICD-10-CM

## 2019-01-01 DIAGNOSIS — S80.812A ABRASION OF LEFT LOWER EXTREMITY, INITIAL ENCOUNTER: ICD-10-CM

## 2019-01-01 DIAGNOSIS — I50.9 ACUTE CONGESTIVE HEART FAILURE, UNSPECIFIED HEART FAILURE TYPE (HCC): Primary | ICD-10-CM

## 2019-01-01 DIAGNOSIS — N93.9 VAGINAL BLEEDING: Primary | ICD-10-CM

## 2019-01-01 DIAGNOSIS — I50.42 CHRONIC COMBINED SYSTOLIC AND DIASTOLIC CONGESTIVE HEART FAILURE (HCC): ICD-10-CM

## 2019-01-01 DIAGNOSIS — E87.6 LOW BLOOD POTASSIUM: ICD-10-CM

## 2019-01-01 DIAGNOSIS — R35.0 FREQUENT URINATION: Primary | ICD-10-CM

## 2019-01-01 DIAGNOSIS — K64.5 THROMBOSED EXTERNAL HEMORRHOID: ICD-10-CM

## 2019-01-01 DIAGNOSIS — R10.84 GENERALIZED ABDOMINAL PAIN: Primary | ICD-10-CM

## 2019-01-01 DIAGNOSIS — I10 ESSENTIAL HYPERTENSION: Primary | ICD-10-CM

## 2019-01-01 DIAGNOSIS — R10.84 GENERALIZED ABDOMINAL PAIN: ICD-10-CM

## 2019-01-01 DIAGNOSIS — I44.7 LEFT BUNDLE BRANCH BLOCK: ICD-10-CM

## 2019-01-01 DIAGNOSIS — N39.0 ACUTE UTI: ICD-10-CM

## 2019-01-01 DIAGNOSIS — R30.0 DYSURIA: ICD-10-CM

## 2019-01-01 DIAGNOSIS — I36.1 NON-RHEUMATIC TRICUSPID VALVE INSUFFICIENCY: ICD-10-CM

## 2019-01-01 DIAGNOSIS — R05.9 COUGH: ICD-10-CM

## 2019-01-01 DIAGNOSIS — R19.7 DIARRHEA, UNSPECIFIED TYPE: Primary | ICD-10-CM

## 2019-01-01 DIAGNOSIS — I48.21 PERMANENT ATRIAL FIBRILLATION (HCC): Primary | ICD-10-CM

## 2019-01-01 DIAGNOSIS — D64.9 ANEMIA, UNSPECIFIED TYPE: Primary | ICD-10-CM

## 2019-01-01 DIAGNOSIS — Z00.00 MEDICARE ANNUAL WELLNESS VISIT, INITIAL: Primary | ICD-10-CM

## 2019-01-01 DIAGNOSIS — E87.1 HYPONATREMIA: ICD-10-CM

## 2019-01-01 DIAGNOSIS — J06.9 URI, ACUTE: Primary | ICD-10-CM

## 2019-01-01 DIAGNOSIS — R30.0 BURNING WITH URINATION: ICD-10-CM

## 2019-01-01 DIAGNOSIS — N30.90 CYSTITIS: ICD-10-CM

## 2019-01-01 LAB
ALBUMIN SERPL-MCNC: 2.4 G/DL (ref 2.9–4.4)
ALBUMIN SERPL-MCNC: 2.4 G/DL (ref 2.9–4.4)
ALBUMIN SERPL-MCNC: 2.7 G/DL (ref 3.5–5.2)
ALBUMIN SERPL-MCNC: 2.8 G/DL (ref 3.5–5.2)
ALBUMIN SERPL-MCNC: 2.9 G/DL (ref 3.5–5.2)
ALBUMIN SERPL-MCNC: 3 G/DL (ref 3.5–5.2)
ALBUMIN SERPL-MCNC: 3.3 G/DL (ref 3.5–5.2)
ALBUMIN SERPL-MCNC: 3.4 G/DL (ref 3.5–5.2)
ALBUMIN SERPL-MCNC: 4 G/DL (ref 3.5–5.2)
ALBUMIN SERPL-MCNC: 4.2 G/DL (ref 3.5–5.2)
ALBUMIN SERPL-MCNC: 4.5 G/DL (ref 3.5–5.2)
ALBUMIN/GLOB SERPL: 1 {RATIO} (ref 0.7–1.7)
ALBUMIN/GLOB SERPL: 1 {RATIO} (ref 0.7–1.7)
ALBUMIN/GLOB SERPL: 1.1 G/DL
ALBUMIN/GLOB SERPL: 1.2 G/DL
ALBUMIN/GLOB SERPL: 1.2 G/DL
ALBUMIN/GLOB SERPL: 1.5 G/DL
ALBUMIN/GLOB SERPL: 1.7 G/DL
ALP SERPL-CCNC: 59 U/L (ref 39–117)
ALP SERPL-CCNC: 62 U/L (ref 39–117)
ALP SERPL-CCNC: 66 U/L (ref 39–117)
ALP SERPL-CCNC: 72 U/L (ref 39–117)
ALP SERPL-CCNC: 74 U/L (ref 39–117)
ALPHA1 GLOB FLD ELPH-MCNC: 0.3 G/DL (ref 0–0.4)
ALPHA1 GLOB FLD ELPH-MCNC: 0.3 G/DL (ref 0–0.4)
ALPHA2 GLOB SERPL ELPH-MCNC: 1 G/DL (ref 0.4–1)
ALPHA2 GLOB SERPL ELPH-MCNC: 1 G/DL (ref 0.4–1)
ALT SERPL W P-5'-P-CCNC: 10 U/L (ref 1–33)
ALT SERPL W P-5'-P-CCNC: 10 U/L (ref 1–33)
ALT SERPL W P-5'-P-CCNC: 11 U/L (ref 1–33)
ALT SERPL W P-5'-P-CCNC: 13 U/L (ref 1–33)
ALT SERPL W P-5'-P-CCNC: 18 U/L (ref 1–33)
ANA SER QL IA: NEGATIVE
ANION GAP SERPL CALCULATED.3IONS-SCNC: 10.2 MMOL/L (ref 5–15)
ANION GAP SERPL CALCULATED.3IONS-SCNC: 11.1 MMOL/L (ref 5–15)
ANION GAP SERPL CALCULATED.3IONS-SCNC: 11.2 MMOL/L
ANION GAP SERPL CALCULATED.3IONS-SCNC: 11.2 MMOL/L (ref 5–15)
ANION GAP SERPL CALCULATED.3IONS-SCNC: 11.3 MMOL/L (ref 5–15)
ANION GAP SERPL CALCULATED.3IONS-SCNC: 11.7 MMOL/L (ref 5–15)
ANION GAP SERPL CALCULATED.3IONS-SCNC: 12 MMOL/L (ref 5–15)
ANION GAP SERPL CALCULATED.3IONS-SCNC: 12.1 MMOL/L (ref 5–15)
ANION GAP SERPL CALCULATED.3IONS-SCNC: 12.3 MMOL/L (ref 5–15)
ANION GAP SERPL CALCULATED.3IONS-SCNC: 12.9 MMOL/L (ref 5–15)
ANION GAP SERPL CALCULATED.3IONS-SCNC: 13.9 MMOL/L (ref 5–15)
ANION GAP SERPL CALCULATED.3IONS-SCNC: 14 MMOL/L (ref 5–15)
ANION GAP SERPL CALCULATED.3IONS-SCNC: 14.5 MMOL/L (ref 5–15)
ANION GAP SERPL CALCULATED.3IONS-SCNC: 14.5 MMOL/L (ref 5–15)
AST SERPL-CCNC: 15 U/L (ref 1–32)
AST SERPL-CCNC: 19 U/L (ref 1–32)
AST SERPL-CCNC: 20 U/L (ref 1–32)
AST SERPL-CCNC: 22 U/L (ref 1–32)
AST SERPL-CCNC: 22 U/L (ref 1–32)
B-GLOBULIN SERPL ELPH-MCNC: 0.6 G/DL (ref 0.7–1.3)
B-GLOBULIN SERPL ELPH-MCNC: 0.6 G/DL (ref 0.7–1.3)
BACTERIA SPEC AEROBE CULT: ABNORMAL
BACTERIA SPEC AEROBE CULT: NORMAL
BACTERIA SPEC AEROBE CULT: NORMAL
BACTERIA UR QL AUTO: ABNORMAL /HPF
BASOPHILS # BLD AUTO: 0.01 10*3/MM3 (ref 0–0.2)
BASOPHILS # BLD AUTO: 0.02 10*3/MM3 (ref 0–0.2)
BASOPHILS # BLD AUTO: 0.04 10*3/MM3 (ref 0–0.2)
BASOPHILS # BLD AUTO: 0.05 10*3/MM3 (ref 0–0.2)
BASOPHILS NFR BLD AUTO: 0.2 % (ref 0–1.5)
BASOPHILS NFR BLD AUTO: 0.3 % (ref 0–1.5)
BASOPHILS NFR BLD AUTO: 0.4 % (ref 0–1.5)
BASOPHILS NFR BLD AUTO: 0.5 % (ref 0–1.5)
BASOPHILS NFR BLD AUTO: 0.6 % (ref 0–1.5)
BASOPHILS NFR BLD AUTO: 0.6 % (ref 0–1.5)
BASOPHILS NFR BLD AUTO: 0.9 % (ref 0–1.5)
BILIRUB SERPL-MCNC: 0.3 MG/DL (ref 0.2–1.2)
BILIRUB SERPL-MCNC: 0.4 MG/DL (ref 0.2–1.2)
BILIRUB SERPL-MCNC: 0.5 MG/DL (ref 0.1–1.2)
BILIRUB SERPL-MCNC: 0.5 MG/DL (ref 0.2–1.2)
BILIRUB SERPL-MCNC: 0.5 MG/DL (ref 0.2–1.2)
BILIRUB UR QL STRIP: NEGATIVE
BUN BLD-MCNC: 107 MG/DL (ref 8–23)
BUN BLD-MCNC: 109 MG/DL (ref 8–23)
BUN BLD-MCNC: 117 MG/DL (ref 8–23)
BUN BLD-MCNC: 24 MG/DL (ref 8–23)
BUN BLD-MCNC: 26 MG/DL (ref 8–23)
BUN BLD-MCNC: 31 MG/DL (ref 8–23)
BUN BLD-MCNC: 40 MG/DL (ref 8–23)
BUN BLD-MCNC: 41 MG/DL (ref 8–23)
BUN BLD-MCNC: 50 MG/DL (ref 8–23)
BUN BLD-MCNC: 54 MG/DL (ref 8–23)
BUN BLD-MCNC: 66 MG/DL (ref 8–23)
BUN BLD-MCNC: 87 MG/DL (ref 8–23)
BUN BLD-MCNC: 92 MG/DL (ref 8–23)
BUN BLD-MCNC: 99 MG/DL (ref 8–23)
BUN/CREAT SERPL: 19.2 (ref 7–25)
BUN/CREAT SERPL: 19.7 (ref 7–25)
BUN/CREAT SERPL: 20.5 (ref 7–25)
BUN/CREAT SERPL: 21.7 (ref 7–25)
BUN/CREAT SERPL: 23 (ref 7–25)
BUN/CREAT SERPL: 24.8 (ref 7–25)
BUN/CREAT SERPL: 26.7 (ref 7–25)
BUN/CREAT SERPL: 31.9 (ref 7–25)
BUN/CREAT SERPL: 41.5 (ref 7–25)
BUN/CREAT SERPL: 45.2 (ref 7–25)
BUN/CREAT SERPL: 46.5 (ref 7–25)
BUN/CREAT SERPL: 52.7 (ref 7–25)
BUN/CREAT SERPL: 57.4 (ref 7–25)
BUN/CREAT SERPL: 59.7 (ref 7–25)
C-ANCA TITR SER IF: NORMAL TITER
C3 SERPL-MCNC: 60 MG/DL (ref 82–167)
C4 SERPL-MCNC: 12 MG/DL (ref 14–44)
CALCIUM SPEC-SCNC: 8 MG/DL (ref 8.2–9.6)
CALCIUM SPEC-SCNC: 8.1 MG/DL (ref 8.2–9.6)
CALCIUM SPEC-SCNC: 8.1 MG/DL (ref 8.2–9.6)
CALCIUM SPEC-SCNC: 8.2 MG/DL (ref 8.2–9.6)
CALCIUM SPEC-SCNC: 8.2 MG/DL (ref 8.2–9.6)
CALCIUM SPEC-SCNC: 8.4 MG/DL (ref 8.2–9.6)
CALCIUM SPEC-SCNC: 8.5 MG/DL (ref 8.2–9.6)
CALCIUM SPEC-SCNC: 8.6 MG/DL (ref 8.2–9.6)
CALCIUM SPEC-SCNC: 8.6 MG/DL (ref 8.2–9.6)
CALCIUM SPEC-SCNC: 8.8 MG/DL (ref 8.2–9.6)
CALCIUM SPEC-SCNC: 8.8 MG/DL (ref 8.2–9.6)
CALCIUM SPEC-SCNC: 9.5 MG/DL (ref 8.2–9.6)
CALCIUM SPEC-SCNC: 9.6 MG/DL (ref 8.2–9.6)
CALCIUM SPEC-SCNC: 9.8 MG/DL (ref 8.2–9.6)
CHLORIDE SERPL-SCNC: 86 MMOL/L (ref 98–107)
CHLORIDE SERPL-SCNC: 89 MMOL/L (ref 98–107)
CHLORIDE SERPL-SCNC: 90 MMOL/L (ref 98–107)
CHLORIDE SERPL-SCNC: 91 MMOL/L (ref 98–107)
CHLORIDE SERPL-SCNC: 91 MMOL/L (ref 98–107)
CHLORIDE SERPL-SCNC: 92 MMOL/L (ref 98–107)
CHLORIDE SERPL-SCNC: 93 MMOL/L (ref 98–107)
CHLORIDE SERPL-SCNC: 94 MMOL/L (ref 98–107)
CHLORIDE SERPL-SCNC: 98 MMOL/L (ref 98–107)
CHLORIDE SERPL-SCNC: 98 MMOL/L (ref 98–107)
CHLORIDE SERPL-SCNC: 99 MMOL/L (ref 98–107)
CHOLEST SERPL-MCNC: 173 MG/DL (ref 0–200)
CHOLEST SERPL-MCNC: 177 MG/DL (ref 0–200)
CK SERPL-CCNC: 96 U/L (ref 20–180)
CLARITY UR: ABNORMAL
CLARITY UR: CLEAR
CLARITY UR: CLEAR
CO2 SERPL-SCNC: 19 MMOL/L (ref 22–29)
CO2 SERPL-SCNC: 20.1 MMOL/L (ref 22–29)
CO2 SERPL-SCNC: 20.1 MMOL/L (ref 22–29)
CO2 SERPL-SCNC: 20.3 MMOL/L (ref 22–29)
CO2 SERPL-SCNC: 21.7 MMOL/L (ref 22–29)
CO2 SERPL-SCNC: 21.9 MMOL/L (ref 22–29)
CO2 SERPL-SCNC: 22 MMOL/L (ref 22–29)
CO2 SERPL-SCNC: 22.9 MMOL/L (ref 22–29)
CO2 SERPL-SCNC: 23.7 MMOL/L (ref 22–29)
CO2 SERPL-SCNC: 24.5 MMOL/L (ref 22–29)
CO2 SERPL-SCNC: 24.8 MMOL/L (ref 22–29)
CO2 SERPL-SCNC: 29.5 MMOL/L (ref 22–29)
CO2 SERPL-SCNC: 32.8 MMOL/L (ref 22–29)
CO2 SERPL-SCNC: 32.8 MMOL/L (ref 22–29)
COLOR UR: ABNORMAL
COLOR UR: YELLOW
CORTIS SERPL-MCNC: 21.24 MCG/DL
CREAT BLD-MCNC: 1.16 MG/DL (ref 0.57–1)
CREAT BLD-MCNC: 1.25 MG/DL (ref 0.57–1)
CREAT BLD-MCNC: 1.32 MG/DL (ref 0.57–1)
CREAT BLD-MCNC: 1.54 MG/DL (ref 0.57–1)
CREAT BLD-MCNC: 1.78 MG/DL (ref 0.57–1)
CREAT BLD-MCNC: 1.87 MG/DL (ref 0.57–1)
CREAT BLD-MCNC: 1.95 MG/DL (ref 0.57–1)
CREAT BLD-MCNC: 2.04 MG/DL (ref 0.57–1)
CREAT BLD-MCNC: 2.07 MG/DL (ref 0.57–1)
CREAT BLD-MCNC: 2.07 MG/DL (ref 0.57–1)
CREAT BLD-MCNC: 2.18 MG/DL (ref 0.57–1)
CREAT BLD-MCNC: 2.19 MG/DL (ref 0.57–1)
CREAT BLD-MCNC: 2.3 MG/DL (ref 0.57–1)
CREAT BLD-MCNC: 2.58 MG/DL (ref 0.57–1)
CREAT UR-MCNC: 118.8 MG/DL
DEPRECATED RDW RBC AUTO: 45.6 FL (ref 37–54)
DEPRECATED RDW RBC AUTO: 46.9 FL (ref 37–54)
DEPRECATED RDW RBC AUTO: 48 FL (ref 37–54)
DEPRECATED RDW RBC AUTO: 48.1 FL (ref 37–54)
DEPRECATED RDW RBC AUTO: 48.8 FL (ref 37–54)
DEPRECATED RDW RBC AUTO: 48.8 FL (ref 37–54)
DEPRECATED RDW RBC AUTO: 49.1 FL (ref 37–54)
DEPRECATED RDW RBC AUTO: 49.2 FL (ref 37–54)
DEPRECATED RDW RBC AUTO: 50 FL (ref 37–54)
DEPRECATED RDW RBC AUTO: 52.6 FL (ref 37–54)
DEPRECATED RDW RBC AUTO: 54.1 FL (ref 37–54)
DEPRECATED RDW RBC AUTO: 54.5 FL (ref 37–54)
EOSINOPHIL # BLD AUTO: 0 10*3/MM3 (ref 0–0.4)
EOSINOPHIL # BLD AUTO: 0.02 10*3/MM3 (ref 0–0.4)
EOSINOPHIL # BLD AUTO: 0.03 10*3/MM3 (ref 0–0.4)
EOSINOPHIL # BLD AUTO: 0.06 10*3/MM3 (ref 0–0.4)
EOSINOPHIL # BLD AUTO: 0.06 10*3/MM3 (ref 0–0.4)
EOSINOPHIL # BLD AUTO: 0.08 10*3/MM3 (ref 0–0.4)
EOSINOPHIL # BLD AUTO: 0.09 10*3/MM3 (ref 0–0.4)
EOSINOPHIL # BLD AUTO: 0.1 10*3/MM3 (ref 0–0.4)
EOSINOPHIL # BLD AUTO: 0.14 10*3/MM3 (ref 0–0.4)
EOSINOPHIL # BLD AUTO: 0.15 10*3/MM3 (ref 0–0.4)
EOSINOPHIL NFR BLD AUTO: 0 % (ref 0.3–6.2)
EOSINOPHIL NFR BLD AUTO: 0.3 % (ref 0.3–6.2)
EOSINOPHIL NFR BLD AUTO: 0.7 % (ref 0.3–6.2)
EOSINOPHIL NFR BLD AUTO: 1.2 % (ref 0.3–6.2)
EOSINOPHIL NFR BLD AUTO: 1.5 % (ref 0.3–6.2)
EOSINOPHIL NFR BLD AUTO: 1.7 % (ref 0.3–6.2)
EOSINOPHIL NFR BLD AUTO: 2.2 % (ref 0.3–6.2)
EOSINOPHIL NFR BLD AUTO: 2.5 % (ref 0.3–6.2)
EOSINOPHIL NFR BLD AUTO: 2.6 % (ref 0.3–6.2)
EOSINOPHIL NFR BLD AUTO: 3.2 % (ref 0.3–6.2)
ERYTHROCYTE [DISTWIDTH] IN BLOOD BY AUTOMATED COUNT: 14.4 % (ref 12.3–15.4)
ERYTHROCYTE [DISTWIDTH] IN BLOOD BY AUTOMATED COUNT: 14.6 % (ref 12.3–15.4)
ERYTHROCYTE [DISTWIDTH] IN BLOOD BY AUTOMATED COUNT: 14.8 % (ref 12.3–15.4)
ERYTHROCYTE [DISTWIDTH] IN BLOOD BY AUTOMATED COUNT: 14.8 % (ref 12.3–15.4)
ERYTHROCYTE [DISTWIDTH] IN BLOOD BY AUTOMATED COUNT: 15 % (ref 12.3–15.4)
ERYTHROCYTE [DISTWIDTH] IN BLOOD BY AUTOMATED COUNT: 15 % (ref 12.3–15.4)
ERYTHROCYTE [DISTWIDTH] IN BLOOD BY AUTOMATED COUNT: 15.1 % (ref 12.3–15.4)
ERYTHROCYTE [DISTWIDTH] IN BLOOD BY AUTOMATED COUNT: 15.2 % (ref 12.3–15.4)
ERYTHROCYTE [DISTWIDTH] IN BLOOD BY AUTOMATED COUNT: 15.6 % (ref 12.3–15.4)
ERYTHROCYTE [DISTWIDTH] IN BLOOD BY AUTOMATED COUNT: 15.8 % (ref 12.3–15.4)
ERYTHROCYTE [DISTWIDTH] IN BLOOD BY AUTOMATED COUNT: 16.1 % (ref 11.5–15)
ERYTHROCYTE [DISTWIDTH] IN BLOOD BY AUTOMATED COUNT: 16.2 % (ref 12.3–15.4)
FERRITIN SERPL-MCNC: 290 NG/ML (ref 13–150)
FOLATE SERPL-MCNC: >20 NG/ML (ref 4.78–24.2)
GAMMA GLOB SERPL ELPH-MCNC: 0.6 G/DL (ref 0.4–1.8)
GAMMA GLOB SERPL ELPH-MCNC: 0.6 G/DL (ref 0.4–1.8)
GFR SERPL CREATININE-BSD FRML MDRD: 17 ML/MIN/1.73
GFR SERPL CREATININE-BSD FRML MDRD: 20 ML/MIN/1.73
GFR SERPL CREATININE-BSD FRML MDRD: 21 ML/MIN/1.73
GFR SERPL CREATININE-BSD FRML MDRD: 21 ML/MIN/1.73
GFR SERPL CREATININE-BSD FRML MDRD: 22 ML/MIN/1.73
GFR SERPL CREATININE-BSD FRML MDRD: 22 ML/MIN/1.73
GFR SERPL CREATININE-BSD FRML MDRD: 23 ML/MIN/1.73
GFR SERPL CREATININE-BSD FRML MDRD: 24 ML/MIN/1.73
GFR SERPL CREATININE-BSD FRML MDRD: 25 ML/MIN/1.73
GFR SERPL CREATININE-BSD FRML MDRD: 27 ML/MIN/1.73
GFR SERPL CREATININE-BSD FRML MDRD: 32 ML/MIN/1.73
GFR SERPL CREATININE-BSD FRML MDRD: 38 ML/MIN/1.73
GFR SERPL CREATININE-BSD FRML MDRD: 40 ML/MIN/1.73
GFR SERPL CREATININE-BSD FRML MDRD: 44 ML/MIN/1.73
GLOBULIN SER CALC-MCNC: 2.5 G/DL (ref 2.2–3.9)
GLOBULIN SER CALC-MCNC: 2.5 G/DL (ref 2.2–3.9)
GLOBULIN UR ELPH-MCNC: 2.6 GM/DL
GLOBULIN UR ELPH-MCNC: 2.8 GM/DL
GLOBULIN UR ELPH-MCNC: 2.8 GM/DL
GLOBULIN UR ELPH-MCNC: 2.9 GM/DL
GLOBULIN UR ELPH-MCNC: 3.5 GM/DL
GLUCOSE BLD-MCNC: 102 MG/DL (ref 65–99)
GLUCOSE BLD-MCNC: 102 MG/DL (ref 65–99)
GLUCOSE BLD-MCNC: 104 MG/DL (ref 65–99)
GLUCOSE BLD-MCNC: 110 MG/DL (ref 65–99)
GLUCOSE BLD-MCNC: 112 MG/DL (ref 65–99)
GLUCOSE BLD-MCNC: 121 MG/DL (ref 65–99)
GLUCOSE BLD-MCNC: 128 MG/DL (ref 65–99)
GLUCOSE BLD-MCNC: 131 MG/DL (ref 65–99)
GLUCOSE BLD-MCNC: 89 MG/DL (ref 65–99)
GLUCOSE BLD-MCNC: 92 MG/DL (ref 65–99)
GLUCOSE BLD-MCNC: 96 MG/DL (ref 65–99)
GLUCOSE BLD-MCNC: 96 MG/DL (ref 65–99)
GLUCOSE BLDC GLUCOMTR-MCNC: 107 MG/DL (ref 70–130)
GLUCOSE UR STRIP-MCNC: NEGATIVE MG/DL
HCT VFR BLD AUTO: 22.6 % (ref 34–46.6)
HCT VFR BLD AUTO: 22.9 % (ref 34–46.6)
HCT VFR BLD AUTO: 24.6 % (ref 34–46.6)
HCT VFR BLD AUTO: 25.4 % (ref 34–46.6)
HCT VFR BLD AUTO: 25.4 % (ref 34–46.6)
HCT VFR BLD AUTO: 26.2 % (ref 34–46.6)
HCT VFR BLD AUTO: 26.7 % (ref 34–46.6)
HCT VFR BLD AUTO: 27.6 % (ref 34–46.6)
HCT VFR BLD AUTO: 28.9 % (ref 34–46.6)
HCT VFR BLD AUTO: 34.3 % (ref 34–46.6)
HCT VFR BLD AUTO: 35.3 % (ref 34.1–44.9)
HCT VFR BLD AUTO: 37.2 % (ref 34–46.6)
HDLC SERPL-MCNC: 63 MG/DL (ref 40–60)
HDLC SERPL-MCNC: 65 MG/DL (ref 40–60)
HGB BLD-MCNC: 10.8 G/DL (ref 12–15.9)
HGB BLD-MCNC: 11 G/DL (ref 11.2–15.7)
HGB BLD-MCNC: 11.6 G/DL (ref 12–15.9)
HGB BLD-MCNC: 7.5 G/DL (ref 12–15.9)
HGB BLD-MCNC: 7.6 G/DL (ref 12–15.9)
HGB BLD-MCNC: 7.8 G/DL (ref 12–15.9)
HGB BLD-MCNC: 8.1 G/DL (ref 12–15.9)
HGB BLD-MCNC: 8.2 G/DL (ref 12–15.9)
HGB BLD-MCNC: 8.2 G/DL (ref 12–15.9)
HGB BLD-MCNC: 8.3 G/DL (ref 12–15.9)
HGB BLD-MCNC: 8.6 G/DL (ref 12–15.9)
HGB BLD-MCNC: 9.2 G/DL (ref 12–15.9)
HGB RETIC QN AUTO: 29 PG (ref 29.8–36.1)
HGB UR QL STRIP.AUTO: ABNORMAL
HOLD SPECIMEN: NORMAL
HYALINE CASTS UR QL AUTO: ABNORMAL /LPF
IGA SERPL-MCNC: 193 MG/DL (ref 64–422)
IGA SERPL-MCNC: 194 MG/DL (ref 64–422)
IGG SERPL-MCNC: 544 MG/DL (ref 700–1600)
IGG SERPL-MCNC: 550 MG/DL (ref 700–1600)
IGM SERPL-MCNC: 155 MG/DL (ref 26–217)
IGM SERPL-MCNC: 156 MG/DL (ref 26–217)
IMM GRANULOCYTES # BLD AUTO: 0.01 10*3/MM3 (ref 0–0.05)
IMM GRANULOCYTES # BLD AUTO: 0.02 10*3/MM3 (ref 0–0.05)
IMM GRANULOCYTES # BLD AUTO: 0.02 10*3/MM3 (ref 0–0.05)
IMM GRANULOCYTES # BLD AUTO: 0.03 10*3/MM3 (ref 0–0.05)
IMM GRANULOCYTES # BLD AUTO: 0.04 10*3/MM3 (ref 0–0.05)
IMM GRANULOCYTES NFR BLD AUTO: 0.2 % (ref 0–0.5)
IMM GRANULOCYTES NFR BLD AUTO: 0.3 % (ref 0–0.5)
IMM GRANULOCYTES NFR BLD AUTO: 0.3 % (ref 0–0.5)
IMM GRANULOCYTES NFR BLD AUTO: 0.5 % (ref 0–0.5)
IMM GRANULOCYTES NFR BLD AUTO: 0.6 % (ref 0–0.5)
IMM GRANULOCYTES NFR BLD AUTO: 0.7 % (ref 0–0.5)
IMM GRANULOCYTES NFR BLD AUTO: 0.9 % (ref 0–0.5)
IMM RETICS NFR: 18.2 % (ref 3–15.8)
INR PPP: 1.7 (ref 0.91–1.09)
INR PPP: 1.9 (ref 0.91–1.09)
INR PPP: 1.9 (ref 0.91–1.09)
INR PPP: 2 (ref 0.91–1.09)
INR PPP: 2 (ref 0.91–1.09)
INR PPP: 2.1 (ref 0.91–1.09)
INR PPP: 2.17 (ref 0.9–1.1)
INR PPP: 2.2 (ref 0.91–1.09)
INR PPP: 2.2 (ref 0.91–1.09)
INR PPP: 2.22 (ref 0.9–1.1)
INR PPP: 2.25 (ref 0.9–1.1)
INR PPP: 2.28 (ref 0.9–1.1)
INR PPP: 2.3 (ref 0.91–1.09)
INR PPP: 2.39 (ref 0.9–1.1)
INR PPP: 2.4 (ref 0.91–1.09)
INR PPP: 2.49 (ref 0.9–1.1)
INR PPP: 2.6 (ref 0.91–1.09)
INR PPP: 2.69 (ref 0.9–1.1)
INR PPP: 2.7 (ref 0.91–1.09)
INR PPP: 2.8 (ref 0.91–1.09)
INR PPP: 2.9 (ref 0.91–1.09)
INR PPP: 3 (ref 0.91–1.09)
INR PPP: 3.41 (ref 0.9–1.1)
INR PPP: 3.62 (ref 0.9–1.1)
INR PPP: 3.93 (ref 0.9–1.1)
INR PPP: 4.37 (ref 0.9–1.1)
INR PPP: 4.4 (ref 0.91–1.09)
INTERPRETATION SERPL IEP-IMP: ABNORMAL
INTERPRETATION SERPL IEP-IMP: ABNORMAL
IRON 24H UR-MRATE: 23 MCG/DL (ref 37–145)
IRON SATN MFR SERPL: 9 % (ref 20–50)
KETONES UR QL STRIP: NEGATIVE
LDLC SERPL CALC-MCNC: 94 MG/DL (ref 0–100)
LDLC SERPL CALC-MCNC: 97 MG/DL (ref 0–100)
LDLC/HDLC SERPL: 1.49 {RATIO}
LDLC/HDLC SERPL: 1.49 {RATIO}
LEUKOCYTE ESTERASE UR QL STRIP.AUTO: ABNORMAL
LEUKOCYTE ESTERASE UR QL STRIP.AUTO: NEGATIVE
LEUKOCYTE ESTERASE UR QL STRIP.AUTO: NEGATIVE
LYMPHOCYTES # BLD AUTO: 0.32 10*3/MM3 (ref 0.7–3.1)
LYMPHOCYTES # BLD AUTO: 0.4 10*3/MM3 (ref 0.7–3.1)
LYMPHOCYTES # BLD AUTO: 0.49 10*3/MM3 (ref 0.7–3.1)
LYMPHOCYTES # BLD AUTO: 0.52 10*3/MM3 (ref 0.7–3.1)
LYMPHOCYTES # BLD AUTO: 0.52 10*3/MM3 (ref 0.7–3.1)
LYMPHOCYTES # BLD AUTO: 0.6 10*3/MM3 (ref 0.7–3.1)
LYMPHOCYTES # BLD AUTO: 0.65 10*3/MM3 (ref 0.7–3.1)
LYMPHOCYTES # BLD AUTO: 0.84 10*3/MM3 (ref 0.7–3.1)
LYMPHOCYTES # BLD AUTO: 1.02 10*3/MM3 (ref 0.7–3.1)
LYMPHOCYTES # BLD AUTO: 1.07 10*3/MM3 (ref 0.7–3.1)
LYMPHOCYTES NFR BLD AUTO: 12.4 % (ref 19.6–45.3)
LYMPHOCYTES NFR BLD AUTO: 12.6 % (ref 19.6–45.3)
LYMPHOCYTES NFR BLD AUTO: 14.7 % (ref 19.6–45.3)
LYMPHOCYTES NFR BLD AUTO: 14.7 % (ref 19.6–45.3)
LYMPHOCYTES NFR BLD AUTO: 14.8 % (ref 19.6–45.3)
LYMPHOCYTES NFR BLD AUTO: 15.2 % (ref 19.6–45.3)
LYMPHOCYTES NFR BLD AUTO: 15.5 % (ref 19.6–45.3)
LYMPHOCYTES NFR BLD AUTO: 20.9 % (ref 19.6–45.3)
LYMPHOCYTES NFR BLD AUTO: 6.9 % (ref 19.6–45.3)
LYMPHOCYTES NFR BLD AUTO: 7.2 % (ref 19.6–45.3)
Lab: ABNORMAL
Lab: ABNORMAL
Lab: NORMAL
M-SPIKE: ABNORMAL G/DL
M-SPIKE: ABNORMAL G/DL
MAGNESIUM SERPL-MCNC: 1.9 MG/DL (ref 1.7–2.3)
MAGNESIUM SERPL-MCNC: 2 MG/DL (ref 1.7–2.3)
MCH RBC QN AUTO: 27.5 PG (ref 26.6–33)
MCH RBC QN AUTO: 27.8 PG (ref 26.6–33)
MCH RBC QN AUTO: 27.9 PG (ref 26.6–33)
MCH RBC QN AUTO: 27.9 PG (ref 26.6–33)
MCH RBC QN AUTO: 28.2 PG (ref 26.6–33)
MCH RBC QN AUTO: 28.2 PG (ref 26.6–33)
MCH RBC QN AUTO: 28.7 PG (ref 26.6–33)
MCH RBC QN AUTO: 28.8 PG (ref 26.6–33)
MCH RBC QN AUTO: 28.9 PG (ref 26.6–33)
MCH RBC QN AUTO: 29.6 PG (ref 26.6–33)
MCH RBC QN AUTO: 29.8 PG (ref 26.6–33)
MCH RBC QN AUTO: 29.9 PG (ref 26–34)
MCHC RBC AUTO-ENTMCNC: 30.9 G/DL (ref 31.5–35.7)
MCHC RBC AUTO-ENTMCNC: 31.1 G/DL (ref 31.5–35.7)
MCHC RBC AUTO-ENTMCNC: 31.2 G/DL (ref 31.5–35.7)
MCHC RBC AUTO-ENTMCNC: 31.2 G/DL (ref 31.5–35.7)
MCHC RBC AUTO-ENTMCNC: 31.2 G/DL (ref 31–37)
MCHC RBC AUTO-ENTMCNC: 31.5 G/DL (ref 31.5–35.7)
MCHC RBC AUTO-ENTMCNC: 31.7 G/DL (ref 31.5–35.7)
MCHC RBC AUTO-ENTMCNC: 31.8 G/DL (ref 31.5–35.7)
MCHC RBC AUTO-ENTMCNC: 32.3 G/DL (ref 31.5–35.7)
MCHC RBC AUTO-ENTMCNC: 32.3 G/DL (ref 31.5–35.7)
MCHC RBC AUTO-ENTMCNC: 33.2 G/DL (ref 31.5–35.7)
MCHC RBC AUTO-ENTMCNC: 33.2 G/DL (ref 31.5–35.7)
MCV RBC AUTO: 86.9 FL (ref 79–97)
MCV RBC AUTO: 87.1 FL (ref 79–97)
MCV RBC AUTO: 87.3 FL (ref 79–97)
MCV RBC AUTO: 87.9 FL (ref 79–97)
MCV RBC AUTO: 88.7 FL (ref 79–97)
MCV RBC AUTO: 88.8 FL (ref 79–97)
MCV RBC AUTO: 88.8 FL (ref 79–97)
MCV RBC AUTO: 89.3 FL (ref 79–97)
MCV RBC AUTO: 89.9 FL (ref 79–97)
MCV RBC AUTO: 94.5 FL (ref 79–97)
MCV RBC AUTO: 94.9 FL (ref 79–97)
MCV RBC AUTO: 95.9 FL (ref 80–100)
MONOCYTES # BLD AUTO: 0.24 10*3/MM3 (ref 0.1–0.9)
MONOCYTES # BLD AUTO: 0.28 10*3/MM3 (ref 0.1–0.9)
MONOCYTES # BLD AUTO: 0.32 10*3/MM3 (ref 0.1–0.9)
MONOCYTES # BLD AUTO: 0.35 10*3/MM3 (ref 0.1–0.9)
MONOCYTES # BLD AUTO: 0.38 10*3/MM3 (ref 0.1–0.9)
MONOCYTES # BLD AUTO: 0.39 10*3/MM3 (ref 0.1–0.9)
MONOCYTES # BLD AUTO: 0.5 10*3/MM3 (ref 0.1–0.9)
MONOCYTES # BLD AUTO: 0.53 10*3/MM3 (ref 0.1–0.9)
MONOCYTES # BLD AUTO: 0.58 10*3/MM3 (ref 0.1–0.9)
MONOCYTES # BLD AUTO: 0.67 10*3/MM3 (ref 0.1–0.9)
MONOCYTES NFR BLD AUTO: 10.2 % (ref 5–12)
MONOCYTES NFR BLD AUTO: 11.3 % (ref 5–12)
MONOCYTES NFR BLD AUTO: 6.8 % (ref 5–12)
MONOCYTES NFR BLD AUTO: 7.1 % (ref 5–12)
MONOCYTES NFR BLD AUTO: 8.1 % (ref 5–12)
MONOCYTES NFR BLD AUTO: 8.5 % (ref 5–12)
MONOCYTES NFR BLD AUTO: 8.6 % (ref 5–12)
MONOCYTES NFR BLD AUTO: 8.7 % (ref 5–12)
MONOCYTES NFR BLD AUTO: 8.8 % (ref 5–12)
MONOCYTES NFR BLD AUTO: 9.1 % (ref 5–12)
MUCOUS THREADS URNS QL MICRO: ABNORMAL /HPF
MYELOPEROXIDASE AB SER-ACNC: <9 U/ML (ref 0–9)
NEUTROPHILS # BLD AUTO: 2.65 10*3/MM3 (ref 1.7–7)
NEUTROPHILS # BLD AUTO: 2.94 10*3/MM3 (ref 1.7–7)
NEUTROPHILS # BLD AUTO: 3.05 10*3/MM3 (ref 1.7–7)
NEUTROPHILS # BLD AUTO: 3.13 10*3/MM3 (ref 1.7–7)
NEUTROPHILS # BLD AUTO: 3.33 10*3/MM3 (ref 1.7–7)
NEUTROPHILS # BLD AUTO: 3.42 10*3/MM3 (ref 1.7–7)
NEUTROPHILS # BLD AUTO: 3.54 10*3/MM3 (ref 1.7–7)
NEUTROPHILS # BLD AUTO: 4.27 10*3/MM3 (ref 1.7–7)
NEUTROPHILS # BLD AUTO: 4.68 10*3/MM3 (ref 1.7–7)
NEUTROPHILS # BLD AUTO: 4.77 10*3/MM3 (ref 1.7–7)
NEUTROPHILS NFR BLD AUTO: 66.8 % (ref 42.7–76)
NEUTROPHILS NFR BLD AUTO: 72.5 % (ref 42.7–76)
NEUTROPHILS NFR BLD AUTO: 74.4 % (ref 42.7–76)
NEUTROPHILS NFR BLD AUTO: 74.7 % (ref 42.7–76)
NEUTROPHILS NFR BLD AUTO: 75.2 % (ref 42.7–76)
NEUTROPHILS NFR BLD AUTO: 75.4 % (ref 42.7–76)
NEUTROPHILS NFR BLD AUTO: 75.7 % (ref 42.7–76)
NEUTROPHILS NFR BLD AUTO: 77.2 % (ref 42.7–76)
NEUTROPHILS NFR BLD AUTO: 80 % (ref 42.7–76)
NEUTROPHILS NFR BLD AUTO: 80.6 % (ref 42.7–76)
NITRITE UR QL STRIP: NEGATIVE
NITRITE UR QL STRIP: POSITIVE
NRBC BLD AUTO-RTO: 0 /100 WBC (ref 0–0.2)
NRBC BLD AUTO-RTO: 0.2 /100 WBC (ref 0–0.2)
NT-PROBNP SERPL-MCNC: ABNORMAL PG/ML (ref 5–1800)
OSMOLALITY SERPL: 282 MOSM/KG (ref 280–301)
OSMOLALITY UR: 324 MOSM/KG
P-ANCA ATYPICAL TITR SER IF: NORMAL TITER
P-ANCA TITR SER IF: NORMAL TITER
PH UR STRIP.AUTO: 7 [PH] (ref 5–8)
PH UR STRIP.AUTO: <=5 [PH] (ref 5–8)
PH UR STRIP.AUTO: <=5 [PH] (ref 5–8)
PHOSPHATE SERPL-MCNC: 3.5 MG/DL (ref 2.5–4.5)
PHOSPHATE SERPL-MCNC: 3.6 MG/DL (ref 2.5–4.5)
PHOSPHATE SERPL-MCNC: 3.8 MG/DL (ref 2.5–4.5)
PHOSPHATE SERPL-MCNC: 3.8 MG/DL (ref 2.5–4.5)
PHOSPHATE SERPL-MCNC: 4 MG/DL (ref 2.5–4.5)
PHOSPHATE SERPL-MCNC: 4 MG/DL (ref 2.5–4.5)
PHOSPHATE SERPL-MCNC: 4.3 MG/DL (ref 2.5–4.5)
PHOSPHATE SERPL-MCNC: 4.4 MG/DL (ref 2.5–4.5)
PLATELET # BLD AUTO: 189 10*3/MM3 (ref 150–450)
PLATELET # BLD AUTO: 197 10*3/MM3 (ref 140–450)
PLATELET # BLD AUTO: 204 10*3/MM3 (ref 140–450)
PLATELET # BLD AUTO: 213 10*3/MM3 (ref 140–450)
PLATELET # BLD AUTO: 225 10*3/MM3 (ref 140–450)
PLATELET # BLD AUTO: 229 10*3/MM3 (ref 140–450)
PLATELET # BLD AUTO: 238 10*3/MM3 (ref 140–450)
PLATELET # BLD AUTO: 242 10*3/MM3 (ref 140–450)
PLATELET # BLD AUTO: 251 10*3/MM3 (ref 140–450)
PLATELET # BLD AUTO: 252 10*3/MM3 (ref 140–450)
PLATELET # BLD AUTO: 260 10*3/MM3 (ref 140–450)
PLATELET # BLD AUTO: 284 10*3/MM3 (ref 140–450)
PMV BLD AUTO: 10.1 FL (ref 6–12)
PMV BLD AUTO: 10.1 FL (ref 6–12)
PMV BLD AUTO: 10.2 FL (ref 6–12)
PMV BLD AUTO: 10.2 FL (ref 6–12)
PMV BLD AUTO: 10.3 FL (ref 6–12)
PMV BLD AUTO: 10.4 FL (ref 6–12)
PMV BLD AUTO: 10.7 FL (ref 6–12)
PMV BLD AUTO: 10.8 FL (ref 6–12)
PMV BLD AUTO: 11.3 FL (ref 6–12)
PMV BLD AUTO: 9.8 FL (ref 6–12)
POTASSIUM BLD-SCNC: 3.8 MMOL/L (ref 3.5–5.2)
POTASSIUM BLD-SCNC: 4.1 MMOL/L (ref 3.5–5.2)
POTASSIUM BLD-SCNC: 4.3 MMOL/L (ref 3.5–5.2)
POTASSIUM BLD-SCNC: 4.4 MMOL/L (ref 3.5–5.2)
POTASSIUM BLD-SCNC: 4.5 MMOL/L (ref 3.5–5.2)
POTASSIUM BLD-SCNC: 4.6 MMOL/L (ref 3.5–5.2)
POTASSIUM BLD-SCNC: 4.8 MMOL/L (ref 3.5–5.2)
POTASSIUM BLD-SCNC: 4.8 MMOL/L (ref 3.5–5.2)
POTASSIUM BLD-SCNC: 4.9 MMOL/L (ref 3.5–5.2)
POTASSIUM BLD-SCNC: 5 MMOL/L (ref 3.5–5.2)
PROT SERPL-MCNC: 4.9 G/DL (ref 6–8.5)
PROT SERPL-MCNC: 4.9 G/DL (ref 6–8.5)
PROT SERPL-MCNC: 6.1 G/DL (ref 6–8.5)
PROT SERPL-MCNC: 6.3 G/DL (ref 6–8.5)
PROT SERPL-MCNC: 7 G/DL (ref 6–8.5)
PROT SERPL-MCNC: 7.1 G/DL (ref 6–8.5)
PROT SERPL-MCNC: 7.5 G/DL (ref 6–8.5)
PROT UR QL STRIP: ABNORMAL
PROT UR QL STRIP: ABNORMAL
PROT UR QL STRIP: NEGATIVE
PROT UR-MCNC: 68 MG/DL
PROT/CREAT UR: 572.4 MG/G CREA (ref 0–200)
PROTEINASE3 AB SER IA-ACNC: <3.5 U/ML (ref 0–3.5)
PROTHROMBIN TIME: 20.4 SECONDS (ref 10–13.8)
PROTHROMBIN TIME: 22.2 SECONDS (ref 10–13.8)
PROTHROMBIN TIME: 22.2 SECONDS (ref 10–13.8)
PROTHROMBIN TIME: 23.9 SECONDS (ref 11.7–14.2)
PROTHROMBIN TIME: 24 SECONDS (ref 10–13.8)
PROTHROMBIN TIME: 24.3 SECONDS (ref 10–13.8)
PROTHROMBIN TIME: 24.3 SECONDS (ref 11.7–14.2)
PROTHROMBIN TIME: 24.6 SECONDS (ref 11.7–14.2)
PROTHROMBIN TIME: 24.8 SECONDS (ref 10–13.8)
PROTHROMBIN TIME: 24.8 SECONDS (ref 11.7–14.2)
PROTHROMBIN TIME: 25 SECONDS (ref 10–13.8)
PROTHROMBIN TIME: 25.1 SECONDS (ref 10–13.8)
PROTHROMBIN TIME: 25.7 SECONDS (ref 11.7–14.2)
PROTHROMBIN TIME: 26.3 SECONDS (ref 10–13.8)
PROTHROMBIN TIME: 26.6 SECONDS (ref 11.7–14.2)
PROTHROMBIN TIME: 27 SECONDS (ref 10–13.8)
PROTHROMBIN TIME: 27.8 SECONDS (ref 10–13.8)
PROTHROMBIN TIME: 28.3 SECONDS (ref 11.7–14.2)
PROTHROMBIN TIME: 28.7 SECONDS (ref 10–13.8)
PROTHROMBIN TIME: 30.8 SECONDS (ref 10–13.8)
PROTHROMBIN TIME: 32.8 SECONDS (ref 10–13.8)
PROTHROMBIN TIME: 34.2 SECONDS (ref 10–13.8)
PROTHROMBIN TIME: 34.2 SECONDS (ref 11.7–14.2)
PROTHROMBIN TIME: 34.6 SECONDS (ref 10–13.8)
PROTHROMBIN TIME: 35.8 SECONDS (ref 11.7–14.2)
PROTHROMBIN TIME: 36.2 SECONDS (ref 10–13.8)
PROTHROMBIN TIME: 38.2 SECONDS (ref 11.7–14.2)
PROTHROMBIN TIME: 41.5 SECONDS (ref 11.7–14.2)
PROTHROMBIN TIME: 53 SECONDS (ref 10–13.8)
RBC # BLD AUTO: 2.6 10*6/MM3 (ref 3.77–5.28)
RBC # BLD AUTO: 2.63 10*6/MM3 (ref 3.77–5.28)
RBC # BLD AUTO: 2.8 10*6/MM3 (ref 3.77–5.28)
RBC # BLD AUTO: 2.86 10*6/MM3 (ref 3.77–5.28)
RBC # BLD AUTO: 2.91 10*6/MM3 (ref 3.77–5.28)
RBC # BLD AUTO: 2.95 10*6/MM3 (ref 3.77–5.28)
RBC # BLD AUTO: 2.97 10*6/MM3 (ref 3.77–5.28)
RBC # BLD AUTO: 3.09 10*6/MM3 (ref 3.77–5.28)
RBC # BLD AUTO: 3.26 10*6/MM3 (ref 3.77–5.28)
RBC # BLD AUTO: 3.63 10*6/MM3 (ref 3.77–5.28)
RBC # BLD AUTO: 3.68 10*6/MM3 (ref 3.93–5.22)
RBC # BLD AUTO: 3.92 10*6/MM3 (ref 3.77–5.28)
RBC # UR: ABNORMAL /HPF
REF LAB TEST METHOD: ABNORMAL
RETICS/RBC NFR AUTO: 1.6 % (ref 0.7–1.9)
SODIUM BLD-SCNC: 120 MMOL/L (ref 136–145)
SODIUM BLD-SCNC: 124 MMOL/L (ref 136–145)
SODIUM BLD-SCNC: 125 MMOL/L (ref 136–145)
SODIUM BLD-SCNC: 126 MMOL/L (ref 136–145)
SODIUM BLD-SCNC: 128 MMOL/L (ref 136–145)
SODIUM BLD-SCNC: 129 MMOL/L (ref 136–145)
SODIUM BLD-SCNC: 141 MMOL/L (ref 136–145)
SODIUM BLD-SCNC: 142 MMOL/L (ref 136–145)
SODIUM BLD-SCNC: 143 MMOL/L (ref 136–145)
SODIUM UR-SCNC: 21 MMOL/L
SP GR UR STRIP: 1.01 (ref 1–1.03)
SP GR UR STRIP: 1.02 (ref 1–1.03)
SQUAMOUS #/AREA URNS HPF: ABNORMAL /HPF
TIBC SERPL-MCNC: 258 MCG/DL (ref 298–536)
TRANSFERRIN SERPL-MCNC: 173 MG/DL (ref 200–360)
TRIGL SERPL-MCNC: 76 MG/DL (ref 0–150)
TRIGL SERPL-MCNC: 81 MG/DL (ref 0–150)
TROPONIN T SERPL-MCNC: 0.02 NG/ML (ref 0–0.03)
TSH SERPL DL<=0.05 MIU/L-ACNC: 3.32 UIU/ML (ref 0.27–4.2)
TSH SERPL-ACNC: 3.34 MIU/ML (ref 0.27–4.2)
URATE SERPL-MCNC: 11.5 MG/DL (ref 2.4–5.7)
URATE SERPL-MCNC: 11.6 MG/DL (ref 2.4–5.7)
URATE SERPL-MCNC: 11.9 MG/DL (ref 2.4–5.7)
UROBILINOGEN UR QL STRIP: ABNORMAL
VIT B12 BLD-MCNC: 457 PG/ML (ref 211–946)
VLDLC SERPL-MCNC: 15.2 MG/DL (ref 5–40)
VLDLC SERPL-MCNC: 16.2 MG/DL (ref 5–40)
WBC NRBC COR # BLD: 3.52 10*3/MM3 (ref 3.4–10.8)
WBC NRBC COR # BLD: 3.95 10*3/MM3 (ref 3.4–10.8)
WBC NRBC COR # BLD: 3.95 10*3/MM3 (ref 3.4–10.8)
WBC NRBC COR # BLD: 4.13 10*3/MM3 (ref 3.4–10.8)
WBC NRBC COR # BLD: 4.42 10*3/MM3 (ref 3.4–10.8)
WBC NRBC COR # BLD: 4.42 10*3/MM3 (ref 3.4–10.8)
WBC NRBC COR # BLD: 5.12 10*3/MM3 (ref 3.4–10.8)
WBC NRBC COR # BLD: 5.72 10*3/MM3 (ref 3.4–10.8)
WBC NRBC COR # BLD: 5.81 10*3/MM3 (ref 3.4–10.8)
WBC NRBC COR # BLD: 6.27 10*3/MM3 (ref 5–10)
WBC NRBC COR # BLD: 6.54 10*3/MM3 (ref 3.4–10.8)
WBC NRBC COR # BLD: 6.58 10*3/MM3 (ref 3.4–10.8)
WBC UR QL AUTO: ABNORMAL /HPF
WHOLE BLOOD HOLD SPECIMEN: NORMAL

## 2019-01-01 PROCEDURE — 25010000002 ONDANSETRON PER 1 MG: Performed by: INTERNAL MEDICINE

## 2019-01-01 PROCEDURE — 85025 COMPLETE CBC W/AUTO DIFF WBC: CPT | Performed by: NURSE PRACTITIONER

## 2019-01-01 PROCEDURE — 85610 PROTHROMBIN TIME: CPT

## 2019-01-01 PROCEDURE — 83735 ASSAY OF MAGNESIUM: CPT | Performed by: INTERNAL MEDICINE

## 2019-01-01 PROCEDURE — 25010000002 HYDROMORPHONE PER 4 MG: Performed by: HOSPITALIST

## 2019-01-01 PROCEDURE — 82784 ASSAY IGA/IGD/IGG/IGM EACH: CPT | Performed by: INTERNAL MEDICINE

## 2019-01-01 PROCEDURE — 71045 X-RAY EXAM CHEST 1 VIEW: CPT

## 2019-01-01 PROCEDURE — 99214 OFFICE O/P EST MOD 30 MIN: CPT | Performed by: INTERNAL MEDICINE

## 2019-01-01 PROCEDURE — 99222 1ST HOSP IP/OBS MODERATE 55: CPT | Performed by: INTERNAL MEDICINE

## 2019-01-01 PROCEDURE — 82746 ASSAY OF FOLIC ACID SERUM: CPT | Performed by: HOSPITALIST

## 2019-01-01 PROCEDURE — 85610 PROTHROMBIN TIME: CPT | Performed by: HOSPITALIST

## 2019-01-01 PROCEDURE — 71046 X-RAY EXAM CHEST 2 VIEWS: CPT | Performed by: RADIOLOGY

## 2019-01-01 PROCEDURE — 83520 IMMUNOASSAY QUANT NOS NONAB: CPT | Performed by: INTERNAL MEDICINE

## 2019-01-01 PROCEDURE — 36416 COLLJ CAPILLARY BLOOD SPEC: CPT

## 2019-01-01 PROCEDURE — 81001 URINALYSIS AUTO W/SCOPE: CPT | Performed by: NURSE PRACTITIONER

## 2019-01-01 PROCEDURE — 97162 PT EVAL MOD COMPLEX 30 MIN: CPT

## 2019-01-01 PROCEDURE — 85027 COMPLETE CBC AUTOMATED: CPT | Performed by: NURSE PRACTITIONER

## 2019-01-01 PROCEDURE — 85025 COMPLETE CBC W/AUTO DIFF WBC: CPT | Performed by: HOSPITALIST

## 2019-01-01 PROCEDURE — 80053 COMPREHEN METABOLIC PANEL: CPT | Performed by: NURSE PRACTITIONER

## 2019-01-01 PROCEDURE — 99285 EMERGENCY DEPT VISIT HI MDM: CPT

## 2019-01-01 PROCEDURE — 99214 OFFICE O/P EST MOD 30 MIN: CPT | Performed by: NURSE PRACTITIONER

## 2019-01-01 PROCEDURE — 84155 ASSAY OF PROTEIN SERUM: CPT | Performed by: INTERNAL MEDICINE

## 2019-01-01 PROCEDURE — 80069 RENAL FUNCTION PANEL: CPT | Performed by: INTERNAL MEDICINE

## 2019-01-01 PROCEDURE — 87086 URINE CULTURE/COLONY COUNT: CPT | Performed by: NURSE PRACTITIONER

## 2019-01-01 PROCEDURE — P9612 CATHETERIZE FOR URINE SPEC: HCPCS

## 2019-01-01 PROCEDURE — 83540 ASSAY OF IRON: CPT | Performed by: HOSPITALIST

## 2019-01-01 PROCEDURE — 93000 ELECTROCARDIOGRAM COMPLETE: CPT | Performed by: INTERNAL MEDICINE

## 2019-01-01 PROCEDURE — 36415 COLL VENOUS BLD VENIPUNCTURE: CPT | Performed by: NURSE PRACTITIONER

## 2019-01-01 PROCEDURE — G0463 HOSPITAL OUTPT CLINIC VISIT: HCPCS

## 2019-01-01 PROCEDURE — 82962 GLUCOSE BLOOD TEST: CPT

## 2019-01-01 PROCEDURE — 92610 EVALUATE SWALLOWING FUNCTION: CPT

## 2019-01-01 PROCEDURE — 25010000002 LORAZEPAM PER 2 MG: Performed by: HOSPITALIST

## 2019-01-01 PROCEDURE — 87086 URINE CULTURE/COLONY COUNT: CPT | Performed by: EMERGENCY MEDICINE

## 2019-01-01 PROCEDURE — 82570 ASSAY OF URINE CREATININE: CPT | Performed by: INTERNAL MEDICINE

## 2019-01-01 PROCEDURE — 97110 THERAPEUTIC EXERCISES: CPT

## 2019-01-01 PROCEDURE — 83930 ASSAY OF BLOOD OSMOLALITY: CPT | Performed by: INTERNAL MEDICINE

## 2019-01-01 PROCEDURE — 84484 ASSAY OF TROPONIN QUANT: CPT | Performed by: EMERGENCY MEDICINE

## 2019-01-01 PROCEDURE — 84550 ASSAY OF BLOOD/URIC ACID: CPT | Performed by: INTERNAL MEDICINE

## 2019-01-01 PROCEDURE — 87077 CULTURE AEROBIC IDENTIFY: CPT | Performed by: EMERGENCY MEDICINE

## 2019-01-01 PROCEDURE — 86160 COMPLEMENT ANTIGEN: CPT | Performed by: INTERNAL MEDICINE

## 2019-01-01 PROCEDURE — G0438 PPPS, INITIAL VISIT: HCPCS | Performed by: NURSE PRACTITIONER

## 2019-01-01 PROCEDURE — 87077 CULTURE AEROBIC IDENTIFY: CPT | Performed by: NURSE PRACTITIONER

## 2019-01-01 PROCEDURE — G0378 HOSPITAL OBSERVATION PER HR: HCPCS

## 2019-01-01 PROCEDURE — 99213 OFFICE O/P EST LOW 20 MIN: CPT | Performed by: NURSE PRACTITIONER

## 2019-01-01 PROCEDURE — 82607 VITAMIN B-12: CPT | Performed by: HOSPITALIST

## 2019-01-01 PROCEDURE — 83880 ASSAY OF NATRIURETIC PEPTIDE: CPT | Performed by: EMERGENCY MEDICINE

## 2019-01-01 PROCEDURE — 85610 PROTHROMBIN TIME: CPT | Performed by: NURSE PRACTITIONER

## 2019-01-01 PROCEDURE — 83935 ASSAY OF URINE OSMOLALITY: CPT | Performed by: INTERNAL MEDICINE

## 2019-01-01 PROCEDURE — 82728 ASSAY OF FERRITIN: CPT | Performed by: HOSPITALIST

## 2019-01-01 PROCEDURE — 87186 SC STD MICRODIL/AGAR DIL: CPT | Performed by: EMERGENCY MEDICINE

## 2019-01-01 PROCEDURE — 25010000002 PROMETHAZINE PER 50 MG: Performed by: HOSPITALIST

## 2019-01-01 PROCEDURE — 84550 ASSAY OF BLOOD/URIC ACID: CPT | Performed by: HOSPITALIST

## 2019-01-01 PROCEDURE — 80069 RENAL FUNCTION PANEL: CPT | Performed by: HOSPITALIST

## 2019-01-01 PROCEDURE — 25010000002 ONDANSETRON PER 1 MG: Performed by: HOSPITALIST

## 2019-01-01 PROCEDURE — 84165 PROTEIN E-PHORESIS SERUM: CPT | Performed by: INTERNAL MEDICINE

## 2019-01-01 PROCEDURE — 86256 FLUORESCENT ANTIBODY TITER: CPT | Performed by: INTERNAL MEDICINE

## 2019-01-01 PROCEDURE — 80048 BASIC METABOLIC PNL TOTAL CA: CPT | Performed by: INTERNAL MEDICINE

## 2019-01-01 PROCEDURE — 76705 ECHO EXAM OF ABDOMEN: CPT

## 2019-01-01 PROCEDURE — 80061 LIPID PANEL: CPT | Performed by: NURSE PRACTITIONER

## 2019-01-01 PROCEDURE — 25010000002 HYDROMORPHONE 1 MG/ML SOLUTION: Performed by: HOSPITALIST

## 2019-01-01 PROCEDURE — 71046 X-RAY EXAM CHEST 2 VIEWS: CPT | Performed by: NURSE PRACTITIONER

## 2019-01-01 PROCEDURE — 84156 ASSAY OF PROTEIN URINE: CPT | Performed by: INTERNAL MEDICINE

## 2019-01-01 PROCEDURE — 87186 SC STD MICRODIL/AGAR DIL: CPT | Performed by: NURSE PRACTITIONER

## 2019-01-01 PROCEDURE — 84443 ASSAY THYROID STIM HORMONE: CPT | Performed by: HOSPITALIST

## 2019-01-01 PROCEDURE — 82550 ASSAY OF CK (CPK): CPT | Performed by: INTERNAL MEDICINE

## 2019-01-01 PROCEDURE — 85046 RETICYTE/HGB CONCENTRATE: CPT | Performed by: HOSPITALIST

## 2019-01-01 PROCEDURE — 99284 EMERGENCY DEPT VISIT MOD MDM: CPT

## 2019-01-01 PROCEDURE — 86334 IMMUNOFIX E-PHORESIS SERUM: CPT | Performed by: INTERNAL MEDICINE

## 2019-01-01 PROCEDURE — 84466 ASSAY OF TRANSFERRIN: CPT | Performed by: HOSPITALIST

## 2019-01-01 PROCEDURE — 99232 SBSQ HOSP IP/OBS MODERATE 35: CPT | Performed by: INTERNAL MEDICINE

## 2019-01-01 PROCEDURE — 93000 ELECTROCARDIOGRAM COMPLETE: CPT | Performed by: NURSE PRACTITIONER

## 2019-01-01 PROCEDURE — 25010000002 FUROSEMIDE PER 20 MG: Performed by: INTERNAL MEDICINE

## 2019-01-01 PROCEDURE — 25010000002 CEFTRIAXONE PER 250 MG: Performed by: EMERGENCY MEDICINE

## 2019-01-01 PROCEDURE — 74176 CT ABD & PELVIS W/O CONTRAST: CPT

## 2019-01-01 PROCEDURE — 71046 X-RAY EXAM CHEST 2 VIEWS: CPT

## 2019-01-01 PROCEDURE — 94799 UNLISTED PULMONARY SVC/PX: CPT

## 2019-01-01 PROCEDURE — 82533 TOTAL CORTISOL: CPT | Performed by: HOSPITALIST

## 2019-01-01 PROCEDURE — 86038 ANTINUCLEAR ANTIBODIES: CPT | Performed by: INTERNAL MEDICINE

## 2019-01-01 PROCEDURE — 84300 ASSAY OF URINE SODIUM: CPT | Performed by: INTERNAL MEDICINE

## 2019-01-01 RX ORDER — HYDROMORPHONE HYDROCHLORIDE 1 MG/ML
1 INJECTION, SOLUTION INTRAMUSCULAR; INTRAVENOUS; SUBCUTANEOUS
Status: DISCONTINUED | OUTPATIENT
Start: 2019-01-01 | End: 2019-01-01 | Stop reason: HOSPADM

## 2019-01-01 RX ORDER — LORAZEPAM 0.5 MG/1
TABLET ORAL
Qty: 50 TABLET | Refills: 0 | OUTPATIENT
Start: 2019-01-01

## 2019-01-01 RX ORDER — HYDROMORPHONE HYDROCHLORIDE 1 MG/ML
0.5 INJECTION, SOLUTION INTRAMUSCULAR; INTRAVENOUS; SUBCUTANEOUS
Status: DISCONTINUED | OUTPATIENT
Start: 2019-01-01 | End: 2019-01-01 | Stop reason: HOSPADM

## 2019-01-01 RX ORDER — SODIUM CHLORIDE 9 MG/ML
50 INJECTION, SOLUTION INTRAVENOUS CONTINUOUS
Status: DISCONTINUED | OUTPATIENT
Start: 2019-01-01 | End: 2019-01-01

## 2019-01-01 RX ORDER — SCOLOPAMINE TRANSDERMAL SYSTEM 1 MG/1
1 PATCH, EXTENDED RELEASE TRANSDERMAL
Status: DISCONTINUED | OUTPATIENT
Start: 2019-01-01 | End: 2019-01-01 | Stop reason: HOSPADM

## 2019-01-01 RX ORDER — MORPHINE SULFATE 4 MG/ML
4 INJECTION, SOLUTION INTRAMUSCULAR; INTRAVENOUS
Status: DISCONTINUED | OUTPATIENT
Start: 2019-01-01 | End: 2019-01-01 | Stop reason: HOSPADM

## 2019-01-01 RX ORDER — LORAZEPAM 2 MG/ML
1 CONCENTRATE ORAL
Status: DISCONTINUED | OUTPATIENT
Start: 2019-01-01 | End: 2019-01-01 | Stop reason: HOSPADM

## 2019-01-01 RX ORDER — ONDANSETRON 2 MG/ML
4 INJECTION INTRAMUSCULAR; INTRAVENOUS EVERY 6 HOURS PRN
Status: DISCONTINUED | OUTPATIENT
Start: 2019-01-01 | End: 2019-01-01

## 2019-01-01 RX ORDER — TRIAMCINOLONE ACETONIDE 55 UG/1
2 SPRAY, METERED NASAL DAILY
Qty: 1 BOTTLE | Refills: 1 | Status: SHIPPED | OUTPATIENT
Start: 2019-01-01 | End: 2019-01-01

## 2019-01-01 RX ORDER — SULFAMETHOXAZOLE AND TRIMETHOPRIM 400; 80 MG/1; MG/1
1 TABLET ORAL 2 TIMES DAILY
Qty: 14 TABLET | Refills: 0 | Status: SHIPPED | OUTPATIENT
Start: 2019-01-01 | End: 2019-01-01 | Stop reason: SINTOL

## 2019-01-01 RX ORDER — SENNOSIDES 8.6 MG
2 TABLET ORAL 2 TIMES DAILY
Status: ON HOLD
Start: 2019-01-01 | End: 2019-01-01

## 2019-01-01 RX ORDER — LORAZEPAM 0.5 MG/1
0.5 TABLET ORAL NIGHTLY PRN
Status: DISCONTINUED | OUTPATIENT
Start: 2019-01-01 | End: 2019-01-01

## 2019-01-01 RX ORDER — HYDRALAZINE HYDROCHLORIDE 50 MG/1
50 TABLET, FILM COATED ORAL EVERY 12 HOURS SCHEDULED
Status: DISCONTINUED | OUTPATIENT
Start: 2019-01-01 | End: 2019-01-01

## 2019-01-01 RX ORDER — ACETAMINOPHEN 650 MG/1
650 SUPPOSITORY RECTAL EVERY 4 HOURS PRN
Status: CANCELLED | OUTPATIENT
Start: 2019-01-01

## 2019-01-01 RX ORDER — DICYCLOMINE HYDROCHLORIDE 10 MG/1
10 CAPSULE ORAL
Qty: 90 CAPSULE | Refills: 1 | Status: ON HOLD | OUTPATIENT
Start: 2019-01-01 | End: 2019-01-01

## 2019-01-01 RX ORDER — MORPHINE SULFATE 20 MG/ML
20 SOLUTION ORAL
Status: DISCONTINUED | OUTPATIENT
Start: 2019-01-01 | End: 2019-01-01 | Stop reason: HOSPADM

## 2019-01-01 RX ORDER — LORAZEPAM 2 MG/ML
2 INJECTION INTRAMUSCULAR
Status: DISCONTINUED | OUTPATIENT
Start: 2019-01-01 | End: 2019-01-01 | Stop reason: HOSPADM

## 2019-01-01 RX ORDER — CARVEDILOL 25 MG/1
TABLET ORAL
Qty: 180 TABLET | Refills: 3 | Status: SHIPPED | OUTPATIENT
Start: 2019-01-01 | End: 2019-01-01 | Stop reason: SDUPTHER

## 2019-01-01 RX ORDER — MORPHINE SULFATE 20 MG/ML
5 SOLUTION ORAL
Status: CANCELLED | OUTPATIENT
Start: 2019-01-01 | End: 2019-12-04

## 2019-01-01 RX ORDER — PROMETHAZINE HYDROCHLORIDE 6.25 MG/5ML
12.5 SYRUP ORAL EVERY 4 HOURS PRN
Status: DISCONTINUED | OUTPATIENT
Start: 2019-01-01 | End: 2019-01-01 | Stop reason: HOSPADM

## 2019-01-01 RX ORDER — PROMETHAZINE HYDROCHLORIDE 25 MG/ML
12.5 INJECTION, SOLUTION INTRAMUSCULAR; INTRAVENOUS EVERY 4 HOURS PRN
Status: DISCONTINUED | OUTPATIENT
Start: 2019-01-01 | End: 2019-01-01 | Stop reason: HOSPADM

## 2019-01-01 RX ORDER — MORPHINE SULFATE 4 MG/ML
4 INJECTION, SOLUTION INTRAMUSCULAR; INTRAVENOUS
Status: CANCELLED | OUTPATIENT
Start: 2019-01-01 | End: 2019-12-04

## 2019-01-01 RX ORDER — AZITHROMYCIN 250 MG/1
TABLET, FILM COATED ORAL
Qty: 6 TABLET | Refills: 0 | Status: SHIPPED | OUTPATIENT
Start: 2019-01-01 | End: 2019-01-01

## 2019-01-01 RX ORDER — MORPHINE SULFATE 2 MG/ML
2 INJECTION, SOLUTION INTRAMUSCULAR; INTRAVENOUS
Status: DISCONTINUED | OUTPATIENT
Start: 2019-01-01 | End: 2019-01-01 | Stop reason: HOSPADM

## 2019-01-01 RX ORDER — HYDROCODONE BITARTRATE AND ACETAMINOPHEN 5; 325 MG/1; MG/1
1 TABLET ORAL EVERY 6 HOURS PRN
Status: DISCONTINUED | OUTPATIENT
Start: 2019-01-01 | End: 2019-01-01

## 2019-01-01 RX ORDER — TORSEMIDE 20 MG/1
40 TABLET ORAL DAILY
Status: DISCONTINUED | OUTPATIENT
Start: 2019-01-01 | End: 2019-01-01 | Stop reason: HOSPADM

## 2019-01-01 RX ORDER — LORAZEPAM 0.5 MG/1
0.5 TABLET ORAL EVERY 8 HOURS PRN
Qty: 50 TABLET | Refills: 2 | Status: ON HOLD | OUTPATIENT
Start: 2019-01-01 | End: 2019-01-01 | Stop reason: SDUPTHER

## 2019-01-01 RX ORDER — SODIUM CHLORIDE 9 MG/ML
75 INJECTION, SOLUTION INTRAVENOUS CONTINUOUS
Status: DISCONTINUED | OUTPATIENT
Start: 2019-01-01 | End: 2019-01-01

## 2019-01-01 RX ORDER — ROSUVASTATIN CALCIUM 5 MG/1
5 TABLET, COATED ORAL NIGHTLY
Status: ON HOLD | COMMUNITY
End: 2019-01-01

## 2019-01-01 RX ORDER — DIAPER,BRIEF,INFANT-TODD,DISP
EACH MISCELLANEOUS EVERY 12 HOURS SCHEDULED
Status: DISCONTINUED | OUTPATIENT
Start: 2019-01-01 | End: 2019-01-01 | Stop reason: HOSPADM

## 2019-01-01 RX ORDER — ACETAMINOPHEN 325 MG/1
650 TABLET ORAL EVERY 4 HOURS PRN
Status: DISCONTINUED | OUTPATIENT
Start: 2019-01-01 | End: 2019-01-01

## 2019-01-01 RX ORDER — CEPHALEXIN 500 MG/1
500 CAPSULE ORAL 2 TIMES DAILY
Qty: 10 CAPSULE | Refills: 0 | Status: SHIPPED | OUTPATIENT
Start: 2019-01-01 | End: 2019-01-01

## 2019-01-01 RX ORDER — HYDROMORPHONE HYDROCHLORIDE 1 MG/ML
0.25 INJECTION, SOLUTION INTRAMUSCULAR; INTRAVENOUS; SUBCUTANEOUS ONCE
Status: COMPLETED | OUTPATIENT
Start: 2019-01-01 | End: 2019-01-01

## 2019-01-01 RX ORDER — ACETAMINOPHEN 160 MG/5ML
650 SOLUTION ORAL EVERY 4 HOURS PRN
Status: CANCELLED | OUTPATIENT
Start: 2019-01-01

## 2019-01-01 RX ORDER — SODIUM CHLORIDE 0.9 % (FLUSH) 0.9 %
10 SYRINGE (ML) INJECTION AS NEEDED
Status: DISCONTINUED | OUTPATIENT
Start: 2019-01-01 | End: 2019-01-01 | Stop reason: HOSPADM

## 2019-01-01 RX ORDER — ONDANSETRON 4 MG/1
4 TABLET, FILM COATED ORAL EVERY 6 HOURS PRN
Status: DISCONTINUED | OUTPATIENT
Start: 2019-01-01 | End: 2019-01-01

## 2019-01-01 RX ORDER — CARVEDILOL 12.5 MG/1
12.5 TABLET ORAL 2 TIMES DAILY WITH MEALS
Qty: 180 TABLET | Refills: 3 | Status: SHIPPED | OUTPATIENT
Start: 2019-01-01 | End: 2019-01-01

## 2019-01-01 RX ORDER — ACETAMINOPHEN 160 MG/5ML
650 SOLUTION ORAL EVERY 4 HOURS PRN
Status: DISCONTINUED | OUTPATIENT
Start: 2019-01-01 | End: 2019-01-01 | Stop reason: HOSPADM

## 2019-01-01 RX ORDER — PANTOPRAZOLE SODIUM 40 MG/1
40 TABLET, DELAYED RELEASE ORAL
Status: DISCONTINUED | OUTPATIENT
Start: 2019-01-01 | End: 2019-01-01 | Stop reason: HOSPADM

## 2019-01-01 RX ORDER — IRON ASPGLY,PS/C/B12/FA/CA/SUC 150-25-1
CAPSULE ORAL 2 TIMES DAILY WITH MEALS
Status: ON HOLD | COMMUNITY
End: 2019-01-01

## 2019-01-01 RX ORDER — ERGOCALCIFEROL 1.25 MG/1
50000 CAPSULE ORAL WEEKLY
Status: ON HOLD | COMMUNITY
End: 2019-01-01

## 2019-01-01 RX ORDER — MORPHINE SULFATE 20 MG/ML
20 SOLUTION ORAL
Status: CANCELLED | OUTPATIENT
Start: 2019-01-01 | End: 2019-12-04

## 2019-01-01 RX ORDER — FUROSEMIDE 10 MG/ML
40 INJECTION INTRAMUSCULAR; INTRAVENOUS ONCE
Status: COMPLETED | OUTPATIENT
Start: 2019-01-01 | End: 2019-01-01

## 2019-01-01 RX ORDER — BUMETANIDE 0.25 MG/ML
2 INJECTION INTRAMUSCULAR; INTRAVENOUS EVERY 12 HOURS
Status: DISCONTINUED | OUTPATIENT
Start: 2019-01-01 | End: 2019-01-01

## 2019-01-01 RX ORDER — CEFTRIAXONE SODIUM 1 G/50ML
1 INJECTION, SOLUTION INTRAVENOUS EVERY 24 HOURS
Status: DISCONTINUED | OUTPATIENT
Start: 2019-01-01 | End: 2019-01-01

## 2019-01-01 RX ORDER — MORPHINE SULFATE 20 MG/ML
10 SOLUTION ORAL
Status: DISCONTINUED | OUTPATIENT
Start: 2019-01-01 | End: 2019-01-01 | Stop reason: HOSPADM

## 2019-01-01 RX ORDER — LORAZEPAM 0.5 MG/1
TABLET ORAL
Qty: 50 TABLET | Refills: 0 | OUTPATIENT
Start: 2019-01-01 | End: 2019-01-01 | Stop reason: SDUPTHER

## 2019-01-01 RX ORDER — LORATADINE 10 MG/1
10 TABLET ORAL DAILY
Status: DISCONTINUED | OUTPATIENT
Start: 2019-01-01 | End: 2019-01-01

## 2019-01-01 RX ORDER — MORPHINE SULFATE 2 MG/ML
6 INJECTION, SOLUTION INTRAMUSCULAR; INTRAVENOUS
Status: DISCONTINUED | OUTPATIENT
Start: 2019-01-01 | End: 2019-01-01 | Stop reason: HOSPADM

## 2019-01-01 RX ORDER — PANTOPRAZOLE SODIUM 40 MG/1
40 TABLET, DELAYED RELEASE ORAL EVERY MORNING
Status: DISCONTINUED | OUTPATIENT
Start: 2019-01-01 | End: 2019-01-01

## 2019-01-01 RX ORDER — WARFARIN SODIUM 2 MG/1
2 TABLET ORAL
Status: DISCONTINUED | OUTPATIENT
Start: 2019-01-01 | End: 2019-01-01 | Stop reason: DRUGHIGH

## 2019-01-01 RX ORDER — LORAZEPAM 2 MG/ML
2 INJECTION INTRAMUSCULAR
Status: CANCELLED | OUTPATIENT
Start: 2019-01-01 | End: 2019-12-04

## 2019-01-01 RX ORDER — LORAZEPAM 2 MG/ML
2 CONCENTRATE ORAL
Status: DISCONTINUED | OUTPATIENT
Start: 2019-01-01 | End: 2019-01-01 | Stop reason: HOSPADM

## 2019-01-01 RX ORDER — ACETAMINOPHEN 325 MG/1
650 TABLET ORAL EVERY 4 HOURS PRN
Status: DISCONTINUED | OUTPATIENT
Start: 2019-01-01 | End: 2019-01-01 | Stop reason: HOSPADM

## 2019-01-01 RX ORDER — DIPHENHYDRAMINE HCL 25 MG
25 CAPSULE ORAL EVERY 6 HOURS PRN
Status: DISCONTINUED | OUTPATIENT
Start: 2019-01-01 | End: 2019-01-01 | Stop reason: HOSPADM

## 2019-01-01 RX ORDER — LORAZEPAM 0.5 MG/1
0.5 TABLET ORAL EVERY 8 HOURS PRN
Qty: 50 TABLET | Refills: 0 | OUTPATIENT
Start: 2019-01-01 | End: 2019-01-01 | Stop reason: SDUPTHER

## 2019-01-01 RX ORDER — HYDROMORPHONE HCL 110MG/55ML
1.5 PATIENT CONTROLLED ANALGESIA SYRINGE INTRAVENOUS
Status: CANCELLED | OUTPATIENT
Start: 2019-01-01 | End: 2019-12-04

## 2019-01-01 RX ORDER — MORPHINE SULFATE 2 MG/ML
2 INJECTION, SOLUTION INTRAMUSCULAR; INTRAVENOUS
Status: CANCELLED | OUTPATIENT
Start: 2019-01-01 | End: 2019-12-04

## 2019-01-01 RX ORDER — DIPHENOXYLATE HYDROCHLORIDE AND ATROPINE SULFATE 2.5; .025 MG/1; MG/1
1 TABLET ORAL
Status: DISCONTINUED | OUTPATIENT
Start: 2019-01-01 | End: 2019-01-01 | Stop reason: HOSPADM

## 2019-01-01 RX ORDER — PROMETHAZINE HYDROCHLORIDE 25 MG/1
12.5 SUPPOSITORY RECTAL EVERY 4 HOURS PRN
Status: CANCELLED | OUTPATIENT
Start: 2019-01-01

## 2019-01-01 RX ORDER — LORAZEPAM 2 MG/ML
1 INJECTION INTRAMUSCULAR
Status: DISCONTINUED | OUTPATIENT
Start: 2019-01-01 | End: 2019-01-01 | Stop reason: HOSPADM

## 2019-01-01 RX ORDER — LORAZEPAM 2 MG/ML
0.5 INJECTION INTRAMUSCULAR ONCE
Status: COMPLETED | OUTPATIENT
Start: 2019-01-01 | End: 2019-01-01

## 2019-01-01 RX ORDER — SODIUM CHLORIDE 0.9 % (FLUSH) 0.9 %
10 SYRINGE (ML) INJECTION EVERY 12 HOURS SCHEDULED
Status: DISCONTINUED | OUTPATIENT
Start: 2019-01-01 | End: 2019-01-01

## 2019-01-01 RX ORDER — ACETAMINOPHEN 160 MG/5ML
650 SOLUTION ORAL EVERY 4 HOURS PRN
Status: DISCONTINUED | OUTPATIENT
Start: 2019-01-01 | End: 2019-01-01

## 2019-01-01 RX ORDER — HYDRALAZINE HYDROCHLORIDE 50 MG/1
50 TABLET, FILM COATED ORAL EVERY 12 HOURS SCHEDULED
Status: DISCONTINUED | OUTPATIENT
Start: 2019-01-01 | End: 2019-01-01 | Stop reason: HOSPADM

## 2019-01-01 RX ORDER — MORPHINE SULFATE 20 MG/ML
5 SOLUTION ORAL
Status: DISCONTINUED | OUTPATIENT
Start: 2019-01-01 | End: 2019-01-01 | Stop reason: HOSPADM

## 2019-01-01 RX ORDER — ISOSORBIDE MONONITRATE 60 MG/1
TABLET, EXTENDED RELEASE ORAL
Qty: 180 TABLET | Refills: 3 | Status: ON HOLD | OUTPATIENT
Start: 2019-01-01 | End: 2019-01-01

## 2019-01-01 RX ORDER — HYDROCODONE BITARTRATE AND ACETAMINOPHEN 5; 325 MG/1; MG/1
1 TABLET ORAL EVERY 4 HOURS PRN
Status: DISCONTINUED | OUTPATIENT
Start: 2019-01-01 | End: 2019-01-01 | Stop reason: HOSPADM

## 2019-01-01 RX ORDER — SCOLOPAMINE TRANSDERMAL SYSTEM 1 MG/1
1 PATCH, EXTENDED RELEASE TRANSDERMAL
Status: CANCELLED | OUTPATIENT
Start: 2019-01-01

## 2019-01-01 RX ORDER — LORAZEPAM 2 MG/ML
0.5 CONCENTRATE ORAL
Status: DISCONTINUED | OUTPATIENT
Start: 2019-01-01 | End: 2019-01-01 | Stop reason: HOSPADM

## 2019-01-01 RX ORDER — NALOXONE HCL 0.4 MG/ML
0.4 VIAL (ML) INJECTION
Status: DISCONTINUED | OUTPATIENT
Start: 2019-01-01 | End: 2019-01-01 | Stop reason: HOSPADM

## 2019-01-01 RX ORDER — MORPHINE SULFATE 10 MG/ML
6 INJECTION INTRAMUSCULAR; INTRAVENOUS; SUBCUTANEOUS
Status: DISCONTINUED | OUTPATIENT
Start: 2019-01-01 | End: 2019-01-01 | Stop reason: HOSPADM

## 2019-01-01 RX ORDER — ACETAMINOPHEN 650 MG/1
650 SUPPOSITORY RECTAL EVERY 4 HOURS PRN
Status: DISCONTINUED | OUTPATIENT
Start: 2019-01-01 | End: 2019-01-01 | Stop reason: HOSPADM

## 2019-01-01 RX ORDER — WARFARIN SODIUM 2 MG/1
TABLET ORAL
Qty: 108 TABLET | Refills: 1 | Status: SHIPPED | OUTPATIENT
Start: 2019-01-01 | End: 2019-01-01 | Stop reason: HOSPADM

## 2019-01-01 RX ORDER — CEFTRIAXONE SODIUM 1 G/50ML
1 INJECTION, SOLUTION INTRAVENOUS ONCE
Status: COMPLETED | OUTPATIENT
Start: 2019-01-01 | End: 2019-01-01

## 2019-01-01 RX ORDER — BUMETANIDE 0.25 MG/ML
2 INJECTION INTRAMUSCULAR; INTRAVENOUS ONCE
Status: COMPLETED | OUTPATIENT
Start: 2019-01-01 | End: 2019-01-01

## 2019-01-01 RX ORDER — SODIUM CHLORIDE 0.9 % (FLUSH) 0.9 %
10 SYRINGE (ML) INJECTION AS NEEDED
Status: DISCONTINUED | OUTPATIENT
Start: 2019-01-01 | End: 2019-01-01

## 2019-01-01 RX ORDER — ISOSORBIDE MONONITRATE 60 MG/1
60 TABLET, EXTENDED RELEASE ORAL 2 TIMES DAILY
Status: DISCONTINUED | OUTPATIENT
Start: 2019-01-01 | End: 2019-01-01

## 2019-01-01 RX ORDER — HYDROMORPHONE HYDROCHLORIDE 1 MG/ML
1 INJECTION, SOLUTION INTRAMUSCULAR; INTRAVENOUS; SUBCUTANEOUS
Status: CANCELLED | OUTPATIENT
Start: 2019-01-01 | End: 2019-12-04

## 2019-01-01 RX ORDER — CIPROFLOXACIN 250 MG/1
250 TABLET, FILM COATED ORAL 2 TIMES DAILY
Qty: 5 TABLET | Refills: 0 | Status: SHIPPED | OUTPATIENT
Start: 2019-01-01 | End: 2019-01-01 | Stop reason: SINTOL

## 2019-01-01 RX ORDER — SULFAMETHOXAZOLE AND TRIMETHOPRIM 800; 160 MG/1; MG/1
1 TABLET ORAL 2 TIMES DAILY
Qty: 14 TABLET | Refills: 0 | Status: SHIPPED | OUTPATIENT
Start: 2019-01-01 | End: 2019-01-01

## 2019-01-01 RX ORDER — CARVEDILOL 25 MG/1
TABLET ORAL
Qty: 30 TABLET | Refills: 0 | Status: ON HOLD | OUTPATIENT
Start: 2019-01-01 | End: 2019-01-01

## 2019-01-01 RX ORDER — HYDROMORPHONE HCL 110MG/55ML
1.5 PATIENT CONTROLLED ANALGESIA SYRINGE INTRAVENOUS
Status: DISCONTINUED | OUTPATIENT
Start: 2019-01-01 | End: 2019-01-01 | Stop reason: HOSPADM

## 2019-01-01 RX ORDER — PROMETHAZINE HYDROCHLORIDE 25 MG/1
12.5 SUPPOSITORY RECTAL EVERY 4 HOURS PRN
Status: DISCONTINUED | OUTPATIENT
Start: 2019-01-01 | End: 2019-01-01 | Stop reason: HOSPADM

## 2019-01-01 RX ORDER — MORPHINE SULFATE 2 MG/ML
4 INJECTION, SOLUTION INTRAMUSCULAR; INTRAVENOUS
Status: DISCONTINUED | OUTPATIENT
Start: 2019-01-01 | End: 2019-01-01 | Stop reason: HOSPADM

## 2019-01-01 RX ORDER — POTASSIUM CHLORIDE 750 MG/1
10 TABLET, FILM COATED, EXTENDED RELEASE ORAL DAILY
Qty: 90 TABLET | Refills: 2 | Status: SHIPPED | OUTPATIENT
Start: 2019-01-01 | End: 2019-01-01 | Stop reason: HOSPADM

## 2019-01-01 RX ORDER — PANTOPRAZOLE SODIUM 40 MG/1
40 TABLET, DELAYED RELEASE ORAL DAILY
Status: ON HOLD | COMMUNITY
End: 2019-01-01

## 2019-01-01 RX ORDER — FENOPROFEN CALCIUM 200 MG
CAPSULE ORAL 2 TIMES DAILY
Status: ON HOLD | COMMUNITY
End: 2019-01-01

## 2019-01-01 RX ORDER — CARVEDILOL 12.5 MG/1
TABLET ORAL
Qty: 180 TABLET | Refills: 3
Start: 2019-01-01 | End: 2019-01-01 | Stop reason: SDUPTHER

## 2019-01-01 RX ORDER — VALACYCLOVIR HYDROCHLORIDE 500 MG/1
1000 TABLET, FILM COATED ORAL
Status: DISCONTINUED | OUTPATIENT
Start: 2019-01-01 | End: 2019-01-01 | Stop reason: HOSPADM

## 2019-01-01 RX ORDER — SIMETHICONE 80 MG
80 TABLET,CHEWABLE ORAL 4 TIMES DAILY PRN
Status: DISCONTINUED | OUTPATIENT
Start: 2019-01-01 | End: 2019-01-01 | Stop reason: HOSPADM

## 2019-01-01 RX ORDER — SENNOSIDES 8.6 MG
2 TABLET ORAL 2 TIMES DAILY
Status: DISCONTINUED | OUTPATIENT
Start: 2019-01-01 | End: 2019-01-01 | Stop reason: HOSPADM

## 2019-01-01 RX ORDER — LORAZEPAM 2 MG/ML
0.5 INJECTION INTRAMUSCULAR
Status: CANCELLED | OUTPATIENT
Start: 2019-01-01 | End: 2019-12-04

## 2019-01-01 RX ORDER — PROMETHAZINE HYDROCHLORIDE 25 MG/1
12.5 TABLET ORAL EVERY 4 HOURS PRN
Status: DISCONTINUED | OUTPATIENT
Start: 2019-01-01 | End: 2019-01-01 | Stop reason: HOSPADM

## 2019-01-01 RX ORDER — LORATADINE 10 MG/1
10 TABLET ORAL DAILY
Status: ON HOLD | COMMUNITY
End: 2019-01-01

## 2019-01-01 RX ORDER — LORAZEPAM 0.5 MG/1
0.5 TABLET ORAL NIGHTLY PRN
Qty: 3 TABLET | Refills: 0 | Status: SHIPPED | OUTPATIENT
Start: 2019-01-01 | End: 2019-01-01

## 2019-01-01 RX ORDER — HYDROCODONE BITARTRATE AND ACETAMINOPHEN 5; 325 MG/1; MG/1
1 TABLET ORAL EVERY 6 HOURS PRN
Qty: 12 TABLET | Refills: 0 | Status: ON HOLD | OUTPATIENT
Start: 2019-01-01 | End: 2019-01-01

## 2019-01-01 RX ORDER — LORAZEPAM 0.5 MG/1
TABLET ORAL
Qty: 50 TABLET | Refills: 2 | OUTPATIENT
Start: 2019-01-01 | End: 2019-01-01 | Stop reason: SDUPTHER

## 2019-01-01 RX ORDER — DIPHENHYDRAMINE HYDROCHLORIDE 50 MG/ML
25 INJECTION INTRAMUSCULAR; INTRAVENOUS EVERY 6 HOURS PRN
Status: DISCONTINUED | OUTPATIENT
Start: 2019-01-01 | End: 2019-01-01 | Stop reason: HOSPADM

## 2019-01-01 RX ORDER — ATORVASTATIN CALCIUM 10 MG/1
10 TABLET, FILM COATED ORAL DAILY
Status: DISCONTINUED | OUTPATIENT
Start: 2019-01-01 | End: 2019-01-01 | Stop reason: HOSPADM

## 2019-01-01 RX ORDER — DIPHENHYDRAMINE HYDROCHLORIDE 50 MG/ML
25 INJECTION INTRAMUSCULAR; INTRAVENOUS EVERY 6 HOURS PRN
Status: CANCELLED | OUTPATIENT
Start: 2019-01-01

## 2019-01-01 RX ORDER — BISACODYL 10 MG
10 SUPPOSITORY, RECTAL RECTAL DAILY PRN
Status: DISCONTINUED | OUTPATIENT
Start: 2019-01-01 | End: 2019-01-01 | Stop reason: HOSPADM

## 2019-01-01 RX ORDER — HYDRALAZINE HYDROCHLORIDE 25 MG/1
25 TABLET, FILM COATED ORAL EVERY 12 HOURS SCHEDULED
Status: DISCONTINUED | OUTPATIENT
Start: 2019-01-01 | End: 2019-01-01

## 2019-01-01 RX ORDER — CARVEDILOL 25 MG/1
25 TABLET ORAL 2 TIMES DAILY WITH MEALS
Status: DISCONTINUED | OUTPATIENT
Start: 2019-01-01 | End: 2019-01-01 | Stop reason: HOSPADM

## 2019-01-01 RX ORDER — SENNOSIDES 8.6 MG
2 TABLET ORAL 2 TIMES DAILY
Status: DISCONTINUED | OUTPATIENT
Start: 2019-01-01 | End: 2019-01-01

## 2019-01-01 RX ORDER — ACETAMINOPHEN 650 MG/1
650 SUPPOSITORY RECTAL EVERY 4 HOURS PRN
Status: DISCONTINUED | OUTPATIENT
Start: 2019-01-01 | End: 2019-01-01

## 2019-01-01 RX ORDER — MORPHINE SULFATE 20 MG/ML
10 SOLUTION ORAL
Status: CANCELLED | OUTPATIENT
Start: 2019-01-01 | End: 2019-12-04

## 2019-01-01 RX ORDER — PROMETHAZINE HYDROCHLORIDE 25 MG/1
12.5 TABLET ORAL EVERY 4 HOURS PRN
Status: CANCELLED | OUTPATIENT
Start: 2019-01-01

## 2019-01-01 RX ORDER — CARVEDILOL 25 MG/1
TABLET ORAL
Qty: 30 TABLET | Refills: 0 | Status: ON HOLD | OUTPATIENT
Start: 2019-01-01 | End: 2019-01-01 | Stop reason: SDUPTHER

## 2019-01-01 RX ORDER — LORAZEPAM 2 MG/ML
0.5 INJECTION INTRAMUSCULAR
Status: DISCONTINUED | OUTPATIENT
Start: 2019-01-01 | End: 2019-01-01 | Stop reason: HOSPADM

## 2019-01-01 RX ORDER — SODIUM CHLORIDE 9 MG/ML
50 INJECTION, SOLUTION INTRAVENOUS CONTINUOUS
Status: ACTIVE | OUTPATIENT
Start: 2019-01-01 | End: 2019-01-01

## 2019-01-01 RX ORDER — ACETAMINOPHEN 500 MG
500 TABLET ORAL EVERY 6 HOURS PRN
Status: DISCONTINUED | OUTPATIENT
Start: 2019-01-01 | End: 2019-01-01 | Stop reason: SDUPTHER

## 2019-01-01 RX ORDER — SIMVASTATIN 20 MG
TABLET ORAL
Qty: 90 TABLET | Refills: 1 | Status: ON HOLD | OUTPATIENT
Start: 2019-01-01 | End: 2019-01-01

## 2019-01-01 RX ORDER — MORPHINE SULFATE 10 MG/ML
6 INJECTION INTRAMUSCULAR; INTRAVENOUS; SUBCUTANEOUS
Status: CANCELLED | OUTPATIENT
Start: 2019-01-01 | End: 2019-12-04

## 2019-01-01 RX ORDER — ACETAMINOPHEN 325 MG/1
650 TABLET ORAL EVERY 4 HOURS PRN
Status: CANCELLED | OUTPATIENT
Start: 2019-01-01

## 2019-01-01 RX ORDER — FUROSEMIDE 10 MG/ML
40 INJECTION INTRAMUSCULAR; INTRAVENOUS ONCE
Status: DISCONTINUED | OUTPATIENT
Start: 2019-01-01 | End: 2019-01-01

## 2019-01-01 RX ORDER — SIMVASTATIN 20 MG
20 TABLET ORAL NIGHTLY
Qty: 90 TABLET | Refills: 1 | Status: SHIPPED | OUTPATIENT
Start: 2019-01-01 | End: 2019-01-01 | Stop reason: SDUPTHER

## 2019-01-01 RX ORDER — WARFARIN SODIUM 2 MG/1
TABLET ORAL
Qty: 34 TABLET | Refills: 1 | Status: SHIPPED | OUTPATIENT
Start: 2019-01-01 | End: 2019-01-01 | Stop reason: SDUPTHER

## 2019-01-01 RX ORDER — HYDRALAZINE HYDROCHLORIDE 50 MG/1
50 TABLET, FILM COATED ORAL EVERY 12 HOURS SCHEDULED
Status: ON HOLD
Start: 2019-01-01 | End: 2019-01-01

## 2019-01-01 RX ORDER — SODIUM CHLORIDE 0.9 % (FLUSH) 0.9 %
10 SYRINGE (ML) INJECTION EVERY 12 HOURS SCHEDULED
Status: DISCONTINUED | OUTPATIENT
Start: 2019-01-01 | End: 2019-01-01 | Stop reason: HOSPADM

## 2019-01-01 RX ORDER — PROMETHAZINE HYDROCHLORIDE 6.25 MG/5ML
12.5 SYRUP ORAL EVERY 4 HOURS PRN
Status: CANCELLED | OUTPATIENT
Start: 2019-01-01

## 2019-01-01 RX ORDER — TORSEMIDE 20 MG/1
40 TABLET ORAL DAILY
Status: DISCONTINUED | OUTPATIENT
Start: 2019-01-01 | End: 2019-01-01

## 2019-01-01 RX ORDER — LORAZEPAM 0.5 MG/1
0.5 TABLET ORAL EVERY 8 HOURS PRN
Status: DISCONTINUED | OUTPATIENT
Start: 2019-01-01 | End: 2019-01-01 | Stop reason: HOSPADM

## 2019-01-01 RX ORDER — ISOSORBIDE MONONITRATE 60 MG/1
60 TABLET, EXTENDED RELEASE ORAL 2 TIMES DAILY
Status: DISCONTINUED | OUTPATIENT
Start: 2019-01-01 | End: 2019-01-01 | Stop reason: HOSPADM

## 2019-01-01 RX ORDER — ONDANSETRON 2 MG/ML
4 INJECTION INTRAMUSCULAR; INTRAVENOUS EVERY 6 HOURS PRN
Status: DISCONTINUED | OUTPATIENT
Start: 2019-01-01 | End: 2019-01-01 | Stop reason: HOSPADM

## 2019-01-01 RX ORDER — DIPHENOXYLATE HYDROCHLORIDE AND ATROPINE SULFATE 2.5; .025 MG/1; MG/1
1 TABLET ORAL
Status: CANCELLED | OUTPATIENT
Start: 2019-01-01

## 2019-01-01 RX ORDER — WARFARIN SODIUM 2 MG/1
TABLET ORAL
Qty: 105 TABLET | Refills: 1 | Status: SHIPPED | OUTPATIENT
Start: 2019-01-01 | End: 2019-01-01 | Stop reason: SDUPTHER

## 2019-01-01 RX ORDER — CARVEDILOL 12.5 MG/1
TABLET ORAL
Qty: 180 TABLET | Refills: 3 | Status: SHIPPED | OUTPATIENT
Start: 2019-01-01 | End: 2019-01-01 | Stop reason: SDUPTHER

## 2019-01-01 RX ORDER — LORAZEPAM 2 MG/ML
1 INJECTION INTRAMUSCULAR
Status: CANCELLED | OUTPATIENT
Start: 2019-01-01 | End: 2019-12-04

## 2019-01-01 RX ORDER — HYDROMORPHONE HYDROCHLORIDE 1 MG/ML
0.5 INJECTION, SOLUTION INTRAMUSCULAR; INTRAVENOUS; SUBCUTANEOUS
Status: CANCELLED | OUTPATIENT
Start: 2019-01-01 | End: 2019-12-04

## 2019-01-01 RX ORDER — DIAPER,BRIEF,INFANT-TODD,DISP
EACH MISCELLANEOUS EVERY 12 HOURS SCHEDULED
Status: CANCELLED | OUTPATIENT
Start: 2019-01-01

## 2019-01-01 RX ORDER — PROMETHAZINE HYDROCHLORIDE 25 MG/ML
12.5 INJECTION, SOLUTION INTRAMUSCULAR; INTRAVENOUS EVERY 4 HOURS PRN
Status: CANCELLED | OUTPATIENT
Start: 2019-01-01

## 2019-01-01 RX ORDER — DIPHENHYDRAMINE HCL 25 MG
25 CAPSULE ORAL EVERY 6 HOURS PRN
Status: CANCELLED | OUTPATIENT
Start: 2019-01-01

## 2019-01-01 RX ORDER — TORSEMIDE 20 MG/1
TABLET ORAL
Qty: 180 TABLET | Refills: 3 | Status: ON HOLD | OUTPATIENT
Start: 2019-01-01 | End: 2019-01-01

## 2019-01-01 RX ORDER — VALACYCLOVIR HYDROCHLORIDE 1 G/1
1000 TABLET, FILM COATED ORAL
Qty: 2 TABLET | Refills: 0
Start: 2019-01-01 | End: 2019-01-01

## 2019-01-01 RX ORDER — ALBUMIN (HUMAN) 12.5 G/50ML
50 SOLUTION INTRAVENOUS 2 TIMES DAILY
Status: DISCONTINUED | OUTPATIENT
Start: 2019-01-01 | End: 2019-01-01

## 2019-01-01 RX ORDER — NITROFURANTOIN 25; 75 MG/1; MG/1
100 CAPSULE ORAL 2 TIMES DAILY
Refills: 0 | COMMUNITY
Start: 2019-01-01 | End: 2019-01-01

## 2019-01-01 RX ORDER — LORAZEPAM 2 MG/ML
2 CONCENTRATE ORAL
Status: CANCELLED | OUTPATIENT
Start: 2019-01-01 | End: 2019-12-04

## 2019-01-01 RX ORDER — LORAZEPAM 2 MG/ML
1 CONCENTRATE ORAL
Status: CANCELLED | OUTPATIENT
Start: 2019-01-01 | End: 2019-12-04

## 2019-01-01 RX ORDER — DIAPER,BRIEF,INFANT-TODD,DISP
EACH MISCELLANEOUS EVERY 8 HOURS SCHEDULED
Status: DISCONTINUED | OUTPATIENT
Start: 2019-01-01 | End: 2019-01-01 | Stop reason: HOSPADM

## 2019-01-01 RX ORDER — CARVEDILOL 25 MG/1
25 TABLET ORAL EVERY 12 HOURS SCHEDULED
Status: DISCONTINUED | OUTPATIENT
Start: 2019-01-01 | End: 2019-01-01

## 2019-01-01 RX ORDER — LORAZEPAM 2 MG/ML
0.5 CONCENTRATE ORAL
Status: CANCELLED | OUTPATIENT
Start: 2019-01-01 | End: 2019-12-04

## 2019-01-01 RX ORDER — DICYCLOMINE HYDROCHLORIDE 10 MG/1
10 CAPSULE ORAL
Status: DISCONTINUED | OUTPATIENT
Start: 2019-01-01 | End: 2019-01-01 | Stop reason: HOSPADM

## 2019-01-01 RX ADMIN — ISOSORBIDE MONONITRATE 60 MG: 60 TABLET ORAL at 11:42

## 2019-01-01 RX ADMIN — ATORVASTATIN CALCIUM 10 MG: 10 TABLET, FILM COATED ORAL at 08:49

## 2019-01-01 RX ADMIN — LORAZEPAM 0.5 MG: 2 INJECTION INTRAMUSCULAR; INTRAVENOUS at 03:09

## 2019-01-01 RX ADMIN — HYDROMORPHONE HYDROCHLORIDE 1 MG: 1 INJECTION, SOLUTION INTRAMUSCULAR; INTRAVENOUS; SUBCUTANEOUS at 13:03

## 2019-01-01 RX ADMIN — PANTOPRAZOLE SODIUM 40 MG: 40 TABLET, DELAYED RELEASE ORAL at 06:56

## 2019-01-01 RX ADMIN — HYDROCORTISONE: 1 CREAM TOPICAL at 00:37

## 2019-01-01 RX ADMIN — PANTOPRAZOLE SODIUM 40 MG: 40 TABLET, DELAYED RELEASE ORAL at 06:51

## 2019-01-01 RX ADMIN — LORAZEPAM 0.5 MG: 2 INJECTION INTRAMUSCULAR; INTRAVENOUS at 15:57

## 2019-01-01 RX ADMIN — SODIUM CHLORIDE, PRESERVATIVE FREE 10 ML: 5 INJECTION INTRAVENOUS at 08:50

## 2019-01-01 RX ADMIN — ISOSORBIDE MONONITRATE 60 MG: 60 TABLET ORAL at 21:00

## 2019-01-01 RX ADMIN — WARFARIN SODIUM 2 MG: 2 TABLET ORAL at 17:37

## 2019-01-01 RX ADMIN — WARFARIN SODIUM 2 MG: 2 TABLET ORAL at 17:49

## 2019-01-01 RX ADMIN — HYDROCORTISONE: 1 CREAM TOPICAL at 15:57

## 2019-01-01 RX ADMIN — LORAZEPAM 0.5 MG: 0.5 TABLET ORAL at 22:31

## 2019-01-01 RX ADMIN — HYDROCORTISONE: 1 CREAM TOPICAL at 13:06

## 2019-01-01 RX ADMIN — DICYCLOMINE HYDROCHLORIDE 10 MG: 10 CAPSULE ORAL at 22:13

## 2019-01-01 RX ADMIN — WARFARIN SODIUM 2 MG: 2 TABLET ORAL at 17:50

## 2019-01-01 RX ADMIN — HYDRALAZINE HYDROCHLORIDE 50 MG: 50 TABLET, FILM COATED ORAL at 09:38

## 2019-01-01 RX ADMIN — DICYCLOMINE HYDROCHLORIDE 10 MG: 10 CAPSULE ORAL at 06:51

## 2019-01-01 RX ADMIN — Medication 15 G: at 09:44

## 2019-01-01 RX ADMIN — CARVEDILOL 25 MG: 25 TABLET, FILM COATED ORAL at 17:49

## 2019-01-01 RX ADMIN — HYDROCORTISONE: 1 CREAM TOPICAL at 06:23

## 2019-01-01 RX ADMIN — ATORVASTATIN CALCIUM 10 MG: 10 TABLET, FILM COATED ORAL at 11:41

## 2019-01-01 RX ADMIN — HYDROCORTISONE: 1 CREAM TOPICAL at 21:16

## 2019-01-01 RX ADMIN — ISOSORBIDE MONONITRATE 60 MG: 60 TABLET ORAL at 22:13

## 2019-01-01 RX ADMIN — HYDROCORTISONE: 1 CREAM TOPICAL at 22:14

## 2019-01-01 RX ADMIN — SODIUM CHLORIDE 500 ML: 9 INJECTION, SOLUTION INTRAVENOUS at 17:54

## 2019-01-01 RX ADMIN — VALACYCLOVIR 1000 MG: 500 TABLET, FILM COATED ORAL at 08:53

## 2019-01-01 RX ADMIN — ISOSORBIDE MONONITRATE 60 MG: 60 TABLET ORAL at 08:18

## 2019-01-01 RX ADMIN — HYDROCORTISONE: 1 CREAM TOPICAL at 13:02

## 2019-01-01 RX ADMIN — CARVEDILOL 25 MG: 25 TABLET, FILM COATED ORAL at 17:37

## 2019-01-01 RX ADMIN — HYDROCORTISONE: 1 CREAM TOPICAL at 14:27

## 2019-01-01 RX ADMIN — ACETAMINOPHEN 650 MG: 325 TABLET, FILM COATED ORAL at 00:00

## 2019-01-01 RX ADMIN — BUMETANIDE 2 MG: 0.25 INJECTION INTRAMUSCULAR; INTRAVENOUS at 09:25

## 2019-01-01 RX ADMIN — LORAZEPAM 0.5 MG: 0.5 TABLET ORAL at 01:30

## 2019-01-01 RX ADMIN — HYDROCORTISONE: 1 CREAM TOPICAL at 13:00

## 2019-01-01 RX ADMIN — HYDROCORTISONE: 1 CREAM TOPICAL at 06:01

## 2019-01-01 RX ADMIN — ISOSORBIDE MONONITRATE 60 MG: 60 TABLET ORAL at 21:45

## 2019-01-01 RX ADMIN — HYDRALAZINE HYDROCHLORIDE 25 MG: 25 TABLET, FILM COATED ORAL at 21:37

## 2019-01-01 RX ADMIN — HYDROCODONE BITARTRATE AND ACETAMINOPHEN 1 TABLET: 5; 325 TABLET ORAL at 19:00

## 2019-01-01 RX ADMIN — HYDROMORPHONE HYDROCHLORIDE 1 MG: 1 INJECTION, SOLUTION INTRAMUSCULAR; INTRAVENOUS; SUBCUTANEOUS at 21:00

## 2019-01-01 RX ADMIN — LORAZEPAM 0.5 MG: 0.5 TABLET ORAL at 21:59

## 2019-01-01 RX ADMIN — SODIUM CHLORIDE, PRESERVATIVE FREE 10 ML: 5 INJECTION INTRAVENOUS at 10:10

## 2019-01-01 RX ADMIN — DICYCLOMINE HYDROCHLORIDE 10 MG: 10 CAPSULE ORAL at 21:16

## 2019-01-01 RX ADMIN — HYDROMORPHONE HYDROCHLORIDE 1 MG: 1 INJECTION, SOLUTION INTRAMUSCULAR; INTRAVENOUS; SUBCUTANEOUS at 17:03

## 2019-01-01 RX ADMIN — LORAZEPAM 0.5 MG: 0.5 TABLET ORAL at 00:15

## 2019-01-01 RX ADMIN — SODIUM CHLORIDE, PRESERVATIVE FREE 10 ML: 5 INJECTION INTRAVENOUS at 22:14

## 2019-01-01 RX ADMIN — BUMETANIDE 2 MG: 0.25 INJECTION INTRAMUSCULAR; INTRAVENOUS at 12:14

## 2019-01-01 RX ADMIN — HYDROMORPHONE HYDROCHLORIDE 0.25 MG: 1 INJECTION, SOLUTION INTRAMUSCULAR; INTRAVENOUS; SUBCUTANEOUS at 13:48

## 2019-01-01 RX ADMIN — WARFARIN SODIUM 2 MG: 2 TABLET ORAL at 18:16

## 2019-01-01 RX ADMIN — LORAZEPAM 0.5 MG: 0.5 TABLET ORAL at 21:45

## 2019-01-01 RX ADMIN — SODIUM CHLORIDE, PRESERVATIVE FREE 10 ML: 5 INJECTION INTRAVENOUS at 21:38

## 2019-01-01 RX ADMIN — LORAZEPAM 2 MG: 2 INJECTION INTRAMUSCULAR; INTRAVENOUS at 17:03

## 2019-01-01 RX ADMIN — ATORVASTATIN CALCIUM 10 MG: 10 TABLET, FILM COATED ORAL at 08:21

## 2019-01-01 RX ADMIN — SENNA 17.2 MG: 8.6 TABLET, COATED ORAL at 21:18

## 2019-01-01 RX ADMIN — FUROSEMIDE 40 MG: 40 INJECTION, SOLUTION INTRAMUSCULAR; INTRAVENOUS at 17:25

## 2019-01-01 RX ADMIN — DICYCLOMINE HYDROCHLORIDE 10 MG: 10 CAPSULE ORAL at 13:11

## 2019-01-01 RX ADMIN — HYDROCORTISONE: 1 CREAM TOPICAL at 06:13

## 2019-01-01 RX ADMIN — CARVEDILOL 25 MG: 25 TABLET, FILM COATED ORAL at 17:36

## 2019-01-01 RX ADMIN — ISOSORBIDE MONONITRATE 60 MG: 60 TABLET ORAL at 20:31

## 2019-01-01 RX ADMIN — DICYCLOMINE HYDROCHLORIDE 10 MG: 10 CAPSULE ORAL at 11:51

## 2019-01-01 RX ADMIN — HYDROCODONE BITARTRATE AND ACETAMINOPHEN 1 TABLET: 5; 325 TABLET ORAL at 11:52

## 2019-01-01 RX ADMIN — SCOPOLAMINE 1 PATCH: 1 PATCH TRANSDERMAL at 02:30

## 2019-01-01 RX ADMIN — ATORVASTATIN CALCIUM 10 MG: 10 TABLET, FILM COATED ORAL at 10:04

## 2019-01-01 RX ADMIN — HYDROCORTISONE: 1 CREAM TOPICAL at 21:19

## 2019-01-01 RX ADMIN — LORAZEPAM 2 MG: 2 INJECTION INTRAMUSCULAR; INTRAVENOUS at 11:42

## 2019-01-01 RX ADMIN — HYDROCORTISONE: 1 CREAM TOPICAL at 06:52

## 2019-01-01 RX ADMIN — DICYCLOMINE HYDROCHLORIDE 10 MG: 10 CAPSULE ORAL at 06:54

## 2019-01-01 RX ADMIN — VALACYCLOVIR 1000 MG: 500 TABLET, FILM COATED ORAL at 09:43

## 2019-01-01 RX ADMIN — GLYCOPYRROLATE 0.4 MG: 0.2 INJECTION, SOLUTION INTRAMUSCULAR; INTRAVITREAL at 17:08

## 2019-01-01 RX ADMIN — SENNA 17.2 MG: 8.6 TABLET, COATED ORAL at 21:45

## 2019-01-01 RX ADMIN — PANTOPRAZOLE SODIUM 40 MG: 40 TABLET, DELAYED RELEASE ORAL at 06:00

## 2019-01-01 RX ADMIN — WARFARIN SODIUM 2 MG: 2 TABLET ORAL at 17:35

## 2019-01-01 RX ADMIN — Medication 15 G: at 08:45

## 2019-01-01 RX ADMIN — SODIUM CHLORIDE, PRESERVATIVE FREE 10 ML: 5 INJECTION INTRAVENOUS at 09:00

## 2019-01-01 RX ADMIN — VALACYCLOVIR 1000 MG: 500 TABLET, FILM COATED ORAL at 09:51

## 2019-01-01 RX ADMIN — WARFARIN SODIUM 2 MG: 2 TABLET ORAL at 17:25

## 2019-01-01 RX ADMIN — CARVEDILOL 25 MG: 25 TABLET, FILM COATED ORAL at 09:44

## 2019-01-01 RX ADMIN — BISACODYL 10 MG: 10 SUPPOSITORY RECTAL at 17:51

## 2019-01-01 RX ADMIN — Medication 15 G: at 21:37

## 2019-01-01 RX ADMIN — SODIUM CHLORIDE, PRESERVATIVE FREE 10 ML: 5 INJECTION INTRAVENOUS at 22:31

## 2019-01-01 RX ADMIN — ISOSORBIDE MONONITRATE 60 MG: 60 TABLET ORAL at 09:51

## 2019-01-01 RX ADMIN — ATORVASTATIN CALCIUM 10 MG: 10 TABLET, FILM COATED ORAL at 09:44

## 2019-01-01 RX ADMIN — HYDROMORPHONE HYDROCHLORIDE 1 MG: 1 INJECTION, SOLUTION INTRAMUSCULAR; INTRAVENOUS; SUBCUTANEOUS at 09:07

## 2019-01-01 RX ADMIN — ACETAMINOPHEN 650 MG: 325 TABLET, FILM COATED ORAL at 23:39

## 2019-01-01 RX ADMIN — ACETAMINOPHEN 650 MG: 325 TABLET, FILM COATED ORAL at 13:43

## 2019-01-01 RX ADMIN — SODIUM CHLORIDE, PRESERVATIVE FREE 10 ML: 5 INJECTION INTRAVENOUS at 08:45

## 2019-01-01 RX ADMIN — SENNA 17.2 MG: 8.6 TABLET, COATED ORAL at 21:00

## 2019-01-01 RX ADMIN — PANTOPRAZOLE SODIUM 40 MG: 40 TABLET, DELAYED RELEASE ORAL at 06:23

## 2019-01-01 RX ADMIN — HYDROMORPHONE HYDROCHLORIDE 0.5 MG: 1 INJECTION, SOLUTION INTRAMUSCULAR; INTRAVENOUS; SUBCUTANEOUS at 15:56

## 2019-01-01 RX ADMIN — HYDROCORTISONE: 1 CREAM TOPICAL at 21:45

## 2019-01-01 RX ADMIN — CARVEDILOL 25 MG: 25 TABLET, FILM COATED ORAL at 21:01

## 2019-01-01 RX ADMIN — HYDROMORPHONE HYDROCHLORIDE 0.5 MG: 1 INJECTION, SOLUTION INTRAMUSCULAR; INTRAVENOUS; SUBCUTANEOUS at 06:54

## 2019-01-01 RX ADMIN — CARVEDILOL 25 MG: 25 TABLET, FILM COATED ORAL at 09:51

## 2019-01-01 RX ADMIN — Medication 15 G: at 20:08

## 2019-01-01 RX ADMIN — CEFTRIAXONE SODIUM 1 G: 1 INJECTION, SOLUTION INTRAVENOUS at 14:40

## 2019-01-01 RX ADMIN — ISOSORBIDE MONONITRATE 60 MG: 60 TABLET ORAL at 09:44

## 2019-01-01 RX ADMIN — CARVEDILOL 25 MG: 25 TABLET, FILM COATED ORAL at 08:45

## 2019-01-01 RX ADMIN — HYDROCORTISONE: 1 CREAM TOPICAL at 21:37

## 2019-01-01 RX ADMIN — HYDROCORTISONE: 1 CREAM TOPICAL at 00:14

## 2019-01-01 RX ADMIN — ATORVASTATIN CALCIUM 10 MG: 10 TABLET, FILM COATED ORAL at 09:51

## 2019-01-01 RX ADMIN — LORAZEPAM 2 MG: 2 INJECTION INTRAMUSCULAR; INTRAVENOUS at 13:03

## 2019-01-01 RX ADMIN — LORAZEPAM 2 MG: 2 INJECTION INTRAMUSCULAR; INTRAVENOUS at 00:37

## 2019-01-01 RX ADMIN — LORAZEPAM 0.5 MG: 0.5 TABLET ORAL at 22:38

## 2019-01-01 RX ADMIN — LORAZEPAM 2 MG: 2 INJECTION INTRAMUSCULAR; INTRAVENOUS at 20:59

## 2019-01-01 RX ADMIN — DICYCLOMINE HYDROCHLORIDE 10 MG: 10 CAPSULE ORAL at 17:50

## 2019-01-01 RX ADMIN — ONDANSETRON 4 MG: 2 INJECTION INTRAMUSCULAR; INTRAVENOUS at 16:08

## 2019-01-01 RX ADMIN — PANTOPRAZOLE SODIUM 40 MG: 40 TABLET, DELAYED RELEASE ORAL at 07:23

## 2019-01-01 RX ADMIN — GLYCOPYRROLATE 0.4 MG: 0.2 INJECTION, SOLUTION INTRAMUSCULAR; INTRAVITREAL at 00:36

## 2019-01-01 RX ADMIN — DICYCLOMINE HYDROCHLORIDE 10 MG: 10 CAPSULE ORAL at 11:22

## 2019-01-01 RX ADMIN — SENNA 17.2 MG: 8.6 TABLET, COATED ORAL at 20:31

## 2019-01-01 RX ADMIN — SODIUM CHLORIDE 50 ML/HR: 9 INJECTION, SOLUTION INTRAVENOUS at 20:30

## 2019-01-01 RX ADMIN — HYDROMORPHONE HYDROCHLORIDE 1 MG: 1 INJECTION, SOLUTION INTRAMUSCULAR; INTRAVENOUS; SUBCUTANEOUS at 11:42

## 2019-01-01 RX ADMIN — SODIUM CHLORIDE, PRESERVATIVE FREE 10 ML: 5 INJECTION INTRAVENOUS at 21:04

## 2019-01-01 RX ADMIN — Medication 15 G: at 20:31

## 2019-01-01 RX ADMIN — ISOSORBIDE MONONITRATE 60 MG: 60 TABLET ORAL at 20:07

## 2019-01-01 RX ADMIN — CARVEDILOL 25 MG: 25 TABLET, FILM COATED ORAL at 10:04

## 2019-01-01 RX ADMIN — ISOSORBIDE MONONITRATE 60 MG: 60 TABLET ORAL at 08:45

## 2019-01-01 RX ADMIN — HYDROCODONE BITARTRATE AND ACETAMINOPHEN 1 TABLET: 5; 325 TABLET ORAL at 08:52

## 2019-01-01 RX ADMIN — DICYCLOMINE HYDROCHLORIDE 10 MG: 10 CAPSULE ORAL at 17:49

## 2019-01-01 RX ADMIN — ACETAMINOPHEN 650 MG: 325 TABLET, FILM COATED ORAL at 17:36

## 2019-01-01 RX ADMIN — CARVEDILOL 25 MG: 25 TABLET, FILM COATED ORAL at 11:42

## 2019-01-01 RX ADMIN — SODIUM CHLORIDE, PRESERVATIVE FREE 10 ML: 5 INJECTION INTRAVENOUS at 21:19

## 2019-01-01 RX ADMIN — VALACYCLOVIR 1000 MG: 500 TABLET, FILM COATED ORAL at 08:18

## 2019-01-01 RX ADMIN — SODIUM CHLORIDE, PRESERVATIVE FREE 10 ML: 5 INJECTION INTRAVENOUS at 00:15

## 2019-01-01 RX ADMIN — PANTOPRAZOLE SODIUM 40 MG: 40 TABLET, DELAYED RELEASE ORAL at 06:54

## 2019-01-01 RX ADMIN — DICYCLOMINE HYDROCHLORIDE 10 MG: 10 CAPSULE ORAL at 06:56

## 2019-01-01 RX ADMIN — HYDROCORTISONE: 1 CREAM TOPICAL at 06:56

## 2019-01-01 RX ADMIN — LORAZEPAM 0.5 MG: 0.5 TABLET ORAL at 10:05

## 2019-01-01 RX ADMIN — DICYCLOMINE HYDROCHLORIDE 10 MG: 10 CAPSULE ORAL at 18:16

## 2019-01-01 RX ADMIN — HYDRALAZINE HYDROCHLORIDE 50 MG: 50 TABLET, FILM COATED ORAL at 21:00

## 2019-01-01 RX ADMIN — SODIUM CHLORIDE, PRESERVATIVE FREE 10 ML: 5 INJECTION INTRAVENOUS at 11:43

## 2019-01-01 RX ADMIN — ISOSORBIDE MONONITRATE 60 MG: 60 TABLET ORAL at 09:38

## 2019-01-01 RX ADMIN — LORAZEPAM 2 MG: 2 INJECTION INTRAMUSCULAR; INTRAVENOUS at 07:52

## 2019-01-01 RX ADMIN — DICYCLOMINE HYDROCHLORIDE 10 MG: 10 CAPSULE ORAL at 18:56

## 2019-01-01 RX ADMIN — SODIUM CHLORIDE 75 ML/HR: 9 INJECTION, SOLUTION INTRAVENOUS at 04:06

## 2019-01-01 RX ADMIN — ISOSORBIDE MONONITRATE 60 MG: 60 TABLET ORAL at 21:37

## 2019-01-01 RX ADMIN — ISOSORBIDE MONONITRATE 60 MG: 60 TABLET ORAL at 08:50

## 2019-01-01 RX ADMIN — VALACYCLOVIR 1000 MG: 500 TABLET, FILM COATED ORAL at 08:50

## 2019-01-01 RX ADMIN — SENNA 17.2 MG: 8.6 TABLET, COATED ORAL at 11:41

## 2019-01-01 RX ADMIN — DICYCLOMINE HYDROCHLORIDE 10 MG: 10 CAPSULE ORAL at 11:52

## 2019-01-01 RX ADMIN — HYDRALAZINE HYDROCHLORIDE 50 MG: 50 TABLET, FILM COATED ORAL at 10:04

## 2019-01-01 RX ADMIN — LORAZEPAM 0.5 MG: 2 INJECTION INTRAMUSCULAR; INTRAVENOUS at 02:30

## 2019-01-01 RX ADMIN — VALACYCLOVIR 1000 MG: 500 TABLET, FILM COATED ORAL at 11:41

## 2019-01-01 RX ADMIN — DICYCLOMINE HYDROCHLORIDE 10 MG: 10 CAPSULE ORAL at 11:53

## 2019-01-01 RX ADMIN — PROMETHAZINE HYDROCHLORIDE 12.5 MG: 25 INJECTION INTRAMUSCULAR; INTRAVENOUS at 01:48

## 2019-01-01 RX ADMIN — HYDROCORTISONE: 1 CREAM TOPICAL at 08:18

## 2019-01-01 RX ADMIN — PANTOPRAZOLE SODIUM 40 MG: 40 TABLET, DELAYED RELEASE ORAL at 06:52

## 2019-01-01 RX ADMIN — CARVEDILOL 25 MG: 25 TABLET, FILM COATED ORAL at 08:49

## 2019-01-01 RX ADMIN — ONDANSETRON 4 MG: 2 INJECTION INTRAMUSCULAR; INTRAVENOUS at 08:18

## 2019-01-01 RX ADMIN — HYDROCORTISONE: 1 CREAM TOPICAL at 14:18

## 2019-01-01 RX ADMIN — DICYCLOMINE HYDROCHLORIDE 10 MG: 10 CAPSULE ORAL at 11:41

## 2019-01-01 RX ADMIN — LORAZEPAM 1 MG: 2 INJECTION INTRAMUSCULAR; INTRAVENOUS at 06:22

## 2019-01-01 RX ADMIN — PANTOPRAZOLE SODIUM 40 MG: 40 TABLET, DELAYED RELEASE ORAL at 06:13

## 2019-01-01 RX ADMIN — HYDROMORPHONE HYDROCHLORIDE 1 MG: 1 INJECTION, SOLUTION INTRAMUSCULAR; INTRAVENOUS; SUBCUTANEOUS at 04:40

## 2019-01-01 RX ADMIN — HYDRALAZINE HYDROCHLORIDE 50 MG: 50 TABLET, FILM COATED ORAL at 21:45

## 2019-01-01 RX ADMIN — HYDROMORPHONE HYDROCHLORIDE 0.5 MG: 1 INJECTION, SOLUTION INTRAMUSCULAR; INTRAVENOUS; SUBCUTANEOUS at 06:22

## 2019-01-01 RX ADMIN — HYDROCODONE BITARTRATE AND ACETAMINOPHEN 1 TABLET: 5; 325 TABLET ORAL at 00:06

## 2019-01-01 RX ADMIN — LORAZEPAM 0.5 MG: 0.5 TABLET ORAL at 21:01

## 2019-01-01 RX ADMIN — HYDROCORTISONE: 1 CREAM TOPICAL at 21:00

## 2019-01-01 RX ADMIN — HYDROCORTISONE: 1 CREAM TOPICAL at 22:22

## 2019-01-01 RX ADMIN — VALACYCLOVIR 1000 MG: 500 TABLET, FILM COATED ORAL at 08:45

## 2019-01-01 RX ADMIN — DICYCLOMINE HYDROCHLORIDE 10 MG: 10 CAPSULE ORAL at 17:36

## 2019-01-01 RX ADMIN — CARVEDILOL 25 MG: 25 TABLET, FILM COATED ORAL at 08:17

## 2019-01-01 RX ADMIN — HYDROMORPHONE HYDROCHLORIDE 1 MG: 1 INJECTION, SOLUTION INTRAMUSCULAR; INTRAVENOUS; SUBCUTANEOUS at 00:37

## 2019-01-01 RX ADMIN — LORAZEPAM 0.5 MG: 0.5 TABLET ORAL at 22:22

## 2019-01-01 RX ADMIN — SENNA 17.2 MG: 8.6 TABLET, COATED ORAL at 10:05

## 2019-01-01 RX ADMIN — ISOSORBIDE MONONITRATE 60 MG: 60 TABLET ORAL at 08:53

## 2019-01-01 RX ADMIN — DICYCLOMINE HYDROCHLORIDE 10 MG: 10 CAPSULE ORAL at 17:37

## 2019-01-01 RX ADMIN — DICYCLOMINE HYDROCHLORIDE 10 MG: 10 CAPSULE ORAL at 20:09

## 2019-01-01 RX ADMIN — CARVEDILOL 25 MG: 25 TABLET, FILM COATED ORAL at 09:38

## 2019-01-01 RX ADMIN — ISOSORBIDE MONONITRATE 60 MG: 60 TABLET ORAL at 10:04

## 2019-01-01 RX ADMIN — HYDROCORTISONE: 1 CREAM TOPICAL at 06:54

## 2019-01-01 RX ADMIN — LORAZEPAM 0.5 MG: 0.5 TABLET ORAL at 00:35

## 2019-01-01 RX ADMIN — BUMETANIDE 2 MG: 0.25 INJECTION INTRAMUSCULAR; INTRAVENOUS at 14:40

## 2019-01-01 RX ADMIN — HYDRALAZINE HYDROCHLORIDE 50 MG: 50 TABLET, FILM COATED ORAL at 08:52

## 2019-01-01 RX ADMIN — SENNA 17.2 MG: 8.6 TABLET, COATED ORAL at 08:45

## 2019-01-01 RX ADMIN — DICYCLOMINE HYDROCHLORIDE 10 MG: 10 CAPSULE ORAL at 10:04

## 2019-01-01 RX ADMIN — DICYCLOMINE HYDROCHLORIDE 10 MG: 10 CAPSULE ORAL at 06:52

## 2019-01-01 RX ADMIN — CARVEDILOL 25 MG: 25 TABLET, FILM COATED ORAL at 17:50

## 2019-01-01 RX ADMIN — CARVEDILOL 25 MG: 25 TABLET, FILM COATED ORAL at 18:57

## 2019-01-01 RX ADMIN — FUROSEMIDE 40 MG: 40 INJECTION, SOLUTION INTRAMUSCULAR; INTRAVENOUS at 09:51

## 2019-01-01 RX ADMIN — SENNA 17.2 MG: 8.6 TABLET, COATED ORAL at 21:37

## 2019-01-01 RX ADMIN — DICYCLOMINE HYDROCHLORIDE 10 MG: 10 CAPSULE ORAL at 21:48

## 2019-01-01 RX ADMIN — CARVEDILOL 25 MG: 25 TABLET, FILM COATED ORAL at 08:53

## 2019-01-01 RX ADMIN — DICYCLOMINE HYDROCHLORIDE 10 MG: 10 CAPSULE ORAL at 20:31

## 2019-01-01 RX ADMIN — DICYCLOMINE HYDROCHLORIDE 10 MG: 10 CAPSULE ORAL at 17:25

## 2019-01-01 RX ADMIN — ACETAMINOPHEN 650 MG: 325 TABLET, FILM COATED ORAL at 22:36

## 2019-01-01 RX ADMIN — HYDROMORPHONE HYDROCHLORIDE 1 MG: 1 INJECTION, SOLUTION INTRAMUSCULAR; INTRAVENOUS; SUBCUTANEOUS at 07:52

## 2019-01-01 RX ADMIN — SODIUM CHLORIDE, PRESERVATIVE FREE 10 ML: 5 INJECTION INTRAVENOUS at 08:18

## 2019-01-01 RX ADMIN — LORAZEPAM 0.5 MG: 2 INJECTION INTRAMUSCULAR; INTRAVENOUS at 22:11

## 2019-01-01 RX ADMIN — SENNA 17.2 MG: 8.6 TABLET, COATED ORAL at 09:51

## 2019-01-01 RX ADMIN — SODIUM CHLORIDE, PRESERVATIVE FREE 10 ML: 5 INJECTION INTRAVENOUS at 09:52

## 2019-01-01 RX ADMIN — DICYCLOMINE HYDROCHLORIDE 10 MG: 10 CAPSULE ORAL at 21:19

## 2019-01-01 RX ADMIN — HYDROCORTISONE: 1 CREAM TOPICAL at 16:41

## 2019-01-01 RX ADMIN — TORSEMIDE 40 MG: 20 TABLET ORAL at 10:04

## 2019-01-01 RX ADMIN — LORAZEPAM 0.5 MG: 2 INJECTION INTRAMUSCULAR; INTRAVENOUS at 13:49

## 2019-01-01 RX ADMIN — ATORVASTATIN CALCIUM 10 MG: 10 TABLET, FILM COATED ORAL at 08:45

## 2019-01-01 RX ADMIN — HYDRALAZINE HYDROCHLORIDE 50 MG: 50 TABLET, FILM COATED ORAL at 21:16

## 2019-01-01 RX ADMIN — SENNA 17.2 MG: 8.6 TABLET, COATED ORAL at 09:38

## 2019-01-01 RX ADMIN — DICYCLOMINE HYDROCHLORIDE 10 MG: 10 CAPSULE ORAL at 13:00

## 2019-01-01 RX ADMIN — ISOSORBIDE MONONITRATE 60 MG: 60 TABLET ORAL at 21:15

## 2019-01-01 RX ADMIN — ONDANSETRON 4 MG: 2 INJECTION INTRAMUSCULAR; INTRAVENOUS at 06:03

## 2019-01-01 RX ADMIN — GLYCOPYRROLATE 0.4 MG: 0.2 INJECTION, SOLUTION INTRAMUSCULAR; INTRAVITREAL at 12:18

## 2019-01-01 RX ADMIN — HYDROMORPHONE HYDROCHLORIDE 0.5 MG: 1 INJECTION, SOLUTION INTRAMUSCULAR; INTRAVENOUS; SUBCUTANEOUS at 03:08

## 2019-01-01 RX ADMIN — HYDROCORTISONE: 1 CREAM TOPICAL at 23:01

## 2019-01-01 RX ADMIN — SENNA 17.2 MG: 8.6 TABLET, COATED ORAL at 08:53

## 2019-01-01 RX ADMIN — LORAZEPAM 2 MG: 2 INJECTION INTRAMUSCULAR; INTRAVENOUS at 04:40

## 2019-01-01 RX ADMIN — Medication 15 G: at 08:49

## 2019-01-01 RX ADMIN — ATORVASTATIN CALCIUM 10 MG: 10 TABLET, FILM COATED ORAL at 08:53

## 2019-01-01 RX ADMIN — GLYCOPYRROLATE 0.4 MG: 0.2 INJECTION, SOLUTION INTRAMUSCULAR; INTRAVITREAL at 20:58

## 2019-01-01 RX ADMIN — Medication 15 G: at 21:16

## 2019-01-01 RX ADMIN — HYDRALAZINE HYDROCHLORIDE 50 MG: 50 TABLET, FILM COATED ORAL at 09:44

## 2019-01-01 RX ADMIN — DICYCLOMINE HYDROCHLORIDE 10 MG: 10 CAPSULE ORAL at 06:23

## 2019-01-01 RX ADMIN — ISOSORBIDE MONONITRATE 60 MG: 60 TABLET ORAL at 21:18

## 2019-01-01 RX ADMIN — ONDANSETRON 4 MG: 2 INJECTION INTRAMUSCULAR; INTRAVENOUS at 15:37

## 2019-01-01 RX ADMIN — SODIUM CHLORIDE, PRESERVATIVE FREE 10 ML: 5 INJECTION INTRAVENOUS at 00:36

## 2019-01-01 RX ADMIN — SODIUM CHLORIDE 50 ML/HR: 9 INJECTION, SOLUTION INTRAVENOUS at 11:30

## 2019-01-01 RX ADMIN — CARVEDILOL 25 MG: 25 TABLET, FILM COATED ORAL at 17:25

## 2019-01-01 RX ADMIN — CARVEDILOL 25 MG: 25 TABLET, FILM COATED ORAL at 18:16

## 2019-01-01 RX ADMIN — VALACYCLOVIR 1000 MG: 500 TABLET, FILM COATED ORAL at 10:05

## 2019-01-01 RX ADMIN — HYDRALAZINE HYDROCHLORIDE 25 MG: 25 TABLET, FILM COATED ORAL at 10:26

## 2019-01-01 RX ADMIN — DICYCLOMINE HYDROCHLORIDE 10 MG: 10 CAPSULE ORAL at 21:45

## 2019-01-01 RX ADMIN — HYDROCODONE BITARTRATE AND ACETAMINOPHEN 1 TABLET: 5; 325 TABLET ORAL at 16:01

## 2019-01-01 RX ADMIN — PANTOPRAZOLE SODIUM 40 MG: 40 TABLET, DELAYED RELEASE ORAL at 06:18

## 2019-01-01 RX ADMIN — LORAZEPAM 2 MG: 2 INJECTION INTRAMUSCULAR; INTRAVENOUS at 09:07

## 2019-01-01 RX ADMIN — HYDROCORTISONE: 1 CREAM TOPICAL at 13:11

## 2019-01-01 RX ADMIN — DICYCLOMINE HYDROCHLORIDE 10 MG: 10 CAPSULE ORAL at 07:02

## 2019-01-01 RX ADMIN — SODIUM CHLORIDE, PRESERVATIVE FREE 10 ML: 5 INJECTION INTRAVENOUS at 20:31

## 2019-01-01 RX ADMIN — DICYCLOMINE HYDROCHLORIDE 10 MG: 10 CAPSULE ORAL at 11:05

## 2019-01-04 NOTE — TELEPHONE ENCOUNTER
----- Message from Shaina Zavala sent at 1/4/2019  8:56 AM EST -----  Contact: Patient  Patient requesting refill on    LORazepam (ATIVAN) 0.5 MG tablet    Patient:[phone]    Pharmacy:[address] [phone] [facility] [phone]

## 2019-01-08 NOTE — PROGRESS NOTES
Anticoagulation Clinic Progress Note    Anticoagulation Summary  As of 2019    INR goal:   2.0-3.0   TTR:   76.2 % (4.4 mo)   INR used for dosin.3 (2019)   Warfarin maintenance plan:   4 mg on Mon; 2 mg all other days   Weekly warfarin total:   16 mg   No change documented:   Thuy Gracia   Plan last modified:   Brianna Sears RPH (2018)   Next INR check:   2019   Priority:   Maintenance   Target end date:   Indefinite    Indications    Permanent atrial fibrillation (CMS/HCC) [I48.2]             Anticoagulation Episode Summary     INR check location:       Preferred lab:       Send INR reminders to:    JEREMY HUNTER CLINICAL POOL    Comments:         Anticoagulation Care Providers     Provider Role Specialty Phone number    Felicitas Brantley MD Referring Cardiology [phone]          Clinic Interview:  Patient Findings     Negatives:   Signs/symptoms of thrombosis, Signs/symptoms of bleeding,   Laboratory test error suspected, Change in health, Change in alcohol use,   Change in activity, Upcoming invasive procedure, Emergency department   visit, Upcoming dental procedure, Missed doses, Extra doses, Change in   medications, Change in diet/appetite, Hospital admission, Bruising, Other   complaints      Clinical Outcomes     Negatives:   Major bleeding event, Thromboembolic event,   Anticoagulation-related hospital admission, Anticoagulation-related ED   visit, Anticoagulation-related fatality        INR History:  Anticoagulation Monitoring 12/10/2018 2018 2019   INR - 1.6 2.3   INR Date - 2018   INR Goal 2.0-3.0 2.0-3.0 2.0-3.0   Trend Same Same Same   Last Week Total 16 mg 14 mg 16 mg   Next Week Total 14 mg 16 mg 16 mg   Sun 2 mg 2 mg 2 mg   Mon 2 mg (12/10) 4 mg 4 mg   Tue 2 mg 2 mg 2 mg   Wed 2 mg 2 mg 2 mg   Thu 2 mg 2 mg 2 mg   Fri 2 mg 2 mg 2 mg   Sat 2 mg 2 mg 2 mg   Visit Report - - -   Some recent data might be hidden       Plan:  1. INR is  therapeutic today- see above in Anticoagulation Summary.   Will instruct Yudith Robertson to continue their warfarin regimen- see above in Anticoagulation Summary.  2. Follow up in 4 weeks.  3. Patient declines warfarin refills.  4. Verbal and written information provided. Patient expresses understanding and has no further questions at this time.    Thuy Gracia

## 2019-01-08 NOTE — PROGRESS NOTES
Subjective   Yudith Robertson is a 89 y.o. female.     She checks her blood pressure routinely and varies. She is walking 0.5 mile daily.  She weighs herself daily and weight stable. She follows low salt diet.       Atrial Fibrillation   Presents for follow-up visit. Symptoms include dizziness (intermittent, chronic ) and palpitations (intermittent, stable ). Symptoms are negative for chest pain and shortness of breath. The symptoms have been stable. Past medical history includes atrial fibrillation and hyperlipidemia. There are no medication compliance problems.   Hypertension   This is a chronic problem. The current episode started more than 1 year ago. The problem is controlled. Associated symptoms include palpitations (intermittent, stable ). Pertinent negatives include no anxiety, blurred vision, chest pain, headaches, orthopnea, peripheral edema, PND or shortness of breath. Current antihypertension treatment includes diuretics, calcium channel blockers and beta blockers. The current treatment provides significant improvement.   Hyperlipidemia   This is a chronic problem. The current episode started more than 1 year ago. The problem is controlled. Recent lipid tests were reviewed and are normal. Pertinent negatives include no chest pain or shortness of breath. Current antihyperlipidemic treatment includes statins. The current treatment provides significant improvement of lipids.        The following portions of the patient's history were reviewed and updated as appropriate: allergies, current medications, past family history, past medical history, past social history, past surgical history and problem list.    Review of Systems   Constitutional: Negative for activity change, appetite change, fatigue and fever.        Overall doing ok    Eyes: Negative for blurred vision.   Respiratory: Negative for cough, shortness of breath and wheezing.    Cardiovascular: Positive for palpitations (intermittent, stable ). Negative  for chest pain, orthopnea, leg swelling and PND.   Musculoskeletal: Positive for arthralgias (both knees, gets steroid injections).   Neurological: Positive for dizziness (intermittent, chronic ). Negative for headaches.       Objective   Physical Exam   Constitutional: She is oriented to person, place, and time. She appears well-developed and well-nourished.   HENT:   Head: Normocephalic.   Nose: Nose normal.   Neck: Carotid bruit is not present. No thyroid mass and no thyromegaly present.   Cardiovascular: Regular rhythm and normal heart sounds. Exam reveals no S3 and no S4.   No murmur heard.  Repeat bp left arm 132/68  No pedal edema    Pulmonary/Chest: Effort normal and breath sounds normal. She has no decreased breath sounds. She has no wheezes. She has no rhonchi. She has no rales.   Musculoskeletal: She exhibits no edema.   Neurological: She is alert and oriented to person, place, and time. Gait (using walker for assistant ) abnormal.   Skin: Skin is warm and dry.   Psychiatric: She has a normal mood and affect.       Assessment/Plan   Yudith was seen today for atrial fibrillation, hypertension and hyperlipidemia.    Diagnoses and all orders for this visit:    Atrial fibrillation, chronic (CMS/HCC)  Comments:  stable with warfarin     Essential hypertension  -     Comprehensive Metabolic Panel; Future  -     CBC (No Diff); Future  -     Comprehensive Metabolic Panel  -     CBC (No Diff)    Mixed hyperlipidemia  -     Lipid Panel; Future  -     Lipid Panel    Chronic combined systolic and diastolic congestive heart failure (CMS/HCC)  Comments:  stable with entresto and demadex

## 2019-02-01 NOTE — TELEPHONE ENCOUNTER
----- Message from Corie Hernandez sent at 2/1/2019  8:44 AM EST -----  Contact: pt - Dr Garzon's pt - RE: Rx refill  Pt calling and would like a refill on Rx      LORazepam (ATIVAN) 0.5 MG tablet 50 tablet     Sig - Route: Take 1 tablet by mouth Every 8 (Eight) Hours As Needed for Anxiety. for anxiety - Oral     [address] [phone] [facility] [phone] FX    Pt # [phone] or [phone]

## 2019-02-04 NOTE — PROGRESS NOTES
Anticoagulation Clinic Progress Note    Anticoagulation Summary  As of 2019    INR goal:   2.0-3.0   TTR:   80.3 % (5.3 mo)   INR used for dosin.0 (2019)   Warfarin maintenance plan:   4 mg on Mon; 2 mg all other days   Weekly warfarin total:   16 mg   No change documented:   Thuy Gracia   Plan last modified:   Brianna Sears RPH (2018)   Next INR check:   2019   Priority:   Critical   Target end date:   Indefinite    Indications    Permanent atrial fibrillation (CMS/HCC) [I48.2]             Anticoagulation Episode Summary     INR check location:       Preferred lab:       Send INR reminders to:    JEREMY HUNTER CLINICAL POOL    Comments:         Anticoagulation Care Providers     Provider Role Specialty Phone number    Felicitas Brantley MD Referring Cardiology [phone]          Clinic Interview:  Patient Findings     Negatives:   Signs/symptoms of thrombosis, Signs/symptoms of bleeding,   Laboratory test error suspected, Change in health, Change in alcohol use,   Change in activity, Upcoming invasive procedure, Emergency department   visit, Upcoming dental procedure, Missed doses, Extra doses, Change in   medications, Change in diet/appetite, Hospital admission, Bruising, Other   complaints      Clinical Outcomes     Negatives:   Major bleeding event, Thromboembolic event,   Anticoagulation-related hospital admission, Anticoagulation-related ED   visit, Anticoagulation-related fatality        INR History:  Anticoagulation Monitoring 2018   INR 1.6 2.3 2.0   INR Date 2018   INR Goal 2.0-3.0 2.0-3.0 2.0-3.0   Trend Same Same Same   Last Week Total 14 mg 16 mg 16 mg   Next Week Total 16 mg 16 mg 16 mg   Sun 2 mg 2 mg 2 mg   Mon 4 mg 4 mg 4 mg   Tue 2 mg 2 mg 2 mg   Wed 2 mg 2 mg 2 mg   Thu 2 mg 2 mg 2 mg   Fri 2 mg 2 mg 2 mg   Sat 2 mg 2 mg 2 mg   Visit Report - - -   Some recent data might be hidden       Plan:  1. INR is  therapeutic today- see above in Anticoagulation Summary.   Will instruct Yudith Robertson to continue their warfarin regimen- see above in Anticoagulation Summary.  2. Follow up in 1 weeks.  3. Patient desires warfarin refills.  4. Verbal and written information provided. Patient expresses understanding and has no further questions at this time.    Thuy Gracia

## 2019-02-12 NOTE — PROGRESS NOTES
Anticoagulation Clinic Progress Note    Anticoagulation Summary  As of 2019    INR goal:   2.0-3.0   TTR:   81.2 % (5.5 mo)   INR used for dosin.4 (2019)   Warfarin maintenance plan:   4 mg on Mon; 2 mg all other days   Weekly warfarin total:   16 mg   No change documented:   Shonna Steele   Plan last modified:   Brianna Sears RPH (2018)   Next INR check:   3/12/2019   Priority:   Critical   Target end date:   Indefinite    Indications    Permanent atrial fibrillation (CMS/HCC) [I48.2]             Anticoagulation Episode Summary     INR check location:       Preferred lab:       Send INR reminders to:    [facility]    Comments:         Anticoagulation Care Providers     Provider Role Specialty Phone number    Felicitas Brantley MD Referring Cardiology [phone]          Clinic Interview:  Patient Findings     Negatives:   Signs/symptoms of thrombosis, Signs/symptoms of bleeding,   Laboratory test error suspected, Change in health, Change in alcohol use,   Change in activity, Upcoming invasive procedure, Emergency department   visit, Upcoming dental procedure, Missed doses, Extra doses, Change in   medications, Change in diet/appetite, Hospital admission, Bruising, Other   complaints      Clinical Outcomes     Negatives:   Major bleeding event, Thromboembolic event,   Anticoagulation-related hospital admission, Anticoagulation-related ED   visit, Anticoagulation-related fatality        INR History:  Anticoagulation Monitoring 2019   INR 2.3 2.0 2.4   INR Date 2019   INR Goal 2.0-3.0 2.0-3.0 2.0-3.0   Trend Same Same Same   Last Week Total 16 mg 16 mg 16 mg   Next Week Total 16 mg 16 mg 16 mg   Sun 2 mg 2 mg 2 mg   Mon 4 mg 4 mg 4 mg   Tue 2 mg 2 mg 2 mg   Wed 2 mg 2 mg 2 mg   Thu 2 mg 2 mg 2 mg   Fri 2 mg 2 mg 2 mg   Sat 2 mg 2 mg 2 mg   Visit Report - - -   Some recent data might be hidden       Plan:  1. INR is therapeutic  today- see above in Anticoagulation Summary.   Will instruct Yudith Robertson to continue their warfarin regimen- see above in Anticoagulation Summary.  2. Follow up in 4 weeks.  3. Patient declines warfarin refills.  4. Verbal and written information provided. Patient expresses understanding and has no further questions at this time.    Shonna Steele

## 2019-02-18 NOTE — TELEPHONE ENCOUNTER
02/18/19  2:28 PM  Yudith Robertson  7/9/1927    Home Phone [phone]   Mobile [phone]       Yudith Robertson is a patient of Dr Brantley.  She is calling with c/o a 4lb wt gain over the last week.  She does not have any edema, she is SOA, having palpitations at times.  She reports yesterday was worse than today.    170/70 hr 70    Cardiac med reviewed with patient  Coumadin  Torsemide 20mg 2 tabs daily  imdur 60mg BID  entresto 49/51mg every 12 hours  Carvedilol 12.5mg BID    Does she need a med adjustment?    Magalie Marmolejo RN  Triage nurse

## 2019-02-18 NOTE — TELEPHONE ENCOUNTER
Discussed with Dr Brantley.  No changes at this time.  Patient notified and verbalized understanding.  Magalie Marmolejo RN  Triage nurse

## 2019-02-25 NOTE — PROGRESS NOTES
PATIENTINFORMATION    Date of Office Visit: 2019  Encounter Provider: Felicitas Brantley MD  Place of Service: [facility] CARDIOLOGY  Patient Name: Yudith Robertson  : 1927    Subjective:     Encounter Date:2019      Patient ID: Yudith Robertson is a 89 y.o. female.      History of Present Illness    She had a heart catheterization in , which showed nonobstructive disease with 30%-40% stenoses in various vessels and an ejection fraction of 50%. She came to [facility] in 2015 with chest pain. She had a PET stress test 2/15 in our office, which was normal and showed normal left ventricular function. Echocardiogram was in 2014 and showed normal left ventricular systolic function with an ejection fraction of 54%. There was abnormal diastolic filling pressures. There was mild to moderate mitral regurgitation, moderate to severe tricuspid regurgitation, and right ventricular systolic pressure of 52 mmHg. She has persistent atrial fibrillation.         She was admitted to [facility] on 2015, with acute diastolic congestive heart failure. She diuresed with intravenous Lasix and then with oral Lasix. She complained of chest pain, which sounded like an esophageal stricture. Dr. Morales saw her and recommended an outpatient EGD with Dr. Abrams. She did have this and had an esophageal stricture dilated.       I saw her in 2016. At that time she was complaining of shortness of breath with paroxysmal nocturnal dyspnea and orthopnea. Because of this, she had a repeat echocardiogram performed on August 3, 2016:   - Left ventricular function is normal. Calculated EF = 63.6%. Estimated EF was in agreement with the calculated EF. Estimated EF = 64%. Normal left ventricular cavity size noted. Left ventricular wall thickness is consistent with mild concentric hypertrophy. Left ventricular diastolic function was unable to be assessed. Elevated left atrial pressure.  -  The following segments are hypokinetic: basal inferior and mid inferior. All other segments are normal.  - Left atrial volume is severely increased.  - Right atrial cavity size is severely dilated  - There is severe thickening of the aortic valve. Mild aortic valve regurgitation is present.  - Severe mitral annular calcification is present. Mild mitral valve regurgitation is present.  - The tricuspid valve is grossly normal. Moderate tricuspid valve stenosis is present. Mild tricuspid valve regurgitation is present. Estimated right ventricular systolic pressure from tricuspid regurgitation is moderately elevated (45-55 mmHg). The calculated RV systolic pressures 51 mmHg.      She was hospitalized at [facility] on 05/17/2017.  She came in because of difficulty swallowing and she was in a little bit of diastolic heart failure. Her warfarin was held and she underwent an esophageal dilation.      Around 05/26/2017, she was acting confused and Ashly was concerned.  She contacted the patient's daughter.  The patient ended up being taken to [facility] by ambulance for stroke. She was treated with tPA while in the hospital there.  I do not know what her INR was when she arrived at [facility], but she had been running supratherapeutic around that time because of treatment with fluconazole.       We struggled with volume overload over the summer and fall of 2017.  She repeatedly declined admission to the hospital.  Ultimately, we were unsuccessful in controlling her volume status as an outpatient.  She was admitted to the hospital in August 2017 and at that time underwent an echocardiogram which showed her ejection fraction had dropped to 27%.  She was also noted to have severe left atrial enlargement, moderate aortic regurgitation, mild mitral regurgitation, mild to moderate tricuspid regurgitation with a right ventricular systolic pressure 65 mmHg.  She also had a nuclear stress test on August 17, 2017 which  was normal at the exception of an ejection fraction of 30%.  Valsartan was discontinued and she was placed on Enteresto.     She was readmitted to the hospital in September 2017 with acute on chronic systolic congestive heart failure.  She was discharged on torsemide.  Since then, she has seen Aminata multiple times.  She has remained euvolemic.    She has not had any change in her symptoms. She feels some pressure in her chest when she is feeling anxious. She has an occasional palpitation. She is trying to stay active and reports that she is walking about a half a mile a day. She does not have exertional symptoms except sometimes she gets weak and fatigued. Her systolic blood pressure has been running elevated.           Review of Systems   Constitution: Negative for fever, malaise/fatigue, weight gain and weight loss.   HENT: Negative for ear pain, hearing loss, nosebleeds and sore throat.    Eyes: Negative for double vision, pain, vision loss in left eye and vision loss in right eye.   Cardiovascular: Negative for orthopnea.        See history of present illness.   Respiratory: Negative for cough, shortness of breath, sleep disturbances due to breathing, snoring and wheezing.    Endocrine: Negative for cold intolerance, heat intolerance and polyuria.   Skin: Negative for itching, poor wound healing and rash.   Musculoskeletal: Negative for joint pain, joint swelling and myalgias.   Gastrointestinal: Negative for abdominal pain, diarrhea, hematochezia, nausea and vomiting.   Genitourinary: Negative for hematuria and hesitancy.   Neurological: Negative for numbness, paresthesias and seizures.   Psychiatric/Behavioral: Negative for depression. The patient is nervous/anxious.            ECG 12 Lead  Date/Time: 2/25/2019 11:03 AM  Performed by: Felicitas Brantley MD  Authorized by: Felicitas Brantley MD   Comparison: compared with previous ECG from 8/28/2018  Similar to previous ECG  Rhythm: atrial fibrillation  BPM:  "75  Conduction: left bundle branch block    Clinical impression: abnormal EKG               Objective:     /82 (BP Location: Left arm, Patient Position: Sitting, Cuff Size: Adult)   Pulse 91   Ht 152.4 cm (60\")   Wt 67.4 kg (148 lb 9.6 oz)   SpO2 94%   BMI 29.02 kg/m²  Body mass index is 29.02 kg/m².     Physical Exam   Constitutional: She appears well-developed.   HENT:   Head: Normocephalic and atraumatic.   Eyes: Conjunctivae and lids are normal. Pupils are equal, round, and reactive to light. Lids are everted and swept, no foreign bodies found.   Neck: Normal range of motion. No JVD present. Carotid bruit is not present. No tracheal deviation present. No thyroid mass present.   Cardiovascular: Normal rate and normal heart sounds. An irregularly irregular rhythm present.   Pulses:       Dorsalis pedis pulses are 2+ on the right side, and 2+ on the left side.   Pulmonary/Chest: Effort normal and breath sounds normal.   Abdominal: Normal appearance and bowel sounds are normal.   Musculoskeletal: Normal range of motion.   Neurological: She is alert. She has normal strength.   Skin: Skin is warm, dry and intact.   Psychiatric: She has a normal mood and affect. Her behavior is normal.   Vitals reviewed.      Lab Review: I reviewed laboratory data from January 2019.      Assessment/Plan:       1. Heart Failure  Assessment  • NYHA class III-B - There is significant limitation of physical activity. The patient is comfortable at rest, but minimal activity causes fatigue, palpitations or shortness of breath.  • Beta blocker prescribed  • Angiotensin receptor blocker (ARB) prescribed  • The most recent ejection fraction is 27%    Subjective/Objective    • Physical exam findings negative for peripheral edema and elevated JVP.      2.  Chest pain.  She had a heart catheterization in the past which showed nonobstructive disease.  She had a normal stress test in August 2017.  3. Atrial Fibrillation and Atrial " Flutter  Assessment  • The patient has permanent atrial fibrillation  • The patient's CHADS2-VASc score is 7  • A [id number] score of 2 or more is considered a high risk for a thromboembolic event    Plan  • Continue in atrial fibrillation with rate control  • Continue warfarin for antithrombotic therapy, bleeding issues discussed  • Continue beta blocker for rate control    4. Valvular heart disease with moderate tricuspid regurgitation and mild mitral regurgitation.   5. Moderate pulmonary hypertension.   6. History of carotid stenosis, 30%-40% bilateral disease.   7. History of gastroesophageal reflux disease and esophageal stenosis.   8. Hyperlipidemia.   9. History of small bowel obstruction.   10. History of left bundle branch block.   11. History of stroke.   12. Cough. Better.  13.  Hypoxia.  She follows with Dr. Salomon.  She wears oxygen at night.     I will see her back in 1 year unless she is having problems sooner.    Orders Placed This Encounter   Procedures   • ECG 12 Lead     This order was created via procedure documentation        Discharge Medications           Accurate as of 2/25/19 11:04 AM. If you have any questions, ask your nurse or doctor.               Continue These Medications      Instructions Start Date   acetaminophen 500 MG tablet  Commonly known as:  TYLENOL   500 mg, Oral, Every 6 Hours PRN      carvedilol 12.5 MG tablet  Commonly known as:  COREG   TAKE 1 TABLET BY MOUTH  TWICE A DAY WITH MEALS      dicyclomine 10 MG capsule  Commonly known as:  BENTYL   TAKE ONE CAPSULE BY MOUTH EVERY 6 HOURS AS NEEDED (CRAMPING)      fexofenadine 180 MG tablet  Commonly known as:  ALLEGRA   180 mg, Oral, Daily      isosorbide mononitrate 60 MG 24 hr tablet  Commonly known as:  IMDUR   TAKE 1 TABLET BY MOUTH  TWICE A DAY      lansoprazole 15 MG capsule  Commonly known as:  PREVACID   30 mg, Oral, Daily      LORazepam 0.5 MG tablet  Commonly known as:  ATIVAN   TAKE 1 TABLET BY MOUTH EVERY 8  HOURS AS NEEDED FOR ANXIETY      O2  Commonly known as:  OXYGEN   2 L/min, Inhalation, Once      potassium chloride 10 MEQ CR tablet  Commonly known as:  K-DUR   10 mEq, Oral, Daily      raNITIdine 150 MG tablet  Commonly known as:  ZANTAC   150 mg, Oral, Nightly PRN      sacubitril-valsartan 49-51 MG tablet  Commonly known as:  ENTRESTO   1 tablet, Oral, Every 12 Hours Scheduled      simvastatin 20 MG tablet  Commonly known as:  ZOCOR   20 mg, Oral, Nightly      torsemide 20 MG tablet  Commonly known as:  DEMADEX   TAKE TWO TABLETS BY MOUTH ONCE DAILY      Triamcinolone Acetonide 55 MCG/ACT nasal inhaler  Commonly known as:  NASACORT ALLERGY 24HR   2 sprays, Nasal, Daily      warfarin 2 MG tablet  Commonly known as:  COUMADIN   Take one tablet by mouth daily except take 2 tabs on Monday or as directed by [facility]                    Felicitas Brantley MD  02/25/19  11:04 AM

## 2019-03-07 NOTE — TELEPHONE ENCOUNTER
----- Message from Shaina Zavala sent at 3/7/2019  1:07 PM EST -----  Contact: Patient  Patient calling states pharmacy has not received her prescription for LORazepam (ATIVAN) 0.5 MG tablet. Please advise    Patient:[phone]     Pharmacy:[address] [phone] [facility] [phone]

## 2019-03-07 NOTE — TELEPHONE ENCOUNTER
I called this on on Monday under covering MD, Dr. Blum.  I called pharmacy, they did not receive order and also the medication is on backorder from the  they use.  They are going to call pt to ask if she wants to try to get at another pharmacy and call me back.

## 2019-03-07 NOTE — TELEPHONE ENCOUNTER
[place] [facility] [place], asking if [facility] can [facility] to [facility] [phone].  I called in to [facility] and left message to inform pt.

## 2019-03-12 NOTE — PROGRESS NOTES
Anticoagulation Clinic Progress Note    Anticoagulation Summary  As of 3/12/2019    INR goal:   2.0-3.0   TTR:   83.9 % (6.5 mo)   INR used for dosin.1 (3/12/2019)   Warfarin maintenance plan:   4 mg on Mon; 2 mg all other days   Weekly warfarin total:   16 mg   No change documented:   Thuy Gracia   Plan last modified:   Brianna Sears RPH (2018)   Next INR check:   2019   Priority:   Maintenance   Target end date:   Indefinite    Indications    Permanent atrial fibrillation (CMS/HCC) [I48.2]             Anticoagulation Episode Summary     INR check location:       Preferred lab:       Send INR reminders to:    [facility]    Comments:         Anticoagulation Care Providers     Provider Role Specialty Phone number    Felicitas Brantley MD Referring Cardiology [phone]          Clinic Interview:  Patient Findings     Positives:   Change in medications    Negatives:   Signs/symptoms of thrombosis, Signs/symptoms of bleeding,   Laboratory test error suspected, Change in health, Change in alcohol use,   Change in activity, Upcoming invasive procedure, Emergency department   visit, Upcoming dental procedure, Missed doses, Extra doses, Change in   diet/appetite, Hospital admission, Bruising, Other complaints      Clinical Outcomes     Negatives:   Major bleeding event, Thromboembolic event,   Anticoagulation-related hospital admission, Anticoagulation-related ED   visit, Anticoagulation-related fatality        INR History:  Anticoagulation Monitoring 2019 2019 3/12/2019   INR 2.0 2.4 2.1   INR Date 2019 2019 3/12/2019   INR Goal 2.0-3.0 2.0-3.0 2.0-3.0   Trend Same Same Same   Last Week Total 16 mg 16 mg 16 mg   Next Week Total 16 mg 16 mg 16 mg   Sun 2 mg 2 mg 2 mg   Mon 4 mg 4 mg 4 mg   Tue 2 mg 2 mg 2 mg   Wed 2 mg 2 mg 2 mg   Thu 2 mg 2 mg 2 mg   Fri 2 mg 2 mg 2 mg   Sat 2 mg 2 mg 2 mg   Visit Report - - -   Some recent data might be hidden        Plan:  1. INR is therapeutic today- see above in Anticoagulation Summary.   Will instruct Yudith Robertson to continue their warfarin regimen- see above in Anticoagulation Summary.  2. Follow up in 4 weeks.  3. Patient declines warfarin refills.  4. Verbal and written information provided. Patient expresses understanding and has no further questions at this time.    Thuy Gracia

## 2019-04-02 NOTE — TELEPHONE ENCOUNTER
[facility] Patient-Refill Request    Last Office Visit-Kati 1-8-2019    Last Brian- 1-4-2019    Next Office Visit-Kati 4-

## 2019-04-09 NOTE — PROGRESS NOTES
Subjective   Yudith Robertson is a 89 y.o. female.     She presents today for a productive cough, wheezing, and URI sx x 2 weeks. No recent travel or ID contact. Her sx started as allergic sx but now gone into her chest. She reports that she gets this every year.       Wheezing    This is a new problem. The problem has been gradually worsening. Associated symptoms include chills, coughing (productive), headaches, rhinorrhea, shortness of breath and a sore throat. Pertinent negatives include no abdominal pain, chest pain, fever, swollen glands or vomiting. Her past medical history is significant for chronic lung disease, heart failure and pneumonia. There is no history of asthma.   URI    This is a new problem. The current episode started 1 to 4 weeks ago. The problem has been gradually worsening. There has been no fever. Associated symptoms include congestion, coughing (productive), headaches, rhinorrhea, sneezing, a sore throat and wheezing. Pertinent negatives include no abdominal pain, chest pain, nausea, plugged ear sensation, sinus pain, swollen glands or vomiting. Associated symptoms comments: Ears itch. Treatments tried: tessalon, nasocort, allegra. The treatment provided mild relief.        The following portions of the patient's history were reviewed and updated as appropriate: allergies, current medications, past medical history, past social history, past surgical history and problem list.    Review of Systems   Constitutional: Positive for chills and fatigue. Negative for fever.   HENT: Positive for congestion, rhinorrhea, sneezing and sore throat. Negative for sinus pain.    Respiratory: Positive for cough (productive), chest tightness, shortness of breath and wheezing.    Cardiovascular: Negative for chest pain, palpitations and leg swelling.   Gastrointestinal: Negative for abdominal pain, nausea and vomiting.   Allergic/Immunologic: Positive for environmental allergies.   Neurological: Positive for  headaches.       Objective   Physical Exam   Constitutional: She appears well-developed and well-nourished. No distress.   HENT:   Head: Normocephalic and atraumatic.   Right Ear: Hearing and tympanic membrane normal.   Left Ear: Hearing and tympanic membrane normal.   Nose: Mucosal edema and rhinorrhea present. Right sinus exhibits no maxillary sinus tenderness and no frontal sinus tenderness. Left sinus exhibits no maxillary sinus tenderness and no frontal sinus tenderness.   Mouth/Throat: Uvula is midline and mucous membranes are normal. Posterior oropharyngeal erythema (clear hypersecretions noted) present. No oropharyngeal exudate. No tonsillar exudate.   Eyes: Conjunctivae are normal. Right eye exhibits no discharge. Left eye exhibits no discharge.   Cardiovascular: Normal rate, regular rhythm and normal heart sounds.   No murmur heard.  Pulmonary/Chest: Effort normal and breath sounds normal. No tachypnea. No respiratory distress. She has no wheezes. She has no rhonchi.   Breath sounds sound tight   Lymphadenopathy:     She has no cervical adenopathy.   Neurological: She is alert.   Skin: She is not diaphoretic.   Psychiatric: She has a normal mood and affect.   Vitals reviewed.      Assessment/Plan   Yudith was seen today for wheezing and uri.    Diagnoses and all orders for this visit:    URI, acute  -     azithromycin (ZITHROMAX Z-MORTEZA) 250 MG tablet; Take 2 tablets the first day, then 1 tablet daily for 4 days.    Cough  -     XR Chest 2 View - no acute findings but some possible minor increased opaque areas- will treat with abx to prevent development of pneumonia. Will send to radiology for further review    Continue taking allegra, nasocort, and slightly increase fluids since she can't tolerate Mucinex.                less than 2 seconds

## 2019-04-09 NOTE — PROGRESS NOTES
Anticoagulation Clinic Progress Note    Anticoagulation Summary  As of 2019    INR goal:   2.0-3.0   TTR:   79.6 % (7.4 mo)   INR used for dosin.9! (2019)   Warfarin maintenance plan:   4 mg every Mon; 2 mg all other days   Weekly warfarin total:   16 mg   No change documented:   Cheri Cruz RPH   Plan last modified:   Brianna Sears RPH (2018)   Next INR check:   2019   Priority:   Maintenance   Target end date:   Indefinite    Indications    Permanent atrial fibrillation (CMS/HCC) [I48.2]             Anticoagulation Episode Summary     INR check location:       Preferred lab:       Send INR reminders to:    [facility]    Comments:         Anticoagulation Care Providers     Provider Role Specialty Phone number    Felicitas Brantley MD Referring Cardiology [phone]          Clinic Interview:  Patient Findings     Negatives:   Signs/symptoms of thrombosis, Signs/symptoms of bleeding,   Laboratory test error suspected, Change in health, Change in alcohol use,   Change in activity, Upcoming invasive procedure, Emergency department   visit, Upcoming dental procedure, Missed doses, Extra doses, Change in   medications, Change in diet/appetite, Hospital admission, Bruising, Other   complaints      Clinical Outcomes     Negatives:   Major bleeding event, Thromboembolic event,   Anticoagulation-related hospital admission, Anticoagulation-related ED   visit, Anticoagulation-related fatality        INR History:  Anticoagulation Monitoring 2019 3/12/2019 2019   INR 2.4 2.1 1.9   INR Date 2019 3/12/2019 2019   INR Goal 2.0-3.0 2.0-3.0 2.0-3.0   Trend Same Same Same   Last Week Total 16 mg 16 mg 16 mg   Next Week Total 16 mg 16 mg 16 mg   Sun 2 mg 2 mg 2 mg   Mon 4 mg 4 mg 4 mg   Tue 2 mg 2 mg 2 mg   Wed 2 mg 2 mg 2 mg   Thu 2 mg 2 mg 2 mg   Fri 2 mg 2 mg 2 mg   Sat 2 mg 2 mg 2 mg   Visit Report - - -   Some recent data might be hidden       Plan:  1. INR is  Subtherapeutic today- see above in Anticoagulation Summary.  Will instruct Yudith Robertson to Continue their warfarin regimen- see above in Anticoagulation Summary.  2. Follow up in 4 weeks  3. Patient declines warfarin refills.  4. Verbal and written information provided. Patient expresses understanding and has no further questions at this time.    Cheri Cruz, [facility]

## 2019-04-09 NOTE — TELEPHONE ENCOUNTER
Pt's pharmacy called stating there is an interaction with Zpak and Warfarin.  They wanted to make sure you were aware of interaction and to see if you want to make changes.

## 2019-04-11 NOTE — TELEPHONE ENCOUNTER
Spoke to pt. Her cough is still productive but it is hard for her to get it up.  She is unable to take Mucinex. She has already been taking Tessalon perles that she received previously from another MD. Her CXR aren't final.  At this time, I recommended trying Delsym OTC for the cough. She was agreeable. She is seeing Kati on Tuesday as previously scheduled.

## 2019-04-11 NOTE — TELEPHONE ENCOUNTER
----- Message from Gayla Melgar sent at 4/11/2019  9:12 AM EDT -----  Contact: Pt   Pt was seen on 4/9.  She wanted to know if she could have something a bit stronger called in for her cough.      Pt#[phone]     [address] [place] [address] [phone] [facility] [phone]

## 2019-04-12 NOTE — PROGRESS NOTES
Anticoagulation Clinic Progress Note    Anticoagulation Summary  As of 2019    INR goal:   2.0-3.0   TTR:   79.5 % (7.5 mo)   INR used for dosin.2 (2019)   Warfarin maintenance plan:   4 mg every Mon; 2 mg all other days   Weekly warfarin total:   16 mg   No change documented:   Thuy Gracia   Plan last modified:   Brianna Sears RPH (2018)   Next INR check:   2019   Priority:   Maintenance   Target end date:   Indefinite    Indications    Permanent atrial fibrillation (CMS/HCC) [I48.2]             Anticoagulation Episode Summary     INR check location:       Preferred lab:       Send INR reminders to:    [facility]    Comments:         Anticoagulation Care Providers     Provider Role Specialty Phone number    Felicitas Brantley MD Referring Cardiology [phone]          Clinic Interview:  Patient Findings     Negatives:   Signs/symptoms of thrombosis, Signs/symptoms of bleeding,   Laboratory test error suspected, Change in health, Change in alcohol use,   Change in activity, Upcoming invasive procedure, Emergency department   visit, Upcoming dental procedure, Missed doses, Extra doses, Change in   medications, Change in diet/appetite, Hospital admission, Bruising, Other   complaints      Clinical Outcomes     Negatives:   Major bleeding event, Thromboembolic event,   Anticoagulation-related hospital admission, Anticoagulation-related ED   visit, Anticoagulation-related fatality        INR History:  Anticoagulation Monitoring 3/12/2019 2019 2019   INR 2.1 1.9 2.2   INR Date 3/12/2019 2019 2019   INR Goal 2.0-3.0 2.0-3.0 2.0-3.0   Trend Same Same Same   Last Week Total 16 mg 16 mg 16 mg   Next Week Total 16 mg 16 mg 16 mg   Sun 2 mg 2 mg 2 mg   Mon 4 mg 4 mg 4 mg   Tue 2 mg 2 mg 2 mg   Wed 2 mg 2 mg 2 mg   Thu 2 mg 2 mg 2 mg   Fri 2 mg 2 mg 2 mg   Sat 2 mg 2 mg 2 mg   Visit Report - - -   Some recent data might be hidden       Plan:  1. INR is  therapeutic today- see above in Anticoagulation Summary.   Will instruct Yudith Robertson to continue their warfarin regimen- see above in Anticoagulation Summary.  2. Follow up in 4 weeks.  3. Patient declines warfarin refills.  4. Verbal and written information provided. Patient expresses understanding and has no further questions at this time.    Thuy Gracia

## 2019-04-16 NOTE — PROGRESS NOTES
Subjective   Yudith Robertson is a 89 y.o. female.     She does monitor her blood pressure routinely and readings vary. She was recently treated for respiratory infection with z-flavia and slowly improving.       Hypertension   This is a chronic problem. The current episode started more than 1 year ago. The problem is controlled. Pertinent negatives include no anxiety, blurred vision, chest pain, headaches, orthopnea, palpitations, peripheral edema, PND or shortness of breath. Current antihypertension treatment includes calcium channel blockers and angiotensin blockers. The current treatment provides significant improvement. There are no compliance problems.    Atrial Fibrillation   Presents for follow-up visit. Symptoms are negative for an AICD problem, bradycardia, chest pain, dizziness, hemodynamic instability, pacemaker problem, palpitations, shortness of breath, syncope, tachycardia and weakness (using walker for assistance ). Past medical history includes atrial fibrillation.        The following portions of the patient's history were reviewed and updated as appropriate: allergies, current medications, past family history, past medical history, past social history, past surgical history and problem list.    Review of Systems   Constitutional: Negative for activity change, appetite change, fatigue and fever.   HENT: Positive for postnasal drip and sinus pressure.    Eyes: Positive for visual disturbance. Negative for blurred vision. Photophobia: wears glasses    Respiratory: Positive for cough (production ) and wheezing (intermittently ). Negative for shortness of breath.    Cardiovascular: Negative for chest pain, palpitations, orthopnea, leg swelling, syncope and PND.   Gastrointestinal: Negative for constipation, diarrhea, nausea, rectal pain and vomiting.        Reflux stable    Allergic/Immunologic: Positive for environmental allergies.   Neurological: Negative for dizziness, weakness (using walker for assistance  ) and headaches.   Psychiatric/Behavioral: Positive for sleep disturbance (uses ativan ). Negative for confusion and decreased concentration.       Objective   Physical Exam   Constitutional: She is oriented to person, place, and time. She appears well-developed and well-nourished.   HENT:   Head: Normocephalic.   Right Ear: Hearing, tympanic membrane, external ear and ear canal normal.   Left Ear: Hearing, tympanic membrane, external ear and ear canal normal.   Nose: No mucosal edema. Right sinus exhibits maxillary sinus tenderness and frontal sinus tenderness. Left sinus exhibits maxillary sinus tenderness and frontal sinus tenderness.   Mouth/Throat: Uvula is midline and mucous membranes are normal. Posterior oropharyngeal erythema (clear hypersecretions ) present. No tonsillar exudate.   Neck: Carotid bruit is not present. No thyroid mass and no thyromegaly present.   Cardiovascular: Regular rhythm and normal heart sounds. Exam reveals no S3 and no S4.   No murmur heard.  Repeat bp left arm 146/72  No pedal edema    Pulmonary/Chest: Effort normal and breath sounds normal. She has no decreased breath sounds. She has no wheezes. She has no rhonchi. She has no rales.   Musculoskeletal: She exhibits no edema.   Lymphadenopathy:        Head (right side): No submental, no submandibular, no tonsillar, no preauricular, no posterior auricular and no occipital adenopathy present.        Head (left side): No submental, no submandibular, no tonsillar, no preauricular, no posterior auricular and no occipital adenopathy present.     She has no cervical adenopathy.   Neurological: She is alert and oriented to person, place, and time. Gait (using walker ) abnormal.   Skin: Skin is warm and dry.   Psychiatric: She has a normal mood and affect. Her speech is normal and behavior is normal. Judgment and thought content normal. Cognition and memory are normal.       Assessment/Plan   Yudith was seen today for atrial  fibrillation.    Diagnoses and all orders for this visit:    Essential hypertension  Comments:  stable; continue with monitoring     Mixed hyperlipidemia  Comments:  tolerating statin therapy     Atrial fibrillation, chronic (CMS/HCC)    Gastroesophageal reflux disease with esophagitis  Comments:  stable with protonix, using zantac sparingly     Chronic combined systolic and diastolic congestive heart failure (CMS/HCC)  Comments:  stable, tolerating entresto and demadex     URI, acute  Comments:  has completed z-flavia, will monitor symptom I suspect r/t allergies     She is due to have labs with neprologist in 2-3 weeks. No labs today.   She is accompanied by her daughter.

## 2019-05-07 NOTE — PROGRESS NOTES
Anticoagulation Clinic Progress Note    Anticoagulation Summary  As of 2019    INR goal:   2.0-3.0   TTR:   78.2 % (8.3 mo)   INR used for dosin.9! (2019)   Warfarin maintenance plan:   4 mg every Mon; 2 mg all other days   Weekly warfarin total:   16 mg   Plan last modified:   Lino Barnard [facility] (2019)   Next INR check:   2019   Priority:   Maintenance   Target end date:   Indefinite    Indications    Permanent atrial fibrillation (CMS/HCC) [I48.2]             Anticoagulation Episode Summary     INR check location:       Preferred lab:       Send INR reminders to:   [facility]    Comments:         Anticoagulation Care Providers     Provider Role Specialty Phone number    Felicitas Brantley MD Referring Cardiology [phone]          Clinic Interview:  Patient Findings     Negatives:   Signs/symptoms of thrombosis, Signs/symptoms of bleeding,   Laboratory test error suspected, Change in health, Change in alcohol use,   Change in activity, Upcoming invasive procedure, Emergency department   visit, Upcoming dental procedure, Missed doses, Extra doses, Change in   medications, Change in diet/appetite, Hospital admission, Bruising, Other   complaints      Clinical Outcomes     Negatives:   Major bleeding event, Thromboembolic event,   Anticoagulation-related hospital admission, Anticoagulation-related ED   visit, Anticoagulation-related fatality        INR History:  Anticoagulation Monitoring 2019   INR 1.9 2.2 1.9   INR Date 2019   INR Goal 2.0-3.0 2.0-3.0 2.0-3.0   Trend Same Same Same   Last Week Total 16 mg 16 mg 16 mg   Next Week Total 16 mg 16 mg 16 mg   Sun 2 mg 2 mg 2 mg   Mon 4 mg 4 mg 4 mg   Tue 2 mg 2 mg 2 mg   Wed 2 mg 2 mg 2 mg   Thu 2 mg 2 mg 2 mg   Fri 2 mg 2 mg 2 mg   Sat 2 mg 2 mg 2 mg   Visit Report - - -   Some recent data might be hidden       Plan:  1. INR is Subtherapeutic today- see above in Anticoagulation  Summary.  Will instruct Yudith Robertson to Continue their warfarin regimen- see above in Anticoagulation Summary.  2. Follow up in 4 weeks  3. Patient declines warfarin refills.  4. Verbal and written information provided. Patient expresses understanding and has no further questions at this time.    Lino Barnard [facility]

## 2019-05-08 NOTE — TELEPHONE ENCOUNTER
RADHA query complete. Treatment plan to include limited course of prescribed  controlled substance. Risks including addiction, benefits, and alternatives presented to patient.

## 2019-06-04 NOTE — PROGRESS NOTES
Anticoagulation Clinic Progress Note    Anticoagulation Summary  As of 2019    INR goal:   2.0-3.0   TTR:   70.3 % (9.3 mo)   INR used for dosin.7! (2019)   Warfarin maintenance plan:   4 mg every Mon, Fri; 2 mg all other days   Weekly warfarin total:   18 mg   Plan last modified:   Lino Barnard RPH (2019)   Next INR check:   2019   Priority:   Maintenance   Target end date:   Indefinite    Indications    Permanent atrial fibrillation (CMS/HCC) [I48.2]             Anticoagulation Episode Summary     INR check location:       Preferred lab:       Send INR reminders to:   [facility]    Comments:         Anticoagulation Care Providers     Provider Role Specialty Phone number    Felicitas Brantley MD Referring Cardiology [phone]          Clinic Interview:  Patient Findings     Negatives:   Signs/symptoms of thrombosis, Signs/symptoms of bleeding,   Laboratory test error suspected, Change in health, Change in alcohol use,   Change in activity, Upcoming invasive procedure, Emergency department   visit, Upcoming dental procedure, Missed doses, Extra doses, Change in   medications, Change in diet/appetite, Hospital admission, Bruising, Other   complaints      Clinical Outcomes     Negatives:   Major bleeding event, Thromboembolic event,   Anticoagulation-related hospital admission, Anticoagulation-related ED   visit, Anticoagulation-related fatality        INR History:  Anticoagulation Monitoring 2019   INR 2.2 1.9 1.7   INR Date 2019   INR Goal 2.0-3.0 2.0-3.0 2.0-3.0   Trend Same Same Up   Last Week Total 16 mg 16 mg 16 mg   Next Week Total 16 mg 16 mg 18 mg   Sun 2 mg 2 mg 2 mg   Mon 4 mg 4 mg 4 mg   Tue 2 mg 2 mg 2 mg   Wed 2 mg 2 mg 2 mg   Thu 2 mg 2 mg 2 mg   Fri 2 mg 2 mg 4 mg   Sat 2 mg 2 mg 2 mg   Visit Report - - -   Some recent data might be hidden       Plan:  1. INR is Subtherapeutic today- see above in Anticoagulation  Summary.  Will instruct Yudith Robertson to Increase their warfarin regimen- see above in Anticoagulation Summary.  2. Follow up in 2 weeks  3. Patient declines warfarin refills.  4. Verbal and written information provided. Patient expresses understanding and has no further questions at this time.    Lino Barnard [facility]

## 2019-06-06 NOTE — TELEPHONE ENCOUNTER
Last office visit:  04/16/2019  Next office visit:  07/25/2019  Last fill date:  05/08/2019  Last Brian:  05/08/2019

## 2019-06-06 NOTE — TELEPHONE ENCOUNTER
----- Message from Gayla Melgar sent at 6/6/2019  8:30 AM EDT -----  Contact: Pt   Pt is calling for a refill     FOR  LORazepam (ATIVAN) 0.5 MG tablet      Patient requests RX SENT TO   [facility] [address] [place] [address] [phone] [facility] [phone] FX      Caller# [phone]     Please and thank you.

## 2019-06-18 NOTE — PROGRESS NOTES
Anticoagulation Clinic Progress Note    Anticoagulation Summary  As of 2019    INR goal:   2.0-3.0   TTR:   68.2 % (9.7 mo)   INR used for dosin.1 (2019)   Warfarin maintenance plan:   4 mg every Mon, Fri; 2 mg all other days   Weekly warfarin total:   18 mg   No change documented:   Nickie Brewster   Plan last modified:   Lino Barnard RPH (2019)   Next INR check:   2019   Priority:   Maintenance   Target end date:   Indefinite    Indications    Permanent atrial fibrillation (CMS/HCC) [I48.2]             Anticoagulation Episode Summary     INR check location:       Preferred lab:       Send INR reminders to:    [facility]    Comments:         Anticoagulation Care Providers     Provider Role Specialty Phone number    Felicitas Brantley MD Referring Cardiology [phone]          Clinic Interview:  Patient Findings     Negatives:   Signs/symptoms of thrombosis, Signs/symptoms of bleeding,   Laboratory test error suspected, Change in health, Change in alcohol use,   Change in activity, Upcoming invasive procedure, Emergency department   visit, Upcoming dental procedure, Missed doses, Extra doses, Change in   medications, Change in diet/appetite, Hospital admission, Bruising, Other   complaints      Clinical Outcomes     Negatives:   Major bleeding event, Thromboembolic event,   Anticoagulation-related hospital admission, Anticoagulation-related ED   visit, Anticoagulation-related fatality        INR History:  Anticoagulation Monitoring 2019   INR 1.9 1.7 2.1   INR Date 2019   INR Goal 2.0-3.0 2.0-3.0 2.0-3.0   Trend Same Up Same   Last Week Total 16 mg 16 mg 18 mg   Next Week Total 16 mg 18 mg 18 mg   Sun 2 mg 2 mg 2 mg   Mon 4 mg 4 mg 4 mg   Tue 2 mg 2 mg 2 mg   Wed 2 mg 2 mg 2 mg   Thu 2 mg 2 mg 2 mg   Fri 2 mg 4 mg 4 mg   Sat 2 mg 2 mg 2 mg   Visit Report - - -   Some recent data might be hidden       Plan:  1. INR is  therapeutic today- see above in Anticoagulation Summary.   Will instruct Yudith Robertson to continue their warfarin regimen- see above in Anticoagulation Summary.  2. Follow up in 4 weeks.  3. Patient declines warfarin refills.  4. Verbal and written information provided. Patient expresses understanding and has no further questions at this time.    Nickie Brewster

## 2019-06-18 NOTE — PROGRESS NOTES
Ms. Ailyn has been scheduled for 2 weeks rather than 4 weeks to ensure INR remains stable in therapeutic range.

## 2019-07-02 NOTE — PROGRESS NOTES
Anticoagulation Clinic Progress Note    Anticoagulation Summary  As of 2019    INR goal:   2.0-3.0   TTR:   69.6 % (10.2 mo)   INR used for dosin.8 (2019)   Warfarin maintenance plan:   4 mg every Mon, Fri; 2 mg all other days   Weekly warfarin total:   18 mg   No change documented:   Thuy Gracia   Plan last modified:   Lino Barnard RPH (2019)   Next INR check:   2019   Priority:   Maintenance   Target end date:   Indefinite    Indications    Permanent atrial fibrillation (CMS/[facility]) [I48.2]             Anticoagulation Episode Summary     INR check location:       Preferred lab:       Send INR reminders to:    [facility]    Comments:         Anticoagulation Care Providers     Provider Role Specialty Phone number    Felicitas Brantley MD Referring Cardiology [phone]          Clinic Interview:  Patient Findings     Negatives:   Signs/symptoms of thrombosis, Signs/symptoms of bleeding,   Laboratory test error suspected, Change in health, Change in alcohol use,   Change in activity, Upcoming invasive procedure, Emergency department   visit, Upcoming dental procedure, Missed doses, Extra doses, Change in   medications, Change in diet/appetite, Hospital admission, Bruising, Other   complaints      Clinical Outcomes     Negatives:   Major bleeding event, Thromboembolic event,   Anticoagulation-related hospital admission, Anticoagulation-related ED   visit, Anticoagulation-related fatality        INR History:  Anticoagulation Monitoring 2019   INR 1.7 2.1 2.8   INR Date 2019   INR Goal 2.0-3.0 2.0-3.0 2.0-3.0   Trend Up Same Same   Last Week Total 16 mg 18 mg 18 mg   Next Week Total 18 mg 18 mg 18 mg   Sun 2 mg 2 mg 2 mg   Mon 4 mg 4 mg 4 mg   Tue 2 mg 2 mg 2 mg   Wed 2 mg 2 mg 2 mg   Thu 2 mg 2 mg 2 mg   Fri 4 mg 4 mg 4 mg   Sat 2 mg 2 mg 2 mg   Visit Report - - -   Some recent data might be hidden       Plan:  1. INR is  therapeutic today- see above in Anticoagulation Summary.   Will instruct Yudith Robertson to continue their warfarin regimen- see above in Anticoagulation Summary.  2. Follow up in 4 weeks.  3. Patient declines warfarin refills.  4. Verbal and written information provided. Patient expresses understanding and has no further questions at this time.    Thuy Gracia

## 2019-07-08 NOTE — PROGRESS NOTES
Subjective   Yudith Robertson is a 89 y.o. female.     Urinary Tract Infection    This is a new problem. The current episode started in the past 7 days. The problem occurs every urination. The problem has been unchanged. The quality of the pain is described as burning. There has been no fever. Associated symptoms include flank pain (also has h/o of back pain, osteoporosis), frequency and urgency. Pertinent negatives include no chills, hematuria, hesitancy, nausea or vomiting. She has tried nothing for the symptoms.   Leg Injury   This is a new problem. The current episode started in the past 7 days. The problem occurs constantly. The problem has been gradually improving. Associated symptoms include abdominal pain (lower). Pertinent negatives include no chills, fever, nausea or vomiting. Nothing aggravates the symptoms. Treatments tried: neosporin, dressing. The treatment provided mild relief.        The following portions of the patient's history were reviewed and updated as appropriate: allergies, current medications, past family history, past medical history, past social history, past surgical history and problem list.    Review of Systems   Constitutional: Negative for chills and fever.   Gastrointestinal: Positive for abdominal pain (lower) and diarrhea (occassional). Negative for nausea and vomiting.   Genitourinary: Positive for difficulty urinating, dysuria, flank pain (also has h/o of back pain, osteoporosis), frequency, pelvic pain and urgency. Negative for hematuria, hesitancy and vaginal discharge.       Objective   Physical Exam   Constitutional: She appears well-developed and well-nourished.   HENT:   Head: Normocephalic and atraumatic.   Cardiovascular: Normal rate, regular rhythm and normal heart sounds.   No murmur heard.  Pulmonary/Chest: Effort normal and breath sounds normal. No respiratory distress.   Abdominal: Normal appearance and bowel sounds are normal. There is no tenderness.   Musculoskeletal:  Normal range of motion.   Neurological: She is alert.   Skin: Skin is warm and dry.   Psychiatric: She has a normal mood and affect. Her behavior is normal. Judgment and thought content normal.   Vitals reviewed.      Assessment/Plan   Yudith was seen today for urinary tract infection and leg injury.    Diagnoses and all orders for this visit:    Urinary tract infection with hematuria, site unspecified  -     ciprofloxacin (CIPRO) 250 MG tablet; Take 1 tablet by mouth 2 (Two) Times a Day.    Burning with urination  -     Urinalysis With Culture If Indicated - Urine, Clean Catch; Future  -     Urinalysis With Culture If Indicated - Urine, Clean Catch  -     Urinalysis, Microscopic Only - Urine, Clean Catch; Future  -     Urinalysis, Microscopic Only - Urine, Clean Catch      Abrasion of left lower extremity, initial encounter   She will continue to dress wound and use neosporin. She will closely observe for nonhealing wound and return if wound refuses to heal.    Only 0-2 RBCs, no f/u urine needed for hematuria.    UTI present, discussed with pt.    She will call her cardiologist and inform them she was started on abx since she takes warfarin.    Drink plenty of water.    Return for worsening of sx.

## 2019-07-08 NOTE — PROGRESS NOTES
Anticoagulation Clinic Progress Note    Anticoagulation Summary  As of 7/8/2019    INR goal:   2.0-3.0   TTR:   69.6 % (10.2 mo)   INR used for dosing:   No new INR was available at the time of this encounter.   Warfarin maintenance plan:   4 mg every Mon, Fri; 2 mg all other days   Weekly warfarin total:   18 mg   Plan last modified:   Lino Barnard RPH (6/4/2019)   Next INR check:   7/12/2019   Priority:   Maintenance   Target end date:   Indefinite    Indications    Permanent atrial fibrillation (CMS/HCC) [I48.2]             Anticoagulation Episode Summary     INR check location:       Preferred lab:       Send INR reminders to:    [facility]    Comments:         Anticoagulation Care Providers     Provider Role Specialty Phone number    Felicitas Brantley MD Referring Cardiology [phone]          Clinic Interview:  Patient Findings     Positives:   Change in medications, Other complaints    Negatives:   Signs/symptoms of thrombosis, Signs/symptoms of bleeding,   Laboratory test error suspected, Change in health, Change in alcohol use,   Change in activity, Upcoming invasive procedure, Emergency department   visit, Upcoming dental procedure, Missed doses, Extra doses, Change in   diet/appetite, Hospital admission, Bruising    Comments:   Starting cipro today for UTI. Wound/injury on leg that is   healing appropriately; evaluated by another provider today.       Clinical Outcomes     Negatives:   Major bleeding event, Thromboembolic event,   Anticoagulation-related hospital admission, Anticoagulation-related ED   visit, Anticoagulation-related fatality    Comments:   Starting cipro today for UTI. Wound/injury on leg that is   healing appropriately; evaluated by another provider today.         INR History:  Anticoagulation Monitoring 6/18/2019 7/2/2019 7/8/2019   INR 2.1 2.8 -   INR Date 6/18/2019 7/2/2019 -   INR Goal 2.0-3.0 2.0-3.0 2.0-3.0   Trend Same Same Same   Last Week Total 18 mg 18 mg  18 mg   Next Week Total 18 mg 18 mg 16 mg   Sun 2 mg 2 mg -   Mon 4 mg 4 mg 2 mg (7/8)   Tue 2 mg 2 mg 2 mg   Wed 2 mg 2 mg 2 mg   Thu 2 mg 2 mg 2 mg   Fri 4 mg 4 mg -   Sat 2 mg 2 mg -   Visit Report - - -   Some recent data might be hidden       Plan:  1. INR was not evaluated today - see above in Anticoagulation Summary.   Will instruct Yudith Robertson to Change their warfarin regimen- see above in Anticoagulation Summary.  2. Follow up in 4 days  3. They have been instructed to call if any changes in medications, doses, concerns, etc. Patient expresses understanding and has no further questions at this time.    Lino Barnard [facility]

## 2019-07-11 NOTE — TELEPHONE ENCOUNTER
----- Message from Gayla Melgar sent at 7/11/2019 10:23 AM EDT -----  Contact: Fredy with [facility] pharm  Pharmacy is calling for clarification/ medication change.    RX:sulfamethoxazole-trimethoprim (BACTRIM DS,SEPTRA DS) 800-160 MG per tablet    Conflicts with another diagnosis of kidneys.    Caller#[phone]

## 2019-07-12 NOTE — TELEPHONE ENCOUNTER
Will you call her sometime this morning and ask if she received her Bactrim from the pharmacy? I sent a lower dose of Bactrim to the pharmacy and tried to call them but I could not reach anyone at the pharmacy and I want to make sure she got her abx.

## 2019-07-12 NOTE — TELEPHONE ENCOUNTER
Rebecca Hernandez Ailyn's nephrology office called and states that she cannot take the bactrim and they are starting her on something different.

## 2019-07-12 NOTE — PROGRESS NOTES
Anticoagulation Clinic Progress Note    Anticoagulation Summary  As of 2019    INR goal:   2.0-3.0   TTR:   70.6 % (10.5 mo)   INR used for dosin.9 (2019)   Warfarin maintenance plan:   4 mg every Mon, Fri; 2 mg all other days   Weekly warfarin total:   18 mg   Plan last modified:   Lino Barnard RPH (2019)   Next INR check:   2019   Priority:   Maintenance   Target end date:   Indefinite    Indications    Permanent atrial fibrillation (CMS/HCC) [I48.2]             Anticoagulation Episode Summary     INR check location:       Preferred lab:       Send INR reminders to:   [facility]    Comments:         Anticoagulation Care Providers     Provider Role Specialty Phone number    Felicitas Brantley MD Referring Cardiology [phone]          Clinic Interview:  Patient Findings     Positives:   Change in medications    Negatives:   Signs/symptoms of thrombosis, Signs/symptoms of bleeding,   Laboratory test error suspected, Change in health, Change in alcohol use,   Change in activity, Upcoming invasive procedure, Emergency department   visit, Upcoming dental procedure, Missed doses, Extra doses, Change in   diet/appetite, Hospital admission, Bruising, Other complaints    Comments:   Finished cipro 2 days. Bactrim prescribed; however, pt   checking with nephrologist first before taking. Another abx may be   prescribed instead.       Clinical Outcomes     Negatives:   Major bleeding event, Thromboembolic event,   Anticoagulation-related hospital admission, Anticoagulation-related ED   visit, Anticoagulation-related fatality    Comments:   Finished cipro 2 days. Bactrim prescribed; however, pt   checking with nephrologist first before taking. Another abx may be   prescribed instead.         INR History:  Anticoagulation Monitoring 2019   INR 2.8 - 2.9   INR Date 2019 - 2019   INR Goal 2.0-3.0 2.0-3.0 2.0-3.0   Trend Same Same Same   Last Week Total  18 mg 18 mg 16 mg   Next Week Total 18 mg 16 mg 16 mg   Sun 2 mg - 2 mg   Mon 4 mg 2 mg (7/8) 4 mg   Tue 2 mg 2 mg -   Wed 2 mg 2 mg -   Thu 2 mg 2 mg -   Fri 4 mg - 2 mg (7/12)   Sat 2 mg - 2 mg   Visit Report - - -   Some recent data might be hidden       Plan:  1. INR is Therapeutic today- see above in Anticoagulation Summary.  Will instruct Yudith Robertson to Change their warfarin regimen- see above in Anticoagulation Summary.  2. Follow up in 4 days to assess for abx interaction.  3. Patient declines warfarin refills.  4. Verbal and written information provided. Patient expresses understanding and has no further questions at this time.    Lino Barnard RP

## 2019-07-16 NOTE — PROGRESS NOTES
Anticoagulation Clinic Progress Note    Anticoagulation Summary  As of 2019    INR goal:   2.0-3.0   TTR:   70.9 % (10.7 mo)   INR used for dosin.1 (2019)   Warfarin maintenance plan:   4 mg every Mon, Fri; 2 mg all other days   Weekly warfarin total:   18 mg   No change documented:   Shonna Steele   Plan last modified:   Lino Barnard RP (2019)   Next INR check:   2019   Priority:   Maintenance   Target end date:   Indefinite    Indications    Permanent atrial fibrillation (CMS/HCC) [I48.2]             Anticoagulation Episode Summary     INR check location:       Preferred lab:       Send INR reminders to:    [facility]    Comments:         Anticoagulation Care Providers     Provider Role Specialty Phone number    Felicitas Brantley MD Referring Cardiology [phone]          Clinic Interview:  Patient Findings     Negatives:   Signs/symptoms of thrombosis, Signs/symptoms of bleeding,   Laboratory test error suspected, Change in health, Change in alcohol use,   Change in activity, Upcoming invasive procedure, Emergency department   visit, Upcoming dental procedure, Missed doses, Extra doses, Change in   medications, Change in diet/appetite, Hospital admission, Bruising, Other   complaints      Clinical Outcomes     Negatives:   Major bleeding event, Thromboembolic event,   Anticoagulation-related hospital admission, Anticoagulation-related ED   visit, Anticoagulation-related fatality        INR History:  Anticoagulation Monitoring 2019   INR - 2.9 2.1   INR Date - 2019   INR Goal 2.0-3.0 2.0-3.0 2.0-3.0   Trend Same Same Same   Last Week Total 18 mg 16 mg 16 mg   Next Week Total 16 mg 16 mg 18 mg   Sun - 2 mg 2 mg   Mon 2 mg () 4 mg 4 mg   Tue 2 mg - 2 mg   Wed 2 mg - 2 mg   Thu 2 mg - 2 mg   Fri - 2 mg () 4 mg   Sat - 2 mg 2 mg   Visit Report - - -   Some recent data might be hidden       Plan:  1. INR is therapeutic  today- see above in Anticoagulation Summary.   Will instruct Yudith Robertson to continue their warfarin regimen- see above in Anticoagulation Summary.  2. Follow up in 2 weeks.  3. Patient declines warfarin refills.  4. Verbal and written information provided. Patient expresses understanding and has no further questions at this time.    Shonna Steele

## 2019-07-25 NOTE — PROGRESS NOTES
Subjective   Yudith Robertson is a 89 y.o. female.     She monitors her blood pressure (varies) twice a day and weight daily (ranges within 2 lbs). She tries to stay active up to 2000 steps daily.       Hypertension   This is a chronic problem. The current episode started more than 1 year ago. The problem is unchanged. Pertinent negatives include no chest pain, palpitations or shortness of breath. Current antihypertension treatment includes beta blockers, diuretics and angiotensin blockers (imdur ). The current treatment provides significant improvement. Hypertensive end-organ damage includes heart failure.   Hyperlipidemia   This is a chronic problem. The current episode started more than 1 year ago. The problem is controlled. Recent lipid tests were reviewed and are normal. Pertinent negatives include no chest pain, myalgias or shortness of breath. Current antihyperlipidemic treatment includes statins. The current treatment provides significant improvement of lipids. Risk factors for coronary artery disease include hypertension, obesity, post-menopausal and a sedentary lifestyle.        The following portions of the patient's history were reviewed and updated as appropriate: allergies, current medications, past family history, past medical history, past social history, past surgical history and problem list.    Review of Systems   Constitutional: Positive for fatigue. Negative for activity change, appetite change and fever.   Respiratory: Negative for cough, shortness of breath and wheezing.    Cardiovascular: Negative for chest pain, palpitations and leg swelling.   Genitourinary: Negative for dysuria, frequency, hematuria and urgency.   Musculoskeletal: Negative for myalgias.   Neurological: Negative for speech difficulty and numbness.   Psychiatric/Behavioral: Negative for agitation and confusion.       Objective   Physical Exam   Constitutional: She is oriented to person, place, and time. She appears well-developed  and well-nourished.   HENT:   Head: Normocephalic.   Nose: Nose normal.   Neck: Carotid bruit is not present. No thyroid mass and no thyromegaly present.   Cardiovascular: Normal heart sounds. An irregularly irregular rhythm present. Exam reveals no S3 and no S4.   No murmur heard.  Repeat bp left arm  142/72  No pedal edema    Pulmonary/Chest: Effort normal and breath sounds normal. She has no decreased breath sounds. She has no wheezes. She has no rhonchi. She has no rales.   Musculoskeletal: She exhibits no edema.   Neurological: She is alert and oriented to person, place, and time. Gait (using walker ) abnormal.   Skin: Skin is warm and dry.   Psychiatric: She has a normal mood and affect.       Assessment/Plan   Yudith was seen today for medicare wellness-subsequent.    Diagnoses and all orders for this visit:    Medicare annual wellness visit, initial    Essential hypertension  Comments:  stable   Orders:  -     Comprehensive Metabolic Panel; Future  -     CBC No Differential; Future  -     TSH Rfx On Abnormal To Free T4; Future  -     Comprehensive Metabolic Panel  -     CBC No Differential  -     TSH Rfx On Abnormal To Free T4    Mixed hyperlipidemia  Comments:  tolerating statin therapy   Orders:  -     Lipid Panel; Future  -     Lipid Panel    Mood disorder (CMS/HCC)  Comments:  using ativan nightly     Atrial fibrillation, chronic (CMS/HCC)  Comments:  stable     Urinary tract infection due to extended-spectrum beta lactamase (ESBL) producing Escherichia coli  -     Urinalysis With Microscopic If Indicated (No Culture) - Urine, Clean Catch; Future  -     Urinalysis With Microscopic If Indicated (No Culture) - Urine, Clean Catch  -     Urinalysis, Microscopic Only - Urine, Clean Catch; Future  -     Urinalysis, Microscopic Only - Urine, Clean Catch      She is accompanied by her daughters Pat and Irene.   Repeat urine ok.

## 2019-07-25 NOTE — PROGRESS NOTES
The ABCs of the Annual Wellness Visit  Initial Medicare Wellness Visit    Chief Complaint   Patient presents with   • Medicare Wellness-subsequent       Subjective   History of Present Illness:  Yudith Robertson is a 89 y.o. female who presents for an Initial Medicare Wellness Visit.    HEALTH RISK ASSESSMENT    Recent Hospitalizations:  No hospitalization(s) within the last year.    Current Medical Providers:  Patient Care Team:  Kati Estrada APRN as PCP - General (Family Medicine)  Kati Estrada APRN as PCP - Claims Attributed  Brianna Sears RPH as Pharmacist    Smoking Status:  Social History     Tobacco Use   Smoking Status Former Smoker   • Packs/day: 1.00   • Years: 30.00   • Pack years: 30.00   • Last attempt to quit: 1986   • Years since quittin.5   Smokeless Tobacco Never Used   Tobacco Comment    [email or web address]. [email or web address]       Alcohol Consumption:  Social History     Substance and Sexual Activity   Alcohol Use No    Comment: stopped drinking       Depression Screen:   PHQ-2/PHQ-9 Depression Screening 2019   Little interest or pleasure in doing things 0   Feeling down, depressed, or hopeless 1   Trouble falling or staying asleep, or sleeping too much 1   Feeling tired or having little energy 1   Poor appetite or overeating 1   Feeling bad about yourself - or that you are a failure or have let yourself or your family down 0   Trouble concentrating on things, such as reading the newspaper or watching television 0   Moving or speaking so slowly that other people could have noticed. Or the opposite - being so fidgety or restless that you have been moving around a lot more than usual 0   Thoughts that you would be better off dead, or of hurting yourself in some way 0   Total Score 4   If you checked off any problems, how difficult have these problems made it for you to do your work, take care of things at home, or get along with other people? Somewhat difficult       Fall Risk Screen:  STEADI Fall Risk  Assessment was completed, and patient is at LOW risk for falls.Assessment completed on:7/25/2019    Health Habits and Functional and Cognitive Screening:  Functional & Cognitive Status 7/25/2019   Do you have difficulty preparing food and eating? No   Do you have difficulty bathing yourself, getting dressed or grooming yourself? No   Do you have difficulty using the toilet? No   Do you have difficulty moving around from place to place? No   Do you have trouble with steps or getting out of a bed or a chair? Yes   Do you need help using the phone?  No   Are you deaf or do you have serious difficulty hearing?  No   Do you need help with transportation? Yes   Do you need help shopping? Yes   Do you need help preparing meals?  Yes   Do you need help with housework?  Yes   Do you need help with laundry? Yes   Do you need help taking your medications? Yes   Do you need help managing money? Yes   Do you ever drive or ride in a car without wearing a seat belt? Yes   Have you felt unusual stress, anger or loneliness in the last month? No   Who do you live with? Spouse   If you need help, do you have trouble finding someone available to you? No   Do you have difficulty concentrating, remembering or making decisions? No         Does the patient have evidence of cognitive impairment? No    Asprin use counseling:Contraindicated from taking ASA    Age-appropriate Screening Schedule:  Refer to the list below for future screening recommendations based on patient's age, sex and/or medical conditions. Orders for these recommended tests are listed in the plan section. The patient has been provided with a written plan.    Health Maintenance   Topic Date Due   • TDAP/TD VACCINES (1 - Tdap) 07/09/1946   • ZOSTER VACCINE (1 of 2) 07/09/1977   • PNEUMOCOCCAL VACCINES (65+ LOW/MEDIUM RISK) (2 of 2 - PPSV23) 11/03/2016   • INFLUENZA VACCINE  08/01/2019   • LIPID PANEL  07/25/2020   • DXA SCAN  04/16/2021   • MAMMOGRAM  Discontinued           The following portions of the patient's history were reviewed and updated as appropriate: allergies, current medications, past family history, past medical history, past social history, past surgical history and problem list.    Outpatient Medications Prior to Visit   Medication Sig Dispense Refill   • carvedilol (COREG) 12.5 MG tablet Take 1 tablet by mouth 2 (Two) Times a Day With Meals. 180 tablet 3   • dicyclomine (BENTYL) 10 MG capsule TAKE ONE CAPSULE BY MOUTH EVERY 6 HOURS AS NEEDED (CRAMPING) 90 capsule 1   • fexofenadine (ALLEGRA) 180 MG tablet Take 1 tablet by mouth Daily. 90 tablet 2   • isosorbide mononitrate (IMDUR) 60 MG 24 hr tablet TAKE 1 TABLET BY MOUTH  TWICE A  tablet 3   • lansoprazole (PREVACID) 15 MG capsule Take 2 capsules by mouth Daily. 180 capsule 2   • LORazepam (ATIVAN) 0.5 MG tablet TAKE 1 TABLET BY MOUTH EVERY 8 HOURS AS NEEDED FOR ANXIETY 50 tablet 2   • O2 (OXYGEN) Inhale 2 L/min 1 (One) Time.     • potassium chloride (K-DUR) 10 MEQ CR tablet Take 1 tablet by mouth Daily. 90 tablet 2   • sacubitril-valsartan (ENTRESTO) 49-51 MG tablet Take 1 tablet by mouth Every 12 (Twelve) Hours. 180 tablet 1   • simvastatin (ZOCOR) 20 MG tablet TAKE 1 TABLET BY MOUTH  EVERY NIGHT 90 tablet 1   • torsemide (DEMADEX) 20 MG tablet TAKE TWO TABLETS BY MOUTH ONCE DAILY 60 tablet 11   • vitamin D (ERGOCALCIFEROL) 26714 units capsule capsule Take 50,000 Units by mouth 1 (One) Time Per Week.     • warfarin (COUMADIN) 2 MG tablet Take one tablet by mouth daily except take 2 tabs on Monday or as directed by [facility] 105 tablet 1   • acetaminophen (TYLENOL) 500 MG tablet Take 500 mg by mouth every 6 (six) hours as needed for mild pain (1-3).     • nitrofurantoin, macrocrystal-monohydrate, (MACROBID) 100 MG capsule Take 100 mg by mouth 2 (Two) Times a Day.  0   • raNITIdine (ZANTAC) 150 MG tablet Take 1 tablet by mouth At Night As Needed for Heartburn or Indigestion. 30 tablet 0   •  "ciprofloxacin (CIPRO) 250 MG tablet Take 1 tablet by mouth 2 (Two) Times a Day. 5 tablet 0   • sulfamethoxazole-trimethoprim (BACTRIM,SEPTRA) 400-80 MG tablet Take 1 tablet by mouth 2 (Two) Times a Day. 14 tablet 0     No facility-administered medications prior to visit.        Patient Active Problem List   Diagnosis   • Permanent atrial fibrillation (CMS/HCC)   • Atypical chest pain   • Hypertension   • Tricuspid insufficiency   • Pulmonary hypertension (CMS/HCC)   • Left bundle branch block   • Hyperlipidemia   • GERD (gastroesophageal reflux disease)   • Aspiration pneumonia of right lower lobe (CMS/HCC)   • Acute on chronic combined systolic and diastolic congestive heart failure (CMS/HCC)   • Acute on chronic respiratory failure with hypoxia (CMS/HCC)   • Left upper quadrant abdominal mass   • Oropharyngeal dysphagia   • Valvular heart disease       Advanced Care Planning:  Patient has an advance directive - a copy has not been provided. Have asked the patient to send this to us to add to record    Review of Systems    Compared to one year ago, the patient feels her physical health is worse.  Compared to one year ago, the patient feels her mental health is the same.    Reviewed chart for potential of high risk medication in the elderly: yes  Reviewed chart for potential of harmful drug interactions in the elderly:yes    Objective         Vitals:    07/25/19 1114   BP: 136/82   BP Location: Left arm   Patient Position: Sitting   Cuff Size: Adult   Pulse: 85   SpO2: 93%   Weight: 66.7 kg (147 lb)   Height: 152.4 cm (60\")       Body mass index is 28.71 kg/m².  Discussed the patient's BMI with her. The BMI is above average; BMI management plan is completed.    Physical Exam    Lab Results   Component Value Date    TRIG 81 07/25/2019    HDL 63 (H) 07/25/2019    LDL 94 07/25/2019    VLDL 16.2 07/25/2019        Assessment/Plan   Medicare Risks and Personalized Health Plan  CMS Preventative Services Quick " Reference  Advance Directive Discussion  Depression/Dysphoria  Fall Risk  Immunizations Discussed/Encouraged (specific immunizations; Pneumococcal 23 and Shingrix )    The above risks/problems have been discussed with the patient.  Pertinent information has been shared with the patient in the After Visit Summary.  Follow up plans and orders are seen below in the Assessment/Plan Section.    Diagnoses and all orders for this visit:    1. Medicare annual wellness visit, initial (Primary)    2. Essential hypertension  Comments:  stable   Orders:  -     Comprehensive Metabolic Panel; Future  -     CBC No Differential; Future  -     TSH Rfx On Abnormal To Free T4; Future  -     Comprehensive Metabolic Panel  -     CBC No Differential  -     TSH Rfx On Abnormal To Free T4    3. Mixed hyperlipidemia  Comments:  tolerating statin therapy   Orders:  -     Lipid Panel; Future  -     Lipid Panel    4. Mood disorder (CMS/HCC)  Comments:  using ativan nightly     5. Atrial fibrillation, chronic (CMS/HCC)  Comments:  stable     6. Urinary tract infection due to extended-spectrum beta lactamase (ESBL) producing Escherichia coli  -     Urinalysis With Microscopic If Indicated (No Culture) - Urine, Clean Catch; Future  -     Urinalysis With Microscopic If Indicated (No Culture) - Urine, Clean Catch  -     Urinalysis, Microscopic Only - Urine, Clean Catch; Future  -     Urinalysis, Microscopic Only - Urine, Clean Catch      Follow Up:  Return in about 3 months (around 10/25/2019) for Recheck.     An After Visit Summary and PPPS were given to the patient.      Patient got irritable and ready to go.   We discussed falls risk  She will bring copy of advance directive to office  She was advised to shingrix if she desires to get at [facility] pharmacy

## 2019-07-26 NOTE — TELEPHONE ENCOUNTER
Spoke with daughter Pat and informed her urine specimen got discarded without culture being performed. She will come to office to pick it up and have mom leave urine and will send for culture to make sure it has resolved.

## 2019-07-26 NOTE — PATIENT INSTRUCTIONS
Medicare Wellness  Personal Prevention Plan of Service     Date of Office Visit:  2019  Encounter Provider:  STEFANO Gibbs  Place of Service:  [facility]  Patient Name: Yudith Robertson  :  1927    As part of the Medicare Wellness portion of your visit today, we are providing you with this personalized preventive plan of services (PPPS). This plan is based upon recommendations of the [place] Preventive Services Task Force (USPSTF) and the Advisory Committee on Immunization Practices (ACIP).    This lists the preventive care services that should be considered, and provides dates of when you are due. Items listed as completed are up-to-date and do not require any further intervention.    Health Maintenance   Topic Date Due   • TDAP/TD VACCINES (1 - Tdap) 1946   • ZOSTER VACCINE (1 of 2) 1977   • MEDICARE ANNUAL WELLNESS  2016   • PNEUMOCOCCAL VACCINES (65+ LOW/MEDIUM RISK) (2 of 2 - PPSV23) 2016   • INFLUENZA VACCINE  2019   • LIPID PANEL  2020   • DXA SCAN  2021   • MAMMOGRAM  Discontinued       Orders Placed This Encounter   Procedures   • Lipid Panel     Standing Status:   Future     Number of Occurrences:   1     Standing Expiration Date:   2020   • Comprehensive Metabolic Panel     Standing Status:   Future     Number of Occurrences:   1     Standing Expiration Date:   2020   • CBC No Differential     Standing Status:   Future     Number of Occurrences:   1     Standing Expiration Date:   2020   • TSH Rfx On Abnormal To Free T4     Standing Status:   Future     Number of Occurrences:   1     Standing Expiration Date:   2020   • Urinalysis With Microscopic If Indicated (No Culture) - Urine, Clean Catch     Standing Status:   Future     Number of Occurrences:   1     Standing Expiration Date:   2020   • Urinalysis, Microscopic Only - Urine, Clean Catch     Standing Status:   Future      Number of Occurrences:   1     Standing Expiration Date:   7/25/2020       Return in about 3 months (around 10/25/2019) for Recheck.

## 2019-07-30 NOTE — PROGRESS NOTES
Anticoagulation Clinic Progress Note    Anticoagulation Summary  As of 7/30/2019    INR goal:   2.0-3.0   TTR:   72.2 % (11.1 mo)   INR used for dosing:   3.0 (7/30/2019)   Warfarin maintenance plan:   4 mg every Mon, Fri; 2 mg all other days   Weekly warfarin total:   18 mg   No change documented:   Nickie Brewster   Plan last modified:   Lino Barnard RP (6/4/2019)   Next INR check:   8/13/2019   Priority:   Maintenance   Target end date:   Indefinite    Indications    Permanent atrial fibrillation (CMS/HCC) [I48.2]             Anticoagulation Episode Summary     INR check location:       Preferred lab:       Send INR reminders to:    [facility]    Comments:         Anticoagulation Care Providers     Provider Role Specialty Phone number    Felicitas Brantley MD Referring Cardiology [phone]          Clinic Interview:  Patient Findings     Negatives:   Signs/symptoms of thrombosis, Signs/symptoms of bleeding,   Laboratory test error suspected, Change in health, Change in alcohol use,   Change in activity, Upcoming invasive procedure, Emergency department   visit, Upcoming dental procedure, Missed doses, Extra doses, Change in   medications, Change in diet/appetite, Hospital admission, Bruising, Other   complaints      Clinical Outcomes     Negatives:   Major bleeding event, Thromboembolic event,   Anticoagulation-related hospital admission, Anticoagulation-related ED   visit, Anticoagulation-related fatality        INR History:  Anticoagulation Monitoring 7/12/2019 7/16/2019 7/30/2019   INR 2.9 2.1 3.0   INR Date 7/12/2019 7/16/2019 7/30/2019   INR Goal 2.0-3.0 2.0-3.0 2.0-3.0   Trend Same Same Same   Last Week Total 16 mg 16 mg 18 mg   Next Week Total 16 mg 18 mg 18 mg   Sun 2 mg 2 mg 2 mg   Mon 4 mg 4 mg 4 mg   Tue - 2 mg 2 mg   Wed - 2 mg 2 mg   Thu - 2 mg 2 mg   Fri 2 mg (7/12) 4 mg 4 mg   Sat 2 mg 2 mg 2 mg   Visit Report - - -   Some recent data might be hidden       Plan:  1. INR is  therapeutic today- see above in Anticoagulation Summary.   Will instruct Yudith Robertson to continue their warfarin regimen- see above in Anticoagulation Summary.  2. Follow up in 2 weeks.  3. Patient declines warfarin refills.  4. Verbal and written information provided. Patient expresses understanding and has no further questions at this time.    Nickie Brewster

## 2019-08-05 NOTE — TELEPHONE ENCOUNTER
----- Message from Gayla Melgar sent at 8/5/2019  3:02 PM EDT -----  Contact: pt  Pt would like a paper RX mailed to her for   Triamcinolone Acetonide (NASACORT ALLERGY 24HR) 55 MCG/ACT nasal inhaler  2 sprays-one each nostril a day.     Pt# [phone]

## 2019-08-12 NOTE — TELEPHONE ENCOUNTER
----- Message from Lisa Loo sent at 8/12/2019 10:58 AM EDT -----  Contact: pt   Patient  called in to get his wife's LORazepam (ATIVAN) 0.5 MG tablet filled. He stated that there is only one pill left and would like it filled today. He asked for the refill on Friday last week but it has not been called into the pharmacy. He hung up before I could confirm the pharmacy with him. Please advise

## 2019-08-12 NOTE — TELEPHONE ENCOUNTER
After speaking with the pharmacist at [facility], the patient still has 2 refills left.  Pharmacist states he will get the prescription ready for the patient.  Patient advised.

## 2019-08-13 NOTE — PROGRESS NOTES
Anticoagulation Clinic Progress Note    Anticoagulation Summary  As of 2019    INR goal:   2.0-3.0   TTR:   73.3 % (11.6 mo)   INR used for dosin.7 (2019)   Warfarin maintenance plan:   4 mg every Mon, Fri; 2 mg all other days   Weekly warfarin total:   18 mg   No change documented:   Nickie Brewster   Plan last modified:   Lino Barnard [facility] (2019)   Next INR check:   9/10/2019   Priority:   Maintenance   Target end date:   Indefinite    Indications    Permanent atrial fibrillation (CMS/HCC) [I48.2]             Anticoagulation Episode Summary     INR check location:       Preferred lab:       [facility]ers [facility]    [facility]    Comments:         Anticoagulation Care Providers     Provider Role Specialty Phone number    Felicitas Brantley MD Referring Cardiology [phone]          Clinic Interview:      INR History:  Anticoagulation Monitoring 2019   INR 2.1 3.0 2.7   INR Date 2019   INR Goal 2.0-3.0 2.0-3.0 2.0-3.0   Trend Same Same Same   Last Week Total 16 mg 18 mg 18 mg   Next Week Total 18 mg 18 mg 18 mg   Sun 2 mg 2 mg 2 mg   Mon 4 mg 4 mg 4 mg   Tue 2 mg 2 mg 2 mg   Wed 2 mg 2 mg 2 mg   Thu 2 mg 2 mg 2 mg   Fri 4 mg 4 mg 4 mg   Sat 2 mg 2 mg 2 mg   Visit Report - - -   Some recent data might be hidden       Plan:  1. INR is therapeutic today- see above in Anticoagulation Summary.   Will instruct Yudith Robertson to continue their warfarin regimen- see above in Anticoagulation Summary.  2. Follow up in 4 weeks.  3. Patient declines warfarin refills.  4. Verbal and written information provided. Patient expresses understanding and has no further questions at this time.    Nickie Brewster

## 2019-09-03 NOTE — TELEPHONE ENCOUNTER
----- Message from Gayla Melgar sent at 9/3/2019 10:08 AM EDT -----  Contact: Pt   Pt is calling for a refill     FOR  potassium chloride (K-DUR) 10 MEQ CR tablet      Patient requests RX SENT TO   [email or web address] - [address] [phone] [facility] [phone] [facility]      Caller# [phone]     Please and thank you.

## 2019-09-10 NOTE — PROGRESS NOTES
Anticoagulation Clinic Progress Note    Anticoagulation Summary  As of 9/10/2019    INR goal:   2.0-3.0   TTR:   75.3 % (1 y)   INR used for dosin.2 (9/10/2019)   Warfarin maintenance plan:   4 mg every Mon, Fri; 2 mg all other days   Weekly warfarin total:   18 mg   No change documented:   Shonna Steele   Plan last modified:   Lino Barnard RP (2019)   Next INR check:   10/8/2019   Priority:   Maintenance   Target end date:   Indefinite    Indications    Permanent atrial fibrillation (CMS/HCC) [I48.2]             Anticoagulation Episode Summary     INR check location:       Preferred lab:       Send INR reminders to:    [facility]    Comments:         Anticoagulation Care Providers     Provider Role Specialty Phone number    Felicitas Brantley MD Referring Cardiology [phone]          Clinic Interview:  Patient Findings     Negatives:   Signs/symptoms of thrombosis, Signs/symptoms of bleeding,   Laboratory test error suspected, Change in health, Change in alcohol use,   Change in activity, Upcoming invasive procedure, Emergency department   visit, Upcoming dental procedure, Missed doses, Extra doses, Change in   medications, Change in diet/appetite, Hospital admission, Bruising, Other   complaints      Clinical Outcomes     Negatives:   Major bleeding event, Thromboembolic event,   Anticoagulation-related hospital admission, Anticoagulation-related ED   visit, Anticoagulation-related fatality        INR History:  Anticoagulation Monitoring 2019 2019 9/10/2019   INR 3.0 2.7 2.2   INR Date 2019 2019 9/10/2019   INR Goal 2.0-3.0 2.0-3.0 2.0-3.0   Trend Same Same Same   Last Week Total 18 mg 18 mg 18 mg   Next Week Total 18 mg 18 mg 18 mg   Sun 2 mg 2 mg 2 mg   Mon 4 mg 4 mg 4 mg   Tue 2 mg 2 mg 2 mg   Wed 2 mg 2 mg 2 mg   Thu 2 mg 2 mg 2 mg   Fri 4 mg 4 mg 4 mg   Sat 2 mg 2 mg 2 mg   Visit Report - - -   Some recent data might be hidden       Plan:  1. INR is  therapeutic today- see above in Anticoagulation Summary.   Will instruct Yudith Robertson to continue their warfarin regimen- see above in Anticoagulation Summary.  2. Follow up in 4 weeks.  3. Patient declines warfarin refills.  4. Verbal and written information provided. Patient expresses understanding and has no further questions at this time.    Shonna Steele

## 2019-09-12 NOTE — PROGRESS NOTES
Subjective   Yudith Robertson is a 89 y.o. female who presents due to lower abdominal cramping with loose stools.    She has a questionable hx of IBS (daughter is here with her today and reports a previous hx) with intermittent exacerbations. She c/o lower abdominal cramping with loose stools for the past several weeks. Denies change in diet. She takes Bentyl as needed with good results but is afraid to use consistently, taking 1-2 tablets daily sparingly. She denies side effect from medication. Denies rectal bleeding.  She alternates between constipation and diarrhea, c/o not having a BM for a couple of days and then several loose stools in one day.  She had a CT abd/pelvis done in 2017 which showed colonic diverticulosis.      Abdominal Pain   This is a recurrent problem. The current episode started more than 1 month ago. The problem occurs intermittently. The problem has been waxing and waning. The pain is located in the LLQ and RLQ. The pain is moderate. The quality of the pain is cramping. The abdominal pain does not radiate. Associated symptoms include arthralgias, constipation and diarrhea. Pertinent negatives include no dysuria, fever, frequency, headaches, nausea or vomiting. Treatments tried: Bentyl. The treatment provided moderate relief.   Diarrhea    Associated symptoms include abdominal pain and arthralgias. Pertinent negatives include no chills, coughing, fever, headaches or vomiting.        The following portions of the patient's history were reviewed and updated as appropriate: allergies, current medications, past social history and problem list.    Past Medical History:   Diagnosis Date   • Allergic rhinitis    • Anemia    • Atrial fibrillation (CMS/[facility])    • Atypical chest pain    • CAD (coronary artery disease)    • Carotid artery stenosis     20-30% bilateral   • Cerebral artery occlusion with cerebral infarction (CMS/[facility]) 1997   • CHF (congestive heart failure) (CMS/[facility])    • Diastolic congestive  heart failure (CMS/HCC)     Echo 3/2012 with normal LVEF, mod LVH   • Difficulty swallowing    • Dizzy     mild   • Edema     In Calf   • Esophageal reflux     GERD, history of esophageal stenosis s/p dilation   • GERD (gastroesophageal reflux disease)    • H/O bone density study 06/16/2015    Osteopenia   • H/O mammogram 02/04/2014   • H/O thyroid nodule    • History of esophageal stricture    • Hyperlipidemia    • Hypertension    • IBS (irritable bowel syndrome)    • Intestinal obstruction (CMS/HCC)     small bowel obstruction   • LBBB (left bundle branch block)    • Mild aortic insufficiency    • Mild mitral insufficiency    • Mixed hyperlipidemia    • Moderate tricuspid insufficiency    • Mood disorder in conditions classified elsewhere    • Osteoarthrosis    • Osteopenia    • Polyarthropathy, multiple sites    • Postural dizziness with presyncope    • SOB (shortness of breath)    • Transient cerebral ischemia    • Valvular heart disease     Echo 1/17/14: EF 54%, elevated LAP, mild-mod MR, mod-severe TR, moderate PHTN (52mm Hg)         Current Outpatient Medications:   •  acetaminophen (TYLENOL) 500 MG tablet, Take 500 mg by mouth every 6 (six) hours as needed for mild pain (1-3)., Disp: , Rfl:   •  carvedilol (COREG) 12.5 MG tablet, Take 1 tablet by mouth 2 (Two) Times a Day With Meals., Disp: 180 tablet, Rfl: 3  •  dicyclomine (BENTYL) 10 MG capsule, Take 1 capsule by mouth 4 (Four) Times a Day Before Meals & at Bedtime., Disp: 90 capsule, Rfl: 1  •  fexofenadine (ALLEGRA) 180 MG tablet, Take 1 tablet by mouth Daily., Disp: 90 tablet, Rfl: 2  •  isosorbide mononitrate (IMDUR) 60 MG 24 hr tablet, TAKE 1 TABLET BY MOUTH  TWICE A DAY, Disp: 180 tablet, Rfl: 3  •  lansoprazole (PREVACID) 15 MG capsule, Take 2 capsules by mouth Daily., Disp: 180 capsule, Rfl: 2  •  LORazepam (ATIVAN) 0.5 MG tablet, TAKE 1 TABLET BY MOUTH EVERY 8 HOURS AS NEEDED FOR ANXIETY, Disp: 50 tablet, Rfl: 2  •  O2 (OXYGEN), Inhale 2 L/min 1  (One) Time., Disp: , Rfl:   •  potassium chloride (K-DUR) 10 MEQ CR tablet, Take 1 tablet by mouth Daily., Disp: 90 tablet, Rfl: 2  •  sacubitril-valsartan (ENTRESTO) 49-51 MG tablet, Take 1 tablet by mouth Every 12 (Twelve) Hours., Disp: 180 tablet, Rfl: 1  •  simvastatin (ZOCOR) 20 MG tablet, TAKE 1 TABLET BY MOUTH  NIGHTLY, Disp: 90 tablet, Rfl: 1  •  torsemide (DEMADEX) 20 MG tablet, TAKE TWO TABLETS BY MOUTH ONCE DAILY, Disp: 60 tablet, Rfl: 11  •  vitamin D (ERGOCALCIFEROL) 79561 units capsule capsule, Take 50,000 Units by mouth 1 (One) Time Per Week., Disp: , Rfl:   •  warfarin (COUMADIN) 2 MG tablet, TAKE 1 TABLET BY MOUTH ONCE DAILY EXCEPT TAKE 2 TABLETS ON Monday AND Friday OR AS DIRECTED BY MED MANAGEMENT CLINIC, Disp: 108 tablet, Rfl: 1    Allergies   Allergen Reactions   • Montelukast Nausea Only   • Morphine      Makes pt feel freaked out/loopy   • Sulfa Antibiotics Unknown (See Comments)     Kidney damage       Review of Systems   Constitutional: Positive for fatigue. Negative for chills, fever and unexpected weight change.   HENT: Negative for congestion, ear pain, postnasal drip, sinus pressure, sore throat and trouble swallowing.    Eyes: Negative for visual disturbance.   Respiratory: Negative for cough, chest tightness and wheezing.    Cardiovascular: Negative for chest pain, palpitations and leg swelling.   Gastrointestinal: Positive for abdominal pain, constipation and diarrhea. Negative for blood in stool, nausea and vomiting.   Genitourinary: Negative for dysuria, frequency and urgency.   Musculoskeletal: Positive for arthralgias. Negative for joint swelling.   Skin: Negative for color change.   Neurological: Positive for weakness. Negative for syncope and headaches.   Hematological: Does not bruise/bleed easily.       Objective   Vitals:    09/11/19 1101   BP: 132/80   BP Location: Left arm   Patient Position: Sitting   Cuff Size: Adult   Pulse: 70   Temp: 98 °F (36.7 °C)   TempSrc: Oral  "  SpO2: 95%   Weight: 68.9 kg (152 lb)   Height: 152.4 cm (60\")     Physical Exam   Constitutional: She appears well-developed and well-nourished. She is cooperative. She does not have a sickly appearance. She does not appear ill.   HENT:   Head: Normocephalic.   Right Ear: Hearing, tympanic membrane and external ear normal.   Left Ear: Hearing, tympanic membrane and external ear normal.   Nose: Nose normal. No mucosal edema, rhinorrhea, sinus tenderness or nasal deformity. Right sinus exhibits no maxillary sinus tenderness and no frontal sinus tenderness. Left sinus exhibits no maxillary sinus tenderness and no frontal sinus tenderness.   Mouth/Throat: Oropharynx is clear and moist and mucous membranes are normal. Normal dentition.   Eyes: Conjunctivae and lids are normal. Right eye exhibits no discharge and no exudate. Left eye exhibits no discharge and no exudate.   Neck: Trachea normal. Carotid bruit is not present. No edema present. No thyroid mass present.   Cardiovascular: Regular rhythm, normal heart sounds and normal pulses.   No murmur heard.  Pulmonary/Chest: Breath sounds normal. No respiratory distress. She has no decreased breath sounds. She has no wheezes. She has no rhonchi. She has no rales.   Abdominal: Soft. Normal appearance and bowel sounds are normal. There is no tenderness. There is no rebound and no guarding.   Musculoskeletal:   Ambulating with a walker for assistance   Lymphadenopathy:        Head (right side): No submental, no submandibular, no tonsillar, no preauricular, no posterior auricular and no occipital adenopathy present.        Head (left side): No submental, no submandibular, no tonsillar, no preauricular, no posterior auricular and no occipital adenopathy present.   Neurological: She is alert.   Skin: Skin is warm, dry and intact. No cyanosis. Nails show no clubbing.       Assessment/Plan   Yudith was seen today for abdominal pain and diarrhea.    Diagnoses and all orders for " this visit:    Diarrhea, unspecified type    Generalized abdominal pain  Comments:  intermittent cramping   Orders:  -     dicyclomine (BENTYL) 10 MG capsule; Take 1 capsule by mouth 4 (Four) Times a Day Before Meals & at Bedtime.    Reviewed labs from 7/2019 with patient which did not show any acute abnl. Her sx are suggestive of IBS-discussed use of Bentyl and more consistent use for better mgmt of sx. She will start taking more consistently and monitor sx, she has f/u appt in several weeks and which time we can re-evaluate.

## 2019-09-25 NOTE — TELEPHONE ENCOUNTER
"Pt calls to report that for the last \"3 to 4 weeks\" she has noticed that her systolic is 170-180 and diastolic is in the 70s    Pts states that her HR is also in the 70s but that she intermittently will feel it beating fast and cannot attribute this to exertion.    Pt states that she has noticed that she is more short of breath with minimal exertion, non-radiating mid sternal chest discomfort.    Pt denies any swelling at this time    Pt reports that she is compliant with medications below are her cardiac medications    Carvedilol 12.5mg BID  Isosorbide Mononitrate 60 mg QD  Potassium 10 mEq QD  Entresto 49-51 mg BID  Warfarin 2 mg as directed.    I spoke with Zoie and she advised that the Pt should be seen in office either tomorrow, Friday or early next week.     Info to the Pt she is able to be seen tomorrow in the morning and I have scheduled her with Mariana Herbert LOV  2/25/19    Assessment/Plan:      Assessment       1. Heart Failure  Assessment  • NYHA class III-B - There is significant limitation of physical activity. The patient is comfortable at rest, but minimal activity causes fatigue, palpitations or shortness of breath.  • Beta blocker prescribed  • Angiotensin receptor blocker (ARB) prescribed  • The most recent ejection fraction is 27%     Subjective/Objective    • Physical exam findings negative for peripheral edema and elevated JVP.        2.  Chest pain.  She had a heart catheterization in the past which showed nonobstructive disease.  She had a normal stress test in August 2017.  3. Atrial Fibrillation and Atrial Flutter  Assessment  • The patient has permanent atrial fibrillation  • The patient's CHADS2-VASc score is 7  • A [id number] score of 2 or more is considered a high risk for a thromboembolic event     Plan  • Continue in atrial fibrillation with rate control  • Continue warfarin for antithrombotic therapy, bleeding issues discussed  • Continue beta blocker for rate control     4. " Valvular heart disease with moderate tricuspid regurgitation and mild mitral regurgitation.   5. Moderate pulmonary hypertension.   6. History of carotid stenosis, 30%-40% bilateral disease.   7. History of gastroesophageal reflux disease and esophageal stenosis.   8. Hyperlipidemia.   9. History of small bowel obstruction.   10. History of left bundle branch block.   11. History of stroke.   12. Cough. Better.  13.  Hypoxia.  She follows with Dr. Salomon.  She wears oxygen at night.      I will see her back in 1 year unless she is having problems sooner.

## 2019-09-26 NOTE — PROGRESS NOTES
Date of Office Visit: 2019  Encounter Provider: STEFANO Feliciano  Place of Service: [facility] CARDIOLOGY  Patient Name: Yudith Robertson  :1927    No chief complaint on file.  :     HPI: Yudith Robertson is a 89 y.o. female  with history of hypertension, left bundle branch block, permanent atrial fibrillation, carotid artery disease, CVA, chronic combined systolic and diastolic congestive heart failure and pulmonary hypertension. She is followed by Dr. Brantley. I will be seeing her for the first time today and have reviewed her medical record.      In  she had a heart catheterization which showed nonobstructive coronary artery disease with 30-40% stenosis in various vessels with an ejection fraction of 50%.  She had an echocardiogram in 2014 which showed normal left ventricular systolic function with an EF of 54%, abnormal diastolic filling pressure, mild to moderate mitral regurgitation with moderate to severe tricuspid regurgitation and RVSP 52 mmHg.  She had persistent atrial fibrillation at that time.  In 2015 she presented with chest pain.  She had a PET perfusion study which showed no ischemia and normal left ventricular systolic function.      In 2015 she was admitted with acute diastolic congestive heart failure.  She required IV Lasix and was transitioned to oral Lasix.  She complained of chest pain which sounded like esophageal stricture.  She was to have an outpatient EGD and ultimately had esophageal stricture dilation.  In 2016 she complained of shortness of breath with paroxysmal nocturnal dyspnea and orthopnea.  She had an echocardiogram which showed normal left ventricular systolic function with an EF 64%, mild concentric hypertrophy with elevated left atrial pressure.  The basal inferior mid inferior segments were hypokinetic there was severe thickening of the aortic valve with mild aortic regurgitation severe mitral annular  calcification with moderate tricuspid valve stenosis and mild regurgitation.  In May 2017 she required another esophageal dilation.  After being off of her warfarin for that she presented at [facility] with stroke and received TPA.  Volume overload in summer and fall 2017.  She had an echocardiogram around that time which showed EF it dropped to 27% with severe atrial enlargement on the left, moderate aortic regurgitation mild to moderate tricuspid regurgitation with elevated RVSP at 65 mmHg.  Stress test which showed an ejection fraction of 30% otherwise no ischemia.  She was started on Entresto and valsartan was discontinued.  He had recurrent heart failure September 2017 and was discharged on torsemide.  She was last seen in the office in February 2019 and had been doing relatively well.      She presents today in follow-up because she has been having elevated blood pressure over the last several months.  She has had some lightheadedness.  Since her last visit she has been started on vitamin D.  She has baseline shortness of breath with exertion.  She has fatigue all the time.  She has some pressure in the middle of the chest which does not radiate and goes away on its own.  Other than her blood pressure she reports doing relatively well.      Allergies   Allergen Reactions   • Latex Itching   • Montelukast Nausea Only   • Morphine      Makes pt feel freaked out/loopy   • Sulfa Antibiotics Unknown (See Comments)     Kidney damage       Past Medical History:   Diagnosis Date   • Allergic rhinitis    • Anemia    • Atrial fibrillation (CMS/[facility])    • Atypical chest pain    • CAD (coronary artery disease)    • Carotid artery stenosis     20-30% bilateral   • Cerebral artery occlusion with cerebral infarction (CMS/HCC) 1997   • CHF (congestive heart failure) (CMS/HCC)    • Diastolic congestive heart failure (CMS/HCC)     Echo 3/2012 with normal LVEF, mod LVH   • Difficulty swallowing    • Dizzy   "   mild   • Edema     In [place]   • Esophageal reflux     GERD, history of esophageal stenosis s/p dilation   • GERD (gastroesophageal reflux disease)    • H/O bone density study 06/16/2015    Osteopenia   • H/O mammogram 02/04/2014   • H/O thyroid nodule    • History of esophageal stricture    • Hyperlipidemia    • Hypertension    • IBS (irritable bowel syndrome)    • Intestinal obstruction (CMS/HCC)     small bowel obstruction   • LBBB (left bundle branch block)    • Mild aortic insufficiency    • Mild mitral insufficiency    • Mixed hyperlipidemia    • Moderate tricuspid insufficiency    • Mood disorder in conditions classified elsewhere    • Osteoarthrosis    • Osteopenia    • Polyarthropathy, multiple sites    • Postural dizziness with presyncope    • SOB (shortness of breath)    • Transient cerebral ischemia    • Valvular heart disease     Echo 1/17/14: EF 54%, elevated LAP, mild-mod MR, mod-severe TR, moderate PHTN (52mm Hg)       Past Surgical History:   Procedure Laterality Date   • APPENDECTOMY     • BACK SURGERY     • ENDOSCOPY N/A 5/22/2017    Procedure: ESOPHAGOGASTRODUODENOSCOPY WITH GASTON DILATATION 46FR, 48FR,52FR  AND ESOPHAGEAL BRUSHINGS;  Surgeon: Rebecca Becerra MD;  Location: [facility] ENDOSCOPY;  Service:    • EYE SURGERY Right 12/10/2008    Vitrectomy-Dr. Yogi Finnegan   • HYSTERECTOMY     • LAPAROTOMY SALPINGO OOPHORECTOMY N/A 10/29/2008    Dr. Lucas Mills   • UPPER GASTROINTESTINAL ENDOSCOPY N/A 07/24/2015    Dr. Jayce Abrams         Family and social history reviewed.     ROS  All other systems were reviewed and are negative          Objective:     Vitals:    09/26/19 0925   BP: 180/68   BP Location: Left arm   Patient Position: Sitting   Pulse: 75   Resp: 16   Weight: 68.3 kg (150 lb 9.6 oz)   Height: 152.4 cm (60\")     Body mass index is 29.41 kg/m².    PHYSICAL EXAM:  Physical Exam   Constitutional: She is oriented to person, place, and time. She appears well-developed and " well-nourished. No distress.   HENT:   Head: Normocephalic.   Eyes: Conjunctivae are normal.   Neck: Normal range of motion. No JVD present.   Cardiovascular: Normal rate and intact distal pulses. An irregularly irregular rhythm present.   Murmur heard.   Systolic murmur is present with a grade of 1/6 at the upper right sternal border.  Pulses:       Carotid pulses are 2+ on the right side, and 2+ on the left side.       Radial pulses are 2+ on the right side, and 2+ on the left side.        Posterior tibial pulses are 2+ on the right side, and 2+ on the left side.   Pulmonary/Chest: Effort normal and breath sounds normal. No respiratory distress. She has no wheezes. She has no rhonchi. She has no rales. She exhibits no tenderness.   Abdominal: Soft. Bowel sounds are normal. She exhibits no distension.   Musculoskeletal: Normal range of motion. She exhibits no edema.   Neurological: She is alert and oriented to person, place, and time.   Skin: Skin is warm, dry and intact. No rash noted. She is not diaphoretic. No cyanosis.   Psychiatric: She has a normal mood and affect. Her behavior is normal. Judgment and thought content normal.         ECG 12 Lead  Date/Time: 9/26/2019 4:03 PM  Performed by: Mariana Saeed APRN  Authorized by: Mariana Saeed APRN   Comparison: compared with previous ECG from 2/25/2019  Similar to previous ECG  Rhythm: atrial fibrillation  Ectopy: unifocal PVCs  Rate: normal    Clinical impression: abnormal EKG            Current Outpatient Medications   Medication Sig Dispense Refill   • acetaminophen (TYLENOL) 500 MG tablet Take 500 mg by mouth every 6 (six) hours as needed for mild pain (1-3).     • carvedilol (COREG) 12.5 MG tablet Take 1 tablet in am and two tablets at night 180 tablet 3   • dicyclomine (BENTYL) 10 MG capsule Take 1 capsule by mouth 4 (Four) Times a Day Before Meals & at Bedtime. 90 capsule 1   • fexofenadine (ALLEGRA) 180 MG tablet Take 1 tablet by mouth Daily. 90 tablet 2    • isosorbide mononitrate (IMDUR) 60 MG 24 hr tablet TAKE 1 TABLET BY MOUTH  TWICE A  tablet 3   • lansoprazole (PREVACID) 15 MG capsule Take 2 capsules by mouth Daily. 180 capsule 2   • LORazepam (ATIVAN) 0.5 MG tablet TAKE 1 TABLET BY MOUTH EVERY 8 HOURS AS NEEDED FOR ANXIETY 50 tablet 2   • O2 (OXYGEN) Inhale 2 L/min 1 (One) Time.     • potassium chloride (K-DUR) 10 MEQ CR tablet Take 1 tablet by mouth Daily. 90 tablet 2   • sacubitril-valsartan (ENTRESTO) 49-51 MG tablet Take 1 tablet by mouth Every 12 (Twelve) Hours. 180 tablet 1   • simvastatin (ZOCOR) 20 MG tablet TAKE 1 TABLET BY MOUTH  NIGHTLY 90 tablet 1   • torsemide (DEMADEX) 20 MG tablet TAKE TWO TABLETS BY MOUTH ONCE DAILY 60 tablet 11   • vitamin D (ERGOCALCIFEROL) 93033 units capsule capsule Take 50,000 Units by mouth 1 (One) Time Per Week.     • warfarin (COUMADIN) 2 MG tablet TAKE 1 TABLET BY MOUTH ONCE DAILY EXCEPT TAKE 2 TABLETS ON Monday AND Friday OR AS DIRECTED BY MED MANAGEMENT CLINIC 108 tablet 1     No current facility-administered medications for this visit.      Assessment:       Diagnosis Plan   1. Permanent atrial fibrillation     2. Essential hypertension  carvedilol (COREG) 12.5 MG tablet   3. Pulmonary hypertension (CMS/HCC)     4. Left bundle branch block     5. Non-rheumatic tricuspid valve insufficiency     6. Mixed hyperlipidemia          Orders Placed This Encounter   Procedures   • ECG 12 Lead     This order was created via procedure documentation         Plan:       1.  Chronic atrial fibrillation rate controlled anticoagulated with warfarin.  Atrial Fibrillation and Atrial Flutter  Assessment  • The patient has permanent atrial fibrillation  • The patient's CHADS2-VASc score is 7  • A OGD2OC1-DYWo score of 2 or more is considered a high risk for a thromboembolic event  • Warfarin prescribed    Plan  • Continue in atrial fibrillation with rate control  • Continue warfarin for antithrombotic therapy, bleeding issues  discussed  • Continue beta blocker for rate control    2.  Chronic combined congestive heart failure appears euvolemic continue the same  3.  Pulmonary hypertension  4.  Left bundle branch block  5.  CVA   6.  Carotid artery disease  7.  Tricuspid insufficiency  8.  Hypertension her blood pressure has been more elevated over the last 3 weeks.  We will increase carvedilol only in the evening and I will give her a call next week.  She is to keep a list of her blood pressure and heart rate we will determine if increasing the a.m. dose of carvedilol as needed.  She was hesitant to increase both dosages in the day which is reasonable.  Apparently she had history of low blood pressure.    Follow-up in 6 months call with questions or concerns          It has been a pleasure to participate in this patient's care.      Thank you,  STEFANO Feliciano      **Marianne Disclaimer:**  Much of this encounter note is an electronic transcription/translation of spoken language to printed text. The electronic translation of spoken language may permit erroneous, or at times, nonsensical words or phrases to be inadvertently transcribed. Although I have reviewed the note for such errors, some may still exist.

## 2019-10-01 NOTE — TELEPHONE ENCOUNTER
I return called to patient reports having a quarter sized lump on her rectum. She denies any pain, pus or fever. She reports its been there for about one week. I have recommended she be seen in office. She may use warm sitz bath if she chooses.

## 2019-10-01 NOTE — TELEPHONE ENCOUNTER
----- Message from Janessa Bautista sent at 10/1/2019  2:13 PM EDT -----  Pt would like to speak with Kati..  She wants to talk to her about a lump she has on her rectum.  She had to cxl her appt today because  is ill and she had to take him to the doctor      [phone] or [phone]

## 2019-10-02 NOTE — TELEPHONE ENCOUNTER
Spoke with patient.     9/30 am /67 P 75  Pm 176/68 P 67    10/1 am 177/72 P 72  10/02 164/73 P 76      We will increase carvedilol to 25 BID. She will see us in 4 months and call with questions or concerns .

## 2019-10-02 NOTE — TELEPHONE ENCOUNTER
----- Message from STEFANO Feliciano sent at 9/26/2019 10:05 AM EDT -----  Call f/u on BP and Hr since increasing night coreg. Send to mail order?

## 2019-10-07 NOTE — TELEPHONE ENCOUNTER
The patient picked up a refill of carvedilol 25 mg  today she is unsure if the directions are the way they need to be it said take one in the am and 2 in the pm. She is only going to take one in the am and one in the PM today until she hears back from you with any other directions on taking this medications.    Pt # [phone]    Thank you  Brook

## 2019-10-08 NOTE — PROGRESS NOTES
Subjective   Yudith Robertson is a 89 y.o. female.     She noticed rectal mass about two weeks. It hasn't gotten worst but wants it checked. She denies any rectal bleeding. She was constipated several weeks ago, which has improved. She does have some intermittent rectal itching. She has applied warm rags/water to area and some topical stuff (unsure of name).       Abdominal Cramping   This is a new problem. The current episode started more than 1 year ago. The problem occurs intermittently. The problem has been waxing and waning. The pain is located in the generalized abdominal region (upper abdominal ). The pain is at a severity of 0/10. The patient is experiencing no pain. The quality of the pain is cramping. The abdominal pain does not radiate. Associated symptoms include diarrhea and frequency. Pertinent negatives include no anorexia, arthralgias, belching, constipation, dysuria, fever, flatus, hematuria, melena, nausea or vomiting. Treatments tried: bentyl intermittent  The treatment provided significant relief.   Rectal Pain   This is a chronic problem. The current episode started more than 1 year ago. The problem has been unchanged. Associated symptoms include abdominal pain. Pertinent negatives include no anorexia, arthralgias, chills, coughing, fever, nausea or vomiting. Treatments tried: warm soaks  The treatment provided mild relief.        The following portions of the patient's history were reviewed and updated as appropriate: allergies, current medications, past family history, past medical history, past social history, past surgical history and problem list.    Review of Systems   Constitutional: Negative for chills and fever.   Respiratory: Negative for cough and shortness of breath.    Gastrointestinal: Positive for abdominal pain, diarrhea and rectal pain. Negative for anal bleeding, anorexia, blood in stool, constipation, flatus, melena, nausea and vomiting. Abdominal distention: intermittent cramping     Genitourinary: Positive for frequency. Negative for dysuria and hematuria.   Musculoskeletal: Negative for arthralgias.       Objective   Physical Exam   Constitutional: She is oriented to person, place, and time. She appears well-developed and well-nourished.   HENT:   Head: Normocephalic.   Nose: Nose normal.   Cardiovascular: Regular rhythm and normal heart sounds. Exam reveals no S3 and no S4.   No murmur heard.  No pedal edema    Pulmonary/Chest: Effort normal and breath sounds normal. She has no decreased breath sounds. She has no wheezes. She has no rhonchi. She has no rales.   Abdominal: Normal appearance and bowel sounds are normal. There is no hepatosplenomegaly. There is no tenderness. There is no CVA tenderness.   Genitourinary: Rectal exam shows external hemorrhoid (right thrombosed ).   Musculoskeletal: She exhibits no edema.   Neurological: She is alert and oriented to person, place, and time. Gait normal.   Skin: Skin is warm and dry.   Psychiatric: She has a normal mood and affect. Her speech is normal and behavior is normal. Judgment and thought content normal. Cognition and memory are normal.       Assessment/Plan   Yudith was seen today for abdominal cramping and rectal pain.    Diagnoses and all orders for this visit:    Generalized abdominal pain  Comments:  will check labs; continue with bentyl   Orders:  -     CBC & Differential  -     Comprehensive Metabolic Panel; Future  -     Comprehensive Metabolic Panel  -     CBC Auto Differential    Thrombosed external hemorrhoid  Comments:  sitz bath, use preparation H; she declines referral to colorectal

## 2019-10-08 NOTE — PATIENT INSTRUCTIONS
Hemorrhoids  Hemorrhoids are swollen veins in and around the rectum or anus. There are two types of hemorrhoids:  · Internal hemorrhoids. These occur in the veins that are just inside the rectum. They may poke through to the outside and become irritated and painful.  · External hemorrhoids. These occur in the veins that are outside of the anus and can be felt as a painful swelling or hard lump near the anus.  Most hemorrhoids do not cause serious problems, and they can be managed with home treatments such as diet and lifestyle changes. If home treatments do not help your symptoms, procedures can be done to shrink or remove the hemorrhoids.  What are the causes?  This condition is caused by increased pressure in the anal area. This pressure may result from various things, including:  · Constipation.  · Straining to have a bowel movement.  · Diarrhea.  · Pregnancy.  · Obesity.  · Sitting for long periods of time.  · Heavy lifting or other activity that causes you to strain.  · Anal sex.  What are the signs or symptoms?  Symptoms of this condition include:  · Pain.  · Anal itching or irritation.  · Rectal bleeding.  · Leakage of stool (feces).  · Anal swelling.  · One or more lumps around the anus.  How is this diagnosed?  This condition can often be diagnosed through a visual exam. Other exams or tests may also be done, such as:  · Examination of the rectal area with a gloved hand (digital rectal exam).  · Examination of the anal canal using a small tube (anoscope).  · A blood test, if you have lost a significant amount of blood.  · A test to look inside the colon (sigmoidoscopy or colonoscopy).  How is this treated?  This condition can usually be treated at home. However, various procedures may be done if dietary changes, lifestyle changes, and other home treatments do not help your symptoms. These procedures can help make the hemorrhoids smaller or remove them completely. Some of these procedures involve surgery,  and others do not. Common procedures include:  · Rubber band ligation. Rubber bands are placed at the base of the hemorrhoids to cut off the blood supply to them.  · Sclerotherapy. Medicine is injected into the hemorrhoids to shrink them.  · Infrared coagulation. A type of light energy is used to get rid of the hemorrhoids.  · Hemorrhoidectomy surgery. The hemorrhoids are surgically removed, and the veins that supply them are tied off.  · Stapled hemorrhoidopexy surgery. A circular stapling device is used to remove the hemorrhoids and use staples to cut off the blood supply to them.  Follow these instructions at home:  Eating and drinking  · Eat foods that have a lot of fiber in them, such as whole grains, beans, nuts, fruits, and vegetables. Ask your health care provider about taking products that have added fiber (fiber supplements).  · Drink enough fluid to keep your urine clear or pale yellow.  Managing pain and swelling  · Take warm sitz baths for 20 minutes, 3-4 times a day to ease pain and discomfort.  · If directed, apply ice to the affected area. Using ice packs between sitz baths may be helpful.  ? Put ice in a plastic bag.  ? Place a towel between your skin and the bag.  ? Leave the ice on for 20 minutes, 2-3 times a day.  General instructions  · Take over-the-counter and prescription medicines only as told by your health care provider.  · Use medicated creams or suppositories as told.  · Exercise regularly.  · Go to the bathroom when you have the urge to have a bowel movement. Do not wait.  · Avoid straining to have bowel movements.  · Keep the anal area dry and clean. Use wet toilet paper or moist towelettes after a bowel movement.  · Do not sit on the toilet for long periods of time. This increases blood pooling and pain.  Contact a health care provider if:  · You have increasing pain and swelling that are not controlled by treatment or medicine.  · You have uncontrolled bleeding.  · You have  difficulty having a bowel movement, or you are unable to have a bowel movement.  · You have pain or inflammation outside the area of the hemorrhoids.  This information is not intended to replace advice given to you by your health care provider. Make sure you discuss any questions you have with your health care provider.  Document Released: 12/15/2001 Document Revised: 05/17/2017 Document Reviewed: 08/31/2016  [employer] Interactive Patient Education © 2019 [employer]

## 2019-10-08 NOTE — PROGRESS NOTES
Anticoagulation Clinic Progress Note    Anticoagulation Summary  As of 10/8/2019    INR goal:   2.0-3.0   TTR:   77.0 % (1.1 y)   INR used for dosin.0 (10/8/2019)   Warfarin maintenance plan:   4 mg every Mon, Fri; 2 mg all other days   Weekly warfarin total:   18 mg   No change documented:   Thuy Gracia   Plan last modified:   Lino Barnard RPH (2019)   Next INR check:   2019   Priority:   Maintenance   Target end date:   Indefinite    Indications    Permanent atrial fibrillation [I48.21]             Anticoagulation Episode Summary     INR check location:       Preferred lab:       Send INR reminders to:    [facility]    Comments:         Anticoagulation Care Providers     Provider Role Specialty Phone number    Felicitas Brantley MD Referring Cardiology [phone]          Clinic Interview:  Patient Findings     Negatives:   Signs/symptoms of thrombosis, Signs/symptoms of bleeding,   Laboratory test error suspected, Change in health, Change in alcohol use,   Change in activity, Upcoming invasive procedure, Emergency department   visit, Upcoming dental procedure, Missed doses, Extra doses, Change in   medications, Change in diet/appetite, Hospital admission, Bruising, Other   complaints      Clinical Outcomes     Negatives:   Major bleeding event, Thromboembolic event,   Anticoagulation-related hospital admission, Anticoagulation-related ED   visit, Anticoagulation-related fatality        INR History:  Anticoagulation Monitoring 2019 9/10/2019 10/8/2019   INR 2.7 2.2 2.0   INR Date 2019 9/10/2019 10/8/2019   INR Goal 2.0-3.0 2.0-3.0 2.0-3.0   Trend Same Same Same   Last Week Total 18 mg 18 mg 18 mg   Next Week Total 18 mg 18 mg 18 mg   Sun 2 mg 2 mg 2 mg   Mon 4 mg 4 mg 4 mg   Tue 2 mg 2 mg 2 mg   Wed 2 mg 2 mg 2 mg   Thu 2 mg 2 mg 2 mg   Fri 4 mg 4 mg 4 mg   Sat 2 mg 2 mg 2 mg   Visit Report - - -   Some recent data might be hidden       Plan:  1. INR is  therapeutic today- see above in Anticoagulation Summary.   Will instruct Yudith Robertson to continue their warfarin regimen- see above in Anticoagulation Summary.  2. Follow up in 4 weeks.  3. Patient declines warfarin refills.  4. Verbal and written information provided. Patient expresses understanding and has no further questions at this time.    Thuy Gracia

## 2019-10-08 NOTE — TELEPHONE ENCOUNTER
Spoke with patient to confirm that she is to only take 1 tablet twice daily. Patient verbalized understanding.

## 2019-10-30 NOTE — TELEPHONE ENCOUNTER
Pt called wanting to reschedule her anticoaggulation appt to the Nov 19 at 10am. Pt request someone give her a call back to let her know that day and time is ok.

## 2019-11-05 NOTE — PROGRESS NOTES
Anticoagulation Clinic Progress Note    Anticoagulation Summary  As of 2019    INR goal:   2.0-3.0   TTR:   74.7 % (1.2 y)   INR used for dosin.4! (2019)   Warfarin maintenance plan:   4 mg every Mon, Fri; 2 mg all other days   Weekly warfarin total:   18 mg   Plan last modified:   Lino Barnard RPH (2019)   Next INR check:   2019   Priority:   Maintenance   Target end date:   Indefinite    Indications    Permanent atrial fibrillation [I48.21]             Anticoagulation Episode Summary     INR check location:       Preferred lab:       Send INR reminders to:    [facility]    Comments:         Anticoagulation Care Providers     Provider Role Specialty Phone number    Felicitas Brantley MD Referring Cardiology [phone]          Clinic Interview:  Patient Findings     Positives:   Change in diet/appetite    Negatives:   Signs/symptoms of thrombosis, Signs/symptoms of bleeding,   Laboratory test error suspected, Change in health, Change in alcohol use,   Change in activity, Upcoming invasive procedure, Emergency department   visit, Upcoming dental procedure, Missed doses, Extra doses, Change in   medications, Hospital admission, Bruising, Other complaints    Comments:   Eating less greens and eating out more because    admitted; pt reports will get back on usual diet when  is   discharged today       Clinical Outcomes     Negatives:   Major bleeding event, Thromboembolic event,   Anticoagulation-related hospital admission, Anticoagulation-related ED   visit, Anticoagulation-related fatality    Comments:   Eating less greens and eating out more because    admitted; pt reports will get back on usual diet when  is   discharged today         INR History:  Anticoagulation Monitoring 9/10/2019 10/8/2019 2019   INR 2.2 2.0 4.4   INR Date 9/10/2019 10/8/2019 2019   INR Goal 2.0-3.0 2.0-3.0 2.0-3.0   Trend Same Same Same   Last Week Total 18 mg  18 mg 18 mg   Next Week Total 18 mg 18 mg 15 mg   Sun 2 mg 2 mg 2 mg   Mon 4 mg 4 mg 4 mg   Tue 2 mg 2 mg Hold (11/5)   Wed 2 mg 2 mg 1 mg (11/6)   Thu 2 mg 2 mg 2 mg   Fri 4 mg 4 mg 4 mg   Sat 2 mg 2 mg 2 mg   Visit Report - - -   Some recent data might be hidden       Plan:  1. INR is Supratherapeutic today- see above in Anticoagulation Summary.  Will instruct Yudith Robertson to Continue their warfarin regimen but will hold her dose today and only take 1 mg on 11/6. The patient's reports her  will be discharged from the hospital today and she expects to starting eating more greens again at this time. I would have liked to see her earlier but given the added stress from her 's discharge, pt will be eating more greens, and she would have some difficulty finding a ride, decided to wait until 11/12.  see above in Anticoagulation Summary.  2. Follow up in 1 week  3. Patient declines warfarin refills.  4. Verbal and written information provided. Patient expresses understanding and has no further questions at this time.    Danny Emery [facility]

## 2019-11-06 NOTE — PATIENT INSTRUCTIONS

## 2019-11-06 NOTE — PROGRESS NOTES
Subjective   Yudith Robertson is a 89 y.o. female.     She reports in the last couple days of burning and frequency. Then today she noticed blood in urine.       Blood in Urine   This is a new problem. The current episode started today. The problem has been gradually improving since onset. She describes the hematuria as gross hematuria. She reports clotting at the end of her urine stream. Her pain is at a severity of 2/10. She describes her urine color as clear. Irritative symptoms include frequency and urgency. Irritative symptoms do not include nocturia. Obstructive symptoms do not include dribbling, incomplete emptying, an intermittent stream, a slower stream or straining. Associated symptoms include dysuria. Pertinent negatives include no abdominal pain, chills, fever, nausea or vomiting. She is not sexually active.        The following portions of the patient's history were reviewed and updated as appropriate: allergies, current medications, past family history, past medical history, past social history, past surgical history and problem list.    Review of Systems   Constitutional: Negative for chills and fever.   Gastrointestinal: Negative for abdominal pain, nausea and vomiting.   Genitourinary: Positive for dysuria, frequency, hematuria and urgency. Negative for difficulty urinating, dyspareunia, incomplete emptying and nocturia.       Objective   Physical Exam   Constitutional: She is oriented to person, place, and time. She appears well-developed and well-nourished.   HENT:   Head: Normocephalic.   Nose: Nose normal.   Cardiovascular: Regular rhythm and normal heart sounds. Exam reveals no S3 and no S4.   No murmur heard.  Pulmonary/Chest: Effort normal and breath sounds normal. She has no decreased breath sounds. She has no wheezes. She has no rhonchi. She has no rales.   Abdominal: Normal appearance and bowel sounds are normal. There is no hepatosplenomegaly. There is no tenderness.   Musculoskeletal: She  exhibits no edema.   Neurological: She is alert and oriented to person, place, and time. Gait normal.   Skin: Skin is warm and dry.   Psychiatric: She has a normal mood and affect.       Assessment/Plan   Yudith was seen today for blood in urine and urinary frequency.    Diagnoses and all orders for this visit:    Frequent urination  -     Urinalysis With Microscopic If Indicated (No Culture) - Urine, Clean Catch; Future  -     Urinalysis With Microscopic If Indicated (No Culture) - Urine, Clean Catch  -     Urinalysis, Microscopic Only - Urine, Clean Catch; Future  -     Urinalysis, Microscopic Only - Urine, Clean Catch  -     Urine Culture - Urine, Urine, Clean Catch; Future  -     Urine Culture - Urine, Urine, Clean Catch    Cystitis  -     cephalexin (KEFLEX) 500 MG capsule; Take 1 capsule by mouth 2 (Two) Times a Day for 5 days.    Urine with bacteria and blood. Sent for culture will adjust care if warranted. Recent INR 4.4. She will inform cardiologist of start of antibiotic.

## 2019-11-12 NOTE — PROGRESS NOTES
I received call from patient nephrologist regarding patient. She is at Dr FITZGERALD office at this time and complaining of nausea. He is recommend she follow up with GI and also request vaginal US being obtain due to recent complaints of vaginal bleeding though she reports it has subsided. Will also have her repeat CBC next week.

## 2019-11-12 NOTE — PROGRESS NOTES
Anticoagulation Clinic Progress Note    Anticoagulation Summary  As of 2019    INR goal:   2.0-3.0   TTR:   73.8 % (1.2 y)   INR used for dosin.6 (2019)   Warfarin maintenance plan:   2 mg every day   Weekly warfarin total:   14 mg   Plan last modified:   Yudith Bobo [facility] (2019)   Next INR check:   2019   Priority:   Maintenance   Target end date:   Indefinite    Indications    Permanent atrial fibrillation [I48.21]             Anticoagulation Episode Summary     INR check location:       Preferred lab:       [facility]    Comments:         Anticoagulation Care Providers     Provider Role Specialty Phone number    Felicitas Brantley MD Referring Cardiology [phone]          Clinic Interview:  Patient Findings     Positives:   Change in health    Negatives:   Signs/symptoms of thrombosis, Signs/symptoms of bleeding,   Laboratory test error suspected, Change in alcohol use, Change in   activity, Upcoming invasive procedure, Emergency department visit,   Upcoming dental procedure, Missed doses, Extra doses, Change in   medications, Change in diet/appetite, Hospital admission, Bruising, Other   complaints    Comments:   Seeing other physicians; GI, nephrologist.       Clinical Outcomes     Negatives:   Major bleeding event, Thromboembolic event,   Anticoagulation-related hospital admission, Anticoagulation-related ED   visit, Anticoagulation-related fatality    Comments:   Seeing other physicians; GI, nephrologist.  Under a good deal of stress.        INR History:  Anticoagulation Monitoring 10/8/2019 2019 2019   INR 2.0 4.4 2.6   INR Date 10/8/2019 2019 2019   INR Goal 2.0-3.0 2.0-3.0 2.0-3.0   Trend Same Same Down   Last Week Total 18 mg 18 mg 15 mg   Next Week Total 18 mg 15 mg 14 mg   Sun 2 mg 2 mg 2 mg   Mon 4 mg 4 mg 2 mg   Tue 2 mg Hold () 2 mg   Wed 2 mg 1 mg () 2 mg   Thu 2 mg 2 mg 2 mg   Fri 4 mg 4  mg 2 mg   Sat 2 mg 2 mg 2 mg   Visit Report - - -   Some recent data might be hidden       Plan:  1. INR is Therapeutic today- see above in Anticoagulation Summary.  Will instruct Yudith Robertson to take 14mg/week warfarin dosage regimen.  2. Follow up in 2 weeks  3. Patient declines warfarin refills.  4. Verbal and written information provided. Patient expresses understanding and has no further questions at this time.    Yudith Bobo [facility]

## 2019-11-13 PROBLEM — D64.9 ANEMIA: Status: ACTIVE | Noted: 2019-01-01

## 2019-11-13 NOTE — TELEPHONE ENCOUNTER
Spoke with patient and she reports having some nausea and not feeling well. She denies any vomiting but food make her more nausea, oral fluids ok/.  She has been having normal bowel movements and urine slightly decreased. She will scheduled appt for 10:15 to recheck labs and obtain imaging of abdomen. I have told her if worsening of symptoms overnight to go to ER.     Please schedule for 10:15 am tomorrow. She can't do 8:00 am

## 2019-11-13 NOTE — TELEPHONE ENCOUNTER
Pt returning your call. I gave her information in message but she would like to talk to you.  She was will to make an appt but wanted to see you tomorrow.  Please advise.      PT # [phone]

## 2019-11-13 NOTE — TELEPHONE ENCOUNTER
I called patient to follow up with her from a call from Dr Pdero. There was no answer so I left a message for her to return call. I would like for her to make appointment for next week -revaluate symptoms and obtain CBC.

## 2019-11-14 PROBLEM — B02.9 HERPES ZOSTER: Status: ACTIVE | Noted: 2019-01-01

## 2019-11-14 PROBLEM — N17.9 ARF (ACUTE RENAL FAILURE) (HCC): Status: ACTIVE | Noted: 2019-01-01

## 2019-11-14 PROBLEM — J96.11 CHRONIC RESPIRATORY FAILURE WITH HYPOXIA (HCC): Status: ACTIVE | Noted: 2019-01-01

## 2019-11-15 NOTE — TELEPHONE ENCOUNTER
"Voice Count: [phone]  Voice message from [facility] [phone] received 01:29 PM 11/15/2019      \"Hi this is Irene Mcdaniels at [facility]. I'm calling in regards to a Yudith Robertson  date of birth 7/9/1927. We are [facility] and we have been given the referral to follow up for some home health needs her physician or her a nurse practitioner is Kati Estrada. So calling to see if I can get the approval for some home health if needed when she does discharge him. Our number here is [phone]. Thank you.\"     I spoke to Irene and they would like to set up skilled nursing and PT          "

## 2019-11-23 PROBLEM — I50.43 ACUTE ON CHRONIC COMBINED SYSTOLIC AND DIASTOLIC CONGESTIVE HEART FAILURE (HCC): Status: ACTIVE | Noted: 2019-01-01

## 2019-11-23 PROBLEM — N39.0 UTI (URINARY TRACT INFECTION), BACTERIAL: Status: ACTIVE | Noted: 2019-01-01

## 2019-11-23 PROBLEM — I50.9 ACUTE CONGESTIVE HEART FAILURE (HCC): Status: ACTIVE | Noted: 2019-01-01

## 2019-11-23 PROBLEM — A49.9 UTI (URINARY TRACT INFECTION), BACTERIAL: Status: ACTIVE | Noted: 2019-01-01

## 2019-11-26 PROBLEM — I34.81 MITRAL ANNULAR CALCIFICATION: Status: ACTIVE | Noted: 2019-01-01

## 2019-11-26 PROBLEM — N18.30 CKD (CHRONIC KIDNEY DISEASE) STAGE 3, GFR 30-59 ML/MIN (HCC): Status: ACTIVE | Noted: 2019-01-01

## 2019-11-26 PROBLEM — I51.7 CARDIOMEGALY: Status: ACTIVE | Noted: 2019-01-01

## 2019-11-26 PROBLEM — I35.1 AORTIC VALVE REGURGITATION, ACQUIRED: Status: ACTIVE | Noted: 2019-01-01

## 2019-11-26 PROBLEM — Z51.5 ADMISSION FOR HOSPICE CARE: Status: ACTIVE | Noted: 2019-01-01

## 2019-11-26 PROBLEM — J90 PLEURAL EFFUSION, BILATERAL: Status: ACTIVE | Noted: 2019-01-01

## 2019-12-02 ENCOUNTER — EPISODE CHANGES (OUTPATIENT)
Dept: CASE MANAGEMENT | Facility: OTHER | Age: 84
End: 2019-12-02

## 2020-01-08 DIAGNOSIS — E78.2 MIXED HYPERLIPIDEMIA: ICD-10-CM

## 2020-01-08 RX ORDER — SIMVASTATIN 20 MG
TABLET ORAL
Qty: 90 TABLET | Refills: 1 | OUTPATIENT
Start: 2020-01-08

## 2021-06-02 NOTE — THERAPY DISCHARGE NOTE
Acute Care - Physical Therapy Initial Eval/Discharge  [facility]     Patient Name: Yudith Robertson  : 1927  MRN: [id number]  Today's Date: 8/15/2017   Onset of Illness/Injury or Date of Surgery Date: 17            Admit Date: 2017    Visit Dx:    ICD-10-CM ICD-9-CM   1. Acute on chronic systolic congestive heart failure I50.23 428.23     428.0     Patient Active Problem List   Diagnosis   • Permanent atrial fibrillation   • Atypical chest pain   • Hypertension   • Diastolic CHF, chronic   • Tricuspid insufficiency   • Pulmonary hypertension   • Left bundle branch block   • Hyperlipidemia   • GERD (gastroesophageal reflux disease)   • Aspiration pneumonia of right lower lobe   • Acute on chronic diastolic congestive heart failure   • Acute on chronic respiratory failure with hypoxia   • Left upper quadrant abdominal mass   • Oropharyngeal dysphagia   • Valvular heart disease     Past Medical History:   Diagnosis Date   • Allergic rhinitis    • Anemia    • Atrial fibrillation    • Atypical chest pain    • CAD (coronary artery disease)    • Carotid artery stenosis     20-30% bilateral   • Cerebral artery occlusion with cerebral infarction    • CHF (congestive heart failure)    • Diastolic congestive heart failure     Echo 3/2012 with normal LVEF, mod LVH   • Difficulty swallowing    • Dizzy     mild   • Edema     In [place]   • Esophageal reflux     GERD, history of esophageal stenosis s/p dilation   • GERD (gastroesophageal reflux disease)    • H/O bone density study 2015    Osteopenia   • H/O mammogram 2014   • H/O thyroid nodule    • History of esophageal stricture    • Hyperlipidemia    • Hypertension    • IBS (irritable bowel syndrome)    • Intestinal obstruction     small bowel obstruction   • LBBB (left bundle branch block)    • Mild aortic insufficiency    • Mild mitral insufficiency    • Mixed hyperlipidemia    • Moderate tricuspid insufficiency    • Mood disorder in conditions  classified elsewhere    • Osteoarthrosis    • Osteopenia    • Polyarthropathy, multiple sites    • SOB (shortness of breath)    • Transient cerebral ischemia    • Valvular heart disease     Echo 1/17/14: EF 54%, elevated LAP, mild-mod MR, mod-severe TR, moderate PHTN (52mm Hg)     Past Surgical History:   Procedure Laterality Date   • APPENDECTOMY     • BACK SURGERY     • ENDOSCOPY N/A 5/22/2017    Procedure: ESOPHAGOGASTRODUODENOSCOPY WITH GASTON DILATATION 46FR, 48FR,52FR  AND ESOPHAGEAL BRUSHINGS;  Surgeon: Rebecca Becerra MD;  Location: [facility] ENDOSCOPY;  Service:    • EYE SURGERY Right 12/10/2008    Vitrectomy-Dr. Yogi Finnegan   • HYSTERECTOMY     • LAPAROTOMY SALPINGO OOPHORECTOMY N/A 10/29/2008    Dr. Lucas Mills   • UPPER GASTROINTESTINAL ENDOSCOPY N/A 07/24/2015    Dr. Jayce Abrams          PT ASSESSMENT (last 72 hours)      PT Evaluation       08/15/17 1519 08/15/17 0808    Rehab Evaluation    Document Type evaluation  -     Subjective Information agree to therapy  -     Patient Effort, Rehab Treatment good  -     Symptoms Noted During/After Treatment none  -     General Information    Onset of Illness/Injury or Date of Surgery Date 08/14/17  -     General Observations supine in bed, no acute distress noted at rest, family present  -     Pertinent History Of Current Problem pt admitted with SOA and CHF  -     Precautions/Limitations fall precautions  -     Prior Level of Function independent:;gait;transfer;bed mobility;ADL's   walks with walker PRN  -     Equipment Currently Used at Home walker, rolling  - walker, rolling;grab bar;shower chair;oxygen;wheelchair;cane, straight   O2 concentrator thru [place]  -SK    Plans/Goals Discussed With patient  -CH     Barriers to Rehab none identified  -     Living Environment    Lives With  spouse  -SK    Living Arrangements  house  -SK    Home Accessibility  bed and bath on same level  -SK    Transportation Available  family or friend  will provide  -SK    Clinical Impression    Criteria for Skilled Therapeutic Interventions Met no problems identified which require skilled intervention;current level of function same as previous level of function  -     Pain Assessment    Pain Assessment No/denies pain  -     Cognitive Assessment/Intervention    Current Cognitive/Communication Assessment functional  -     Orientation Status oriented x 4  -     Follows Commands/Answers Questions 100% of the time  -     Personal Safety WNL/WFL  -     Personal Safety Interventions fall prevention program maintained;gait belt;nonskid shoes/slippers when out of bed  -     ROM (Range of Motion)    General ROM no range of motion deficits identified  -     MMT (Manual Muscle Testing)    General MMT Assessment no strength deficits identified  -     Bed Mobility, Assessment/Treatment    Bed Mob, Supine to Sit, [place] supervision required  -     Bed Mob, Sit to Supine, [place] supervision required  -     Transfer Assessment/Treatment    Transfers, Sit-Stand [place] supervision required  -     Transfers, Stand-Sit [place] supervision required  -     Transfers, Sit-Stand-Sit, Assist Device rolling walker  -     Gait Assessment/Treatment    Gait, [place] Level supervision required  -     Gait, Assistive Device rolling walker  -     Gait, Distance (Feet) 180  -     Gait, Gait Deviations roby decreased  -     Motor Skills/Interventions    Additional Documentation Balance Skills Training (Group)  -     Balance Skills Training    Standing-Level of Assistance Distant supervision  -     Static Standing Balance Support assistive device  -     Gait Balance-Level of Assistance Distant supervision  -     Gait Balance Support assistive device  -     Positioning and Restraints    Pre-Treatment Position in bed  -     Post Treatment Position bed  -     In Bed supine;call light within reach;encouraged to call for  assist;exit alarm on;with family/caregiver;notified nsg  -       08/14/17 1200 08/14/17 0429    Rehab Evaluation    Document Type evaluation  -MT     Subjective Information no complaints  -MT     Patient Effort, Rehab Treatment good  -MT     Living Environment    Lives With  spouse  -    Living Arrangements  house  -    Home Accessibility  no concerns  -    Stair Railings at Home  none  -MH    Type of Financial/Environmental Concern  none  -    Transportation Available  car;family or friend will provide  -    Cognitive Assessment/Intervention    Current Cognitive/Communication Assessment functional  -MT       08/14/17 0424       General Information    Equipment Currently Used at Home walker, standard;oxygen  -MH       User Key  (r) = Recorded By, (t) = Taken By, (c) = Cosigned By    Initials Name Provider Type    MT Jennifer Rojas MS CCC-SLP Speech and Language Pathologist    ADITYA Curtis, PT Physical Therapist    GABRIELLA Cerna, KVNG Case Manager    SERGIO Blankenship RN Registered Nurse              PT Recommendation and Plan  Anticipated Discharge Disposition: home  Planned Therapy Interventions:  (no acute care PT needs identified at this time)  PT Frequency: evaluation only  Plan of Care Review  Plan Of Care Reviewed With: patient  Outcome Summary/Follow up Plan: Pt demonstrates adequate strength and balance to perform functional mobility and gait independently. Pt reports she has been walking with nursing and states she does not feel as though she needs PT at this time. PT will sign off.              Outcome Measures       08/15/17 1500          How much help from another person do you currently need...    Turning from your back to your side while in flat bed without using bedrails? 4  -CH      Moving from lying on back to sitting on the side of a flat bed without bedrails? 4  -CH      Moving to and from a bed to a chair (including a wheelchair)? 4  -CH      Standing up from a  chair using your arms (e.g., wheelchair, bedside chair)? 4  -CH      Climbing 3-5 steps with a railing? 3  -CH      To walk in hospital room? 4  -CH      AM-PAC 6 Clicks Score 23  -CH      Functional Assessment    Outcome Measure Options AM-PAC 6 Clicks Basic Mobility (PT)  -CH        User Key  (r) = Recorded By, (t) = Taken By, (c) = Cosigned By    Initials Name Provider Type    ADITYA Curtis, PT Physical Therapist           Time Calculation:         PT Charges       08/15/17 1525          Time Calculation    Start Time 1501  -CH      Stop Time 1518  -      Time Calculation (min) 17 min  -      PT Received On 08/15/17  -        User Key  (r) = Recorded By, (t) = Taken By, (c) = Cosigned By    Initials Name Provider Type    ADITYA Curtis, PT Physical Therapist          Therapy Charges for Today     Code Description Service Date Service Provider Modifiers Qty    53361692613 HC PT MOBILITY CURRENT 8/15/2017 Chelsea Curtis, PT GP, CI 1    47523552527 HC PT MOBILITY PROJECTED 8/15/2017 Chelsea Curtis, PT GP, CI 1    45729826476 HC PT MOBILITY DISCHARGE 8/15/2017 Chelsea Curtis, PT GP, CI 1    22341017984 HC PT EVAL LOW COMPLEXITY 1 8/15/2017 Chelsea Curtis, PT GP 1    23461593981 HC PT THER PROC EA 15 MIN 8/15/2017 Chelsea Curtis, PT GP 1          PT G-Codes  Outcome Measure Options: AM-PAC 6 Clicks Basic Mobility (PT)  Functional Limitation: Mobility: Walking and moving around  Mobility: Walking and Moving Around Current Status (): At least 1 percent but less than 20 percent impaired, limited or restricted  Mobility: Walking and Moving Around Goal Status (): At least 1 percent but less than 20 percent impaired, limited or restricted  Mobility: Walking and Moving Around Discharge Status (): At least 1 percent but less than 20 percent impaired, limited or restricted    PT Discharge Summary  Anticipated Discharge Disposition: home  Reason for Discharge: At baseline  function    Chelsea Curtis, PT  8/15/2017          No

## 2023-04-25 NOTE — PROGRESS NOTES
Subjective   Yudith Robertson is a 89 y.o. female who presents due to a painful lump in her left axillary area.    HPI Comments: She c/o a cystic lesion in the left axillary area for several years, has noticed increase in size and tenderness over the past week. Denies fever/chills.    Cyst   This is a chronic problem. The current episode started more than 1 year ago. The problem has been rapidly worsening. Pertinent negatives include no abdominal pain, arthralgias, chest pain, chills, congestion, coughing, fatigue, fever, joint swelling, nausea, rash, sore throat or vomiting. Nothing aggravates the symptoms. She has tried nothing for the symptoms.        The following portions of the patient's history were reviewed and updated as appropriate: allergies, current medications, past social history and problem list.    Past Medical History:   Diagnosis Date   • Allergic rhinitis    • Anemia    • Atrial fibrillation    • Atypical chest pain    • CAD (coronary artery disease)    • Carotid artery stenosis     20-30% bilateral   • Cerebral artery occlusion with cerebral infarction 1997   • CHF (congestive heart failure)    • Diastolic congestive heart failure     Echo 3/2012 with normal LVEF, mod LVH   • Difficulty swallowing    • Dizzy     mild   • Edema     In Calf   • Esophageal reflux     GERD, history of esophageal stenosis s/p dilation   • GERD (gastroesophageal reflux disease)    • H/O bone density study 06/16/2015    Osteopenia   • H/O mammogram 02/04/2014   • H/O thyroid nodule    • History of esophageal stricture    • Hyperlipidemia    • Hypertension    • IBS (irritable bowel syndrome)    • Intestinal obstruction     small bowel obstruction   • LBBB (left bundle branch block)    • Mild aortic insufficiency    • Mild mitral insufficiency    • Mixed hyperlipidemia    • Moderate tricuspid insufficiency    • Mood disorder in conditions classified elsewhere    • Osteoarthrosis    • Osteopenia    • Polyarthropathy, multiple  sites    • SOB (shortness of breath)    • Transient cerebral ischemia    • Valvular heart disease     Echo 1/17/14: EF 54%, elevated LAP, mild-mod MR, mod-severe TR, moderate PHTN (52mm Hg)         Current Outpatient Prescriptions:   •  acetaminophen (TYLENOL) 500 MG tablet, Take 500 mg by mouth every 6 (six) hours as needed for mild pain (1-3)., Disp: , Rfl:   •  carvedilol (COREG) 12.5 MG tablet, TAKE 1 TABLET BY MOUTH  TWICE A DAY WITH MEALS, Disp: 180 tablet, Rfl: 3  •  cephalexin (KEFLEX) 250 MG capsule, Take 1 capsule by mouth 3 (Three) Times a Day for 5 days., Disp: 15 capsule, Rfl: 0  •  ENTRESTO 49-51 MG tablet, TAKE ONE TABLET BY MOUTH EVERY 12 HOURS, Disp: 60 tablet, Rfl: 3  •  fexofenadine (ALLEGRA) 180 MG tablet, Take 1 tablet by mouth Daily., Disp: 90 tablet, Rfl: 3  •  isosorbide mononitrate (IMDUR) 60 MG 24 hr tablet, TAKE 1 TABLET BY MOUTH  TWICE A DAY, Disp: 180 tablet, Rfl: 3  •  lansoprazole (PREVACID) 15 MG capsule, Take 1 capsule by mouth Daily., Disp: 90 capsule, Rfl: 3  •  LORazepam (ATIVAN) 0.5 MG tablet, Take 1 tablet by mouth Every 8 (Eight) Hours As Needed for Anxiety., Disp: 90 tablet, Rfl: 0  •  O2 (OXYGEN), Inhale 2 L/min 1 (One) Time., Disp: , Rfl:   •  potassium chloride (K-DUR) 10 MEQ CR tablet, TAKE ONE TABLET BY MOUTH ONCE DAILY, Disp: 30 tablet, Rfl: 2  •  sacubitril-valsartan (ENTRESTO) 49-51 MG tablet, Take 1 tablet by mouth Every 12 (Twelve) Hours., Disp: , Rfl:   •  torsemide (DEMADEX) 20 MG tablet, Take 2 tablets by mouth Daily., Disp: 60 tablet, Rfl: 11  •  warfarin (COUMADIN) 2 MG tablet, TAKE ONE TABLET BY MOUTH ONCE DAILY AS DIRECTED, Disp: 30 tablet, Rfl: 1    Allergies   Allergen Reactions   • Montelukast Nausea Only   • Morphine      Makes pt feel freaked out/loopy       Review of Systems   Constitutional: Negative for chills, fatigue, fever and unexpected weight change.   HENT: Negative for congestion, ear pain, postnasal drip, sinus pressure, sore throat and trouble  "swallowing.    Eyes: Negative for visual disturbance.   Respiratory: Negative for cough, chest tightness and wheezing.    Cardiovascular: Negative for chest pain, palpitations and leg swelling.   Gastrointestinal: Negative for abdominal pain, blood in stool, nausea and vomiting.   Genitourinary: Negative for dysuria, frequency and urgency.   Musculoskeletal: Negative for arthralgias and joint swelling.   Skin: Negative for color change and rash.   Hematological: Does not bruise/bleed easily.       Objective   Vitals:    02/27/18 0918   BP: 140/78   BP Location: Left arm   Patient Position: Sitting   Cuff Size: Adult   Pulse: 100   Temp: 98 °F (36.7 °C)   TempSrc: Oral   SpO2: 95%   Weight: 60.3 kg (133 lb)   Height: 152.4 cm (60\")       Physical Exam   Constitutional: She appears well-developed and well-nourished. She is cooperative. She does not have a sickly appearance. She does not appear ill.   HENT:   Head: Normocephalic.   Right Ear: Hearing, tympanic membrane and external ear normal. No drainage, swelling or tenderness. Tympanic membrane is not injected, not scarred, not erythematous and not bulging. No middle ear effusion. No decreased hearing is noted.   Left Ear: Hearing, tympanic membrane and external ear normal. No drainage, swelling or tenderness. Tympanic membrane is not injected, not scarred, not erythematous and not bulging.  No middle ear effusion. No decreased hearing is noted.   Nose: Nose normal. No mucosal edema, rhinorrhea, sinus tenderness or nasal deformity. Right sinus exhibits no maxillary sinus tenderness and no frontal sinus tenderness. Left sinus exhibits no maxillary sinus tenderness and no frontal sinus tenderness.   Mouth/Throat: Oropharynx is clear and moist and mucous membranes are normal. Normal dentition.   Eyes: Conjunctivae and lids are normal. Pupils are equal, round, and reactive to light. Right eye exhibits no discharge and no exudate. Left eye exhibits no discharge and no " exudate.   Neck: Trachea normal and normal range of motion. Carotid bruit is not present. No edema present. No thyroid mass and no thyromegaly present.   Cardiovascular: Regular rhythm, normal heart sounds and normal pulses.    No murmur heard.  Pulmonary/Chest: Breath sounds normal. No respiratory distress. She has no decreased breath sounds. She has no wheezes. She has no rhonchi. She has no rales.   Lymphadenopathy:        Head (right side): No submental, no submandibular, no tonsillar, no preauricular, no posterior auricular and no occipital adenopathy present.        Head (left side): No submental, no submandibular, no tonsillar, no preauricular, no posterior auricular and no occipital adenopathy present.   Neurological: She is alert.   Skin: Skin is warm, dry and intact. No cyanosis. Nails show no clubbing.   Abscess in left axillary area with surrounding induration, area is fluctuant       Assessment/Plan   Yudith was seen today for cyst.    Diagnoses and all orders for this visit:    Abscess of axillary region  Comments:  pressure dressing applied which she will keep intact for 24 hours, will f/u Thur or Fri for removal of wick  Orders:  -     cephalexin (KEFLEX) 250 MG capsule; Take 1 capsule by mouth 3 (Three) Times a Day for 5 days.    Other orders  -     Incision & Drainage    Incision & Drainage  Date/Time: 2/27/2018 9:00 AM  Performed by: CARLOS ALBERTO GOMEZ  Authorized by: CARLOS ALBERTO GOMEZ   Consent: Verbal consent obtained.  Risks and benefits: risks, benefits and alternatives were discussed  Consent given by: patient  Type: abscess  Body area: upper extremity (left axillary area)  Anesthesia: local infiltration    Anesthesia:  Local Anesthetic: lidocaine 1% with epinephrine  Scalpel size: 11  Needle gauge: 22  Incision type: single straight  Drainage: purulent and  serosanguinous  Drainage amount: copious  Wound treatment: wound left open  Packing material: wick placed  Patient tolerance: Patient  Detail Level: Detailed tolerated the procedure well with no immediate complications                  Depth Of Biopsy: dermis Was A Bandage Applied: Yes Size Of Lesion In Cm: 0.6 X Size Of Lesion In Cm: 0 Biopsy Type: H and E Biopsy Method: Dermablade Anesthesia Type: 1% lidocaine without epinephrine Anesthesia Volume In Cc: 0.3 Hemostasis: Monsel's and Electrocautery Wound Care: Petrolatum Dressing: no dressing applied Destruction After The Procedure: No Type Of Destruction Used: Curettage Curettage Text: The wound bed was treated with curettage after the biopsy was performed. Cryotherapy Text: The wound bed was treated with cryotherapy after the biopsy was performed. Electrodesiccation Text: The wound bed was treated with electrodesiccation after the biopsy was performed. Electrodesiccation And Curettage Text: The wound bed was treated with electrodesiccation and curettage after the biopsy was performed. Silver Nitrate Text: The wound bed was treated with silver nitrate after the biopsy was performed. Lab: [id number] Consent was obtained and risks were reviewed including but not limited to scarring, infection, bleeding, scabbing, incomplete removal, nerve damage and allergy to anesthesia. Post-Care Instructions: I reviewed with the patient in detail post-care instructions. Patient is to keep the biopsy site dry overnight, and then apply bacitracin twice daily until healed. Patient may apply hydrogen peroxide soaks to remove any crusting. Notification Instructions: Patient will be notified of biopsy results. However, patient instructed to call the office if not contacted within 2 weeks. Billing Type: Third-Party Bill Information: Selecting Yes will display possible errors in your note based on the variables you have selected. This validation is only offered as a suggestion for you. PLEASE NOTE THAT THE VALIDATION TEXT WILL BE REMOVED WHEN YOU FINALIZE YOUR NOTE. IF YOU WANT TO FAX A PRELIMINARY NOTE YOU WILL NEED TO TOGGLE THIS TO 'NO' IF YOU DO NOT WANT IT IN YOUR FAXED NOTE.

## (undated) DEVICE — Device: Brand: DEFENDO AIR/WATER/SUCTION AND BIOPSY VALVE

## (undated) DEVICE — BITEBLOCK OMNI BLOC

## (undated) DEVICE — TUBING, SUCTION, 1/4" X 10', STRAIGHT: Brand: MEDLINE

## (undated) DEVICE — BRUSH CYTO COLONOSCOPE 7F3MM 240CM RED

## (undated) DEVICE — CANN NASL CO2 TRULINK W/O2 A/